# Patient Record
Sex: FEMALE | Race: WHITE | NOT HISPANIC OR LATINO | Employment: OTHER | URBAN - METROPOLITAN AREA
[De-identification: names, ages, dates, MRNs, and addresses within clinical notes are randomized per-mention and may not be internally consistent; named-entity substitution may affect disease eponyms.]

---

## 2017-04-11 ENCOUNTER — HISTORICAL (OUTPATIENT)
Dept: ADMINISTRATIVE | Facility: HOSPITAL | Age: 42
End: 2017-04-11

## 2017-04-11 LAB
BUN SERPL-MCNC: 9 MG/DL (ref 7–25)
CALCIUM SERPL-MCNC: 9.4 MG/DL (ref 8.4–10.3)
CHLORIDE SERPL-SCNC: 102 MMOL/L (ref 96–110)
CHOLEST SERPL-MCNC: 270 MG/DL
CHOLEST/HDLC SERPL: 7.3 {RATIO} (ref 0–4.4)
CO2 SERPL-SCNC: 28 MMOL/L (ref 24–32)
CREAT SERPL-MCNC: 0.65 MG/DL (ref 0.7–1.1)
CREAT UR-MCNC: 134.8 MG/DL
CREAT UR-MCNC: 134.8 MG/DL
EST. AVERAGE GLUCOSE BLD GHB EST-MCNC: 272 MG/DL
GLUCOSE SERPL-MCNC: 217 MG/DL (ref 65–99)
HBA1C MFR BLD: 11.1 % (ref 4.7–5.6)
HDLC SERPL-MCNC: 37 MG/DL
LDLC SERPL CALC-MCNC: 176 MG/DL (ref 0–130)
MICROALBUMIN UR-MCNC: <2 MG/L (ref 0–19)
MICROALBUMIN/CREAT RATIO PNL UR: <1.5 MCG/MG CR (ref 0–29)
POTASSIUM SERPL-SCNC: 4.2 MMOL/L (ref 3.6–5.2)
PROT UR STRIP-MCNC: 9 MG/DL
PROT/CREAT UR-RTO: 66.8 MG/GM
SODIUM SERPL-SCNC: 138 MMOL/L (ref 135–146)
TRIGL SERPL-MCNC: 287 MG/DL
VLDLC SERPL CALC-MCNC: 57 MG/DL

## 2018-04-16 ENCOUNTER — HISTORICAL (OUTPATIENT)
Dept: ADMINISTRATIVE | Facility: HOSPITAL | Age: 43
End: 2018-04-16

## 2018-04-16 LAB
ABS NEUT (OLG): 6.23 X10(3)/MCL (ref 2.1–9.2)
ALBUMIN SERPL-MCNC: 3.7 GM/DL (ref 3.4–5)
ALBUMIN/GLOB SERPL: 1 RATIO (ref 1–2)
ALP SERPL-CCNC: 106 UNIT/L (ref 45–117)
ALT SERPL-CCNC: 15 UNIT/L (ref 12–78)
AST SERPL-CCNC: 6 UNIT/L (ref 15–37)
BASOPHILS # BLD AUTO: 0.06 X10(3)/MCL
BASOPHILS NFR BLD AUTO: 0 %
BILIRUB SERPL-MCNC: 0.1 MG/DL (ref 0.2–1)
BILIRUBIN DIRECT+TOT PNL SERPL-MCNC: <0.1 MG/DL
BILIRUBIN DIRECT+TOT PNL SERPL-MCNC: ABNORMAL MG/DL
BUN SERPL-MCNC: 5 MG/DL (ref 7–18)
CALCIUM SERPL-MCNC: 9.4 MG/DL (ref 8.5–10.1)
CHLORIDE SERPL-SCNC: 102 MMOL/L (ref 98–107)
CHOLEST SERPL-MCNC: 226 MG/DL
CHOLEST/HDLC SERPL: 6.8 {RATIO} (ref 0–4.4)
CO2 SERPL-SCNC: 31 MMOL/L (ref 21–32)
CREAT SERPL-MCNC: 0.7 MG/DL (ref 0.6–1.3)
EOSINOPHIL # BLD AUTO: 0.16 X10(3)/MCL
EOSINOPHIL NFR BLD AUTO: 1 %
ERYTHROCYTE [DISTWIDTH] IN BLOOD BY AUTOMATED COUNT: 12.3 % (ref 11.5–14.5)
EST. AVERAGE GLUCOSE BLD GHB EST-MCNC: 280 MG/DL
GLOBULIN SER-MCNC: 3.9 GM/ML (ref 2.3–3.5)
GLUCOSE SERPL-MCNC: 342 MG/DL (ref 74–106)
HBA1C MFR BLD: 11.4 % (ref 4.2–6.3)
HCT VFR BLD AUTO: 43.6 % (ref 35–46)
HDLC SERPL-MCNC: 33 MG/DL
HGB BLD-MCNC: 14.7 GM/DL (ref 12–16)
IMM GRANULOCYTES # BLD AUTO: 0.07 10*3/UL
IMM GRANULOCYTES NFR BLD AUTO: 1 %
LDLC SERPL CALC-MCNC: 146 MG/DL (ref 0–130)
LYMPHOCYTES # BLD AUTO: 4.6 X10(3)/MCL
LYMPHOCYTES NFR BLD AUTO: 39 % (ref 13–40)
MCH RBC QN AUTO: 29.6 PG (ref 26–34)
MCHC RBC AUTO-ENTMCNC: 33.7 GM/DL (ref 31–37)
MCV RBC AUTO: 87.9 FL (ref 80–100)
MONOCYTES # BLD AUTO: 0.55 X10(3)/MCL
MONOCYTES NFR BLD AUTO: 5 % (ref 4–12)
NEUTROPHILS # BLD AUTO: 6.23 X10(3)/MCL
NEUTROPHILS NFR BLD AUTO: 53 X10(3)/MCL
PLATELET # BLD AUTO: 305 X10(3)/MCL (ref 130–400)
PMV BLD AUTO: 12 FL (ref 7.4–10.4)
POTASSIUM SERPL-SCNC: 3.4 MMOL/L (ref 3.5–5.1)
PROT SERPL-MCNC: 7.6 GM/DL (ref 6.4–8.2)
RBC # BLD AUTO: 4.96 X10(6)/MCL (ref 4–5.2)
SODIUM SERPL-SCNC: 140 MMOL/L (ref 136–145)
T4 SERPL-MCNC: 10.2 MCG/DL (ref 4.8–13.9)
TRIGL SERPL-MCNC: 237 MG/DL
TSH SERPL-ACNC: 2.28 MIU/L (ref 0.36–3.74)
VLDLC SERPL CALC-MCNC: 47 MG/DL
WBC # SPEC AUTO: 11.7 X10(3)/MCL (ref 4.5–11)

## 2018-07-30 ENCOUNTER — HISTORICAL (OUTPATIENT)
Dept: RADIOLOGY | Facility: HOSPITAL | Age: 43
End: 2018-07-30

## 2018-08-14 ENCOUNTER — HISTORICAL (OUTPATIENT)
Dept: INTERNAL MEDICINE | Facility: CLINIC | Age: 43
End: 2018-08-14

## 2018-08-14 LAB
BUN SERPL-MCNC: 8 MG/DL (ref 7–18)
CALCIUM SERPL-MCNC: 9.2 MG/DL (ref 8.5–10.1)
CHLORIDE SERPL-SCNC: 98 MMOL/L (ref 98–107)
CHOLEST SERPL-MCNC: 256 MG/DL
CHOLEST/HDLC SERPL: 7.8 {RATIO} (ref 0–4.4)
CO2 SERPL-SCNC: 28 MMOL/L (ref 21–32)
CREAT SERPL-MCNC: 0.8 MG/DL (ref 0.6–1.3)
CREAT UR-MCNC: 92 MG/DL
CREAT/UREA NIT SERPL: 10
EST. AVERAGE GLUCOSE BLD GHB EST-MCNC: 252 MG/DL
GLUCOSE SERPL-MCNC: 323 MG/DL (ref 74–106)
HBA1C MFR BLD: 10.4 % (ref 4.2–6.3)
HDLC SERPL-MCNC: 33 MG/DL
LDLC SERPL CALC-MCNC: 153 MG/DL (ref 0–130)
MICROALBUMIN UR-MCNC: 9 MG/L (ref 0–19)
MICROALBUMIN/CREAT RATIO PNL UR: 9.8 MCG/MG CR (ref 0–29)
POTASSIUM SERPL-SCNC: 3.9 MMOL/L (ref 3.5–5.1)
SODIUM SERPL-SCNC: 135 MMOL/L (ref 136–145)
TRIGL SERPL-MCNC: 349 MG/DL
VLDLC SERPL CALC-MCNC: 70 MG/DL

## 2018-10-17 LAB — POC BETA-HCG (QUAL): NEGATIVE

## 2019-02-07 ENCOUNTER — HISTORICAL (OUTPATIENT)
Dept: ADMINISTRATIVE | Facility: HOSPITAL | Age: 44
End: 2019-02-07

## 2019-02-07 ENCOUNTER — HISTORICAL (OUTPATIENT)
Dept: INTERNAL MEDICINE | Facility: CLINIC | Age: 44
End: 2019-02-07

## 2019-02-07 LAB
ALBUMIN SERPL-MCNC: 3.6 GM/DL (ref 3.4–5)
ALBUMIN/GLOB SERPL: 0.8 RATIO (ref 1.1–2)
ALP SERPL-CCNC: 109 UNIT/L (ref 45–117)
ALT SERPL-CCNC: 11 UNIT/L (ref 12–78)
AST SERPL-CCNC: 14 UNIT/L (ref 15–37)
BILIRUB SERPL-MCNC: 0.3 MG/DL (ref 0.2–1)
BILIRUBIN DIRECT+TOT PNL SERPL-MCNC: <0.1 MG/DL
BILIRUBIN DIRECT+TOT PNL SERPL-MCNC: ABNORMAL MG/DL
BUN SERPL-MCNC: 5 MG/DL (ref 7–18)
CALCIUM SERPL-MCNC: 9.4 MG/DL (ref 8.5–10.1)
CHLORIDE SERPL-SCNC: 95 MMOL/L (ref 98–107)
CHOLEST SERPL-MCNC: 225 MG/DL
CHOLEST/HDLC SERPL: 6.2 {RATIO} (ref 0–4.4)
CK MB SERPL-MCNC: 1.4 NG/ML (ref 1–3.6)
CK SERPL-CCNC: 65 UNIT/L (ref 26–192)
CO2 SERPL-SCNC: 26 MMOL/L (ref 21–32)
CREAT SERPL-MCNC: 0.8 MG/DL (ref 0.6–1.3)
CREAT UR-MCNC: 36 MG/DL
EST. AVERAGE GLUCOSE BLD GHB EST-MCNC: 260 MG/DL
GLOBULIN SER-MCNC: 4.7 GM/ML (ref 2.3–3.5)
GLUCOSE SERPL-MCNC: 372 MG/DL (ref 74–106)
HBA1C MFR BLD: 10.7 % (ref 4.2–6.3)
HDLC SERPL-MCNC: 36 MG/DL
LDLC SERPL CALC-MCNC: 153 MG/DL (ref 0–130)
MAGNESIUM SERPL-MCNC: 2 MG/DL (ref 1.6–2.6)
MICROALBUMIN UR-MCNC: <5 MG/L (ref 0–19)
MICROALBUMIN/CREAT RATIO PNL UR: <13.9 MCG/MG CR (ref 0–29)
POTASSIUM SERPL-SCNC: 3.1 MMOL/L (ref 3.5–5.1)
PROT SERPL-MCNC: 8.3 GM/DL (ref 6.4–8.2)
SODIUM SERPL-SCNC: 133 MMOL/L (ref 136–145)
TRIGL SERPL-MCNC: 178 MG/DL
TROPONIN I SERPL-MCNC: 2.81 NG/ML (ref 0–0.05)
VLDLC SERPL CALC-MCNC: 36 MG/DL

## 2019-02-08 ENCOUNTER — HOSPITAL ENCOUNTER (OUTPATIENT)
Dept: MEDSURG UNIT | Facility: HOSPITAL | Age: 44
End: 2019-02-09
Attending: INTERNAL MEDICINE | Admitting: INTERNAL MEDICINE

## 2019-02-09 LAB
ABS NEUT (OLG): 7.47 X10(3)/MCL (ref 2.1–9.2)
ALBUMIN SERPL-MCNC: 2.6 GM/DL (ref 3.4–5)
ALBUMIN/GLOB SERPL: 0.9 RATIO (ref 1.1–2)
ALP SERPL-CCNC: 91 UNIT/L (ref 38–126)
ALT SERPL-CCNC: 8 UNIT/L (ref 12–78)
AST SERPL-CCNC: 6 UNIT/L (ref 15–37)
BASOPHILS # BLD AUTO: 0 X10(3)/MCL (ref 0–0.2)
BASOPHILS NFR BLD AUTO: 0 %
BILIRUB SERPL-MCNC: 0.1 MG/DL (ref 0.2–1)
BILIRUBIN DIRECT+TOT PNL SERPL-MCNC: 0 MG/DL (ref 0–0.5)
BILIRUBIN DIRECT+TOT PNL SERPL-MCNC: 0.1 MG/DL (ref 0–0.8)
BUN SERPL-MCNC: 4 MG/DL (ref 7–18)
CALCIUM SERPL-MCNC: 8.3 MG/DL (ref 8.5–10.1)
CHLORIDE SERPL-SCNC: 106 MMOL/L (ref 98–107)
CO2 SERPL-SCNC: 28 MMOL/L (ref 21–32)
CREAT SERPL-MCNC: 0.63 MG/DL (ref 0.55–1.02)
EOSINOPHIL # BLD AUTO: 0.1 X10(3)/MCL (ref 0–0.9)
EOSINOPHIL NFR BLD AUTO: 1 %
ERYTHROCYTE [DISTWIDTH] IN BLOOD BY AUTOMATED COUNT: 12.8 % (ref 11.5–17)
GLOBULIN SER-MCNC: 3 GM/DL (ref 2.4–3.5)
GLUCOSE SERPL-MCNC: 292 MG/DL (ref 74–106)
HCT VFR BLD AUTO: 33 % (ref 37–47)
HGB BLD-MCNC: 10.6 GM/DL (ref 12–16)
LYMPHOCYTES # BLD AUTO: 2.9 X10(3)/MCL (ref 0.6–4.6)
LYMPHOCYTES NFR BLD AUTO: 26 %
MCH RBC QN AUTO: 29 PG (ref 27–31)
MCHC RBC AUTO-ENTMCNC: 32.1 GM/DL (ref 33–36)
MCV RBC AUTO: 90.2 FL (ref 80–94)
MONOCYTES # BLD AUTO: 0.7 X10(3)/MCL (ref 0.1–1.3)
MONOCYTES NFR BLD AUTO: 6 %
NEUTROPHILS # BLD AUTO: 7.47 X10(3)/MCL (ref 2.1–9.2)
NEUTROPHILS NFR BLD AUTO: 66 %
PLATELET # BLD AUTO: 285 X10(3)/MCL (ref 130–400)
PMV BLD AUTO: 11.1 FL (ref 9.4–12.4)
POTASSIUM SERPL-SCNC: 3.9 MMOL/L (ref 3.5–5.1)
PROT SERPL-MCNC: 5.6 GM/DL (ref 6.4–8.2)
RBC # BLD AUTO: 3.66 X10(6)/MCL (ref 4.2–5.4)
SODIUM SERPL-SCNC: 139 MMOL/L (ref 136–145)
WBC # SPEC AUTO: 11.4 X10(3)/MCL (ref 4.5–11.5)

## 2019-06-27 ENCOUNTER — HISTORICAL (OUTPATIENT)
Dept: INTERNAL MEDICINE | Facility: CLINIC | Age: 44
End: 2019-06-27

## 2019-06-27 LAB
CHOLEST SERPL-MCNC: 157 MG/DL
CHOLEST/HDLC SERPL: 5.6 {RATIO} (ref 0–4.4)
EST. AVERAGE GLUCOSE BLD GHB EST-MCNC: 232 MG/DL
HBA1C MFR BLD: 9.7 % (ref 4.2–6.3)
HDLC SERPL-MCNC: 28 MG/DL
LDLC SERPL CALC-MCNC: 92 MG/DL (ref 0–130)
TRIGL SERPL-MCNC: 183 MG/DL
VLDLC SERPL CALC-MCNC: 37 MG/DL

## 2019-09-10 ENCOUNTER — HISTORICAL (OUTPATIENT)
Dept: ADMINISTRATIVE | Facility: HOSPITAL | Age: 44
End: 2019-09-10

## 2019-09-10 LAB
ABS NEUT (OLG): 7.95 X10(3)/MCL (ref 2.1–9.2)
ALBUMIN SERPL-MCNC: 4.1 GM/DL (ref 3.4–5)
ALBUMIN/GLOB SERPL: 1.1 RATIO (ref 1.1–2)
ALP SERPL-CCNC: 133 UNIT/L (ref 45–117)
ALT SERPL-CCNC: 12 UNIT/L (ref 12–78)
AST SERPL-CCNC: 5 UNIT/L (ref 15–37)
BASOPHILS # BLD AUTO: 0.1 X10(3)/MCL (ref 0–0.2)
BASOPHILS NFR BLD AUTO: 0 %
BILIRUB SERPL-MCNC: 0.2 MG/DL (ref 0.2–1)
BILIRUBIN DIRECT+TOT PNL SERPL-MCNC: <0.1 MG/DL (ref 0–0.2)
BILIRUBIN DIRECT+TOT PNL SERPL-MCNC: ABNORMAL MG/DL
BUN SERPL-MCNC: 7 MG/DL (ref 7–18)
CALCIUM SERPL-MCNC: 9.5 MG/DL (ref 8.5–10.1)
CHLORIDE SERPL-SCNC: 100 MMOL/L (ref 98–107)
CO2 SERPL-SCNC: 26 MMOL/L (ref 21–32)
CREAT SERPL-MCNC: 0.8 MG/DL (ref 0.6–1.3)
EOSINOPHIL # BLD AUTO: 0.2 X10(3)/MCL (ref 0–0.9)
EOSINOPHIL NFR BLD AUTO: 1 %
ERYTHROCYTE [DISTWIDTH] IN BLOOD BY AUTOMATED COUNT: 14.3 % (ref 11.5–14.5)
EST. AVERAGE GLUCOSE BLD GHB EST-MCNC: 292 MG/DL
GLOBULIN SER-MCNC: 3.9 GM/ML (ref 2.3–3.5)
GLUCOSE SERPL-MCNC: 357 MG/DL (ref 74–106)
HBA1C MFR BLD: 11.8 % (ref 4.2–6.3)
HCT VFR BLD AUTO: 39.5 % (ref 35–46)
HGB BLD-MCNC: 11.9 GM/DL (ref 12–16)
IMM GRANULOCYTES # BLD AUTO: 0.1 10*3/UL
IMM GRANULOCYTES NFR BLD AUTO: 1 %
LYMPHOCYTES # BLD AUTO: 3.8 X10(3)/MCL (ref 0.6–4.6)
LYMPHOCYTES NFR BLD AUTO: 30 %
MCH RBC QN AUTO: 22.7 PG (ref 26–34)
MCHC RBC AUTO-ENTMCNC: 30.1 GM/DL (ref 31–37)
MCV RBC AUTO: 75.2 FL (ref 80–100)
MONOCYTES # BLD AUTO: 0.8 X10(3)/MCL (ref 0.1–1.3)
MONOCYTES NFR BLD AUTO: 6 %
NEUTROPHILS # BLD AUTO: 7.95 X10(3)/MCL (ref 2.1–9.2)
NEUTROPHILS NFR BLD AUTO: 62 %
PLATELET # BLD AUTO: 487 X10(3)/MCL (ref 130–400)
PMV BLD AUTO: 11.5 FL (ref 7.4–10.4)
POTASSIUM SERPL-SCNC: 3.6 MMOL/L (ref 3.5–5.1)
PROT SERPL-MCNC: 8 GM/DL (ref 6.4–8.2)
RBC # BLD AUTO: 5.25 X10(6)/MCL (ref 4–5.2)
SODIUM SERPL-SCNC: 136 MMOL/L (ref 136–145)
TSH SERPL-ACNC: 2.55 MIU/L (ref 0.36–3.74)
WBC # SPEC AUTO: 12.9 X10(3)/MCL (ref 4.5–11)

## 2019-12-09 ENCOUNTER — HISTORICAL (OUTPATIENT)
Dept: INTERNAL MEDICINE | Facility: CLINIC | Age: 44
End: 2019-12-09

## 2019-12-09 LAB
ABS NEUT (OLG): 9.09 X10(3)/MCL (ref 2.1–9.2)
ALBUMIN SERPL-MCNC: 3.8 GM/DL (ref 3.4–5)
ALBUMIN/GLOB SERPL: 1 RATIO (ref 1.1–2)
ALP SERPL-CCNC: 111 UNIT/L (ref 45–117)
ALT SERPL-CCNC: 17 UNIT/L (ref 12–78)
APPEARANCE, UA: ABNORMAL
AST SERPL-CCNC: 6 UNIT/L (ref 15–37)
BACTERIA #/AREA URNS AUTO: ABNORMAL /HPF
BASOPHILS # BLD AUTO: 0.1 X10(3)/MCL (ref 0–0.2)
BASOPHILS NFR BLD AUTO: 0 %
BILIRUB SERPL-MCNC: 0.1 MG/DL (ref 0.2–1)
BILIRUB UR QL STRIP: NEGATIVE
BILIRUBIN DIRECT+TOT PNL SERPL-MCNC: <0.1 MG/DL (ref 0–0.2)
BILIRUBIN DIRECT+TOT PNL SERPL-MCNC: >0 MG/DL
BUN SERPL-MCNC: 4 MG/DL (ref 7–18)
CALCIUM SERPL-MCNC: 9.2 MG/DL (ref 8.5–10.1)
CHLORIDE SERPL-SCNC: 104 MMOL/L (ref 98–107)
CHOLEST SERPL-MCNC: 145 MG/DL
CHOLEST/HDLC SERPL: 4.3 {RATIO} (ref 0–4.4)
CO2 SERPL-SCNC: 27 MMOL/L (ref 21–32)
COLOR UR: YELLOW
CREAT SERPL-MCNC: 0.7 MG/DL (ref 0.6–1.3)
CREAT UR-MCNC: 180 MG/DL
DEPRECATED CALCIDIOL+CALCIFEROL SERPL-MC: 27.28 NG/ML (ref 30–80)
EOSINOPHIL # BLD AUTO: 0.2 X10(3)/MCL (ref 0–0.9)
EOSINOPHIL NFR BLD AUTO: 1 %
ERYTHROCYTE [DISTWIDTH] IN BLOOD BY AUTOMATED COUNT: 16 % (ref 11.5–14.5)
EST. AVERAGE GLUCOSE BLD GHB EST-MCNC: 220 MG/DL
GLOBULIN SER-MCNC: 3.7 GM/ML (ref 2.3–3.5)
GLUCOSE (UA): 300 MG/DL
GLUCOSE SERPL-MCNC: 228 MG/DL (ref 74–106)
HBA1C MFR BLD: 9.3 % (ref 4.2–6.3)
HCT VFR BLD AUTO: 44.6 % (ref 35–46)
HDLC SERPL-MCNC: 34 MG/DL (ref 40–59)
HGB BLD-MCNC: 14.5 GM/DL (ref 12–16)
HGB UR QL STRIP: NEGATIVE
HYALINE CASTS #/AREA URNS LPF: ABNORMAL /LPF
IMM GRANULOCYTES # BLD AUTO: 0.09 10*3/UL
IMM GRANULOCYTES NFR BLD AUTO: 1 %
KETONES UR QL STRIP: ABNORMAL
LDLC SERPL CALC-MCNC: 73 MG/DL
LEUKOCYTE ESTERASE UR QL STRIP: 250 LEU/UL
LYMPHOCYTES # BLD AUTO: 3.2 X10(3)/MCL (ref 0.6–4.6)
LYMPHOCYTES NFR BLD AUTO: 24 %
MCH RBC QN AUTO: 27.2 PG (ref 26–34)
MCHC RBC AUTO-ENTMCNC: 32.5 GM/DL (ref 31–37)
MCV RBC AUTO: 83.7 FL (ref 80–100)
MICROALBUMIN UR-MCNC: 18.6 MG/L (ref 0–19)
MICROALBUMIN/CREAT RATIO PNL UR: 10.3 MCG/MG CR (ref 0–29)
MONOCYTES # BLD AUTO: 0.6 X10(3)/MCL (ref 0.1–1.3)
MONOCYTES NFR BLD AUTO: 5 %
MUCOUS THREADS URNS QL MICRO: ABNORMAL
NEUTROPHILS # BLD AUTO: 9.09 X10(3)/MCL (ref 2.1–9.2)
NEUTROPHILS NFR BLD AUTO: 69 %
NITRITE UR QL STRIP: NEGATIVE
PH UR STRIP: 6 [PH] (ref 4.5–8)
PLATELET # BLD AUTO: 361 X10(3)/MCL (ref 130–400)
PMV BLD AUTO: 11.1 FL (ref 7.4–10.4)
POTASSIUM SERPL-SCNC: 3.7 MMOL/L (ref 3.5–5.1)
PROT SERPL-MCNC: 7.5 GM/DL (ref 6.4–8.2)
PROT UR QL STRIP: 30 MG/DL
RBC # BLD AUTO: 5.33 X10(6)/MCL (ref 4–5.2)
RBC #/AREA URNS AUTO: ABNORMAL /HPF
SODIUM SERPL-SCNC: 138 MMOL/L (ref 136–145)
SP GR UR STRIP: 1.02 (ref 1–1.03)
SQUAMOUS #/AREA URNS LPF: >100 /LPF
TRIGL SERPL-MCNC: 188 MG/DL
TSH SERPL-ACNC: 3.56 MIU/L (ref 0.36–3.74)
UROBILINOGEN UR STRIP-ACNC: 4 MG/DL
VLDLC SERPL CALC-MCNC: 38 MG/DL
WBC # SPEC AUTO: 13.2 X10(3)/MCL (ref 4.5–11)
WBC #/AREA URNS AUTO: ABNORMAL /HPF

## 2020-03-09 ENCOUNTER — HISTORICAL (OUTPATIENT)
Dept: INTERNAL MEDICINE | Facility: CLINIC | Age: 45
End: 2020-03-09

## 2020-03-09 ENCOUNTER — HISTORICAL (OUTPATIENT)
Dept: ADMINISTRATIVE | Facility: HOSPITAL | Age: 45
End: 2020-03-09

## 2020-03-09 LAB
ALBUMIN SERPL-MCNC: 3.5 GM/DL (ref 3.4–5)
ALBUMIN/GLOB SERPL: 0.9 RATIO (ref 1.1–2)
ALP SERPL-CCNC: 80 UNIT/L (ref 45–117)
ALT SERPL-CCNC: 17 UNIT/L (ref 12–78)
AST SERPL-CCNC: 7 UNIT/L (ref 15–37)
BILIRUB SERPL-MCNC: 0.1 MG/DL (ref 0.2–1)
BILIRUBIN DIRECT+TOT PNL SERPL-MCNC: <0.1 MG/DL (ref 0–0.2)
BILIRUBIN DIRECT+TOT PNL SERPL-MCNC: ABNORMAL MG/DL
BUN SERPL-MCNC: 3 MG/DL (ref 7–18)
CALCIUM SERPL-MCNC: 8.9 MG/DL (ref 8.5–10.1)
CHLORIDE SERPL-SCNC: 106 MMOL/L (ref 98–107)
CHOLEST SERPL-MCNC: 131 MG/DL
CHOLEST/HDLC SERPL: 3.7 {RATIO} (ref 0–4.4)
CO2 SERPL-SCNC: 26 MMOL/L (ref 21–32)
CREAT SERPL-MCNC: 0.6 MG/DL (ref 0.6–1.3)
CREAT UR-MCNC: 49 MG/DL
DEPRECATED CALCIDIOL+CALCIFEROL SERPL-MC: 29.8 NG/ML (ref 30–80)
EST. AVERAGE GLUCOSE BLD GHB EST-MCNC: 160 MG/DL
GLOBULIN SER-MCNC: 3.7 GM/ML (ref 2.3–3.5)
GLUCOSE SERPL-MCNC: 139 MG/DL (ref 74–106)
HBA1C MFR BLD: 7.2 % (ref 4.2–6.3)
HDLC SERPL-MCNC: 35 MG/DL (ref 40–59)
LDLC SERPL CALC-MCNC: 71 MG/DL
MICROALBUMIN UR-MCNC: 5.1 MG/L (ref 0–19)
MICROALBUMIN/CREAT RATIO PNL UR: 10.4 MCG/MG CR (ref 0–29)
POTASSIUM SERPL-SCNC: 4.4 MMOL/L (ref 3.5–5.1)
PROT SERPL-MCNC: 7.2 GM/DL (ref 6.4–8.2)
SODIUM SERPL-SCNC: 139 MMOL/L (ref 136–145)
TRIGL SERPL-MCNC: 123 MG/DL
VLDLC SERPL CALC-MCNC: 25 MG/DL

## 2020-06-11 ENCOUNTER — HISTORICAL (OUTPATIENT)
Dept: RADIOLOGY | Facility: HOSPITAL | Age: 45
End: 2020-06-11

## 2020-06-23 ENCOUNTER — HISTORICAL (OUTPATIENT)
Dept: ADMINISTRATIVE | Facility: HOSPITAL | Age: 45
End: 2020-06-23

## 2020-06-23 LAB
ABS NEUT (OLG): 10.83 X10(3)/MCL (ref 2.1–9.2)
ALBUMIN SERPL-MCNC: 3.8 GM/DL (ref 3.4–5)
ALBUMIN/GLOB SERPL: 0.9 RATIO (ref 1.1–2)
ALP SERPL-CCNC: 83 UNIT/L (ref 45–117)
ALT SERPL-CCNC: 16 UNIT/L (ref 12–78)
APPEARANCE, UA: CLEAR
AST SERPL-CCNC: 11 UNIT/L (ref 15–37)
BACTERIA #/AREA URNS AUTO: ABNORMAL /HPF
BASOPHILS # BLD AUTO: 0 X10(3)/MCL (ref 0–0.2)
BASOPHILS NFR BLD AUTO: 0 %
BILIRUB SERPL-MCNC: 0.2 MG/DL (ref 0.2–1)
BILIRUB UR QL STRIP: NEGATIVE
BILIRUBIN DIRECT+TOT PNL SERPL-MCNC: <0.1 MG/DL (ref 0–0.2)
BILIRUBIN DIRECT+TOT PNL SERPL-MCNC: ABNORMAL MG/DL
BUN SERPL-MCNC: 4 MG/DL (ref 7–18)
CALCIUM SERPL-MCNC: 9.2 MG/DL (ref 8.5–10.1)
CHLORIDE SERPL-SCNC: 105 MMOL/L (ref 98–107)
CO2 SERPL-SCNC: 27 MMOL/L (ref 21–32)
COLOR UR: NORMAL
CREAT SERPL-MCNC: 0.7 MG/DL (ref 0.6–1.3)
DEPRECATED CALCIDIOL+CALCIFEROL SERPL-MC: 38.4 NG/ML (ref 30–80)
EOSINOPHIL # BLD AUTO: 0.1 X10(3)/MCL (ref 0–0.9)
EOSINOPHIL NFR BLD AUTO: 1 %
ERYTHROCYTE [DISTWIDTH] IN BLOOD BY AUTOMATED COUNT: 13.2 % (ref 11.5–14.5)
EST. AVERAGE GLUCOSE BLD GHB EST-MCNC: 171 MG/DL
GLOBULIN SER-MCNC: 4.4 GM/ML (ref 2.3–3.5)
GLUCOSE (UA): NEGATIVE
GLUCOSE SERPL-MCNC: 153 MG/DL (ref 74–106)
HBA1C MFR BLD: 7.6 % (ref 4.2–6.3)
HCT VFR BLD AUTO: 43.5 % (ref 35–46)
HGB BLD-MCNC: 14.2 GM/DL (ref 12–16)
HGB UR QL STRIP: 0.2
HYALINE CASTS #/AREA URNS LPF: ABNORMAL /LPF
IMM GRANULOCYTES # BLD AUTO: 0.12 10*3/UL
IMM GRANULOCYTES NFR BLD AUTO: 1 %
KETONES UR QL STRIP: NEGATIVE
LEUKOCYTE ESTERASE UR QL STRIP: NEGATIVE
LYMPHOCYTES # BLD AUTO: 2.9 X10(3)/MCL (ref 0.6–4.6)
LYMPHOCYTES NFR BLD AUTO: 20 %
MCH RBC QN AUTO: 27.9 PG (ref 26–34)
MCHC RBC AUTO-ENTMCNC: 32.6 GM/DL (ref 31–37)
MCV RBC AUTO: 85.5 FL (ref 80–100)
MONOCYTES # BLD AUTO: 0.6 X10(3)/MCL (ref 0.1–1.3)
MONOCYTES NFR BLD AUTO: 4 %
NEUTROPHILS # BLD AUTO: 10.83 X10(3)/MCL (ref 2.1–9.2)
NEUTROPHILS NFR BLD AUTO: 74 %
NITRITE UR QL STRIP: NEGATIVE
PH UR STRIP: 7 [PH] (ref 4.5–8)
PLATELET # BLD AUTO: 315 X10(3)/MCL (ref 130–400)
PMV BLD AUTO: 11.6 FL (ref 7.4–10.4)
POTASSIUM SERPL-SCNC: 3.8 MMOL/L (ref 3.5–5.1)
PROT SERPL-MCNC: 8.2 GM/DL (ref 6.4–8.2)
PROT UR QL STRIP: NEGATIVE
RBC # BLD AUTO: 5.09 X10(6)/MCL (ref 4–5.2)
RBC #/AREA URNS AUTO: ABNORMAL /HPF
SODIUM SERPL-SCNC: 137 MMOL/L (ref 136–145)
SP GR UR STRIP: 1.01 (ref 1–1.03)
SQUAMOUS #/AREA URNS LPF: ABNORMAL /LPF
UROBILINOGEN UR STRIP-ACNC: NORMAL
WBC # SPEC AUTO: 14.6 X10(3)/MCL (ref 4.5–11)
WBC #/AREA URNS AUTO: ABNORMAL /HPF

## 2020-06-25 LAB — FINAL CULTURE: NORMAL

## 2020-07-23 ENCOUNTER — HISTORICAL (OUTPATIENT)
Dept: RADIOLOGY | Facility: HOSPITAL | Age: 45
End: 2020-07-23

## 2020-08-12 LAB — POC BETA-HCG (QUAL): NEGATIVE

## 2020-08-26 ENCOUNTER — HISTORICAL (OUTPATIENT)
Dept: ADMINISTRATIVE | Facility: HOSPITAL | Age: 45
End: 2020-08-26

## 2020-08-26 LAB
ABS NEUT (OLG): 7.56 X10(3)/MCL (ref 2.1–9.2)
BASOPHILS # BLD AUTO: 0.1 X10(3)/MCL (ref 0–0.2)
BASOPHILS NFR BLD AUTO: 1 %
EOSINOPHIL # BLD AUTO: 0.2 X10(3)/MCL (ref 0–0.9)
EOSINOPHIL NFR BLD AUTO: 1 %
ERYTHROCYTE [DISTWIDTH] IN BLOOD BY AUTOMATED COUNT: 13.5 % (ref 11.5–14.5)
HCT VFR BLD AUTO: 42 % (ref 35–46)
HGB BLD-MCNC: 13.7 GM/DL (ref 12–16)
IMM GRANULOCYTES # BLD AUTO: 0.13 10*3/UL
IMM GRANULOCYTES NFR BLD AUTO: 1 %
LYMPHOCYTES # BLD AUTO: 3.9 X10(3)/MCL (ref 0.6–4.6)
LYMPHOCYTES NFR BLD AUTO: 31 %
MCH RBC QN AUTO: 28.6 PG (ref 26–34)
MCHC RBC AUTO-ENTMCNC: 32.6 GM/DL (ref 31–37)
MCV RBC AUTO: 87.7 FL (ref 80–100)
MONOCYTES # BLD AUTO: 0.6 X10(3)/MCL (ref 0.1–1.3)
MONOCYTES NFR BLD AUTO: 5 %
NEUTROPHILS # BLD AUTO: 7.56 X10(3)/MCL (ref 2.1–9.2)
NEUTROPHILS NFR BLD AUTO: 61 %
PLATELET # BLD AUTO: 371 X10(3)/MCL (ref 130–400)
PMV BLD AUTO: 10.6 FL (ref 7.4–10.4)
RBC # BLD AUTO: 4.79 X10(6)/MCL (ref 4–5.2)
WBC # SPEC AUTO: 12.4 X10(3)/MCL (ref 4.5–11)

## 2020-08-31 ENCOUNTER — HISTORICAL (OUTPATIENT)
Dept: ADMINISTRATIVE | Facility: HOSPITAL | Age: 45
End: 2020-08-31

## 2020-09-03 ENCOUNTER — HISTORICAL (OUTPATIENT)
Dept: SURGERY | Facility: HOSPITAL | Age: 45
End: 2020-09-03

## 2020-09-18 ENCOUNTER — HISTORICAL (OUTPATIENT)
Dept: ADMINISTRATIVE | Facility: HOSPITAL | Age: 45
End: 2020-09-18

## 2020-09-18 LAB
ABS NEUT (OLG): 9.69 X10(3)/MCL (ref 2.1–9.2)
BASOPHILS # BLD AUTO: 0.1 X10(3)/MCL (ref 0–0.2)
BASOPHILS NFR BLD AUTO: 1 %
BUN SERPL-MCNC: 6 MG/DL (ref 7–18)
CALCIUM SERPL-MCNC: 9.2 MG/DL (ref 8.5–10.1)
CHLORIDE SERPL-SCNC: 102 MMOL/L (ref 98–107)
CO2 SERPL-SCNC: 24 MMOL/L (ref 21–32)
CREAT SERPL-MCNC: 0.7 MG/DL (ref 0.6–1.3)
CREAT/UREA NIT SERPL: 9 MG/DL (ref 12–14)
EOSINOPHIL # BLD AUTO: 0.2 X10(3)/MCL (ref 0–0.9)
EOSINOPHIL NFR BLD AUTO: 2 %
ERYTHROCYTE [DISTWIDTH] IN BLOOD BY AUTOMATED COUNT: 13.5 % (ref 11.5–14.5)
GLUCOSE SERPL-MCNC: 162 MG/DL (ref 74–106)
HCT VFR BLD AUTO: 41.6 % (ref 35–46)
HGB BLD-MCNC: 13.5 GM/DL (ref 12–16)
IMM GRANULOCYTES # BLD AUTO: 0.25 10*3/UL
IMM GRANULOCYTES NFR BLD AUTO: 2 %
LYMPHOCYTES # BLD AUTO: 3.8 X10(3)/MCL (ref 0.6–4.6)
LYMPHOCYTES NFR BLD AUTO: 26 %
MCH RBC QN AUTO: 29.5 PG (ref 26–34)
MCHC RBC AUTO-ENTMCNC: 32.5 GM/DL (ref 31–37)
MCV RBC AUTO: 90.8 FL (ref 80–100)
MONOCYTES # BLD AUTO: 0.7 X10(3)/MCL (ref 0.1–1.3)
MONOCYTES NFR BLD AUTO: 5 %
NEUTROPHILS # BLD AUTO: 9.69 X10(3)/MCL (ref 2.1–9.2)
NEUTROPHILS NFR BLD AUTO: 65 %
PLATELET # BLD AUTO: 438 X10(3)/MCL (ref 130–400)
PMV BLD AUTO: 10.6 FL (ref 7.4–10.4)
POTASSIUM SERPL-SCNC: 4.2 MMOL/L (ref 3.5–5.1)
RBC # BLD AUTO: 4.58 X10(6)/MCL (ref 4–5.2)
SODIUM SERPL-SCNC: 134 MMOL/L (ref 136–145)
WBC # SPEC AUTO: 14.8 X10(3)/MCL (ref 4.5–11)

## 2020-09-22 ENCOUNTER — HISTORICAL (OUTPATIENT)
Dept: INTERNAL MEDICINE | Facility: CLINIC | Age: 45
End: 2020-09-22

## 2020-09-22 LAB
ABS NEUT (OLG): 9.2 X10(3)/MCL (ref 2.1–9.2)
ALBUMIN SERPL-MCNC: 3.6 GM/DL (ref 3.4–5)
ALBUMIN/GLOB SERPL: 0.9 RATIO (ref 1.1–2)
ALP SERPL-CCNC: 93 UNIT/L (ref 45–117)
ALT SERPL-CCNC: 34 UNIT/L (ref 12–78)
AST SERPL-CCNC: 34 UNIT/L (ref 15–37)
BASOPHILS # BLD AUTO: 0.1 X10(3)/MCL (ref 0–0.2)
BASOPHILS NFR BLD AUTO: 1 %
BILIRUB SERPL-MCNC: 0.2 MG/DL (ref 0.2–1)
BILIRUBIN DIRECT+TOT PNL SERPL-MCNC: <0.1 MG/DL (ref 0–0.2)
BILIRUBIN DIRECT+TOT PNL SERPL-MCNC: ABNORMAL MG/DL
BUN SERPL-MCNC: 8 MG/DL (ref 7–18)
CALCIUM SERPL-MCNC: 8.8 MG/DL (ref 8.5–10.1)
CHLORIDE SERPL-SCNC: 103 MMOL/L (ref 98–107)
CHOLEST SERPL-MCNC: 205 MG/DL
CHOLEST/HDLC SERPL: 5.4 {RATIO} (ref 0–4.4)
CO2 SERPL-SCNC: 23 MMOL/L (ref 21–32)
CREAT SERPL-MCNC: 0.9 MG/DL (ref 0.6–1.3)
DEPRECATED CALCIDIOL+CALCIFEROL SERPL-MC: 31.6 NG/ML (ref 30–80)
EOSINOPHIL # BLD AUTO: 0.3 X10(3)/MCL (ref 0–0.9)
EOSINOPHIL NFR BLD AUTO: 2 %
ERYTHROCYTE [DISTWIDTH] IN BLOOD BY AUTOMATED COUNT: 13.3 % (ref 11.5–14.5)
EST. AVERAGE GLUCOSE BLD GHB EST-MCNC: 180 MG/DL
GLOBULIN SER-MCNC: 3.8 GM/ML (ref 2.3–3.5)
GLUCOSE SERPL-MCNC: 251 MG/DL (ref 74–100)
HBA1C MFR BLD: 7.9 % (ref 4.2–6.3)
HCT VFR BLD AUTO: 41 % (ref 35–46)
HDLC SERPL-MCNC: 38 MG/DL (ref 40–59)
HGB BLD-MCNC: 13.3 GM/DL (ref 12–16)
IMM GRANULOCYTES # BLD AUTO: 0.29 10*3/UL
IMM GRANULOCYTES NFR BLD AUTO: 2 %
LDLC SERPL CALC-MCNC: 88 MG/DL
LYMPHOCYTES # BLD AUTO: 3.5 X10(3)/MCL (ref 0.6–4.6)
LYMPHOCYTES NFR BLD AUTO: 25 %
MCH RBC QN AUTO: 28.8 PG (ref 26–34)
MCHC RBC AUTO-ENTMCNC: 32.4 GM/DL (ref 31–37)
MCV RBC AUTO: 88.7 FL (ref 80–100)
MONOCYTES # BLD AUTO: 0.8 X10(3)/MCL (ref 0.1–1.3)
MONOCYTES NFR BLD AUTO: 6 %
NEUTROPHILS # BLD AUTO: 9.2 X10(3)/MCL (ref 2.1–9.2)
NEUTROPHILS NFR BLD AUTO: 65 %
PLATELET # BLD AUTO: 438 X10(3)/MCL (ref 130–400)
PMV BLD AUTO: 10.6 FL (ref 7.4–10.4)
POTASSIUM SERPL-SCNC: 4.3 MMOL/L (ref 3.5–5.1)
PROT SERPL-MCNC: 7.4 GM/DL (ref 6.4–8.2)
RBC # BLD AUTO: 4.62 X10(6)/MCL (ref 4–5.2)
SODIUM SERPL-SCNC: 135 MMOL/L (ref 136–145)
TRIGL SERPL-MCNC: 394 MG/DL
VLDLC SERPL CALC-MCNC: 79 MG/DL
WBC # SPEC AUTO: 14.1 X10(3)/MCL (ref 4.5–11)

## 2021-01-22 ENCOUNTER — HISTORICAL (OUTPATIENT)
Dept: INTERNAL MEDICINE | Facility: CLINIC | Age: 46
End: 2021-01-22

## 2021-01-22 LAB
ALBUMIN SERPL-MCNC: 4.1 GM/DL (ref 3.5–5)
ALBUMIN/GLOB SERPL: 1.1 RATIO (ref 1.1–2)
ALP SERPL-CCNC: 78 UNIT/L (ref 40–150)
ALT SERPL-CCNC: 15 UNIT/L (ref 0–55)
AST SERPL-CCNC: 19 UNIT/L (ref 5–34)
BILIRUB SERPL-MCNC: 0.2 MG/DL
BILIRUBIN DIRECT+TOT PNL SERPL-MCNC: 0.1 MG/DL (ref 0–0.5)
BILIRUBIN DIRECT+TOT PNL SERPL-MCNC: 0.1 MG/DL (ref 0–0.8)
BUN SERPL-MCNC: 4 MG/DL (ref 7–18.7)
CALCIUM SERPL-MCNC: 9.5 MG/DL (ref 8.4–10.2)
CHLORIDE SERPL-SCNC: 101 MMOL/L (ref 98–107)
CHOLEST SERPL-MCNC: 242 MG/DL
CHOLEST/HDLC SERPL: 7 {RATIO} (ref 0–5)
CO2 SERPL-SCNC: 28 MMOL/L (ref 22–29)
CREAT SERPL-MCNC: 0.72 MG/DL (ref 0.55–1.02)
CREAT UR-MCNC: 48.7 MG/DL (ref 45–106)
DEPRECATED CALCIDIOL+CALCIFEROL SERPL-MC: 30.1 NG/ML (ref 30–80)
EST. AVERAGE GLUCOSE BLD GHB EST-MCNC: 171.4 MG/DL
GLOBULIN SER-MCNC: 3.8 GM/DL (ref 2.4–3.5)
GLUCOSE SERPL-MCNC: 145 MG/DL (ref 74–100)
HBA1C MFR BLD: 7.6 %
HDLC SERPL-MCNC: 34 MG/DL (ref 35–60)
LDLC SERPL CALC-MCNC: ABNORMAL MG/DL (ref 50–140)
MICROALBUMIN UR-MCNC: <5 UG/ML
MICROALBUMIN/CREAT RATIO PNL UR: <10.3 MG/GM CR (ref 0–30)
POTASSIUM SERPL-SCNC: 4.4 MMOL/L (ref 3.5–5.1)
PROT SERPL-MCNC: 7.9 GM/DL (ref 6.4–8.3)
SODIUM SERPL-SCNC: 138 MMOL/L (ref 136–145)
TRIGL SERPL-MCNC: 409 MG/DL (ref 37–140)
VLDLC SERPL CALC-MCNC: ABNORMAL MG/DL

## 2021-03-22 ENCOUNTER — HISTORICAL (OUTPATIENT)
Dept: RADIOLOGY | Facility: HOSPITAL | Age: 46
End: 2021-03-22

## 2021-03-31 ENCOUNTER — HISTORICAL (OUTPATIENT)
Dept: RADIOLOGY | Facility: HOSPITAL | Age: 46
End: 2021-03-31

## 2021-04-14 ENCOUNTER — HISTORICAL (OUTPATIENT)
Dept: INTERNAL MEDICINE | Facility: CLINIC | Age: 46
End: 2021-04-14

## 2021-04-14 LAB
ABS NEUT (OLG): 7.37 X10(3)/MCL (ref 2.1–9.2)
ALBUMIN SERPL-MCNC: 3.8 GM/DL (ref 3.5–5)
ALBUMIN/GLOB SERPL: 1 RATIO (ref 1.1–2)
ALP SERPL-CCNC: 99 UNIT/L (ref 40–150)
ALT SERPL-CCNC: 18 UNIT/L (ref 0–55)
APPEARANCE, UA: CLEAR
AST SERPL-CCNC: 21 UNIT/L (ref 5–34)
BACTERIA #/AREA URNS AUTO: ABNORMAL /HPF
BASOPHILS # BLD AUTO: 0 X10(3)/MCL (ref 0–0.2)
BASOPHILS NFR BLD AUTO: 0 %
BILIRUB SERPL-MCNC: 0.4 MG/DL
BILIRUB UR QL STRIP: NEGATIVE
BILIRUBIN DIRECT+TOT PNL SERPL-MCNC: 0.1 MG/DL (ref 0–0.5)
BILIRUBIN DIRECT+TOT PNL SERPL-MCNC: 0.3 MG/DL (ref 0–0.8)
BUN SERPL-MCNC: 3.5 MG/DL (ref 7–18.7)
CALCIUM SERPL-MCNC: 9.9 MG/DL (ref 8.4–10.2)
CHLORIDE SERPL-SCNC: 100 MMOL/L (ref 98–107)
CHOLEST SERPL-MCNC: 266 MG/DL
CHOLEST/HDLC SERPL: 10 {RATIO} (ref 0–5)
CO2 SERPL-SCNC: 22 MMOL/L (ref 22–29)
COLOR UR: YELLOW
CREAT SERPL-MCNC: 0.8 MG/DL (ref 0.55–1.02)
EOSINOPHIL # BLD AUTO: 0.2 X10(3)/MCL (ref 0–0.9)
EOSINOPHIL NFR BLD AUTO: 1 %
ERYTHROCYTE [DISTWIDTH] IN BLOOD BY AUTOMATED COUNT: 13.1 % (ref 11.5–14.5)
GLOBULIN SER-MCNC: 3.8 GM/DL (ref 2.4–3.5)
GLUCOSE (UA): >1000 MG/DL
GLUCOSE SERPL-MCNC: 314 MG/DL (ref 74–100)
HCT VFR BLD AUTO: 43.2 % (ref 35–46)
HDLC SERPL-MCNC: 28 MG/DL (ref 35–60)
HGB BLD-MCNC: 14.5 GM/DL (ref 12–16)
HGB UR QL STRIP: NEGATIVE
HYALINE CASTS #/AREA URNS LPF: ABNORMAL /LPF
IMM GRANULOCYTES # BLD AUTO: 0.1 10*3/UL
IMM GRANULOCYTES NFR BLD AUTO: 1 %
KETONES UR QL STRIP: NEGATIVE
LDLC SERPL CALC-MCNC: 172 MG/DL (ref 50–140)
LEUKOCYTE ESTERASE UR QL STRIP: NEGATIVE
LYMPHOCYTES # BLD AUTO: 3.1 X10(3)/MCL (ref 0.6–4.6)
LYMPHOCYTES NFR BLD AUTO: 27 %
MCH RBC QN AUTO: 29.1 PG (ref 26–34)
MCHC RBC AUTO-ENTMCNC: 33.6 GM/DL (ref 31–37)
MCV RBC AUTO: 86.6 FL (ref 80–100)
MONOCYTES # BLD AUTO: 0.7 X10(3)/MCL (ref 0.1–1.3)
MONOCYTES NFR BLD AUTO: 6 %
NEUTROPHILS # BLD AUTO: 7.37 X10(3)/MCL (ref 2.1–9.2)
NEUTROPHILS NFR BLD AUTO: 64 %
NITRITE UR QL STRIP: NEGATIVE
PH UR STRIP: 6 [PH] (ref 4.5–8)
PLATELET # BLD AUTO: 270 X10(3)/MCL (ref 130–400)
PMV BLD AUTO: 11.7 FL (ref 7.4–10.4)
POTASSIUM SERPL-SCNC: 3.8 MMOL/L (ref 3.5–5.1)
PROT SERPL-MCNC: 7.6 GM/DL (ref 6.4–8.3)
PROT UR QL STRIP: NEGATIVE
RBC # BLD AUTO: 4.99 X10(6)/MCL (ref 4–5.2)
RBC #/AREA URNS AUTO: ABNORMAL /HPF
SODIUM SERPL-SCNC: 133 MMOL/L (ref 136–145)
SP GR UR STRIP: 1.01 (ref 1–1.03)
SQUAMOUS #/AREA URNS LPF: ABNORMAL /LPF
TRIGL SERPL-MCNC: 331 MG/DL (ref 37–140)
UROBILINOGEN UR STRIP-ACNC: NORMAL
VLDLC SERPL CALC-MCNC: 66 MG/DL
WBC # SPEC AUTO: 11.5 X10(3)/MCL (ref 4.5–11)
WBC #/AREA URNS AUTO: ABNORMAL /HPF

## 2021-06-23 ENCOUNTER — HOSPITAL ENCOUNTER (OUTPATIENT)
Dept: MEDSURG UNIT | Facility: HOSPITAL | Age: 46
End: 2021-06-25
Attending: INTERNAL MEDICINE | Admitting: INTERNAL MEDICINE

## 2021-06-23 LAB
ABS NEUT (OLG): 10 X10(3)/MCL (ref 2.1–9.2)
ALBUMIN SERPL-MCNC: 4 GM/DL (ref 3.5–5)
ALBUMIN/GLOB SERPL: 1.1 RATIO (ref 1.1–2)
ALP SERPL-CCNC: 85 UNIT/L (ref 40–150)
ALT SERPL-CCNC: 69 UNIT/L (ref 0–55)
APPEARANCE, UA: ABNORMAL
APTT PPP: 24.3 SECOND(S) (ref 23.3–37)
AST SERPL-CCNC: 92 UNIT/L (ref 5–34)
BACTERIA #/AREA URNS AUTO: ABNORMAL /HPF
BASOPHILS # BLD AUTO: 0.1 X10(3)/MCL (ref 0–0.2)
BASOPHILS NFR BLD AUTO: 1 %
BILIRUB SERPL-MCNC: 0.3 MG/DL
BILIRUB UR QL STRIP: NEGATIVE
BILIRUBIN DIRECT+TOT PNL SERPL-MCNC: 0.1 MG/DL (ref 0–0.5)
BILIRUBIN DIRECT+TOT PNL SERPL-MCNC: 0.2 MG/DL (ref 0–0.8)
BUN SERPL-MCNC: 8 MG/DL (ref 7–18.7)
CALCIUM SERPL-MCNC: 10.2 MG/DL (ref 8.4–10.2)
CHLORIDE SERPL-SCNC: 101 MMOL/L (ref 98–107)
CK MB SERPL-MCNC: 0.7 NG/ML
CK SERPL-CCNC: 77 U/L (ref 29–168)
CO2 SERPL-SCNC: 24 MMOL/L (ref 22–29)
COLOR UR: ABNORMAL
CREAT SERPL-MCNC: 0.74 MG/DL (ref 0.55–1.02)
EOSINOPHIL # BLD AUTO: 0.2 X10(3)/MCL (ref 0–0.9)
EOSINOPHIL NFR BLD AUTO: 1 %
ERYTHROCYTE [DISTWIDTH] IN BLOOD BY AUTOMATED COUNT: 12.8 % (ref 11.5–14.5)
GLOBULIN SER-MCNC: 3.7 GM/DL (ref 2.4–3.5)
GLUCOSE (UA): NEGATIVE
GLUCOSE SERPL-MCNC: 148 MG/DL (ref 74–100)
HCT VFR BLD AUTO: 40.7 % (ref 35–46)
HGB BLD-MCNC: 13.7 GM/DL (ref 12–16)
HGB UR QL STRIP: NEGATIVE
HYALINE CASTS #/AREA URNS LPF: ABNORMAL /LPF
IMM GRANULOCYTES # BLD AUTO: 0.44 10*3/UL
IMM GRANULOCYTES NFR BLD AUTO: 3 %
INR PPP: 0.98 (ref 0.9–1.2)
KETONES UR QL STRIP: NEGATIVE
LEUKOCYTE ESTERASE UR QL STRIP: NEGATIVE
LYMPHOCYTES # BLD AUTO: 4.1 X10(3)/MCL (ref 0.6–4.6)
LYMPHOCYTES NFR BLD AUTO: 26 %
MAGNESIUM SERPL-MCNC: 1.8 MG/DL (ref 1.6–2.6)
MCH RBC QN AUTO: 29.1 PG (ref 26–34)
MCHC RBC AUTO-ENTMCNC: 33.7 GM/DL (ref 31–37)
MCV RBC AUTO: 86.4 FL (ref 80–100)
MONOCYTES # BLD AUTO: 0.7 X10(3)/MCL (ref 0.1–1.3)
MONOCYTES NFR BLD AUTO: 5 %
NEUTROPHILS # BLD AUTO: 10 X10(3)/MCL (ref 2.1–9.2)
NEUTROPHILS NFR BLD AUTO: 64 %
NITRITE UR QL STRIP: NEGATIVE
NRBC BLD AUTO-RTO: 0 % (ref 0–0.2)
PH UR STRIP: 6.5 [PH] (ref 4.5–8)
PLATELET # BLD AUTO: 333 X10(3)/MCL (ref 130–400)
PMV BLD AUTO: 10.4 FL (ref 7.4–10.4)
POTASSIUM SERPL-SCNC: 4 MMOL/L (ref 3.5–5.1)
PROT SERPL-MCNC: 7.7 GM/DL (ref 6.4–8.3)
PROT UR QL STRIP: NEGATIVE
PROTHROMBIN TIME: 12.8 SECOND(S) (ref 11.9–14.4)
RBC # BLD AUTO: 4.71 X10(6)/MCL (ref 4–5.2)
RBC #/AREA URNS AUTO: ABNORMAL /HPF
SARS-COV-2 RNA RESP QL NAA+PROBE: NOT DETECTED
SODIUM SERPL-SCNC: 138 MMOL/L (ref 136–145)
SP GR UR STRIP: 1 (ref 1–1.03)
SQUAMOUS #/AREA URNS LPF: >100 /LPF
TROPONIN I SERPL-MCNC: 0.01 NG/ML (ref 0–0.04)
UROBILINOGEN UR STRIP-ACNC: NORMAL
WBC # SPEC AUTO: 15.5 X10(3)/MCL (ref 4.5–11)
WBC #/AREA URNS AUTO: ABNORMAL /HPF

## 2021-06-24 LAB
ABS NEUT (OLG): 8.06 X10(3)/MCL (ref 2.1–9.2)
ALBUMIN SERPL-MCNC: 3.4 GM/DL (ref 3.5–5)
ALBUMIN/GLOB SERPL: 1.1 RATIO (ref 1.1–2)
ALP SERPL-CCNC: 75 UNIT/L (ref 40–150)
ALT SERPL-CCNC: 74 UNIT/L (ref 0–55)
APTT PPP: 27.5 SECOND(S) (ref 23.3–37)
APTT PPP: 37.5 SECOND(S) (ref 23.3–37)
APTT PPP: 47.4 SECOND(S) (ref 23.3–37)
AST SERPL-CCNC: 105 UNIT/L (ref 5–34)
BASOPHILS # BLD AUTO: 0.1 X10(3)/MCL (ref 0–0.2)
BASOPHILS NFR BLD AUTO: 1 %
BILIRUB SERPL-MCNC: 0.3 MG/DL
BILIRUBIN DIRECT+TOT PNL SERPL-MCNC: 0.1 MG/DL (ref 0–0.8)
BILIRUBIN DIRECT+TOT PNL SERPL-MCNC: 0.2 MG/DL (ref 0–0.5)
BUN SERPL-MCNC: 9.2 MG/DL (ref 7–18.7)
CALCIUM SERPL-MCNC: 9.4 MG/DL (ref 8.4–10.2)
CHLORIDE SERPL-SCNC: 99 MMOL/L (ref 98–107)
CO2 SERPL-SCNC: 26 MMOL/L (ref 22–29)
CREAT SERPL-MCNC: 0.77 MG/DL (ref 0.55–1.02)
EOSINOPHIL # BLD AUTO: 0.2 X10(3)/MCL (ref 0–0.9)
EOSINOPHIL NFR BLD AUTO: 2 %
ERYTHROCYTE [DISTWIDTH] IN BLOOD BY AUTOMATED COUNT: 12.9 % (ref 11.5–14.5)
GLOBULIN SER-MCNC: 3.2 GM/DL (ref 2.4–3.5)
GLUCOSE SERPL-MCNC: 195 MG/DL (ref 74–100)
HCT VFR BLD AUTO: 35.8 % (ref 35–46)
HGB BLD-MCNC: 11.9 GM/DL (ref 12–16)
IMM GRANULOCYTES # BLD AUTO: 0.32 10*3/UL
IMM GRANULOCYTES NFR BLD AUTO: 2 %
INR PPP: 0.98 (ref 0.9–1.2)
INR PPP: 1.01 (ref 0.9–1.2)
LYMPHOCYTES # BLD AUTO: 3.8 X10(3)/MCL (ref 0.6–4.6)
LYMPHOCYTES NFR BLD AUTO: 29 %
MAGNESIUM SERPL-MCNC: 1.8 MG/DL (ref 1.6–2.6)
MCH RBC QN AUTO: 29.3 PG (ref 26–34)
MCHC RBC AUTO-ENTMCNC: 33.2 GM/DL (ref 31–37)
MCV RBC AUTO: 88.2 FL (ref 80–100)
MONOCYTES # BLD AUTO: 0.6 X10(3)/MCL (ref 0.1–1.3)
MONOCYTES NFR BLD AUTO: 5 %
NEUTROPHILS # BLD AUTO: 8.06 X10(3)/MCL (ref 2.1–9.2)
NEUTROPHILS NFR BLD AUTO: 61 %
NRBC BLD AUTO-RTO: 0 % (ref 0–0.2)
PHOSPHATE SERPL-MCNC: 5.4 MG/DL (ref 2.3–4.7)
PLATELET # BLD AUTO: 290 X10(3)/MCL (ref 130–400)
PMV BLD AUTO: 10.9 FL (ref 7.4–10.4)
POTASSIUM SERPL-SCNC: 4.6 MMOL/L (ref 3.5–5.1)
PROT SERPL-MCNC: 6.6 GM/DL (ref 6.4–8.3)
PROTHROMBIN TIME: 12.8 SECOND(S) (ref 11.9–14.4)
PROTHROMBIN TIME: 13.1 SECOND(S) (ref 11.9–14.4)
RBC # BLD AUTO: 4.06 X10(6)/MCL (ref 4–5.2)
SODIUM SERPL-SCNC: 135 MMOL/L (ref 136–145)
TROPONIN I SERPL-MCNC: <0.01 NG/ML (ref 0–0.04)
WBC # SPEC AUTO: 13.1 X10(3)/MCL (ref 4.5–11)

## 2021-06-25 LAB
ABS NEUT (OLG): 7.04 X10(3)/MCL (ref 2.1–9.2)
ALBUMIN SERPL-MCNC: 3.3 GM/DL (ref 3.5–5)
ALBUMIN/GLOB SERPL: 1 RATIO (ref 1.1–2)
ALP SERPL-CCNC: 96 UNIT/L (ref 40–150)
ALT SERPL-CCNC: 117 UNIT/L (ref 0–55)
APTT PPP: 65.8 SECOND(S) (ref 23.3–37)
APTT PPP: 67.9 SECOND(S) (ref 23.3–37)
APTT PPP: 69.5 SECOND(S) (ref 23.3–37)
AST SERPL-CCNC: 134 UNIT/L (ref 5–34)
BASOPHILS # BLD AUTO: 0.1 X10(3)/MCL (ref 0–0.2)
BASOPHILS NFR BLD AUTO: 1 %
BILIRUB SERPL-MCNC: 0.3 MG/DL
BILIRUBIN DIRECT+TOT PNL SERPL-MCNC: 0.1 MG/DL (ref 0–0.5)
BILIRUBIN DIRECT+TOT PNL SERPL-MCNC: 0.2 MG/DL (ref 0–0.8)
BUN SERPL-MCNC: 10.7 MG/DL (ref 7–18.7)
CALCIUM SERPL-MCNC: 9.2 MG/DL (ref 8.4–10.2)
CHLORIDE SERPL-SCNC: 100 MMOL/L (ref 98–107)
CO2 SERPL-SCNC: 26 MMOL/L (ref 22–29)
CREAT SERPL-MCNC: 0.76 MG/DL (ref 0.55–1.02)
EOSINOPHIL # BLD AUTO: 0.2 X10(3)/MCL (ref 0–0.9)
EOSINOPHIL NFR BLD AUTO: 2 %
ERYTHROCYTE [DISTWIDTH] IN BLOOD BY AUTOMATED COUNT: 12.5 % (ref 11.5–14.5)
GLOBULIN SER-MCNC: 3.3 GM/DL (ref 2.4–3.5)
GLUCOSE SERPL-MCNC: 238 MG/DL (ref 74–100)
HCT VFR BLD AUTO: 35.8 % (ref 35–46)
HGB BLD-MCNC: 12 GM/DL (ref 12–16)
IMM GRANULOCYTES # BLD AUTO: 0.18 10*3/UL
IMM GRANULOCYTES NFR BLD AUTO: 2 %
LYMPHOCYTES # BLD AUTO: 2.3 X10(3)/MCL (ref 0.6–4.6)
LYMPHOCYTES NFR BLD AUTO: 22 %
MAGNESIUM SERPL-MCNC: 1.9 MG/DL (ref 1.6–2.6)
MCH RBC QN AUTO: 28.8 PG (ref 26–34)
MCHC RBC AUTO-ENTMCNC: 33.5 GM/DL (ref 31–37)
MCV RBC AUTO: 85.9 FL (ref 80–100)
MONOCYTES # BLD AUTO: 0.4 X10(3)/MCL (ref 0.1–1.3)
MONOCYTES NFR BLD AUTO: 4 %
NEUTROPHILS # BLD AUTO: 7.04 X10(3)/MCL (ref 2.1–9.2)
NEUTROPHILS NFR BLD AUTO: 69 %
NRBC BLD AUTO-RTO: 0 % (ref 0–0.2)
PHOSPHATE SERPL-MCNC: 4 MG/DL (ref 2.3–4.7)
PLATELET # BLD AUTO: 285 X10(3)/MCL (ref 130–400)
PMV BLD AUTO: 11.4 FL (ref 7.4–10.4)
POTASSIUM SERPL-SCNC: 4.3 MMOL/L (ref 3.5–5.1)
PROT SERPL-MCNC: 6.6 GM/DL (ref 6.4–8.3)
RBC # BLD AUTO: 4.17 X10(6)/MCL (ref 4–5.2)
SODIUM SERPL-SCNC: 134 MMOL/L (ref 136–145)
TROPONIN I SERPL-MCNC: <0.01 NG/ML (ref 0–0.04)
WBC # SPEC AUTO: 10.2 X10(3)/MCL (ref 4.5–11)

## 2021-06-26 LAB — FINAL CULTURE: NO GROWTH

## 2021-07-19 ENCOUNTER — HOSPITAL ENCOUNTER (OUTPATIENT)
Dept: EMERGENCY MEDICINE | Facility: HOSPITAL | Age: 46
End: 2021-07-20
Attending: INTERNAL MEDICINE | Admitting: INTERNAL MEDICINE

## 2021-07-19 LAB
ABS NEUT (OLG): 17.45 X10(3)/MCL (ref 2.1–9.2)
ALBUMIN SERPL-MCNC: 3.6 GM/DL (ref 3.5–5)
ALBUMIN/GLOB SERPL: 0.9 RATIO (ref 1.1–2)
ALP SERPL-CCNC: 121 UNIT/L (ref 40–150)
ALT SERPL-CCNC: 8 UNIT/L (ref 0–55)
APTT PPP: 38.6 SECOND(S) (ref 23.3–37)
AST SERPL-CCNC: 8 UNIT/L (ref 5–34)
BASOPHILS # BLD AUTO: 0.1 X10(3)/MCL (ref 0–0.2)
BASOPHILS NFR BLD AUTO: 0 %
BILIRUB SERPL-MCNC: 0.6 MG/DL
BILIRUBIN DIRECT+TOT PNL SERPL-MCNC: 0.3 MG/DL (ref 0–0.5)
BILIRUBIN DIRECT+TOT PNL SERPL-MCNC: 0.3 MG/DL (ref 0–0.8)
BUN SERPL-MCNC: 7.7 MG/DL (ref 7–18.7)
CALCIUM SERPL-MCNC: 10.2 MG/DL (ref 8.4–10.2)
CHLORIDE SERPL-SCNC: 96 MMOL/L (ref 98–107)
CO2 SERPL-SCNC: 27 MMOL/L (ref 22–29)
CREAT SERPL-MCNC: 0.92 MG/DL (ref 0.55–1.02)
EOSINOPHIL # BLD AUTO: 0.1 X10(3)/MCL (ref 0–0.9)
EOSINOPHIL NFR BLD AUTO: 0 %
ERYTHROCYTE [DISTWIDTH] IN BLOOD BY AUTOMATED COUNT: 13.5 % (ref 11.5–14.5)
ERYTHROCYTE [SEDIMENTATION RATE] IN BLOOD: 91 MM/HR (ref 0–20)
GLOBULIN SER-MCNC: 4.2 GM/DL (ref 2.4–3.5)
GLUCOSE SERPL-MCNC: 234 MG/DL (ref 74–100)
HCT VFR BLD AUTO: 37.1 % (ref 35–46)
HGB BLD-MCNC: 12.2 GM/DL (ref 12–16)
IMM GRANULOCYTES # BLD AUTO: 0.18 10*3/UL
IMM GRANULOCYTES NFR BLD AUTO: 1 %
INR PPP: 1.28 (ref 0.9–1.2)
LACTATE SERPL-SCNC: 2 MMOL/L (ref 0.5–2.2)
LYMPHOCYTES # BLD AUTO: 2.1 X10(3)/MCL (ref 0.6–4.6)
LYMPHOCYTES NFR BLD AUTO: 10 %
MCH RBC QN AUTO: 28.8 PG (ref 26–34)
MCHC RBC AUTO-ENTMCNC: 32.9 GM/DL (ref 31–37)
MCV RBC AUTO: 87.7 FL (ref 80–100)
MONOCYTES # BLD AUTO: 1.3 X10(3)/MCL (ref 0.1–1.3)
MONOCYTES NFR BLD AUTO: 6 %
NEUTROPHILS # BLD AUTO: 17.45 X10(3)/MCL (ref 2.1–9.2)
NEUTROPHILS NFR BLD AUTO: 82 %
NRBC BLD AUTO-RTO: 0 % (ref 0–0.2)
PLATELET # BLD AUTO: 430 X10(3)/MCL (ref 130–400)
PMV BLD AUTO: 11.1 FL (ref 7.4–10.4)
POTASSIUM SERPL-SCNC: 3.9 MMOL/L (ref 3.5–5.1)
PROT SERPL-MCNC: 7.8 GM/DL (ref 6.4–8.3)
PROTHROMBIN TIME: 15.8 SECOND(S) (ref 11.9–14.4)
RBC # BLD AUTO: 4.23 X10(6)/MCL (ref 4–5.2)
SARS-COV-2 AG RESP QL IA.RAPID: NEGATIVE
SODIUM SERPL-SCNC: 135 MMOL/L (ref 136–145)
TROPONIN I SERPL-MCNC: <0.01 NG/ML (ref 0–0.04)
WBC # SPEC AUTO: 21.2 X10(3)/MCL (ref 4.5–11)

## 2021-07-20 LAB
ABS NEUT (OLG): 11.42 X10(3)/MCL (ref 2.1–9.2)
ALBUMIN SERPL-MCNC: 2.8 GM/DL (ref 3.5–5)
ALBUMIN/GLOB SERPL: 0.8 RATIO (ref 1.1–2)
ALP SERPL-CCNC: 99 UNIT/L (ref 40–150)
ALT SERPL-CCNC: 10 UNIT/L (ref 0–55)
AST SERPL-CCNC: 9 UNIT/L (ref 5–34)
BASOPHILS # BLD AUTO: 0 X10(3)/MCL (ref 0–0.2)
BASOPHILS NFR BLD AUTO: 0 %
BILIRUB SERPL-MCNC: 0.3 MG/DL
BILIRUBIN DIRECT+TOT PNL SERPL-MCNC: 0.1 MG/DL (ref 0–0.8)
BILIRUBIN DIRECT+TOT PNL SERPL-MCNC: 0.2 MG/DL (ref 0–0.5)
BUN SERPL-MCNC: 6.7 MG/DL (ref 7–18.7)
CALCIUM SERPL-MCNC: 9 MG/DL (ref 8.4–10.2)
CHLORIDE SERPL-SCNC: 101 MMOL/L (ref 98–107)
CO2 SERPL-SCNC: 27 MMOL/L (ref 22–29)
CREAT SERPL-MCNC: 0.7 MG/DL (ref 0.55–1.02)
EOSINOPHIL # BLD AUTO: 0.4 X10(3)/MCL (ref 0–0.9)
EOSINOPHIL NFR BLD AUTO: 2 %
ERYTHROCYTE [DISTWIDTH] IN BLOOD BY AUTOMATED COUNT: 13.4 % (ref 11.5–14.5)
GLOBULIN SER-MCNC: 3.6 GM/DL (ref 2.4–3.5)
GLUCOSE SERPL-MCNC: 178 MG/DL (ref 74–100)
HCT VFR BLD AUTO: 30.9 % (ref 35–46)
HGB BLD-MCNC: 9.9 GM/DL (ref 12–16)
IMM GRANULOCYTES # BLD AUTO: 0.16 10*3/UL
IMM GRANULOCYTES NFR BLD AUTO: 1 %
LYMPHOCYTES # BLD AUTO: 1.8 X10(3)/MCL (ref 0.6–4.6)
LYMPHOCYTES NFR BLD AUTO: 12 %
MAGNESIUM SERPL-MCNC: 2 MG/DL (ref 1.6–2.6)
MCH RBC QN AUTO: 28.4 PG (ref 26–34)
MCHC RBC AUTO-ENTMCNC: 32 GM/DL (ref 31–37)
MCV RBC AUTO: 88.8 FL (ref 80–100)
MONOCYTES # BLD AUTO: 1 X10(3)/MCL (ref 0.1–1.3)
MONOCYTES NFR BLD AUTO: 7 %
NEUTROPHILS # BLD AUTO: 11.42 X10(3)/MCL (ref 2.1–9.2)
NEUTROPHILS NFR BLD AUTO: 77 %
NRBC BLD AUTO-RTO: 0 % (ref 0–0.2)
PHOSPHATE SERPL-MCNC: 2.7 MG/DL (ref 2.3–4.7)
PLATELET # BLD AUTO: 339 X10(3)/MCL (ref 130–400)
PMV BLD AUTO: 10.9 FL (ref 7.4–10.4)
POTASSIUM SERPL-SCNC: 4 MMOL/L (ref 3.5–5.1)
PROT SERPL-MCNC: 6.4 GM/DL (ref 6.4–8.3)
RBC # BLD AUTO: 3.48 X10(6)/MCL (ref 4–5.2)
SODIUM SERPL-SCNC: 135 MMOL/L (ref 136–145)
WBC # SPEC AUTO: 14.8 X10(3)/MCL (ref 4.5–11)

## 2021-07-24 LAB — FINAL CULTURE: NORMAL

## 2021-08-06 LAB
ABS NEUT (OLG): 4.11 X10(3)/MCL (ref 2.1–9.2)
BASOPHILS # BLD AUTO: 0.07 X10(3)/MCL (ref 0–0.2)
BASOPHILS NFR BLD AUTO: 0.9 % (ref 0–1)
BUN SERPL-MCNC: 9.8 MG/DL (ref 7–18.7)
CALCIUM SERPL-MCNC: 9.9 MG/DL (ref 8.4–10.2)
CHLORIDE SERPL-SCNC: 101 MMOL/L (ref 98–107)
CO2 SERPL-SCNC: 29 MMOL/L (ref 22–29)
CREAT SERPL-MCNC: 0.78 MG/DL (ref 0.57–1.11)
CREAT/UREA NIT SERPL: 13
EOSINOPHIL # BLD AUTO: 0.39 X10(3)/MCL (ref 0–0.9)
EOSINOPHIL NFR BLD AUTO: 4.8 % (ref 0–6.4)
ERYTHROCYTE [DISTWIDTH] IN BLOOD BY AUTOMATED COUNT: 14.6 % (ref 11.5–17)
GLUCOSE SERPL-MCNC: 230 MG/DL (ref 74–100)
HCT VFR BLD AUTO: 30.9 % (ref 37–47)
HGB BLD-MCNC: 9.7 GM/DL (ref 12–16)
IMM GRANULOCYTES # BLD AUTO: 0.07 10*3/UL (ref 0–0.02)
IMM GRANULOCYTES NFR BLD AUTO: 0.9 % (ref 0–0.43)
LYMPHOCYTES # BLD AUTO: 2.78 X10(3)/MCL (ref 0.6–4.6)
LYMPHOCYTES NFR BLD AUTO: 33.9 % (ref 16–44)
MCH RBC QN AUTO: 27.4 PG (ref 27–31)
MCHC RBC AUTO-ENTMCNC: 31.4 GM/DL (ref 33–36)
MCV RBC AUTO: 87.3 FL (ref 80–94)
MONOCYTES # BLD AUTO: 0.77 X10(3)/MCL (ref 0.1–1.3)
MONOCYTES NFR BLD AUTO: 9.4 % (ref 4–12.1)
NEUTROPHILS # BLD AUTO: 4.11 X10(3)/MCL (ref 2.1–9.2)
NEUTROPHILS NFR BLD AUTO: 50.1 % (ref 43–73)
NRBC BLD AUTO-RTO: 0 % (ref 0–0.2)
PLATELET # BLD AUTO: 332 X10(3)/MCL (ref 130–400)
PMV BLD AUTO: 11.3 FL (ref 7.4–10.4)
POTASSIUM SERPL-SCNC: 4.2 MMOL/L (ref 3.5–5.1)
PREALB SERPL-MCNC: 19.6 MG/DL (ref 16–38)
RBC # BLD AUTO: 3.54 X10(6)/MCL (ref 4.2–5.4)
SODIUM SERPL-SCNC: 140 MMOL/L (ref 136–145)
WBC # SPEC AUTO: 8.2 X10(3)/MCL (ref 4.5–11.5)

## 2021-08-09 LAB
CRP SERPL HS-MCNC: 13 MG/L (ref 0–5)
ERYTHROCYTE [SEDIMENTATION RATE] IN BLOOD: 52 MM/HR (ref 0–20)

## 2021-08-11 ENCOUNTER — HISTORICAL (OUTPATIENT)
Dept: ADMINISTRATIVE | Facility: HOSPITAL | Age: 46
End: 2021-08-11

## 2021-08-11 LAB
ABS NEUT (OLG): 3.14 X10(3)/MCL (ref 2.1–9.2)
ALBUMIN SERPL-MCNC: 2.6 GM/DL (ref 3.5–5)
BUN SERPL-MCNC: 11 MG/DL (ref 7–18.7)
CALCIUM SERPL-MCNC: 8.7 MG/DL (ref 8.4–10.2)
CHLORIDE SERPL-SCNC: 107 MMOL/L (ref 98–107)
CO2 SERPL-SCNC: 23 MMOL/L (ref 22–29)
CREAT SERPL-MCNC: 0.67 MG/DL (ref 0.57–1.11)
ERYTHROCYTE [DISTWIDTH] IN BLOOD BY AUTOMATED COUNT: 14.5 % (ref 11.5–17)
EST. AVERAGE GLUCOSE BLD GHB EST-MCNC: 148.5 MG/DL
GLUCOSE SERPL-MCNC: 118 MG/DL (ref 74–100)
HBA1C MFR BLD: 6.8 %
HCT VFR BLD AUTO: 31.7 % (ref 37–47)
HGB BLD-MCNC: 9.5 GM/DL (ref 12–16)
MAGNESIUM SERPL-MCNC: 1.4 MG/DL (ref 1.6–2.6)
MCH RBC QN AUTO: 27.2 PG (ref 27–31)
MCHC RBC AUTO-ENTMCNC: 30 GM/DL (ref 33–36)
MCV RBC AUTO: 90.8 FL (ref 80–94)
NRBC BLD AUTO-RTO: 0 % (ref 0–0.2)
PHOSPHATE SERPL-MCNC: 4.9 MG/DL (ref 2.3–4.7)
PLATELET # BLD AUTO: 240 X10(3)/MCL (ref 130–400)
PMV BLD AUTO: 11.7 FL (ref 7.4–10.4)
POTASSIUM SERPL-SCNC: 3.7 MMOL/L (ref 3.5–5.1)
PREALB SERPL-MCNC: 13.3 MG/DL (ref 16–38)
RBC # BLD AUTO: 3.49 X10(6)/MCL (ref 4.2–5.4)
SODIUM SERPL-SCNC: 141 MMOL/L (ref 136–145)
WBC # SPEC AUTO: 7 X10(3)/MCL (ref 4.5–11.5)

## 2021-08-16 LAB
ABS NEUT (OLG): 3.48 X10(3)/MCL (ref 2.1–9.2)
ALBUMIN SERPL-MCNC: 3.1 GM/DL (ref 3.5–5)
BUN SERPL-MCNC: 17.5 MG/DL (ref 7–18.7)
CALCIUM SERPL-MCNC: 9.7 MG/DL (ref 8.4–10.2)
CHLORIDE SERPL-SCNC: 104 MMOL/L (ref 98–107)
CO2 SERPL-SCNC: 26 MMOL/L (ref 22–29)
CREAT SERPL-MCNC: 0.88 MG/DL (ref 0.57–1.11)
ERYTHROCYTE [DISTWIDTH] IN BLOOD BY AUTOMATED COUNT: 14.4 % (ref 11.5–17)
GLUCOSE SERPL-MCNC: 200 MG/DL (ref 74–100)
HCT VFR BLD AUTO: 34.7 % (ref 37–47)
HGB BLD-MCNC: 10.5 GM/DL (ref 12–16)
MAGNESIUM SERPL-MCNC: 1.9 MG/DL (ref 1.6–2.6)
MCH RBC QN AUTO: 27 PG (ref 27–31)
MCHC RBC AUTO-ENTMCNC: 30.3 GM/DL (ref 33–36)
MCV RBC AUTO: 89.2 FL (ref 80–94)
NRBC BLD AUTO-RTO: 0 % (ref 0–0.2)
PHOSPHATE SERPL-MCNC: 5.1 MG/DL (ref 2.3–4.7)
PLATELET # BLD AUTO: 289 X10(3)/MCL (ref 130–400)
PMV BLD AUTO: 11.9 FL (ref 7.4–10.4)
POTASSIUM SERPL-SCNC: 4.6 MMOL/L (ref 3.5–5.1)
RBC # BLD AUTO: 3.89 X10(6)/MCL (ref 4.2–5.4)
SODIUM SERPL-SCNC: 139 MMOL/L (ref 136–145)
WBC # SPEC AUTO: 8.1 X10(3)/MCL (ref 4.5–11.5)

## 2021-08-25 LAB
ABS NEUT (OLG): 3.44 X10(3)/MCL (ref 2.1–9.2)
BUN SERPL-MCNC: 15.2 MG/DL (ref 7–18.7)
CALCIUM SERPL-MCNC: 9.6 MG/DL (ref 8.4–10.2)
CHLORIDE SERPL-SCNC: 103 MMOL/L (ref 98–107)
CO2 SERPL-SCNC: 25 MMOL/L (ref 22–29)
CREAT SERPL-MCNC: 0.71 MG/DL (ref 0.57–1.11)
CREAT/UREA NIT SERPL: 21
EOSINOPHIL NFR BLD MANUAL: 6 % (ref 0–8)
ERYTHROCYTE [DISTWIDTH] IN BLOOD BY AUTOMATED COUNT: 14.6 % (ref 11.5–17)
GLUCOSE SERPL-MCNC: 119 MG/DL (ref 74–100)
GRANULOCYTES NFR BLD MANUAL: 58 % (ref 47–80)
HCT VFR BLD AUTO: 34.5 % (ref 37–47)
HGB BLD-MCNC: 10.9 GM/DL (ref 12–16)
LYMPHOCYTES NFR BLD MANUAL: 28 % (ref 13–40)
MCH RBC QN AUTO: 27.7 PG (ref 27–31)
MCHC RBC AUTO-ENTMCNC: 31.6 GM/DL (ref 33–36)
MCV RBC AUTO: 87.8 FL (ref 80–94)
MONOCYTES NFR BLD MANUAL: 8 % (ref 2–11)
PHOSPHATE SERPL-MCNC: 4.8 MG/DL (ref 2.3–4.7)
PLATELET # BLD AUTO: 234 X10(3)/MCL (ref 130–400)
PLATELET # BLD EST: ADEQUATE 10*3/UL
PMV BLD AUTO: 11.5 FL (ref 7.4–10.4)
POTASSIUM SERPL-SCNC: 3.8 MMOL/L (ref 3.5–5.1)
RBC # BLD AUTO: 3.93 X10(6)/MCL (ref 4.2–5.4)
SODIUM SERPL-SCNC: 137 MMOL/L (ref 136–145)
WBC # SPEC AUTO: 6.9 X10(3)/MCL (ref 4.5–11.5)

## 2021-08-31 ENCOUNTER — HISTORICAL (OUTPATIENT)
Dept: ADMINISTRATIVE | Facility: HOSPITAL | Age: 46
End: 2021-08-31

## 2021-08-31 LAB
ALBUMIN SERPL-MCNC: 4 GM/DL (ref 3.5–5)
ALBUMIN/GLOB SERPL: 1.2 RATIO (ref 1.1–2)
ALP SERPL-CCNC: 107 UNIT/L (ref 40–150)
ALT SERPL-CCNC: 30 UNIT/L (ref 0–55)
AST SERPL-CCNC: 39 UNIT/L (ref 5–34)
BILIRUB SERPL-MCNC: 0.3 MG/DL
BILIRUBIN DIRECT+TOT PNL SERPL-MCNC: 0.1 MG/DL (ref 0–0.8)
BILIRUBIN DIRECT+TOT PNL SERPL-MCNC: 0.2 MG/DL (ref 0–0.5)
BUN SERPL-MCNC: 13.1 MG/DL (ref 7–18.7)
CALCIUM SERPL-MCNC: 9.7 MG/DL (ref 8.4–10.2)
CHLORIDE SERPL-SCNC: 107 MMOL/L (ref 98–107)
CO2 SERPL-SCNC: 18 MMOL/L (ref 22–29)
CREAT SERPL-MCNC: 0.71 MG/DL (ref 0.55–1.02)
CRP SERPL-MCNC: 0.28 MG/DL
ERYTHROCYTE [DISTWIDTH] IN BLOOD BY AUTOMATED COUNT: 14.8 % (ref 11.5–17)
GLOBULIN SER-MCNC: 3.3 GM/DL (ref 2.4–3.5)
GLUCOSE SERPL-MCNC: 120 MG/DL (ref 74–100)
HCT VFR BLD AUTO: 37.1 % (ref 37–47)
HGB BLD-MCNC: 11.9 GM/DL (ref 12–16)
MCH RBC QN AUTO: 27.7 PG (ref 27–31)
MCHC RBC AUTO-ENTMCNC: 32.1 GM/DL (ref 33–36)
MCV RBC AUTO: 86.3 FL (ref 80–94)
PLATELET # BLD AUTO: 250 X10(3)/MCL (ref 130–400)
PMV BLD AUTO: 12.1 FL (ref 9.4–12.4)
POTASSIUM SERPL-SCNC: 3.8 MMOL/L (ref 3.5–5.1)
PROT SERPL-MCNC: 7.3 GM/DL (ref 6.4–8.3)
RBC # BLD AUTO: 4.3 X10(6)/MCL (ref 4.2–5.4)
SODIUM SERPL-SCNC: 140 MMOL/L (ref 136–145)
WBC # SPEC AUTO: 7 X10(3)/MCL (ref 4.5–11.5)

## 2021-09-03 ENCOUNTER — HISTORICAL (OUTPATIENT)
Dept: ADMINISTRATIVE | Facility: HOSPITAL | Age: 46
End: 2021-09-03

## 2021-09-03 LAB — ERYTHROCYTE [SEDIMENTATION RATE] IN BLOOD: 18 MM/HR (ref 0–20)

## 2021-09-07 ENCOUNTER — HISTORICAL (OUTPATIENT)
Dept: ADMINISTRATIVE | Facility: HOSPITAL | Age: 46
End: 2021-09-07

## 2021-09-07 LAB
ALBUMIN SERPL-MCNC: 3.6 GM/DL (ref 3.5–5)
ALBUMIN/GLOB SERPL: 1.2 RATIO (ref 1.1–2)
ALP SERPL-CCNC: 98 UNIT/L (ref 40–150)
ALT SERPL-CCNC: 18 UNIT/L (ref 0–55)
AST SERPL-CCNC: 11 UNIT/L (ref 5–34)
BILIRUB SERPL-MCNC: 0.3 MG/DL
BILIRUBIN DIRECT+TOT PNL SERPL-MCNC: 0.1 MG/DL (ref 0–0.8)
BILIRUBIN DIRECT+TOT PNL SERPL-MCNC: 0.2 MG/DL (ref 0–0.5)
BUN SERPL-MCNC: 6.4 MG/DL (ref 7–18.7)
CALCIUM SERPL-MCNC: 9.1 MG/DL (ref 8.4–10.2)
CHLORIDE SERPL-SCNC: 108 MMOL/L (ref 98–107)
CO2 SERPL-SCNC: 20 MMOL/L (ref 22–29)
CREAT SERPL-MCNC: 0.67 MG/DL (ref 0.55–1.02)
CRP SERPL-MCNC: 0.25 MG/DL
ERYTHROCYTE [DISTWIDTH] IN BLOOD BY AUTOMATED COUNT: 14.3 % (ref 11.5–17)
ERYTHROCYTE [SEDIMENTATION RATE] IN BLOOD: 8 MM/HR (ref 0–20)
GLOBULIN SER-MCNC: 3.1 GM/DL (ref 2.4–3.5)
GLUCOSE SERPL-MCNC: 192 MG/DL (ref 74–100)
HCT VFR BLD AUTO: 37.4 % (ref 37–47)
HGB BLD-MCNC: 11.8 GM/DL (ref 12–16)
MCH RBC QN AUTO: 27.2 PG (ref 27–31)
MCHC RBC AUTO-ENTMCNC: 31.6 GM/DL (ref 33–36)
MCV RBC AUTO: 86.2 FL (ref 80–94)
PLATELET # BLD AUTO: 224 X10(3)/MCL (ref 130–400)
PMV BLD AUTO: 11.8 FL (ref 9.4–12.4)
POTASSIUM SERPL-SCNC: 3.8 MMOL/L (ref 3.5–5.1)
PROT SERPL-MCNC: 6.7 GM/DL (ref 6.4–8.3)
RBC # BLD AUTO: 4.34 X10(6)/MCL (ref 4.2–5.4)
SODIUM SERPL-SCNC: 140 MMOL/L (ref 136–145)
WBC # SPEC AUTO: 6.6 X10(3)/MCL (ref 4.5–11.5)

## 2021-09-13 ENCOUNTER — HISTORICAL (OUTPATIENT)
Dept: INTERNAL MEDICINE | Facility: CLINIC | Age: 46
End: 2021-09-13

## 2021-09-13 LAB
ABS NEUT (OLG): 4.69 X10(3)/MCL (ref 2.1–9.2)
ALBUMIN SERPL-MCNC: 3.9 GM/DL (ref 3.5–5)
ALBUMIN/GLOB SERPL: 1.1 RATIO (ref 1.1–2)
ALP SERPL-CCNC: 110 UNIT/L (ref 40–150)
ALT SERPL-CCNC: 27 UNIT/L (ref 0–55)
APPEARANCE, UA: ABNORMAL
AST SERPL-CCNC: 17 UNIT/L (ref 5–34)
BACTERIA #/AREA URNS AUTO: ABNORMAL /HPF
BASOPHILS # BLD AUTO: 0 X10(3)/MCL (ref 0–0.2)
BASOPHILS NFR BLD AUTO: 0 %
BILIRUB SERPL-MCNC: 0.4 MG/DL
BILIRUB UR QL STRIP: NEGATIVE
BILIRUBIN DIRECT+TOT PNL SERPL-MCNC: 0.2 MG/DL (ref 0–0.5)
BILIRUBIN DIRECT+TOT PNL SERPL-MCNC: 0.2 MG/DL (ref 0–0.8)
BUN SERPL-MCNC: 7.2 MG/DL (ref 7–18.7)
CALCIUM SERPL-MCNC: 10.1 MG/DL (ref 8.4–10.2)
CHLORIDE SERPL-SCNC: 106 MMOL/L (ref 98–107)
CHOLEST SERPL-MCNC: 108 MG/DL
CHOLEST/HDLC SERPL: 3 {RATIO} (ref 0–5)
CO2 SERPL-SCNC: 24 MMOL/L (ref 22–29)
COLOR UR: ABNORMAL
CREAT SERPL-MCNC: 0.76 MG/DL (ref 0.55–1.02)
CREAT UR-MCNC: 87.9 MG/DL (ref 45–106)
EOSINOPHIL # BLD AUTO: 0.2 X10(3)/MCL (ref 0–0.9)
EOSINOPHIL NFR BLD AUTO: 2 %
ERYTHROCYTE [DISTWIDTH] IN BLOOD BY AUTOMATED COUNT: 14.1 % (ref 11.5–14.5)
EST. AVERAGE GLUCOSE BLD GHB EST-MCNC: 151.3 MG/DL
GLOBULIN SER-MCNC: 3.7 GM/DL (ref 2.4–3.5)
GLUCOSE (UA): NEGATIVE
GLUCOSE SERPL-MCNC: 158 MG/DL (ref 74–100)
HBA1C MFR BLD: 6.9 %
HCT VFR BLD AUTO: 41.3 % (ref 35–46)
HDLC SERPL-MCNC: 35 MG/DL (ref 35–60)
HGB BLD-MCNC: 13.3 GM/DL (ref 12–16)
HGB UR QL STRIP: NEGATIVE
HYALINE CASTS #/AREA URNS LPF: ABNORMAL /LPF
IMM GRANULOCYTES # BLD AUTO: 0.05 10*3/UL
IMM GRANULOCYTES NFR BLD AUTO: 1 %
KETONES UR QL STRIP: NEGATIVE
LDLC SERPL CALC-MCNC: 55 MG/DL (ref 50–140)
LEUKOCYTE ESTERASE UR QL STRIP: 25 LEU/UL
LYMPHOCYTES # BLD AUTO: 2.8 X10(3)/MCL (ref 0.6–4.6)
LYMPHOCYTES NFR BLD AUTO: 34 %
MCH RBC QN AUTO: 27.6 PG (ref 26–34)
MCHC RBC AUTO-ENTMCNC: 32.2 GM/DL (ref 31–37)
MCV RBC AUTO: 85.7 FL (ref 80–100)
MICROALBUMIN UR-MCNC: 5.5 MG/L
MICROALBUMIN/CREAT RATIO PNL UR: 6.3 MG/GM CR (ref 0–30)
MONOCYTES # BLD AUTO: 0.6 X10(3)/MCL (ref 0.1–1.3)
MONOCYTES NFR BLD AUTO: 7 %
NEUTROPHILS # BLD AUTO: 4.69 X10(3)/MCL (ref 2.1–9.2)
NEUTROPHILS NFR BLD AUTO: 56 %
NITRITE UR QL STRIP: NEGATIVE
NRBC BLD AUTO-RTO: 0 % (ref 0–0.2)
PH UR STRIP: 5.5 [PH] (ref 4.5–8)
PLATELET # BLD AUTO: 282 X10(3)/MCL (ref 130–400)
PMV BLD AUTO: 11.5 FL (ref 7.4–10.4)
POTASSIUM SERPL-SCNC: 4 MMOL/L (ref 3.5–5.1)
PROT SERPL-MCNC: 7.6 GM/DL (ref 6.4–8.3)
PROT UR QL STRIP: NEGATIVE
RBC # BLD AUTO: 4.82 X10(6)/MCL (ref 4–5.2)
RBC #/AREA URNS AUTO: ABNORMAL /HPF
SODIUM SERPL-SCNC: 142 MMOL/L (ref 136–145)
SP GR UR STRIP: 1.01 (ref 1–1.03)
SQUAMOUS #/AREA URNS LPF: >100 /LPF
T4 FREE SERPL-MCNC: 0.86 NG/DL (ref 0.7–1.48)
TRIGL SERPL-MCNC: 90 MG/DL (ref 37–140)
TSH SERPL-ACNC: 2.99 UIU/ML (ref 0.35–4.94)
UROBILINOGEN UR STRIP-ACNC: NORMAL
VLDLC SERPL CALC-MCNC: 18 MG/DL
WBC # SPEC AUTO: 8.4 X10(3)/MCL (ref 4.5–11)
WBC #/AREA URNS AUTO: ABNORMAL /HPF

## 2022-02-23 ENCOUNTER — HISTORICAL (OUTPATIENT)
Dept: ADMINISTRATIVE | Facility: HOSPITAL | Age: 47
End: 2022-02-23

## 2022-03-13 ENCOUNTER — HISTORICAL (OUTPATIENT)
Dept: LAB | Facility: HOSPITAL | Age: 47
End: 2022-03-13

## 2022-03-13 LAB
ABS NEUT (OLG): 6.55 (ref 2.1–9.2)
ALBUMIN SERPL-MCNC: 4.3 G/DL (ref 3.5–5)
ALBUMIN/GLOB SERPL: 1.1 {RATIO} (ref 1.1–2)
ALP SERPL-CCNC: 112 U/L (ref 40–150)
ALT SERPL-CCNC: 24 U/L (ref 0–55)
AST SERPL-CCNC: 17 U/L (ref 5–34)
BASOPHILS # BLD AUTO: 0 10*3/UL (ref 0–0.2)
BASOPHILS NFR BLD AUTO: 0 %
BILIRUB SERPL-MCNC: 0.3 MG/DL
BILIRUBIN DIRECT+TOT PNL SERPL-MCNC: 0.1 (ref 0–0.5)
BILIRUBIN DIRECT+TOT PNL SERPL-MCNC: 0.2 (ref 0–0.8)
BUN SERPL-MCNC: 6.4 MG/DL (ref 7–18.7)
CALCIUM SERPL-MCNC: 10.4 MG/DL (ref 8.7–10.5)
CHLORIDE SERPL-SCNC: 103 MMOL/L (ref 98–107)
CHOLEST SERPL-MCNC: 168 MG/DL
CHOLEST/HDLC SERPL: 4 {RATIO} (ref 0–5)
CO2 SERPL-SCNC: 27 MMOL/L (ref 22–29)
CREAT SERPL-MCNC: 0.74 MG/DL (ref 0.55–1.02)
DEPRECATED CALCIDIOL+CALCIFEROL SERPL-MC: 25.8 NG/ML (ref 30–80)
EOSINOPHIL # BLD AUTO: 0.2 10*3/UL (ref 0–0.9)
EOSINOPHIL NFR BLD AUTO: 2 %
ERYTHROCYTE [DISTWIDTH] IN BLOOD BY AUTOMATED COUNT: 13 % (ref 11.5–14.5)
EST. AVERAGE GLUCOSE BLD GHB EST-MCNC: 226 MG/DL
FLAG2 (OHS): 60
FLAG3 (OHS): 80
FLAGS (OHS): 80
GLOBULIN SER-MCNC: 3.8 G/DL (ref 2.4–3.5)
GLUCOSE SERPL-MCNC: 199 MG/DL (ref 74–100)
HBA1C MFR BLD: 9.5 %
HCT VFR BLD AUTO: 43.7 % (ref 35–46)
HDLC SERPL-MCNC: 45 MG/DL (ref 35–60)
HEMOLYSIS INTERF INDEX SERPL-ACNC: 6
HGB BLD-MCNC: 14.6 G/DL (ref 12–16)
ICTERIC INTERF INDEX SERPL-ACNC: 0
IMM GRANULOCYTES # BLD AUTO: 0.07 10*3/UL
IMM GRANULOCYTES NFR BLD AUTO: 1 %
LDLC SERPL CALC-MCNC: 88 MG/DL (ref 50–140)
LIPEMIC INTERF INDEX SERPL-ACNC: 6
LOW EVENT # SUSPECT FLAG (OHS): 80
LYMPHOCYTES # BLD AUTO: 3.4 10*3/UL (ref 0.6–4.6)
LYMPHOCYTES NFR BLD AUTO: 31 %
MANUAL DIFF? (OHS): NO
MCH RBC QN AUTO: 29.1 PG (ref 26–34)
MCHC RBC AUTO-ENTMCNC: 33.4 G/DL (ref 31–37)
MCV RBC AUTO: 87.1 FL (ref 80–100)
MO BLASTS SUSPECT FLAG (OHS): 30
MONOCYTES # BLD AUTO: 0.5 10*3/UL (ref 0.1–1.3)
MONOCYTES NFR BLD AUTO: 5 %
NEUTROPHILS # BLD AUTO: 6.55 10*3/UL (ref 2.1–9.2)
NEUTROPHILS NFR BLD AUTO: 61 %
NRBC BLD AUTO-RTO: 0 % (ref 0–0.2)
PLATELET # BLD AUTO: 244 10*3/UL (ref 130–400)
PMV BLD AUTO: 11.3 FL (ref 7.4–10.4)
POTASSIUM SERPL-SCNC: 3.9 MMOL/L (ref 3.5–5.1)
PROT SERPL-MCNC: 8.1 G/DL (ref 6.4–8.3)
RBC # BLD AUTO: 5.02 10*6/UL (ref 4–5.2)
SODIUM SERPL-SCNC: 141 MMOL/L (ref 136–145)
TRIGL SERPL-MCNC: 175 MG/DL (ref 37–140)
VLDLC SERPL CALC-MCNC: 35 MG/DL
WBC # SPEC AUTO: 10.8 10*3/UL (ref 4.5–11)

## 2022-04-10 ENCOUNTER — HISTORICAL (OUTPATIENT)
Dept: ADMINISTRATIVE | Facility: HOSPITAL | Age: 47
End: 2022-04-10
Payer: MEDICAID

## 2022-04-29 VITALS
BODY MASS INDEX: 29.17 KG/M2 | WEIGHT: 170.88 LBS | HEIGHT: 63 IN | SYSTOLIC BLOOD PRESSURE: 139 MMHG | BODY MASS INDEX: 30.34 KG/M2 | DIASTOLIC BLOOD PRESSURE: 79 MMHG | HEIGHT: 64 IN | SYSTOLIC BLOOD PRESSURE: 122 MMHG | WEIGHT: 177.69 LBS | HEIGHT: 64 IN | OXYGEN SATURATION: 97 % | SYSTOLIC BLOOD PRESSURE: 141 MMHG | BODY MASS INDEX: 33.2 KG/M2 | WEIGHT: 187.38 LBS | DIASTOLIC BLOOD PRESSURE: 82 MMHG | DIASTOLIC BLOOD PRESSURE: 80 MMHG

## 2022-05-02 NOTE — HISTORICAL [facility] CERNER
This is a historical note converted from Nehemiah. Formatting and pictures may have been removed.  Please reference Nehemiah for original formatting and attached multimedia. Chief Complaint  follow up  History of Present Illness  44 yo White female being seen in clinic for follow-up, medication reconciliation, and lab review (3/9/2020).? Hx includes poorly-controlled type 2 diabetes, HLD, CAD with MI in 2019, with stenting, HTN), obesity (BMI 28.45), ventral hernia, heavy menses, depression, anxiety, and chronic rhinitis..??Mom , leukemia.? Former smoker.? 15 pack/year hx smoking.? Quit in 2019, following MI.? Followed by Nikki Ackerman mental health NP for psychiatric care and med management.? Followed by [facility].?? Scheduled to see [facility] 2019 for evaluation.? Single.? Two children 22/15.? Disabled.? Lives in [place].  ?   Imaging:  Needs MMG (ordered today 3/9/20)  ?   TTE done on 2019 showed left ventricular ejection fraction approximately 65%; normal right ventricular size with preserved RV function; and trace mitral regurgitation.  ?   CT of the lumbar spine done 2018 for trauma showed mild degenerative changes at L5-S1; no acute lumbar spine abnormalities; and ligament, spinal cord, and/or vascular abnormalities could not be excluded on the basis of the examination.  ?   Ultrasound of the pelvis for heavy bleeding showed heterogeneity of the endometrium and myometrium with borderline endometrial thickening (1.7 cm).? Tiny echogenic areas near the junctional zone and within the left ovary may reflect punctate calcifications; and possible 12 mm posterior fibroid.  ?   Today 3/9/2020:  Had flu 3 weeks ago.? C/O dizziness since Friday of last week.? Dizziness comes on, depending upon changes in position.? Happens when she is walking and turning her head.? If she is laid back in her recliner, with head still -- she does not have dizziness.? When she gets  up and turns head, dizziness occurs.? Feels like she, rather than room, is spinning.? Had some nausea Friday night; no vomiting.? Vision becomes slightly blurred; no associated ear s/s.? No associated numbness/tingling.? (Hx 2 MIs in 2019).? Dizziness lasts several hours at a time.? Plans for hysterectomy, once diabetes better-controlled for endometriosis and AUB.?? Sleeping well; eating well.? Denies? shortness of breath,?cough, fever, headache,? weakness, abdominal pain, nausea,?vomiting, diarrhea, constipation, black/bloody stools, unplanned weight loss, changes in urinary patterns, depression, anxiety, and SI/HI.?? Called EMS over Mardi Gras for chest pain, which she believes was due to being out in crowds.? Evaluated and not transported; no more episodes since Mardi Gras.? Current level of back pain 5/10.? Worst pain gets is 10/10.? Most days pain is 7/10.?  ?  ?  9/10/2019:? Primary c/o today is a dry cough X 1 week.? Cough comes and goes over the past several months.? Feels like I am having dripping coming from my nose sometimes.? No yellow or green mucous.? No fevers or chills.? No sore throat or ear pain.? Cough is worse at night.?Prescribed Fluticasone nasal spray, which she is using twice a day.? Not prescribed any anti-histamines presently.? ?Using Robitussin DM with no relief of cough.? Started Metformin at last visit and she is tolerating that well; taking 1000 mg twice daily now.? Stopped Basaglar last visit.? States highest CBG since med changes last clinic visit has been approximately 200.? Sleeping poorly with recent cough; eating well.? Denies chest pain, fever, headache,?dizziness, weakness, abdominal pain, nausea,?vomiting, diarrhea, constipation, dysuria, depression, anxiety, SI/HI.? Mood well-controlled on current medications.? Does have some transient SOB with recent coughing, with occasional gagging.? Has been out of Gabapentin for about 2 months; believes script got lost in recent move.?  Current level of pain in lower back 7/10.? Back surgery was recommended to her by local [facility] neurosurgeon, Dr. Simon,?several months ago; however, she is putting that off for now.? Fell, with resultant legal case, which has since settled.? Says she did not get enough money from the case for surgery.?  told her that she would lose her medical benefits, if she tried to have Medicaid pay for surgery, with legal case settled.? Finished PT during legal case, along with chiropractor.?  ?  ?  ?  Review of Systems  Except as stated in HPI, all other 10 body systems normal  ?  Physical Exam  Vitals & Measurements  T:?36.9? ?C (Oral)? HR:?86(Peripheral)? RR:?18? BP:?122/79?  HT:?163?cm? WT:?77.5?kg? BMI:?29.17? LMP:?02/28/2020 00:00 CST?  General:??VSS; afebrile.??Overweight; no acute distress.?  Eyes/Ears: EOMI, clear conjunctiva, eyelids normal.?  Mouth:??tongue midline, pink, and moist; no lesions or erythema in oral cavity, dentition poor  Neck: full range of motion, no thyromegaly or lymphadenopathy  Respiratory:?clear to auscultation anteriorly and posteriorly; diminished over anterior bases bilaterally; no mucous production  Cardiovascular:?regular rate and rhythm without murmurs, gallops or rubs.??No carotid bruits.? No peripheral edema.??No hemosiderin staining or varicosities.?  Gastrointestinal:?obese abdomen, soft, non-tender, with normal bowel sounds, without masses to palpation  Genitourinary: deferred  Musculoskeletal:?negative straight leg raises bilaterally at 80 degree angle;full range of motion of all extremities; no joint deformities or edema  Integumentary: no rashes or skin lesions present  Neurologic: A&O X 3; cranial nerves intact, no signs of peripheral neurological deficit, motor/sensory function intact  Psychiatric:?Calm, cooperative.??Mood and affect normal.???Responses appropriate  ?  Assessment/Plan  1.?Dizziness?R42  Differential dx:? BPV versus carotid artery disease (given hx of  MI, smoking).  RX:? outpatient PT for Epley maneuver.? Handout on technique given.  Carotid US ordered, with teaching on s/s of CVA and when to seek care at nearest ER.? Handouts given today.  Ordered:  US NIVA Carotid Bilateral, *Est. 03/23/20 3:00:00 CDT, *Est. Stop date 03/23/20 3:00:00 CDT, Ambulatory, Patient has IV, Standard Precautions, [facility], Order for future visit, Dizziness  Vision changes  ?  2.?Degenerative joint disease (DJD) of lumbar spine?M47.816  Pain moderate to severe at all times.? ?  Negative straight leg raises @ 60 degrees.?  RX:? outpatient PT with RTC in 6 weeks for follow-up.? Ms. Spring indicates she would be interested in a surgical evaluation, given the duration, frequency, and intensity of her lower back pain.?  MRI will be ordered, pending response to outpatient PT.  Ordered:  1160F- Medication reconciliation completed during visit, BPV (benign positional vertigo)  Diabetes mellitus  Diabetic peripheral neuropathy  CAD (coronary artery disease), native coronary artery  Hypertension  Mixed hyperlipidemia  Degenerative joint disease (DJD) of lumbar spine  Depression with anx...  Clinic Follow up, *Est. 04/22/20 7:40:00 CDT, Order for future visit, Diabetes mellitus, [facility]  Office/Outpatient Visit Level 4 Established [id number] PC, BPV (benign positional vertigo)  Diabetes mellitus  Diabetic peripheral neuropathy  CAD (coronary artery disease), native coronary artery  Hypertension  Mixed hyperlipidemia  Degenerative joint disease (DJD) of lumbar spine  Depression with anx...  XR Spine Lumbar 2 or 3 Views, Routine, 03/09/20 10:36:00 CDT, Back Pain, None, Stretcher, Patient Has IV?, Rad Type, Degenerative joint disease (DJD) of lumbar spine  Chronic back pain, Not Scheduled, 03/09/20 10:36:00 CDT  ?  3.?Diabetes mellitus?E11.9  Much improved with Bydureon.? HgbA1C 7.2 on 3/9/2020.?  She is only able to tolerate Metformin 500 mg every other day.?  CPM  Teaching provided on etiology of disease process, complications of diabetes, goal HgbA1C, normal, low, and high CBGs.? Teaching provided on diabetic foot care principles, and when to notify medical provider of toenail, foot, or nail changes.? Teaching provided on foods high in carbs and how that impairs diabetes.? Teaching provided to avoid sugary drinks, and substitute with water.???  Repeat labs with next visit.? May need to add Actos; no hx of CHF.?  Ordered:  exenatide, 2 mg = 1 EA, Subcutaneous, qWeek, Diabetes, # 4 EA, 5 Refill(s), Pharmacy: [facility], 163, cm, Height/Length Dosing, 03/09/20 8:59:00 CDT, 77.5, kg, Weight Dosing, 03/09/20 8:59:00 CDT  insulin glargine, 80 units, Subcutaneous, Once a day (at bedtime), Take 80 units at bedtime every night, # 15 mL, 5 Refill(s), Pharmacy: [facility], 163, cm, Height/Length Dosing, 03/09/20 8:59:00 CDT, 77.5, kg, Weight Dosing, 03/09/20 8:59:00 CDT  metFORMIN, See Instructions, 1 tab every other day, as tolerated for diabetes, # 15 tab(s), 5 Refill(s), Pharmacy: [facility], 163, cm, Height/Length Dosing, 03/09/20 8:59:00 CDT, 77.5, kg, Weight Dosing, 03/09/20 8:59:00 CDT  [facility] [place] ACCU CHECK SMARTVIEW LANCETS AND STRIPS, See Instructions, CHECK BLOOD SUGAR FOUR TIMES DAILY. DX E11.9, # 200 EA, 11 Refill(s), Pharmacy: [facility], 163, cm, Height/Length Dosing, 03/09/20 8:59:00 CDT, 77.5, kg, Weight Dosing, 03/09/20...  Misc Prescription, Insulin needles for Basaglar, See Instructions, Needles for basaglar injection once a day E11.9, # 30 EA, 11 Refill(s), Pharmacy: [facility], 163, cm, Height/Length Dosing, 03/09/20 8:59:00 CDT, 77.5, kg, Weight Dosing, 03/09/20 8:59:00...  1160F- Medication reconciliation completed during visit, BPV (benign positional vertigo)  Diabetes mellitus  Diabetic peripheral neuropathy  CAD (coronary artery disease), native coronary artery  Hypertension  Mixed  hyperlipidemia  Degenerative joint disease (DJD) of lumbar spine  Depression with anx...  Clinic Follow up, *Est. 04/22/20 7:40:00 CDT, Order for future visit, Diabetes mellitus, [facility]  Comprehensive Metabolic Panel, Routine collect, *Est. 09/09/20 3:00:00 CDT, Blood, Order for future visit, *Est. Stop date 09/09/20 3:00:00 CDT, Lab Collect, Hypertension  Diabetes mellitus, 03/09/20 10:11:00 CDT  Hemoglobin A1C [facility], Routine collect, *Est. 09/09/20 3:00:00 CDT, Blood, Order for future visit, *Est. Stop date 09/09/20 3:00:00 CDT, Lab Collect, Diabetes mellitus, 03/09/20 10:11:00 CDT  Office/Outpatient Visit Level 4 Established [id number] PC, BPV (benign positional vertigo)  Diabetes mellitus  Diabetic peripheral neuropathy  CAD (coronary artery disease), native coronary artery  Hypertension  Mixed hyperlipidemia  Degenerative joint disease (DJD) of lumbar spine  Depression with anx...  Urinalysis with Microscopic if Indicated, Routine collect, Urine, Order for future visit, *Est. 09/09/20 3:00:00 CDT, *Est. Stop date 09/09/20 3:00:00 CDT, Nurse collect, Diabetes mellitus  Hypertension  ?  4.?Diabetic peripheral neuropathy?E11.42  Stable; CPM  Ordered:  1160F- Medication reconciliation completed during visit, BPV (benign positional vertigo)  Diabetes mellitus  Diabetic peripheral neuropathy  CAD (coronary artery disease), native coronary artery  Hypertension  Mixed hyperlipidemia  Degenerative joint disease (DJD) of lumbar spine  Depression with anx...  Clinic Follow up, *Est. 04/22/20 7:40:00 CDT, Order for future visit, Diabetes mellitus, [facility]  Office/Outpatient Visit Level 4 Established 49778 PC, BPV (benign positional vertigo)  Diabetes mellitus  Diabetic peripheral neuropathy  CAD (coronary artery disease), native coronary artery  Hypertension  Mixed hyperlipidemia  Degenerative joint disease (DJD) of lumbar spine  Depression with anx...  ?  5.?CAD (coronary artery disease),  native coronary artery?I25.10  Stable; she is going to 7th floor to schedule GYN visit; instructed her to stop by cardiology clinic to reschedule her appointment.? CPM, with daily asa.? RX for NTG tabs, with teaching on how to use, store, and dispose of once bottle has been opened.  Ordered:  clopidogrel, 75 mg = 1 tab(s), Oral, Daily, # 30 tab(s), 5 Refill(s), Pharmacy: [facility] [address] [facility], 163, cm, Height/Length Dosing, 03/09/20 8:59:00 CDT, 77.5, kg, Weight Dosing, 03/09/20 8:59:00 CDT  nitroglycerin, 0.4 mg = 1 tab(s), SL, q5min, PRN PRN for chest pain, Bottle is good X 6 months, after opened. Dark place., # 1 bottle(s), 11 Refill(s), Pharmacy: [facility] [address] [facility], 163, cm, Height/Length Dosing, 03/09/20 8:59:00 CDT, 77.5, kg, Weight Dosing, 03...  omega-3 polyunsaturated fatty acids, 1,000 mg = 1 cap(s), Oral, BID, cholesterol, # 60 cap(s), 5 Refill(s), Pharmacy: [facility] [address] [facility], 163, cm, Height/Length Dosing, 03/09/20 8:59:00 CDT, 77.5, kg, Weight Dosing, 03/09/20 8:59:00 CDT  1160F- Medication reconciliation completed during visit, BPV (benign positional vertigo)  Diabetes mellitus  Diabetic peripheral neuropathy  CAD (coronary artery disease), native coronary artery  Hypertension  Mixed hyperlipidemia  Degenerative joint disease (DJD) of lumbar spine  Depression with anx...  Clinic Follow up, *Est. 04/22/20 7:40:00 CDT, Order for future visit, Diabetes mellitus, [facility]  Office/Outpatient Visit Level 4 Established [id number] PC, BPV (benign positional vertigo)  Diabetes mellitus  Diabetic peripheral neuropathy  CAD (coronary artery disease), native coronary artery  Hypertension  Mixed hyperlipidemia  Degenerative joint disease (DJD) of lumbar spine  Depression with anx...  ?  6.?Mixed hyperlipidemia?E78.2  At goal.? CPM  Lipid panel 3/9/2020:? Total 131, HDL 35, Trig 123, LDL 71.?  Teaching provided on normal cholesterol, HDL, LDL, and triglyceride levels, along with complications  associated with abnormalities.? Teaching provided on weight loss, dietary changes, exercise, and statins in controlling lipids.??Handout given.  Ordered:  atorvastatin, 80 mg = 1 tab(s), Oral, At Bedtime, # 30 tab(s), 5 Refill(s), Pharmacy: [facility], 163, cm, Height/Length Dosing, 03/09/20 8:59:00 CDT, 77.5, kg, Weight Dosing, 03/09/20 8:59:00 CDT  1160F- Medication reconciliation completed during visit, BPV (benign positional vertigo)  Diabetes mellitus  Diabetic peripheral neuropathy  CAD (coronary artery disease), native coronary artery  Hypertension  Mixed hyperlipidemia  Degenerative joint disease (DJD) of lumbar spine  Depression with anx...  Clinic Follow up, *Est. 04/22/20 7:40:00 CDT, Order for future visit, Diabetes mellitus, [facility]  Lipid Panel, Routine collect, *Est. 09/09/20 3:00:00 CDT, Blood, Order for future visit, *Est. Stop date 09/09/20 3:00:00 CDT, Lab Collect, Mixed hyperlipidemia, 03/09/20 10:11:00 CDT  Office/Outpatient Visit Level 4 Established 52332 PC, BPV (benign positional vertigo)  Diabetes mellitus  Diabetic peripheral neuropathy  CAD (coronary artery disease), native coronary artery  Hypertension  Mixed hyperlipidemia  Degenerative joint disease (DJD) of lumbar spine  Depression with anx...  ?  7.?Abnormal uterine bleeding (AUB)?N93.9  Now that diabetes is better controlled, she wants to move forward with hysterectomy for endometriosis and AUB.? She is going to stop by GYN clinic and get an appt scheduled to discuss that.?  RX:? Provera 10 mg twice daily; CPM  Ordered:  medroxyPROGESTERone, 10 mg = 1 tab(s), Oral, BID, # 60 tab(s), 5 Refill(s), Pharmacy: [facility], 163, cm, Height/Length Dosing, 03/09/20 8:59:00 CDT, 77.5, kg, Weight Dosing, 03/09/20 8:59:00 CDT  ?  8.?Depression with anxiety?F41.8  Stable; CPM.?  Continued care with RONNIE Rob MHNP  Ordered:  1160F- Medication reconciliation completed during visit, BPV (benign  positional vertigo)  Diabetes mellitus  Diabetic peripheral neuropathy  CAD (coronary artery disease), native coronary artery  Hypertension  Mixed hyperlipidemia  Degenerative joint disease (DJD) of lumbar spine  Depression with anx...  Clinic Follow up, *Est. 04/22/20 7:40:00 CDT, Order for future visit, Diabetes mellitus, [facility]  Office/Outpatient Visit Level 4 Established 46770 PC, BPV (benign positional vertigo)  Diabetes mellitus  Diabetic peripheral neuropathy  CAD (coronary artery disease), native coronary artery  Hypertension  Mixed hyperlipidemia  Degenerative joint disease (DJD) of lumbar spine  Depression with anx...  ?  Orders:  albuterol, 2 puff(s), INH, QID, # 1 EA, 11 Refill(s), Pharmacy: [facility] [facility], 163, cm, Height/Length Dosing, 03/09/20 8:59:00 CDT, 77.5, kg, Weight Dosing, 03/09/20 8:59:00 CDT  ergocalciferol, 2,000 IntUnit = 1 cap(s), Oral, Daily, with food, # 60 cap(s), 0 Refill(s), other reason (Rx)  ferrous sulfate, 325 mg = 1 tab(s), Oral, TID, Take 1 tab three times daily, with VITAMIN C, for anemia, # 180 tab(s), 0 Refill(s), Pharmacy: [facility] [facility], 163, cm, Height/Length Dosing, 03/09/20 8:59:00 CDT, 77.5, kg, Weight Dosing, 03/09/20 8:59:00 CDT  fluticasone nasal, 1 spray(s), Nasal, At Bedtime, # 16 gm, 6 Refill(s), Pharmacy: [facility] [facility], 163, cm, Height/Length Dosing, 03/09/20 8:59:00 CDT, 77.5, kg, Weight Dosing, 03/09/20 8:59:00 CDT  gabapentin, 600 mg = 1 tab(s), Oral, TID, # 90 tab(s), 5 Refill(s), Pharmacy: [facility] [facility], 163, cm, Height/Length Dosing, 03/09/20 8:59:00 CDT, 77.5, kg, Weight Dosing, 03/09/20 8:59:00 CDT  lisinopril, 5 mg = 1 tab(s), Oral, Daily, Blood pressure and kidney protection, # 30 tab(s), 5 Refill(s), Pharmacy: [facility] [facility], 163, cm, Height/Length Dosing, 03/09/20 8:59:00 CDT, 77.5, kg, Weight Dosing, 03/09/20 8:59:00 CDT  metoprolol, 25 mg = 1 tab(s), Oral, BID, Blood pressure, # 60 tab(s), 5  Refill(s), Pharmacy: [facility], 163, cm, Height/Length Dosing, 03/09/20 8:59:00 CDT, 77.5, kg, Weight Dosing, 03/09/20 8:59:00 CDT  [facility] Prescription, Outpatient PT, See Instructions, PT to evaluate and treat vertigo with Epley Maneuver or other procedure. DX: H81.10 PT to evaluate and treat lower back pain. DX: M47.816, # 1 EA, 0 Refill(s)  tiZANidine, 4 mg = 1 tab(s), Oral, TID, # 90 tab(s), 5 Refill(s), Pharmacy: [facility], 163, cm, Height/Length Dosing, 03/09/20 8:59:00 CDT, 77.5, kg, Weight Dosing, 03/09/20 8:59:00 CDT  CBC w/ Auto Diff, Routine collect, *Est. 09/09/20 3:00:00 CDT, Blood, Order for future visit, *Est. Stop date 09/09/20 3:00:00 CDT, Lab Collect, Iron deficiency anemia, 03/09/20 10:11:00 CDT  MG Screening Bilateral, Routine, *Est. 04/09/20 3:00:00 CDT, Screening, None, Stretcher, Patient Has IV?, Rad Type, Order for future visit, Breast cancer screening by mammogram, Schedule this test, [facility], Follow Breast Imaging Order Set Protocol, *E...  Vitamin D, 25-Hydroxy Level, Routine collect, *Est. 09/09/20 3:00:00 CDT, Blood, Order for future visit, *Est. Stop date 09/09/20 3:00:00 CDT, Lab Collect, Vitamin D deficiency, 03/09/20 10:11:00 CDT  Referrals  Clinic Follow up, *Est. 04/22/20 7:40:00 CDT, Order for future visit, Diabetes mellitus, [facility]  Reviewed most recent labs and imaging studies with patient; questions answered; voiced understanding of all teaching today.   Problem List/Past Medical History  Ongoing  Abnormal uterine bleeding (AUB)  BMI 29.0-29.9,adult  CAD (coronary artery disease), native coronary artery  Chronic rhinitis  Cyst of ovary  Degenerative joint disease (DJD) of lumbar spine  Depression  Depression with anxiety  Diabetes mellitus  Diabetic peripheral neuropathy  Extensor tenosynovitis of wrist  Eye pain  Generalized anxiety disorder  Hypertension  Microcytic hypochromic anemia  Mixed  hyperlipidemia  Obesity  Peripheral neuropathy  Rectus diastasis  Shoulder pain  Tobacco user  Umbilical hernia  Ventral hernia  Wellness examination  Historical  Anxiety  Anxiety  Arm pain  Closed fracture of radius, distal end  Depression  DIABETES MELLITUS  Hyperlipidemia  HYPERTENSION  Procedure/Surgical History  Catheter placement in coronary artery(s) for coronary angiography, including intraprocedural injection(s) for coronary angiography, imaging supervision and interpretation; with right heart catheterization (02/08/2019)  Dilation of Coronary Artery, One Artery with Drug-eluting Intraluminal Device, Percutaneous Approach (02/08/2019)  Fluoroscopy of Left Heart using Low Osmolar Contrast (02/08/2019)  Fluoroscopy of Multiple Coronary Arteries using Low Osmolar Contrast (02/08/2019)  Fluoroscopy of Multiple Coronary Arteries using Low Osmolar Contrast (02/08/2019)  Measurement of Cardiac Sampling and Pressure, Left Heart, Percutaneous Approach (02/08/2019)  Measurement of Cardiac Sampling and Pressure, Right Heart, Percutaneous Approach (02/08/2019)  Percutaneous transcatheter placement of drug eluting intracoronary stent(s), with coronary angioplasty when performed; single major coronary artery or branch (02/08/2019)  Application of short arm splint (02/09/2016)  Application of short arm splint (forearm to hand); static (02/09/2016)  Immobilization of Right Lower Arm using Splint (02/09/2016)  Central Venous Catheter Placement with Guidance (04/05/2012)  Insertion of peripherally inserted central venous catheter (PICC), without subcutaneous port or pump; age 5 years or older. (04/05/2012)  Insertion of PICC (peripherally inserted central catheter) (04/05/2012)  Ultrasound guidance for vascular access requiring ultrasound evaluation of potential access sites, documentation of selected vessel patency, concurrent realtime ultrasound visualization of vascular needle entry, with permanent carlos.  (04/05/2012)  Cholecystectomy  HAND  Ligation of fallopian tubes with division by endoscopy   Medications  ACCU CHECK SMARTVIEW LANCETS AND STRIPS, See Instructions, 11 refills  albuterol CFC free 90 mcg/inh inhalation aerosol with adapter, 2 puff(s), INH, QID, 11 refills  alPRAzolam 1 mg oral tablet, 1 mg= 1 tab(s), Oral, TID, PRN  atorvastatin 80 mg oral tablet, 80 mg= 1 tab(s), Oral, At Bedtime, 5 refills  Basaglar KwikPen 100 units/mL subcutaneous solution, 80 units, Subcutaneous, Once a day (at bedtime), 5 refills  Bydureon Pen 2 mg subcutaneous injection, extended release, 2 mg= 1 EA, Subcutaneous, qWeek, 5 refills  desvenlafaxine 100 mg oral tablet, extended release, 100 mg= 1 tab(s), Oral, Daily, 4 refills  ferrous sulfate 325 mg (65 mg elemental iron) oral delayed release tablet, 325 mg= 1 tab(s), Oral, TID  fluticasone 50 mcg/inh nasal spray, 1 spray(s), Nasal, At Bedtime, 6 refills  gabapentin 600 mg oral tablet, 600 mg= 1 tab(s), Oral, TID, 5 refills  Insulin needles for Basaglar, See Instructions, 11 refills  lisinopril 5 mg oral tablet, 5 mg= 1 tab(s), Oral, Daily, 5 refills  metFORMIN 500 mg oral tablet, extended release, See Instructions, 5 refills  Metoprolol Tartrate 25 mg oral tablet, 25 mg= 1 tab(s), Oral, BID, 5 refills  nitroglycerin 0.4 mg sublingual TAB, 0.4 mg= 1 tab(s), SL, q5min, PRN, 11 refills  Omega-3 1000 mg oral capsule, 1000 mg= 1 cap(s), Oral, BID, 5 refills  Outpatient PT, See Instructions  oxcarbazepine 300 mg oral tablet, 300 mg= 1 tab(s), Oral, BID  Plavix 75 mg oral tablet, 75 mg= 1 tab(s), Oral, Daily, 5 refills  Provera 10 mg oral tablet, 10 mg= 1 tab(s), Oral, BID, 5 refills  sumatriptan 25mg tab, 25 mg= 1 tab(s), Oral, Once  tiZANidine 4 mg oral tablet, 4 mg= 1 tab(s), Oral, TID, 5 refills  traZODONE 150 mg oral tab ( Desyrel ), 150 mg= 1 tab(s), Oral, Once a day (at bedtime)  Vitamin D2 2000 intl units oral capsule, 2000 IntUnit= 1 cap(s), Oral, Daily  Allergies  No Known  Allergies  Social History  Abuse/Neglect  No, No, Yes, 12/09/2019  No, No, Yes, 09/10/2019  Alcohol - No Risk, 11/28/2015  Past, 12/09/2019  Employment/School  Stay at home w/ 9th grade education, 06/30/2016  Home/Environment  Lives with Children. Living situation: Home/Independent., 04/16/2018  Lives with Children, Spouse. Living situation: Home/Independent. Alcohol abuse in household: No. Substance abuse in household: No. Smoker in household: No. Injuries/Abuse/Neglect in household: No. Feels unsafe at home: No. Safe place to go: Yes. Agency(s)/Others notified: No. Family/Friends available for support: Yes. Concern for family members at home: No. Major illness in household: No. Financial concerns: No. TV/Computer concerns: No., 06/30/2016  Nutrition/Health  Regular, 06/30/2016  Other  Substance Use - Denies Substance Abuse, 11/28/2015  Never, 02/07/2019  Tobacco  Former smoker, quit more than 30 days ago, No, 12/09/2019  Family History  Acute myocardial infarction.: Mother.  Cardiac arrhythmia.: Mother.  Congestive heart disease.: Mother.  Diabetes mellitus type 2: Brother.  Leukemia: Mother.  Immunizations  Vaccine Date Status Comments   influenza virus vaccine, inactivated 09/10/2018 Recorded    tetanus/diphtheria/pertussis, acel(Tdap) 03/29/2018 Recorded    influenza virus vaccine, inactivated 10/25/2017 Recorded [10/30/2017] Immunization record scanned into chart   influenza virus vaccine, inactivated 11/30/2015 Given    Health Maintenance  Health Maintenance  ???Pending?(in the next year)  ??? ??OverDue  ??? ? ? ?Coronary Artery Disease Maintenance-Antiplatelet Agent Prescribed due??and every?  ??? ? ? ?Coronary Artery Disease Maintenance-Lipid Lowering Therapy due??and every?  ??? ? ? ?Alcohol Misuse Screening due??01/01/20??and every 1??year(s)  ??? ??Due?  ??? ? ? ?Hypertension Management-Education due??03/09/20??and every 1??year(s)  ??? ??Due In Future?  ??? ? ? ?Diabetes Maintenance-Foot Exam not due  until??09/09/20??and every 1??year(s)  ??? ? ? ?ADL Screening not due until??09/10/20??and every 1??year(s)  ??? ? ? ?Diabetes Maintenance-Eye Exam not due until??09/11/20??and every 1??year(s)  ??? ? ? ?Diabetes Maintenance-Urine Dipstick not due until??12/09/20??and every 1??year(s)  ??? ? ? ?Obesity Screening not due until??01/01/21??and every 1??year(s)  ??? ? ? ?Smoking Cessation not due until??01/01/21??and every 1??year(s)  ???Satisfied?(in the past 1 year)  ??? ??Satisfied?  ??? ? ? ?ADL Screening on??09/10/19.??Satisfied by Shira Huerta LPN  ??? ? ? ?Alcohol Misuse Screening on??09/10/19.??Satisfied by Yolanda De Jesus  ??? ? ? ?Blood Pressure Screening on??03/09/20.??Satisfied by Shira Huerta LPN  ??? ? ? ?Body Mass Index Check on??03/09/20.??Satisfied by Shira Huerta LPN  ??? ? ? ?Coronary Artery Disease Maintenance-Antiplatelet Agent Prescribed on??03/09/20.??Satisfied by Yolanda De Jesus  ??? ? ? ?Depression Screening on??03/09/20.??Satisfied by Shira Huerta LPN  ??? ? ? ?Diabetes Maintenance-Fasting Lipid Profile on??03/09/20.??Satisfied by Lauren Garcia  ??? ? ? ?Diabetes Maintenance-HgbA1c on??03/09/20.??Satisfied by Lauren Garcia  ??? ? ? ?Diabetes Maintenance-Serum Creatinine on??03/09/20.??Satisfied by Lauren Garcia  ??? ? ? ?Diabetes Maintenance-Urine Dipstick on??12/09/19.??Satisfied by Cindy Stapleton  ??? ? ? ?Diabetes Maintenance-Eye Exam on??09/11/19.??Satisfied by Trey Felix MD  ??? ? ? ?Diabetes Maintenance-Foot Exam on??09/10/19.??Satisfied by Yolanda De Jesus  ??? ? ? ?Diabetes Screening on??03/09/20.??Satisfied by Lauren Garcia  ??? ? ? ?Hypertension Management-Blood Pressure on??03/09/20.??Satisfied by Shira Huerta LPN  ??? ? ? ?Lipid Screening on??03/09/20.??Satisfied by Lauren Garcia  ??? ? ? ?Obesity Screening on??03/09/20.??Satisfied by Shira Huerta LPN  ??? ? ? ?Smoking Cessation on??03/09/20.??Satisfied by Yolanda De Jesus  W??Reason: Expectation Satisfied Elsewhere  ?  Lab Results  Test Name Test Result Date/Time Comments   Sodium Lvl 139 mmol/L 03/09/2020 07:05 CDT    Potassium Lvl 4.4 mmol/L 03/09/2020 07:05 CDT    Calcium Lvl 8.9 mg/dL 03/09/2020 07:05 CDT     mg/dL (High) 03/09/2020 07:05 CDT    BUN 3 mg/dL (Low) 03/09/2020 07:05 CDT    Creatinine 0.60 mg/dL 03/09/2020 07:05 CDT    eGFR-KECIA >105 mL/min 03/09/2020 07:05 CDT    Bili Direct <0.1 mg/dL 03/09/2020 07:05 CDT    AST 7 unit/L (Low) 03/09/2020 07:05 CDT    ALT 17 unit/L 03/09/2020 07:05 CDT    Alk Phos 80 unit/L 03/09/2020 07:05 CDT    Total Protein 7.2 gm/dL 03/09/2020 07:05 CDT    Hgb A1c 7.2 % (High) 03/09/2020 07:05 CDT    Vit D 25 OH 27.28 ng/mL (Low) 12/09/2019 07:16 CST Vit D 25 Hydroxy Reference Range:  0 - 17 years - ? ? Deficiency: ? ? ? ? less than 20 ng/ml  ? ? ? ? Optimum level: ? > or = 20 ng/ml  18 yrs or older: ?Deficiency: ? ? ? ? less than 20 ng/ml  ? ? ? ? ? ? ? ? ? ? ? ? ?Insufficiency: ? ? 20 - -29 ng/ml  ? ? ? ? ? ? ? ? ? ? ? ? ?Optimum level: ?30 - 80 ng/ml  ? ? ? ? ? ? ? ? ? ? ? ? ?Possible toxicity: > or = 150 ng/ml   Chol 131 mg/dL 03/09/2020 07:05 CDT    HDL 35 mg/dL (Low) 03/09/2020 07:05 CDT    Trig 123 mg/dL 03/09/2020 07:05 CDT    LDL 71 mg/dL 03/09/2020 07:05 CDT    Chol/HDL 3.7 03/09/2020 07:05 CDT    TSH 3.560 mIU/L 12/09/2019 07:16 CST    Microalb/Creat 10.4 mcg/mg Cr 03/09/2020 07:05 CDT    WBC 13.2 x10(3)/mcL (High) 12/09/2019 07:16 CST    RBC 5.33 x10(6)/mcL (High) 12/09/2019 07:16 CST    Hgb 14.5 gm/dL 12/09/2019 07:16 CST    Hct 44.6 % 12/09/2019 07:16 CST    Platelet 361 x10(3)/mcL 12/09/2019 07:16 CST    MCV 83.7 fL 12/09/2019 07:16 CST    MCH 27.2 pg 12/09/2019 07:16 CST    UA RBC Interp 3-5 (Abnormal) 12/09/2019 07:09 CST

## 2022-05-02 NOTE — HISTORICAL [facility] CERNER
This is a historical note converted from Nehemiah. Formatting and pictures may have been removed.  Please reference Nehemiah for original formatting and attached multimedia. Chief Complaint  follow up  History of Present Illness  43 yo White female being seen in clinic for follow-up, medication reconciliation, and lab review (3/10/2020).? Hx includes GERD, BPV (self-treats with Epley), chronic rhinitis, type 2 diabetes, HLD, CAD with MI in 2019, with stenting, HTN, chronic lower back pain, obesity (BMI 3.71), ventral hernia, AUB, endometriosis, depression, and anxiety.??Mom , leukemia.? Former smoker.? 15 pack/year hx smoking.? Quit in 2019, following MI.? Followed by Nikki Ackerman, mental health NP for psychiatric care and med management.? Followed by [facility].??? Single.? Two children 22/15.? Disabled.? Lives in [place].? Waiting for US of pelvis to move forward with hysterectomy, now that her diabetes is better-controlled.? Cardiology has given surgical clearance for dental surgery; however, that is on hold now for financial reasons and COVID reasons.? Takes Metformin QOD due to diarrhea and fecal incontinence.?? +straight leg raise 2020.?  ?   Imaging:  Needs MMG (ordered 3/9/20)  ?   TTE done on 2019 showed left ventricular ejection fraction approximately 65%; normal right ventricular size with preserved RV function; and trace mitral regurgitation.  ?   CT of the lumbar spine done 2018 for trauma showed mild degenerative changes at L5-S1; no acute lumbar spine abnormalities; and ligament, spinal cord, and/or vascular abnormalities could not be excluded on the basis of the examination.  ?   2018:? Ultrasound of the pelvis for heavy bleeding showed heterogeneity of the endometrium and myometrium with borderline endometrial thickening (1.7 cm).? Tiny echogenic areas near the junctional zone and within the left ovary may reflect punctate calcifications; and possible  12 mm posterior fibroid.  ?   Today 6/4/2020:  Scheduled for telephone visit with GYN tomorrow to discuss hysterectomy.? Continues taking Bydureon weekly and is tolerating that well.? CBGs continue to be very good at home.? Has occasional Dr. Pepper.? Scheduled for MMG 7/22/2020.? Has a mole on left groin that she would like removed; getting caught in her pants.? Has had it for a long time; no increase in size or changes in appearance.?? Sleeping well; eating well.? Denies chest pain, shortness of breath,?cough, fever, headache,?dizziness, weakness, abdominal pain, nausea,?vomiting, diarrhea, constipation, black/bloody stools, unplanned weight loss, night sweats, changes in urinary patterns, burning/odor with urination, depression, anxiety, and SI/HI.?? Current level of back pain 5-6/10, which is worsened by housework.? Worst pain gets is 8/10.? Best pain gets is 5-6/10, with her medications.? Takes her muscle relaxers TID, scheduled.? Wants to get hysterectomy done before focusing on chronic back pain.?  ?   5/21/2020:  See above for todays complaints.? Sleeping well; eating depends upon heartburn symptoms.? Denies shortness of breath,?cough, fever, headache,?dizziness, weakness, nausea,?vomiting, diarrhea, constipation, black/bloody stools, unplanned weight loss, night sweats, changes in urinary patterns, burning/odor with urination.? Mood well-controlled with current medications; continues care with Ms. Chavez; denies SI/HI.??CBG was 140 this morning.? Checking BP readings at home (verified with 2nd machine at home).? Readings have been ranging 108/92 to 167/113, with three of the four readings >156/90 range.? Does have chronic pelvic pain from endometriosis; no worse from baseline.?  ?   3/9/2020:  Had flu 3 weeks ago.? C/O dizziness since Friday of last week.? Dizziness comes on, depending upon changes in position.? Happens when she is walking and turning her head.? If she is laid back in her recliner, with  head still -- she does not have dizziness.? When she gets up and turns head, dizziness occurs.? Feels like she, rather than room, is spinning.? Had some nausea Friday night; no vomiting.? Vision becomes slightly blurred; no associated ear s/s.? No associated numbness/tingling.? (Hx 2 MIs in 2019).? Dizziness lasts several hours at a time.? Plans for hysterectomy, once diabetes better-controlled for endometriosis and AUB.?? Sleeping well; eating well.? Denies? shortness of breath,?cough, fever, headache,? weakness, abdominal pain, nausea,?vomiting, diarrhea, constipation, black/bloody stools, unplanned weight loss, changes in urinary patterns, depression, anxiety, and SI/HI.?? Called EMS over Mardi Gras for chest pain, which she believes was due to being out in crowds.? Evaluated and not transported; no more episodes since Mardi Gras.? Current level of back pain 5/10.? Worst pain gets is 10/10.? Most days pain is 7/10.?  ?  9/10/2019:? Primary c/o today is a dry cough X 1 week.? Cough comes and goes over the past several months.? Feels like I am having dripping coming from my nose sometimes.? No yellow or green mucous.? No fevers or chills.? No sore throat or ear pain.? Cough is worse at night.?Prescribed Fluticasone nasal spray, which she is using twice a day.? Not prescribed any anti-histamines presently.? ?Using Robitussin DM with no relief of cough.? Started Metformin at last visit and she is tolerating that well; taking 1000 mg twice daily now.? Stopped Basaglar last visit.? States highest CBG since med changes last clinic visit has been approximately 200.? Sleeping poorly with recent cough; eating well.? Denies chest pain, fever, headache,?dizziness, weakness, abdominal pain, nausea,?vomiting, diarrhea, constipation, dysuria, depression, anxiety, SI/HI.? Mood well-controlled on current medications.? Does have some transient SOB with recent coughing, with occasional gagging.? Has been out of Gabapentin for about  2 months; believes script got lost in recent move.? Current level of pain in lower back 7/10.? Back surgery was recommended to her by local [facility] neurosurgeon, Dr. Simon,?several months ago; however, she is putting that off for now.? Fell, with resultant legal case, which has since settled.? Says she did not get enough money from the case for surgery.?  told her that she would lose her medical benefits, if she tried to have Medicaid pay for surgery, with legal case settled.? Finished PT during legal case, along with chiropractor.?  ?  ?  Review of Systems  Except as stated in HPI, all other 10 body systems normal  ?  Physical Exam  Vitals & Measurements  T:?36.9? ?C (Oral)? HR:?80(Peripheral)? RR:?18? BP:?139/82?  HT:?162?cm? WT:?80.6?kg? BMI:?30.71? LMP:?06/01/2020 00:00 CDT?  General:??B/P above goal for diabetes today; afebrile.??Well-nourished; in no acute distress  Eyes: EOMI, clear conjunctiva, eyelids normal  Mouth:??tongue midline, pink, and moist; no lesions or erythema in oral cavity  Neck: full range of motion, no thyromegaly or lymphadenopathy  Respiratory:?breathing even, quiet, and unlabored.? Breath sounds clear throughout, but diminished over bases bilaterally.? No coughing.?  Cardiovascular:? S1/S2, regular rhythm and rate.? No murmurs or rubs.? No peripheral edema.??No hemosiderin staining or varicosities.?  Gastrointestinal:?obese abdomen, soft, mild pelvic and epigastric tenderness; no guarding, normal bowel sounds, without masses to palpation  Genitourinary: deferred  Musculoskeletal:?full range of motion of all extremities; no joint deformities or edema  Integumentary: Small flesh-colored nevi to left abdominal pannus skin fold, with measurements of approximately 0.5 cm x 0.5 cm; lesion non-suspicious in apearance; no other?rashes or skin lesions present  Neurologic: A&O X 3; cranial nerves intact, no signs of peripheral neurological deficit, motor/sensory function  intact  Psychiatric:?Calm, cooperative.??Mood and affect normal.??Responses appropriate  ?  Assessment/Plan  ?   GERD with esophagitis?K21.0,?GERD with esophagitis?K21.0  Stool for H. Pylori cancelled today.? Symptoms well-controlled with newly-prescribed Pantoprazole; CPM  ??  Hypertension?I10  BP labile during visits; ranges 111/74 to 145/97.?  Prescribed Lisinopril 5 mg daily.  RX:? Increasing Lisinopril to 10 mg twice daily.?  Teaching provided on role sodium salt plays in BP elevation.? Taught foods high in salt content to limit/avoid in diet:? processed meats, canned soup, fast foods, products from potato chip aisle in grocery stores, and lunch meats).? Reinforced teaching on vascular, and kidney,?long-term complications associated with poorly-controlled hypertension.? Taught BP goal for patient, based upon age and co-morbidities.?  ?  Chronic rhinitis?J31.0,?Chronic rhinitis?J31.0  Symptoms much improved with Astelin nasal spray; CPM  Not using Flonase much, as that does not control her s/s.? Uses that PRN for post nasal drip.?? Continue self-administered Epley maneuver, as she has taught herself how to do that, with good results.  ?   Abnormal uterine bleeding (AUB)?N93.9  TVUS and pelvic US re-ordered today.?? Scheduled telephone visit tomorrow with GYN to discuss hysterectomy versus continued Provera twice a day.??Heavy menses unresponsive to?BID?Provera.? ?Has been on current menstrual cycle for about 3 weeks, with heavy bleeding in beginning of current cycle.?  ?   Leukocytosis:  WBCs have been?chronically elevated over past one year.? No s/s of?infection.?  Will continue to monitor, with periperhal smear added with next labs.  WBC 14.6, with?elevated Abs Neut?of 10.83 on 6/23/2020.  ?  CAD (coronary artery disease), native coronary artery?I25.10  Stable; continued care with [facility] cardiology clinic.  ?  Degenerative joint disease (DJD) of lumbar spine?M47.816  Stable at her usual baseline.? Outpatient PT  pending at this time, during COVID pandemic.? She wants to hold off on surgical evaluation at this time, pending hysterectomy.? HEP established today, with handouts.? Instructed to?perform HEP for low back pain three times/daily.? RTC 3 months for re-evaluation.?  ?  Assessment/Plan  1.?Diabetic peripheral neuropathy?E11.42  Ordered:  1160F- Medication reconciliation completed during visit, Diabetes mellitus  Diabetic peripheral neuropathy  GERD with esophagitis  Hypertension  Mixed hyperlipidemia  Chronic rhinitis  Abnormal uterine bleeding (AUB)  Endometriosis  Microcytic hypochromic anemia  CAD (coronary artery disease), judy...  Clinic Follow up, *Est. 09/23/20 8:00:00 CDT, Order for future visit, Diabetic peripheral neuropathy, [facility]  Office/Outpatient Visit Level 4 Established 11693 , Diabetes mellitus  Diabetic peripheral neuropathy  GERD with esophagitis  Hypertension  Mixed hyperlipidemia  Chronic rhinitis  Abnormal uterine bleeding (AUB)  Endometriosis  Microcytic hypochromic anemia  CAD (coronary artery disease), judy...  ?  2.?GERD with esophagitis?K21.0  Ordered:  1160F- Medication reconciliation completed during visit, Diabetes mellitus  Diabetic peripheral neuropathy  GERD with esophagitis  Hypertension  Mixed hyperlipidemia  Chronic rhinitis  Abnormal uterine bleeding (AUB)  Endometriosis  Microcytic hypochromic anemia  CAD (coronary artery disease), judy...  Clinic Follow up, *Est. 09/23/20 8:00:00 CDT, Order for future visit, Diabetic peripheral neuropathy, [facility]  Office/Outpatient Visit Level 4 Established 86132 PC, Diabetes mellitus  Diabetic peripheral neuropathy  GERD with esophagitis  Hypertension  Mixed hyperlipidemia  Chronic rhinitis  Abnormal uterine bleeding (AUB)  Endometriosis  Microcytic hypochromic anemia  CAD (coronary artery disease), judy...  ?  3.?Mixed hyperlipidemia?E78.2  Ordered:  atorvastatin, 80 mg = 1 tab(s), Oral, At  Bedtime, Take 1 nightly for cholesterol, # 30 tab(s), 5 Refill(s), Pharmacy: [facility] [id number], 162, cm, Height/Length Dosing, 06/23/20 7:59:00 CDT, 80.6, kg, Weight Dosing, 06/23/20 7:59:00 CDT  1160F- Medication reconciliation completed during visit, Diabetes mellitus  Diabetic peripheral neuropathy  GERD with esophagitis  Hypertension  Mixed hyperlipidemia  Chronic rhinitis  Abnormal uterine bleeding (AUB)  Endometriosis  Microcytic hypochromic anemia  CAD (coronary artery disease), judy...  Clinic Follow up, *Est. 09/23/20 8:00:00 CDT, Order for future visit, Diabetic peripheral neuropathy, [facility]  Lipid Panel, Routine collect, *Est. 09/23/20 3:00:00 CDT, Blood, Order for future visit, *Est. Stop date 09/23/20 3:00:00 CDT, Lab Collect, Mixed hyperlipidemia, 06/23/20 9:05:00 CDT  Office/Outpatient Visit Level 4 Established 77575 PC, Diabetes mellitus  Diabetic peripheral neuropathy  GERD with esophagitis  Hypertension  Mixed hyperlipidemia  Chronic rhinitis  Abnormal uterine bleeding (AUB)  Endometriosis  Microcytic hypochromic anemia  CAD (coronary artery disease), judy...  ?  4.?Abnormal uterine bleeding (AUB)?N93.9  Ordered:  1160F- Medication reconciliation completed during visit, Diabetes mellitus  Diabetic peripheral neuropathy  GERD with esophagitis  Hypertension  Mixed hyperlipidemia  Chronic rhinitis  Abnormal uterine bleeding (AUB)  Endometriosis  Microcytic hypochromic anemia  CAD (coronary artery disease), judy...  CBC w/ Auto Diff, Routine collect, *Est. 09/23/20 3:00:00 CDT, Blood, Order for future visit, *Est. Stop date 09/23/20 3:00:00 CDT, Lab Collect, Abnormal uterine bleeding (AUB)  Iron deficiency anemia, 06/23/20 9:05:00 CDT  Clinic Follow up, *Est. 09/23/20 8:00:00 CDT, Order for future visit, Diabetic peripheral neuropathy, [facility]  Office/Outpatient Visit Level 4 Established 68538 PC, Diabetes mellitus  Diabetic peripheral neuropathy  GERD  with esophagitis  Hypertension  Mixed hyperlipidemia  Chronic rhinitis  Abnormal uterine bleeding (AUB)  Endometriosis  Microcytic hypochromic anemia  CAD (coronary artery disease), jduy...  ?  5.?Endometriosis?N80.9  Ordered:  1160F- Medication reconciliation completed during visit, Diabetes mellitus  Diabetic peripheral neuropathy  GERD with esophagitis  Hypertension  Mixed hyperlipidemia  Chronic rhinitis  Abnormal uterine bleeding (AUB)  Endometriosis  Microcytic hypochromic anemia  CAD (coronary artery disease), judy...  Clinic Follow up, *Est. 09/23/20 8:00:00 CDT, Order for future visit, Diabetic peripheral neuropathy, [facility]  Office/Outpatient Visit Level 4 Established 99335 , Diabetes mellitus  Diabetic peripheral neuropathy  GERD with esophagitis  Hypertension  Mixed hyperlipidemia  Chronic rhinitis  Abnormal uterine bleeding (AUB)  Endometriosis  Microcytic hypochromic anemia  CAD (coronary artery disease), judy...  ?  6.?Leukocytosis?D72.829  ?  7.?Microcytic hypochromic anemia?D50.9  Ordered:  1160F- Medication reconciliation completed during visit, Diabetes mellitus  Diabetic peripheral neuropathy  GERD with esophagitis  Hypertension  Mixed hyperlipidemia  Chronic rhinitis  Abnormal uterine bleeding (AUB)  Endometriosis  Microcytic hypochromic anemia  CAD (coronary artery disease), judy...  CBC w/ Auto Diff, Routine collect, *Est. 09/23/20 3:00:00 CDT, Blood, Order for future visit, *Est. Stop date 09/23/20 3:00:00 CDT, Lab Collect, Abnormal uterine bleeding (AUB)  Iron deficiency anemia, 06/23/20 9:05:00 CDT  Clinic Follow up, *Est. 09/23/20 8:00:00 CDT, Order for future visit, Diabetic peripheral neuropathy, [facility]  Office/Outpatient Visit Level 4 Established 15522 , Diabetes mellitus  Diabetic peripheral neuropathy  GERD with esophagitis  Hypertension  Mixed hyperlipidemia  Chronic rhinitis  Abnormal uterine bleeding (AUB)  Endometriosis   Microcytic hypochromic anemia  CAD (coronary artery disease), judy...  ?  8.?CAD (coronary artery disease), native coronary artery?I25.10  Ordered:  metoprolol, 25 mg = 1 tab(s), Oral, BID, Blood pressure, # 60 tab(s), 5 Refill(s), Pharmacy: [facility] [address] [id number], 162, cm, Height/Length Dosing, 06/23/20 7:59:00 CDT, 80.6, kg, Weight Dosing, 06/23/20 7:59:00 CDT  1160F- Medication reconciliation completed during visit, Diabetes mellitus  Diabetic peripheral neuropathy  GERD with esophagitis  Hypertension  Mixed hyperlipidemia  Chronic rhinitis  Abnormal uterine bleeding (AUB)  Endometriosis  Microcytic hypochromic anemia  CAD (coronary artery disease), judy...  Clinic Follow up, *Est. 09/23/20 8:00:00 CDT, Order for future visit, Diabetic peripheral neuropathy, [facility]  Office/Outpatient Visit Level 4 Established 55217 PC, Diabetes mellitus  Diabetic peripheral neuropathy  GERD with esophagitis  Hypertension  Mixed hyperlipidemia  Chronic rhinitis  Abnormal uterine bleeding (AUB)  Endometriosis  Microcytic hypochromic anemia  CAD (coronary artery disease), judy...  ?  9.?Shoulder pain?M25.519  ?  10.?Degenerative joint disease (DJD) of lumbar spine?M47.816  ?  11.?Depression with anxiety?F41.8  ?  12.?Rectus diastasis?M62.08  ?  13.?Ventral hernia?K43.9  ?  Orders:  azelastine nasal, 1 spray(s), Nasal, BID, Twice a day for sinuses, # 30 mL, 5 Refill(s), Pharmacy: [facility] [address] [id number], 162, cm, Height/Length Dosing, 06/23/20 7:59:00 CDT, 80.6, kg, Weight Dosing, 06/23/20 7:59:00 CDT  exenatide, 2 mg = 1 EA, Subcutaneous, qWeek, Diabetes, # 4 EA, 5 Refill(s), Pharmacy: [facility] [address] [id number], 162, cm, Height/Length Dosing, 06/23/20 7:59:00 CDT, 80.6, kg, Weight Dosing, 06/23/20 7:59:00 CDT  gabapentin, 600 mg = 1 tab(s), Oral, TID, Take for nerve/back pain, # 90 tab(s), 5 Refill(s), Pharmacy: [facility] [address] [id number], 162, cm, Height/Length Dosing, 06/23/20 7:59:00 CDT, 80.6, kg, Weight Dosing,  06/23/20 7:59:00 CDT  lisinopril, 10 mg = 1 tab(s), Oral, BID, Take 1 twice/day for blood pressure and kidney protection, # 60 tab(s), 5 Refill(s), Pharmacy: [facility] [address] [id number], 162, cm, Height/Length Dosing, 06/23/20 7:59:00 CDT, 80.6, kg, Weight Dosing, 06/23/20 7:59:00 CDT  metFORMIN, See Instructions, 1 tab every other day, as tolerated for diabetes, # 15 tab(s), 5 Refill(s), Pharmacy: [facility] [address] [facility], 162, cm, Height/Length Dosing, 06/23/20 7:59:00 CDT, 80.6, kg, Weight Dosing, 06/23/20 7:59:00 CDT  tiZANidine, 4 mg = 1 tab(s), Oral, TID, Take for back pain, as needed, # 90 tab(s), 5 Refill(s), Pharmacy: [facility] [address] [id number], 162, cm, Height/Length Dosing, 06/23/20 7:59:00 CDT, 80.6, kg, Weight Dosing, 06/23/20 7:59:00 CDT  3008F Body Mass Index (BMI), documented, 06/23/20 9:00:00 CDT  3016F Patient screened for unhealthy alcohol use using a systematic screening method, 06/23/20 9:00:00 CDT  3075F Most recent systolic blood pressure, 130 to 139 mm Hg, 06/23/20 9:00:00 CDT  3079F Most recent diastolic blood pressure, 80 - 89 mm Hg, 06/23/20 9:00:00 CDT  3725F Screening for depression performed, 06/23/20 9:00:00 CDT  4008F Beta-blocker prescribed or currently being taken, 06/23/20 9:00:00 CDT  4010F Angiotensin converting enzyme (ACE) inhibitor or Angiotensin Receptor Blocker (ARB) therapy p, 06/23/20 9:00:00 CDT  4013F Statin therapy prescribed or currently being taken, 06/23/20 9:00:00 CDT  Comprehensive Metabolic Panel, Routine collect, *Est. 09/23/20 3:00:00 CDT, Blood, Order for future visit, *Est. Stop date 09/23/20 3:00:00 CDT, Lab Collect, Diabetes mellitus  Hypertension, 06/23/20 9:04:00 CDT  Hemoglobin A1C UHC, Routine collect, *Est. 09/23/20 3:00:00 CDT, Blood, Order for future visit, *Est. Stop date 09/23/20 3:00:00 CDT, Lab Collect, Diabetes mellitus, 06/23/20 9:05:00 CDT  Peripheral Smear Review, Routine collect, *Est. 09/24/20 3:00:00 CDT, Blood, Order for future visit, *Est. Stop date  09/24/20 3:00:00 CDT, Lab Collect, Neutrophilic leukocytosis, 06/24/20 6:19:00 CDT  Urine Culture [id number], Routine collect, 06/23/20 9:40:00 CDT, Urine, Collected, Nurse collect, Urine, [id number], Stop date 06/23/20 9:40:00 CDT, ~INHERIT, Wellness examination  US Pelvic Non-Obstetrics, Routine, *Est. 07/23/20 3:00:00 CDT, Other (please specify), Excessive and frequent menstruation with regular cycle, None, Stretcher, Patient Has IV?, Patient was told she could have hysterectomy once diabetes was better-controlled. Diabetes is now b...  US Transvaginal Non-OB, Routine, *Est. 07/23/20 3:00:00 CDT, Other (please specify), Excessive and frequent menstruation with regular cycle, None, Stretcher, Patient Has IV?, Patient was told she could have hysterectomy once diabetes was better-controlled. Diabetes is now b...  Vitamin D, 25-Hydroxy Level, Routine collect, *Est. 09/23/20 3:00:00 CDT, Blood, Order for future visit, *Est. Stop date 09/23/20 3:00:00 CDT, Lab Collect, Vitamin D deficiency, 06/23/20 9:05:00 CDT  Referrals  Clinic Follow up, *Est. 09/23/20 8:00:00 CDT, Order for future visit, Diabetic peripheral neuropathy, [facility]  Reviewed most recent labs and imaging studies with patient; questions answered; voiced understanding of all teaching today.   Problem List/Past Medical History  Ongoing  Abnormal uterine bleeding (AUB)  BMI 29.0-29.9,adult  CAD (coronary artery disease), native coronary artery  CAD in native artery  Chronic back pain  Chronic rhinitis  Cyst of ovary  Degenerative joint disease (DJD) of lumbar spine  Depression  Depression with anxiety  Diabetes mellitus  Diabetic peripheral neuropathy  Endometriosis  Extensor tenosynovitis of wrist  Eye pain  Generalized anxiety disorder  GERD with esophagitis  Hypertension  Leukocytosis  Microcytic hypochromic anemia  Mixed hyperlipidemia  Obesity  Peripheral neuropathy  Rectus diastasis  Shoulder pain  Umbilical hernia  Ventral hernia  Wellness  examination  Historical  Anxiety  Anxiety  Arm pain  Closed fracture of radius, distal end  Depression  DIABETES MELLITUS  Endometriosis  Hyperlipidemia  HYPERTENSION  Pregnant  Pregnant  Tobacco user  Procedure/Surgical History  Catheter placement in coronary artery(s) for coronary angiography, including intraprocedural injection(s) for coronary angiography, imaging supervision and interpretation; with right heart catheterization (02/08/2019)  Dilation of Coronary Artery, One Artery with Drug-eluting Intraluminal Device, Percutaneous Approach (02/08/2019)  Fluoroscopy of Left Heart using Low Osmolar Contrast (02/08/2019)  Fluoroscopy of Multiple Coronary Arteries using Low Osmolar Contrast (02/08/2019)  Fluoroscopy of Multiple Coronary Arteries using Low Osmolar Contrast (02/08/2019)  Measurement of Cardiac Sampling and Pressure, Left Heart, Percutaneous Approach (02/08/2019)  Measurement of Cardiac Sampling and Pressure, Right Heart, Percutaneous Approach (02/08/2019)  Percutaneous transcatheter placement of drug eluting intracoronary stent(s), with coronary angioplasty when performed; single major coronary artery or branch (02/08/2019)  Application of short arm splint (02/09/2016)  Application of short arm splint (forearm to hand); static (02/09/2016)  Immobilization of Right Lower Arm using Splint (02/09/2016)  Central Venous Catheter Placement with Guidance (04/05/2012)  Insertion of peripherally inserted central venous catheter (PICC), without subcutaneous port or pump; age 5 years or older. (04/05/2012)  Insertion of PICC (peripherally inserted central catheter) (04/05/2012)  Ultrasound guidance for vascular access requiring ultrasound evaluation of potential access sites, documentation of selected vessel patency, concurrent realtime ultrasound visualization of vascular needle entry, with permanent carlos. (04/05/2012)  Cholecystectomy  HAND  Ligation of fallopian tubes with division by endoscopy    Medications  ACCU CHECK SMARTVIEW LANCETS AND STRIPS, See Instructions, 11 refills  albuterol CFC free 90 mcg/inh inhalation aerosol with adapter, 2 puff(s), INH, QID, 11 refills  alPRAzolam 1 mg oral tablet, 1 mg= 1 tab(s), Oral, TID, PRN  Astelin 137 mcg/inh nasal spray, 1 spray(s), Nasal, BID, 5 refills  atorvastatin 80 mg oral tablet, 80 mg= 1 tab(s), Oral, At Bedtime, 5 refills  Basaglar KwikPen 100 units/mL subcutaneous solution, 80 units, Subcutaneous, Once a day (at bedtime), 5 refills  Bydureon Pen 2 mg subcutaneous injection, extended release, 2 mg= 1 EA, Subcutaneous, qWeek, 5 refills  desvenlafaxine 100 mg oral tablet, extended release, 100 mg= 1 tab(s), Oral, Daily, 4 refills  ferrous sulfate 325 mg (65 mg elemental iron) oral delayed release tablet, 325 mg= 1 tab(s), Oral, TID, 3 refills  Flonase 50 mcg/inh nasal spray, 1 spray(s), Nasal, BID, PRN  fluticasone 50 mcg/inh nasal spray, 1 spray(s), Nasal, At Bedtime, 6 refills,? ?Not taking: duplicate  gabapentin 600 mg oral tablet, 600 mg= 1 tab(s), Oral, TID, 5 refills  Insulin needles for Basaglar, See Instructions, 11 refills  lisinopril 10 mg oral tablet, 10 mg= 1 tab(s), Oral, BID, 5 refills  loratadine 10 mg oral tablet, 10 mg= 1 tab(s), Oral, Daily, 5 refills  medroxyPROGESTERone 10 mg oral tablet, See Instructions  metFORMIN 500 mg oral tablet, extended release, See Instructions, 5 refills  Metoprolol Tartrate 25 mg oral tablet, 25 mg= 1 tab(s), Oral, BID, 5 refills  nitroglycerin 0.4 mg sublingual TAB, 0.4 mg= 1 tab(s), SL, q5min, PRN, 11 refills  Outpatient PT, See Instructions,? ?Unable to obtain  oxcarbazepine 300 mg oral tablet, 300 mg= 1 tab(s), Oral, BID  Pantoprazole 40 mg ORAL EC-Tablet, 40 mg= 1 tab(s), Oral, Daily, 5 refills  Plavix 75 mg oral tablet, 75 mg= 1 tab(s), Oral, Daily, 5 refills  sumatriptan 25mg tab, 25 mg= 1 tab(s), Oral, Once  tiZANidine 4 mg oral tablet, 4 mg= 1 tab(s), Oral, TID, 5 refills  traZODONE 150 mg oral tab  ( Desyrel ), 150 mg= 1 tab(s), Oral, Once a day (at bedtime)  Vitamin D2 2000 intl units oral capsule, 2000 IntUnit= 1 cap(s), Oral, Daily,? ?Not Taking, Completed Rx  Allergies  No Known Allergies  Social History  Abuse/Neglect  No, No, Yes, 06/23/2020  Alcohol - No Risk, 11/28/2015  Past, 06/23/2020  Employment/School  Unemployed, Disabled, 06/22/2020  Exercise  Exercise duration: 0., 06/22/2020  Home/Environment  Lives with Children., 06/22/2020  Nutrition/Health  Diabetic, 06/22/2020  Other  Sexual  Sexually active: Yes. Number of current partners 1. Sexual orientation: Straight or heterosexual. Gender Identity Identifies as female. No, 06/22/2020  Substance Use - Denies Substance Abuse, 11/28/2015  Never, 02/07/2019  Tobacco  Former smoker, quit more than 30 days ago, No, 06/23/2020  Family History  Acute myocardial infarction.: Mother.  Breast cancer: Aunt.  Cancer: Negative: Mother.  Cardiac arrhythmia.: Mother.  Congestive heart disease.: Mother.  Diabetes mellitus type 2: Brother.  Leukemia: Mother.  Immunizations  Vaccine Date Status Comments   influenza virus vaccine, inactivated 09/10/2018 Recorded    tetanus/diphtheria/pertussis, acel(Tdap) 03/29/2018 Recorded    influenza virus vaccine, inactivated 10/25/2017 Recorded [10/30/2017] Immunization record scanned into chart   influenza virus vaccine, inactivated 11/30/2015 Given    Health Maintenance  Health Maintenance  ???Pending?(in the next year)  ??? ??OverDue  ??? ? ? ?Coronary Artery Disease Maintenance-Antiplatelet Agent Prescribed due??and every?  ??? ? ? ?Coronary Artery Disease Maintenance-Lipid Lowering Therapy due??and every?  ??? ??Due?  ??? ? ? ?Hypertension Management-Education due??06/24/20??and every 1??year(s)  ??? ??Due In Future?  ??? ? ? ?Diabetes Maintenance-Foot Exam not due until??09/09/20??and every 1??year(s)  ??? ? ? ?Diabetes Maintenance-Eye Exam not due until??09/11/20??and every 1??year(s)  ??? ? ? ?Alcohol Misuse Screening  not due until??01/01/21??and every 1??year(s)  ??? ? ? ?Obesity Screening not due until??01/01/21??and every 1??year(s)  ??? ? ? ?Diabetes Maintenance-Fasting Lipid Profile not due until??03/09/21??and every 1??year(s)  ??? ? ? ?Blood Pressure Screening not due until??06/23/21??and every 1??year(s)  ??? ? ? ?Body Mass Index Check not due until??06/23/21??and every 1??year(s)  ??? ? ? ?Diabetes Maintenance-HgbA1c not due until??06/23/21??and every 1??year(s)  ??? ? ? ?Hypertension Management-Blood Pressure not due until??06/23/21??and every 1??year(s)  ??? ? ? ?Hypertension Management-BMP not due until??06/23/21??and every 1??year(s)  ??? ? ? ?ADL Screening not due until??06/23/21??and every 1??year(s)  ??? ? ? ?Diabetes Maintenance-Urine Dipstick not due until??06/23/21??and every 1??year(s)  ??? ? ? ?Diabetes Maintenance-Serum Creatinine not due until??06/23/21??and every 1??year(s)  ???Satisfied?(in the past 1 year)  ??? ??Satisfied?  ??? ? ? ?ADL Screening on??06/23/20.??Satisfied by Shira Huerta LPN  ??? ? ? ?Alcohol Misuse Screening on??06/23/20.??Satisfied by Yolanda De Jesus  ??? ? ? ?Blood Pressure Screening on??06/23/20.??Satisfied by Shira Huerta LPN  ??? ? ? ?Body Mass Index Check on??06/23/20.??Satisfied by Shira Huerta LPN  ??? ? ? ?Coronary Artery Disease Maintenance-Lipid Lowering Therapy on??06/23/20.??Satisfied by Yolanda De Jesus  ??? ? ? ?Depression Screening on??06/23/20.??Satisfied by Shira Huerta LPN  ??? ? ? ?Diabetes Maintenance-HgbA1c on??06/23/20.??Satisfied by Biju Jenkins Jr.  ??? ? ? ?Diabetes Maintenance-Serum Creatinine on??06/23/20.??Satisfied by Biju Jenkins Jr.  ??? ? ? ?Diabetes Maintenance-Urine Dipstick on??06/23/20.??Satisfied by Lachelle Cho  ??? ? ? ?Diabetes Maintenance-Fasting Lipid Profile on??03/09/20.??Satisfied by Lauren Garcia  ??? ? ? ?Diabetes Maintenance-Eye Exam on??09/11/19.??Satisfied by Trey Felix MD  ???  ? ? ?Diabetes Maintenance-Foot Exam on??09/10/19.??Satisfied by Yolanda De Jesus  ??? ? ? ?Diabetes Screening on??20.??Satisfied by Biju Jenkins Jr.  ??? ? ? ?Hypertension Management-BMP on??20.??Satisfied by Biju Jenkins Jr.  ??? ? ? ?Lipid Screening on??20.??Satisfied by Lauren Garcia  ??? ? ? ?Obesity Screening on??20.??Satisfied by Shira Huerta LPN  ?  Lab Results  Last 6 Months?  ??Lipid Profile: ?Basic Metabolic Panel: ?Hematology:   ?: () ?Sodium Lvl: 137 (20) ?Hgb: 14.2 (20)   ?: () ?Potassium Lvl: 3.8 (20) ?: ()   ?: () ?: () ?WBC: 14.6 (20)   ?LDL: 71 (20) ?: () ?Platelet: 315 (20)   ? ?BUN: 4 (20) ?INR: ------   ? ?Creatinine: 0.70 (20) ?   ? ?: () ?   ?  ?  Additional - Last 6 Months?  ??A/G Ratio: 0.9 (20) ?Abs Baso: 0.0 (20) ?Abs Eos: 0.1 (20)   ?Abs Lymph: 2.9 (20) ?Abs Mono: 0.6 (20) ?Abs Neut: 10.83 (20)   ?Abs Neutro: 10.83 (20) ?Albumin Lvl: 3.8 (20) ?Alk Phos: 83 (20)   ?ALT: 16 (20) ?AST: 11 (20) ?Basophil Auto: 0 (20)   ?Bili Direct: <0.1 (20) ?Bili Indirect: Calc. not valid (20) ?Bili Total: 0.2 (20)   ?Calcium Lvl: 9.2 (20) ?Chloride: 105 (20) ?Chol: 131 (20)   ?Chol/HDL: 3.7 (20) ?CK MB: <1.0 (03/10/20) ?CO2: 27 (20)   ?EA (20) ?eGFR-AA: >105 (20) ?eGFR-KECIA: 96 (20)   ?Eos Auto: 1 (20) ?Globulin: 4.40 (20) ?Glucose Lvl: 153 (20)   ?Hct: 43.5 (20) ?HDL: 35 (20) ?Hgb A1c: 7.6 (20)   ?IG#: 0.1200 (20) ?IG%: 1 (20) ?Lymph Auto: 20 (20)   ?MCH: 27.9 (20) ?MCHC: 32.6 (20) ?MCV: 85.5 (20)   ?Microalb/Creat: 10.4 (20) ?Mono Auto: 4 (20) ?MPV: 11.6 (20)   ?Neutro Auto: 74 (20) ?RBC: 5.09 (20) ?RDW: 13.2 (20)   ?Total CK: 51 (03/10/20) ?Total Protein:  8.2 (20) ?Tri (20)   ?Troponin-I: Troponin-I (03/10/20) ?U beta hCG Ql POC: Negative (03/10/20) ?U Creatinine: 49.0 (20)   ?U Microalb: 5.1 (20) ?UA Appear: Clear (20) ?UA Bact Interp: Few (20)   ?UA Bili: Negative (20) ?UA Blood: 0.2 (20) ?UA Color: LIGHT YELLOW (20)   ?UA Glucose: Negative (20) ?UA Hyal Cast Interp: 0-2 (20) ?UA Ketones: Negative (20)   ?UA Leuk Est: Negative (20) ?UA Mucous: Slight (03/10/20) ?UA Nitrite: Negative (20)   ?UA pH: 7.0 (20) ?UA Protein: Negative (20) ?UA RBC Interp: 0-2 (20)   ?UA Spec Grav: 1.009 (20) ?UA Squam Epi Interp:  (20) ?UA Urobilinogen: Normal (20)   ?UA WBC Interp: 6-10 (20) ?Urine Culture: Review (03/10/20) ?Vit D 25 OH: 38.4 (20)   ?VLDL: 25 (20) ? ?   ?  ?  ?

## 2022-05-02 NOTE — HISTORICAL [facility] CERNER
This is a historical note converted from [place]. Formatting and pictures may have been removed.  Please reference Cerben for original formatting and attached multimedia. Chief Complaint  c/o pain right scapular area and incision mid chest. wound dehisence noted with puirulent drainage. cabg done 3 weeks ago. mask in place  History of Present Illness  46yo F w/ PMHx CAD with stents, s/p CABG,?HTN, HLD, type 2 diabetes, presented to ED 7/19/21?complaining of yellow pus drainage from sternotomy wound that started 7/17/2021. Patient underwent CABG 3 weeks ago with Dr. Jorgensen at [facility]. No complications in the hospital. Denies drainage of blood from incision. One day prior to the drainage of pus, she experienced pain that started in her back, which later?moved to her right chest and then to sternotomy incision. Pain felt like a pulled muscle. The pain gradually increased in intensity until sitting up or moving her right arm was difficult.?Percocet 7.5 mg provided some relief. ?Movement made the pain worse. Reports pain in her ribs with inspiration, chest tightness along sternotomy incision, occasional palpitations, and cough without sputum. ?She has been cleaning the incision with Dial soap. ?Denies swimming or bathing since CABG.?Denies fever, chills, shortness of breath.?  ?   Past Medical History-  CAD: s/p stent (2019), CABG x1 (6/28/21),  DM: prescribed Lantus 80 units?at bedtime but has not?taken since surgery, Trulicity, metformin 500 every other day, Actos, gabapentin for diabetic neuropathy  HTN: Metoprolol 25 twice daily  Anxiety/depression  Chronic back pain  ?   Surgical Hx: Cholecystectomy, hysterectomy, BTL, wrist surgery  Social Hx: Denies alcohol use, quit smoking cigarettes 3 weeks ago, previous 27pack-year smoking history, denies drug use, lives with daughter, son, paco, has been disabled for 3 years due to chronic back pain  Family Hx:Mom- CAD, CHF, leukemia, Dad- unknown  ?   In ED,?patient  afebrile,?tachycardic at 104, tachypneic at 28, 99% on room air, /80.?WBC 21.2. Met sepsis criteria with SIRS 3+ with source.?Patient started on Vanc and Zosyn. CT revealed?recent sternotomy and associated inflammatory changes through the superficial thoracic wall and the subjacent mediastinum. No drainable fluid collection is identified. Consolidative irregularity of the bilateral lower lungs, and?widespread mediastinal adenopathy. CT surgery on call was notified and they recommended vancomycin?with follow up upon discharge.?Internal Medicine team consulted for admission.  Review of Systems  Constitutional:?no fevers, chills or fatigue, no diaphoresis, decreased oral intake  Eye:?no headache  ENMT:?no sore throat or sinus problems  Respiratory:?no shortness?of breath,?+cough without?sputum  Cardiovascular:?Chest pain with deep inspiration,?chest tightness over sternotomy incision, no shortness breath on activity,?no ankle swelling  Gastrointestinal:no constipation,?no diarrhea,?no nausea,?no vomiting  Musculoskeletal:?No muscle pain,?no joint pain  Integumentary: puss from sternotomy incision  Physical Exam  Vitals & Measurements  T:?36.1? ?C (Oral)? HR:?89(Peripheral)? HR:?89(Monitored)? RR:?20? BP:?113/62? SpO2:?95%? WT:?80?kg? BMI:?30.48?  General:?laying in ED bed, mild discomfort, Alert and oriented  Respiratory:?decreased respiratory quality 2/2 pain, Lungs clear to auscultation bilaterally,?No increased work of breathing  Cardiovascular:?S1-S2, regular rate, regular rhythm,?2+ radial pulses,?No lower extremity edema, dorsal pedal pulses appreciated with Doppler  Gastrointestinal:?Soft, nontender, nondistended, bowel sounds present  Musculoskeletal: Appropriate movement of extremities bilaterally  Integumentary: approx. 10cm sternotomy incision, draining yellow pus along inferior half of incision, no blood, increased erythema around incision, tender to palpate on chest around incision, well healed non  erythematous?incisions below left breast and left proximal calf  Psych: Calm, cooperative  Assessment/Plan  Infected sternotomy incision?  - s/p CABG 6/28/21  - yellow drainage x2days, pain at incision  - CT revealed?recent sternotomy and associated inflammatory changes through the superficial thoracic wall and the subjacent mediastinum. No drainable fluid collection is identified.  - SIRS 3+ with source (WBC 21.2, RR?28, )  - wound culture and blood culture x2  - started vanc and zosyn  - LR at 100mL/hr given decreased oral intake  - CT surgery on call recommended vanc with follow up upon discharge  ?   Pneumonia  - CT: Consolidative irregularity of the bilateral lower lungs,  concerning for infectious process with associated small pleural  effusions.  - reports cough with no sputum, afebrile  - vanc and zosyn broad coverage due to sepsis criteria met  ?   CAD s/p stent and CABG  - continue plavix 75mg daily  - atorvastatin 80 mg daily  ?  HTN  - continue home?metoprolol tartrate 25 mg?BID  ?  DM  - low sliding scale insulin  - did not resume?Lantus given pt has not taken for 3 weeks  -?primary?team?to optimize insulin regimen as needed?  - gabapentin 300 TID for diabetic neuropathy  ?  Anxiety/Depression  - continue home oxcarbazepine 300 mg?BID, Pristiq, doxepine 25mg  ?  ?  PPx: Protonix  Abx: vanc and zosyn  IVF: LR at 100 mL/hr  Lines: PIV  Diet: Diabetic and Cardiac  ?  Dispo: SIRS3+ with source. Admit to telemetry for IV antibiotics. Vanc/zosyn.  ?  Fifi Johnson M.D.  [employer]  Resident, HO-1  ?  63-year-old  female PMH DM, HLD, status post CABG 6/28/2021, hypertension, anxiety, depression. ?Presented to [facility] ER 7/19 C/C purulent discharge from midsternal incision site s/p CABG 3 weeks ago. ?Onset few days ago. ?Associated pain with breathing however nontender to touch and nonerythematous with no warmth. ?Denies fevers, chills, nausea vomiting, swelling, coughing, palpitations, abdominal pain. ?No  transformative factors.  ?  In the ER, VS WNL with exception to heart rate 104 and respiratory rate 28 saturating at 99 on room air. ?CMP WNL other than glucose 234. ?Lactic acid 2.0. ?Troponin 0 0.01. ?CBC shows WBC 21.2 with platelets 430 and neutrophil predominance of 82. ?ESR 91 Covid negative blood cultures x2 as well as wound culture pending CXR shows slightly improved aeration of the left lung base with comparison to July 2, 2021 imaging, with small left effusion suspected today. ?CT angio chest PE shows no convincing evidence of PE, findings consistent with recent sternotomy and associated inflammatory changes through the superficial thoracic wall and subjacent mediastinum but no drainable fluid collection identified; consolidative irregularity of the bilateral lower lungs concerning for infectious process with associated small pleural effusions; widespread mediastinal adenopathy is favored to represent a reactive process however worsened sister because of lymph node a more sinister cause of lymph node enlargement is not excluded. ?Hazy irregularity of bilateral mid and lower posterior ribs. ?EKG shows normal sinus rhythm with a rate of 100 no ectopy, normal CO and QRS intervals with a T wave inversion in 1 aVL and V1 through V4. ?Internal medicine consulted and patient admitted for for postsurgical complication.  ?  PMH:DM, HLD, status post CABG 6/28/2021, hypertension, anxiety, depression  Surgical history: BTL, cholecystectomy, hysterectomy, salpingectomy, CABG 6/28/2021  -Family history mother-MI, cardiac arrhythmia, CHF, leukemia; father-DM 2  Social history: Quit smoking 3 weeks ago after smoking a pack per day for 27 years, denies alcohol or other illicit substances; disabled lives at home with her family  Allergies: Aspirin  Medications: Percocet 5/325 every 6 as needed for pain, alprazolam 1 mg 3 times daily, Plavix 75, desvenlafaxine 100, doxepin 25, dulaglutide q. weekly, famotidine 20 twice  daily, gabapentin 600 3 times daily, Lantus 80 p.m., loratadine 10, metformin 500 every other day, metoprolol tartrate 25 twice daily, NTG, oxcarbazepine 300 twice daily, pantoprazole 40, pioglitazone 30, rosuvastatin 40, tizanidine 4 3 times daily  ?  Physical exam  General: Patient lying comfortably in bed in no acute distress  Neck: No JVD, carotid bruits supple  Cardiovascular: Midsternal incision site with open area with a wound cover over purulent fluid draining, with dried drainage under bilateral breast creases; nontender to palpation, no warmth, nor erythema; regular rate and rhythm, no murmurs auscultated; capillary refill less than 3 seconds, radial pulses 2+ bilaterally, dorsalis pedis 2+ on left lower extremity but had to obtain Doppler to find pulses on the right foot; no peripheral edema  Respiratory: Fine crackles at bilateral lower bases posterior, no egophony; symmetrical chest expansion  Abdominal: Normoactive bowel sounds, nontender to palpation  Integumentary: No lesions or rashes on exposed skin  Neurological: No FND  Psychological: Cooperative and appropriate  ?  Assessment and plan  SIRS?3?out of 4 2/2 post surgical complication s/p CABG 6/28/2021  -Patient ?, tachypneic at 28 with WBC 21.2  -Lactic acid of 2  -Patient received Vanco and Zosyn in the ER; will continue Vanco at 1200 every 8 per cardiovascular surgeon recommendations  - Cx PND  -Continue Plavix 75 and metoprolol 25 twice daily  -Continue to monitor vitals and labs  ?  DM  -  -DM diet with cardiac modifications  -Low SSI in place  ?  Hyperlipidemia  -?continue atorvastatin 80  ?  Anxiety/depression  -Continue doxepin 25, oxcarbazepine 300 twice daily as well as desvenlafaxine 100,  ?  PPx: Protonix  Abx: vanc and zosyn  IVF: LR at 100 mL/hr  Lines: PIV  Diet: DM/Cardiac  ?  Disposition: 45-year-old female PMH DM, HLD, status post ?CABG 6/28/2021, hypertension, anxiety, depression. ?Admitted for SIRS 2/4 secondary to  postsurgical complication s/p CABG 6/28/2021. ?Received Vanco and Zosyn in the ER will continue with the vancomycin per cardiothoracic surgeon recommendations at the institution where the procedure was performed. ?Will continue to monitor vitals and labs.  ?   VIMAL Stone MD HO-II?  [employer] FM Resident  ?  ?  ?  ?  Addendum?by primary team  Brief history  Ms. Spring is a 45-year-old  female with a past medical history of CAD with stents, status post CABG 3 weeks ago, hypertension, HLD, type 2 diabetes, who presents to [facility] ED on 7/19/2021 complaining of yellow purulent discharge from sternotomy wound that began 3 days ago on 7/17/2021. ?She underwent CABG 3 weeks ago with Dr. Crandall at [facility] with no complications in the hospital or before discharge. ?Prior to noticing discharge from her sternotomy wound she states that she experienced pain that began around her right scapula that radiated towards her right breast. ?She states that this felt more like a skill skeletal pain and not true chest pain. ?She states that is painful to move around especially with sitting up or moving her right arm. ?After she noticed the purulent drainage she states that she began to clean it with water. ?She denied any recent trauma or incidents that would cause wound dehiscence since her CABG. ?She denied having fever, chills, shortness of breath, difficulty breathing, bloody discharge from wound, nausea, vomiting, and chest pain.  ?  This morning patient continues to experience pain and purulent drainage from her sternotomy wound.  ?   ROS  Constitutional: No recent changes in?weight or appetite. No fevers, no chills, no?recent illness or sick contacts  Head:?No headache, no neck pain  Eyes: No blurry vision, no double vision  Ears: No hearing loss, no tinnitus  Nose/mouth: No rhinorrhea, no congestion, no sore throat  Cardiac: No chest pain, no palpitations, no lower extremity edema  Resp: No shortness of breath, no dyspnea on exertion,?no  coughing, no hemoptysis  GI: No abdominal pain, no constipation, no diarrhea, no nausea, no emesis  Gu: No dysuria  Musculoskeletal: Right sided?musculoskeletal chest pain/back. Ambulatory without assistance at baseline  Neurologic: No loss of sensation, no dizziness,?no syncope  Skin: 4 cm?open wound along?sternotomy?incision  Endocrine: No polyuria, no polydipsia, no heat/cold intolerance  Psychiatric:?No depression,?no anxiety, no SI/HI  ?  PE  Gen: well-nourished, well-developed? female laying in bed in moderate distress  HEENT: Normocephalic, atraumatic.  Neck: No JVD or carotid bruits. No thyromegaly. No lymphadenopathy.  Heart: RRR, no murmurs, gallops, clicks or rubs.  Lungs: CTAB without rales, wheezes or rhonchi. Normal work of breathing. Chest rise symmetrical on inspiration.  Abd: Soft, non-tender, non-distended and without guarding. No organomegaly. No obvious masses. Bowel sounds present.  Extremities: Radial and pedal pulses 2+ bilaterally, no LE edema.  MSK: No obvious deformities. Moves all extremities purposefully.  Neuro: Responds well to commands.  Skin: 4 cm?open wound along sternotomy incision?draining?yellow?purulent pus.  ?   Assessment and plan  Postoperative wound infection/Concern for Mediastinitis?  - s/p CABG 6/28/21  - yellow purulent?drainage x3days, pain at incision.? Copious amount of purulence draining this am.  - CT revealed?recent sternotomy and associated inflammatory changes through the superficial thoracic wall and the subjacent mediastinum. No drainable fluid collection is identified.  - SIRS 3+ with source (WBC 21.2, RR?28, )?on 7/19/2021; 7/20/2021?WBC 14.8, RR?20, HR 87  - wound culture and blood culture x2  - started vanc and zosyn  - CT surgery on call recommended vanc with follow up upon discharge.  - Percocet PRN q6 for pain  ?   Pneumonia  - CT: Consolidative irregularity of the bilateral lower lungs,  concerning for infectious process with associated  small pleural  effusions.  - vanc and zosyn broad coverage due to sepsis criteria met  ?   CAD s/p stent and CABG  - continue plavix 75mg daily  - atorvastatin 80 mg daily  ?   HTN  - continue home?metoprolol tartrate 25 mg?BID  ?   DM  - low sliding scale insulin  - did not resume?Lantus given pt has not taken for 3 weeks  - gabapentin 300 TID for diabetic neuropathy  ?  Anxiety/Depression  - continue home oxcarbazepine 300 mg?BID, Pristiq, doxepine 25mg  ?   DVT Px:?Plavix  GI Px:?Pantoprazole  IVF:?LR  Diet:?Diabetic  ABx:?Vanco and Zosyn - day 2  Lines: R PIV  Dispo:?Patient will be admitted to telemetry?for IV antibiotics.? Wound and blood cultures?still pending  ?  ?  Demond Farris D.O.  [facility]  Faculty addendum: Patient seen and examined in the ED.? Copious amount of light greenish discharge noted from sternal incision.? CT chest with extensive mediastinal and hilar adenopathy.? Scattered locules of gas noted in the superficial and deeper contours of sternotomy site.? Inflammatory fat stranding of mediastinum noted.? Leukocytosis of 21 K on presentation with neutrophilia.? Patient met SIRS criteria.? Reconsulting CV surgery for urgent transfer.   Lab Results  Labs Last 24 Hours?  ?Chemistry? Hematology/Coagulation?   Sodium Lvl:?135 mmol/L?Low (07/20/21 03:36:00) PT:?15.8 second(s)?High (07/19/21 14:10:00)   Potassium Lvl: 4 mmol/L (07/20/21 03:36:00) INR:?1.28?High (07/19/21 14:10:00)   Chloride: 101 mmol/L (07/20/21 03:36:00) PTT:?38.6 second(s)?High (07/19/21 14:10:00)   CO2: 27 mmol/L (07/20/21 03:36:00) WBC:?14.8 x10(3)/mcL?High (07/20/21 05:30:00)   Calcium Lvl: 9 mg/dL (07/20/21 03:36:00) RBC:?3.48 x10(6)/mcL?Low (07/20/21 05:30:00)   Magnesium Lvl: 2 mg/dL (07/20/21 03:36:00) Hgb:?9.9 gm/dL?Low (07/20/21 05:30:00)   Glucose Lvl:?178 mg/dL?High (07/20/21 03:36:00) Hct:?30.9 %?Low (07/20/21 05:30:00)   BUN:?6.7 mg/dL?Low (07/20/21 03:36:00) Platelet: 339 x10(3)/mcL (07/20/21 05:30:00)    Creatinine: 0.7 mg/dL (07/20/21 03:36:00) MCV: 88.8 fL (07/20/21 05:30:00)   Est Creat Clearance Ser: 85.91 mL/min (07/20/21 04:26:16) MCH: 28.4 pg (07/20/21 05:30:00)   eGFR-AA: >105 (07/20/21 03:36:00) MCHC: 32 gm/dL (07/20/21 05:30:00)   eGFR-KECIA: 96 mL/min/1.73 m2 (07/20/21 03:36:00) RDW: 13.4 % (07/20/21 05:30:00)   Bili Total: 0.3 mg/dL (07/20/21 03:36:00) MPV:?10.9 fL?High (07/20/21 05:30:00)   Bili Direct: 0.2 mg/dL (07/20/21 03:36:00) Abs Neut:?11.42 x10(3)/mcL?High (07/20/21 05:30:00)   Bili Indirect: 0.1 mg/dL (07/20/21 03:36:00) Neutro Auto: 77 % (07/20/21 05:30:00)   AST: 9 unit/L (07/20/21 03:36:00) Lymph Auto: 12 % (07/20/21 05:30:00)   ALT: 10 unit/L (07/20/21 03:36:00) Mono Auto: 7 % (07/20/21 05:30:00)   Alk Phos: 99 unit/L (07/20/21 03:36:00) Eos Auto: 2 % (07/20/21 05:30:00)   Total Protein: 6.4 gm/dL (07/20/21 03:36:00) Abs Eos: 0.4 x10(3)/mcL (07/20/21 05:30:00)   Albumin Lvl:?2.8 gm/dL?Low (07/20/21 03:36:00) Basophil Auto: 0 % (07/20/21 05:30:00)   Globulin:?3.6 gm/dL?High (07/20/21 03:36:00) Abs Neutro:?11.42 x10(3)/mcL?High (07/20/21 05:30:00)   A/G Ratio:?0.8 ratio?Low (07/20/21 03:36:00) Abs Lymph: 1.8 x10(3)/mcL (07/20/21 05:30:00)   Phosphorus: 2.7 mg/dL (07/20/21 03:36:00) Abs Mono: 1 x10(3)/mcL (07/20/21 05:30:00)   Lactic Acid Lvl: 2 mmol/L (07/19/21 14:10:00) Abs Baso: 0 x10(3)/mcL (07/20/21 05:30:00)   Troponin-I: <0.010 (07/19/21 14:15:00) NRBC%: 0.0 (07/20/21 05:30:00)   ? IG%: 1 % (07/20/21 05:30:00)   ? IG#: 0.16 (07/20/21 05:30:00)   ? Sed Rate:?91 mm/hr?High (07/19/21 14:10:00)   ?  ?  Diagnostic Results  Accession:?[id number]  Order:?CT Angio Chest PE W Contrast  Report Dt/Tm:?07/19/2021 18:56  Report:?  EXAMINATION  CT Angio Chest PE W Contrast  ?  INDICATION  PE evaluation, sternotomy infection  ?  Comparison: 28 November, 2015  ?  TECHNIQUE  Helical-acquisition CT images were obtained following the intravenous  administration of iodine-based contrast media. Scanning  was timed to  pulmonary arterial system for purposes of pulmonary CTA.  Multiplanar reconstructions, including MIP images, were accomplished  by a CT technologist at a separate workstation and pushed to PACS for  physician review.  ?  Automated tube current modulation and/or weight-based exposure dosing  is utilized, when appropriate, to reach lowest reasonably achievable  exposure rate.  ?  FINDINGS  Images were reviewed in soft tissue, lung, and bone windows.  ?  Exam quality: Quality is grossly degraded secondary to respiratory  motion and resulting artifact, as well as contrast bolus washout  through the segmental and more peripheral pulmonary artery branches.  ?  Lines/tubes: None visualized.  ?  No definite central or segmental pulmonary artery filling defect is  identified. There are no findings of right heart strain.  ?  Cardiac chambers are of mildly dilated appearance, relatively similar  to the prior study. Small pericardial effusion is present. Mediastinal  postoperative changes are noted. There is mild soft tissue prominence  at the superficial and deeper contours of the median sternotomy site,  with scattered locules of gas. Associated appearance of inflammatory  mediastinal fat stranding is also evident. No definite  expansile/drainable fluid collection is identified.  The systemic mediastinal vessels are without convincing evidence of  acute or otherwise suspicious focal abnormality.  ?  Central airway patency is widely maintained. Irregular consolidation  of the bilateral lower lobes is noted. The upper lung zones remain  clear. No definite aggressive appearing mass lesion is identified.  There are small layering bilateral pleural effusions. No loculated  component is evident. There is no pneumothorax.  ?  Extensive mediastinal and bihilar adenopathy is present, increased  relative to the prior study. For example, left mediastinal lymph node  at the level of the aortic arch measures 12 mm short-axis  (series 2,  image 31); subcarinal node measures 12 mm in shortest dimension  (series 2, image 41).  No definite evidence of necrotic adenopathy is appreciated.  ?  Subcentimeter low density nodule of the left hemithyroid is unchanged  in comparison. No new focal lesion or interval mass-like expansion is  identified.  ?  There is no acute or suspicious focal abnormality of the included  upper abdomen.  ?  Widespread inflammatory changes overlie the sternotomy site, without  discrete enhancing lesion or drainable fluid collection. The remaining  superficial thoracic wall subcutaneous tissues and subjacent  musculature are without acute or suspicious focal abnormality.  There is no acute osseous displacement. No destructive skeletal lesion  is evident. There is somewhat hazy irregularity of the posterior  bilateral mid to lower ribs, without expansile medullary lesion or  other corresponding focal abnormality; overall appearance is  indeterminate and may represent artifact, although element of healed  nondisplaced fractures or inflammatory changes are not entirely  excluded.  ?  IMPRESSION  Limited evaluation secondary to motion artifact and technical  limitations discussed above. Within limitations:  ?  ??1. No convincing evidence of large central or segmental pulmonary  thromboembolism.  ??2. Findings consistent with recent sternotomy and associated  inflammatory changes through the superficial thoracic wall and the  subjacent mediastinum. No drainable fluid collection is identified.  ??3. Consolidative irregularity of the bilateral lower lungs,  concerning for infectious process with associated small pleural  effusions.  ??4. Widespread mediastinal adenopathy is favored represent reactive  process. However, more sinister cause of lymph node enlargement is not  excluded and close clinical surveillance recommended.  ??5. Hazy irregularity of the bilateral mid and lower posterior ribs  is nonspecific and may be  artifactual given overall appearance.  However, close clinical/imaging surveillance recommended to ensure  absence of inflammatory or destructive osseous process.  ??6. Additional chronic secondary details discussed above.  ?  Accession:?[id number]  Order:?XR Chest 1 View  Report Dt/Tm:?07/19/2021 16:02  Report:?  History:  Fever  ?  Comparison:  2 July 2021  ?  Findings:  Portable frontal view of the chest was obtained. Median sternotomy.  The heart is not significantly enlarged. Slightly improved aeration of  the left lung base compared to prior with a small left pleural  effusion suspected. No pneumothorax seen.  ?  Impression:?  Slightly improved aeration left lung base. Small left effusion  suspected.  ?

## 2022-05-02 NOTE — HISTORICAL [facility] CERNER
This is a historical note converted from [employer]. Formatting and pictures may have been removed.  Please reference [employer] for original formatting and attached multimedia. Indication for Surgery  43yo  with long-standing history of AUB-L, chronic pelvic pain desiring definitive surgical management. Also with two skin lesions, desiring removal and definitive diagnosis.  ?   Preoperative Diagnosis  Abnormal Uterine Bleeding  Uterine Fibroids  Chronic Pelvic Pain  Skin Lesion x2 (left perianal, left lower abdomen)  ?   Postoperative Diagnosis  Abnormal Uterine Bleeding  Uterine Fibroids  Chronic Pelvic Pain  Skin Lesion x2 (left perianal, left lower abdomen)  ?   Operation  Total Vaginal Hysterectomy, Bilateral Salpingectomy, Right Modified McCalls Culdoplasty, Cystoscopy, Skin Lesion Excision x2 (perianal, left lower quadrant)  ?  Surgeon(s)  Dr. JACLYN Jordan, PGY-4  ?  Staff:  Dr. Bull Evangelina  ?  Assistant  Dr. RADHA Lopez, PGY-3  Dr. INOCENCIA Bowen, PGY-2  ?  Anesthesia  General Endotracheal  ?  Estimated Blood Loss  125cc  ?  Urine Output  130cc clear yellow urine  ?  Findings  -EUA:?Anteverted, 10cm?mobile uterus, not broad with moderate descent, adequate vaginal?capacity, without adnexal masses or fullness; cervix anterior without mass or lesions  -Intraop:?Uterus approximately 10cm in size.?Normal appearing bilateral fallopian tubes and ovaries. Normal appearing uterosacral ligaments without nodularities or lesions.  -Cysto: no evidence of trauma, injury, or suture. Brisk efflux of urine from bilateral ureteral orifices  ?  Specimen(s)  Uterus (220g), bilateral fallopian tubes, left perianal skin lesion, left lower abdomen skin lesion  ?  Complications  None  ?  Technique  Patient was taken to the OR with IV fluids running. General anesthesia was obtained without difficulty.?Ancef 2g?was administered for infection prophylaxis. SCDs were placed on bilateral lower extremities and?Heparin 5000U administered?for  DVT prophylaxis. She was positioned in dorsal lithotomy position with feet in [place] stirrups. Exam under anesthesia was performed. She was prepared in draped in the normal sterile fashion. A time out was performed.?  ?  Attention was first paid to the left sided perianal lesion.?5 cc?of Marcaine was injected at the base and #11 blade was used to create an elliptical incision with 5mm margins . Lesion was grasped with toothed forceps and excised, and sent off for pathologic confirmation. Defect was reapproximated with horizontal mattress suture using 2-0 vicryl suture with noted hemostasis and good reapproximation. Area was then covered with a Tegaderm for the remainder of the case.  ?  Attention was then turned to the vagina for the hysterectomy portion of the case. Right angle retractor placed superiorly with short weighted speculum into the posterior vagina. The cervix was visualized and grasped with two Cynthia thyroid clamps along the anterior?and posterior lip of the cervix. The cervicovaginal junction was visualized. 10 units vasopressin (20 units diluted in 30 mL normal saline) was then circumferentially injected into the cervix and massaged.  ?  A circumferential?incision was made?along the?cervicovaginal junction?with use of the?#11?blade scalpel.?With use of a ray-jennifer sponge,?gentle blunt dissection was then performed to gently separate tissue planes anteriorly and posteriorly, attempting to identify the vesicouterine peritoneum anteriorly and the posterior peritoneum posteriorly. Decision was then made to enter posteriorly. Pickups with teeth were used to tent upwards the posterior peritoneum and cervix was deflected anteriorly.?Posterior colpotomy was then made with use of curved harrison scissors.?Following sharp entry posteriorly, the posterior cul de sac was digitally explored with no injury, masses or lesions?noted. Return of clear peritoneal fluid was noted.?One interrupted suture of #0 Vicryl was  placed to affix the posterior peritoneum to the posterior vaginal mucosa and suture tied to long weighted speculum that was replaced for the short weighted speculum.?The bilateral uterosacral ligaments were identified and clamped with curved Iram clamp,?cut, suture ligated with 0-Vicryl.?The bilateral?cardinal ligaments were clamped , ligated and cut with 0-Vicryl. The bilateral uterine vessels were also identified, clamped, ligated, and cut with use of 0 Vicryl suture. Hemostasis was noted along?with blanching of the uterus. The remainder of the broad ligament bilaterally?was then serially clamped,?cut, and ligated with 0-Vicryl. During the ligation of left uterine vessels, one of the pop-off needles of the 0-Vicryl suture was noted to have popped off prior to being handed back to the surgical tech. After hemostasis was confirmed intraoperatively, we performed a meticulous search for the needle: Xray was performed of abdomen and pelvis and no foreign radiographic body was seen in patients abdomen or pelvis. We confirmed that there was no needle left within the patients cavity?prior to completion of the procedure.  ?  Attention was then paid back to the anterior colpotomy. With use of Metzenbaum scissors, meticulous sharp dissection was performed anteriorly, carefully?dissecting to the level of the vesicouterine peritoneal reflection,?which was then grasped with toothed forceps and entered sharply with Metzenbaum scissors. The anterior cul de sac was explored with no evidence of injury or mass. Cornual structures were digitally palpated bilaterally. Fundus was then grasped via the posterior colpotomy and with the use of the Cynthia clamp, carefully walked out to the level of the?introitus. At this point, #10 blade was used to bivalve the uterus to the level of the lower uterine segment, allowing better access to the cornual structures. At this point, the uterine cornual structures?were visualized with good access to  bilateral pedicle sites.?The round ligament, utero ovarian vessels and cornual ends of fallopian tubes were clamped, cut and double suture ligated bilaterally. This freed the specimen, which was removed and handed off for submission to pathology, with confirmation of both uterus and intact cervix.  ?  Bilateral fallopian tubes were then identified with [place] clamps and walked out to the fimbriated ends. They were then clamped with a Elva clamp along the mesosalpinx with care taken to remove the entire portion of fimbriated ends, cut and suture ligated with 0-Vicryl suture. All vascular pedicles were then inspected and oozing was noted bilaterally along the broad and cardinal ligaments. Several additional figure of eight sutures were placed at bleeding sites with good hemostasis noted. Attention was then paid to?closure of the vaginal cuff. Left vaginal cuff suture was placed at the lateral edge with care taken to incorporate ipsilateral uterosacral ligament; there was good support noted at the left side.  ?  Attention was then paid to right vaginal cuff edge for the suspension procedure.?With use of a?Stoen needle the individual 0 Vicryl suture on?the right uterosacral ligament was used to incorporate the vaginal cuff including the peritoneum?posteriorly to pexy the lateral cuff edge to the ipsilateral uterosacral ligament.?The remainder of the vaginal cuff was closed with?multiple interrupted figure of eight sutures. Surgicel foam was placed for hemostasis prior to completion of cuff closure.?The tagged uterosacral suture was then tied?with?its ipsilateral suture in order to elevate the vaginal apical support?at the level of the uterosacral ligament on the right.Hemostasis with reapproximation confirmed. No visible or palpable defects were?noted.?  ?  Cystoscopy was then performed. The cystoscope was introduced into the bladder without difficulty. There was no noted trauma or injury on bladder survey. We were  able to identify brisk?efflux of urine from bilateral ureteral orifices. Bladder was partially emptied and all instruments removed. Vagina copiously irrigated with suctioning of remaining fluids. All instruments removed from the vagina.  ?  Attention was then paid to the left lower abdominal skin lesion. 5cc of Marcaine were injected at the base and #11 blade was used to remove lesion at its base with 5mm margins. Defect was reapproximated with horizontal mattress suture and wound was covered with Tegaderm dressing. Left perianal?wound was re-dressed with new Tegaderm dressing at completion of case. The patient tolerated the procedure well. ?The sponge and instrument counts were correct x2. Needle count was correct when accounting for the needle that had been misplaced during the procedure as above. Patient went to the recovery?room in stable condition.? Dr. Ml Hutchinson was scrubbed and present for the entire procedure.  ?  Erica Jordan MD  [facility], PGY-4   I was present with the resident during all critical and key portions of the procedure and agree with the findings documented in the residents note.

## 2022-05-02 NOTE — HISTORICAL [facility] CERNER
This is a historical note converted from [employer]. Formatting and pictures may have been removed.  Please reference [employer] for original formatting and attached multimedia. Chief Complaint  postop visit  vaginal discharge  History of Present Illness  45yo  approx 2 weeks s/p Total Vaginal Hysterectomy, Bilateral Salpingectomy, Right Modified McCalls Culdoplasty, Cystoscopy, Skin Lesion Excision x2 (perianal, left lower quadrant), presenting for postop visit.  She was seen in the ED on 9/10 with complaints of UTI sxs and back pain. She was discharged with Cipro x6d, which she completed, and Norco for back pain. She is endorsing some?foul smelling vaginal discharge that she has been wearing pads for, not?soaking. Having?daily normal BM and is denying any fevers/chills, weakness, dizziness. No complaints of dysuria on todays visit.  ?   She has been ambulating without issue. Not yet sexually active, has been upholding pelvic rest precautions.  Pathology  1. ?Left vulva skin tag with stitch, left abdomen skin tag, Excision:  - Left vulva: ? Condyloma acuminatum.  - Left abdomen: ?Fibroepithelial polyp.  ?  2. ?Uterus, cervix, and bilateral tubes, Excision:  - Cervix: ? ? ? ? ? ? ? Squamous metaplasia and chronic cervicitis.  - Endometrium: ? ?Basalis endometrium.  - Myometrium: ? ? ?Adenomyosis. Leiomyomata.  - Bilateral fallopian tubes: ? No significant histopathologic abnormality. [1]  Gynecological History  Last Menstrual Period: 20  Menstrual Status Intake: Hysterectomy  Review of Systems  Constitutional:?no fever, fatigue, weakness  Respiratory:?no cough, no wheezing, no shortness of breath  Cardiovascular:?no chest pain, no palpitations, no edema  Gastrointestinal:?no nausea, vomiting, or diarrhea. No abdominal pain  : Denying vaginal bleeding, + vaginal discharge  Neurologic: no headache, no dizziness, no weakness  Physical Exam  Vitals & Measurements  T:?37? ?C (Oral)? HR:?67(Peripheral)? RR:?20?  BP:?125/85? SpO2:?100%?  HT:?165.00?cm? WT:?78.000?kg? BMI:?28.65?  General: NAD, A/Ox3.  Head: Atraumatic, normocephalic  Eyes: EOMI, No scleral icterus, normal conjunctivae  Respiratory: CTAB  Cardiovascular: RRR  Abdomen: soft, NTND, normoactive bowel sounds  Incisions: well healing LLQ and perianal skin lesion excision sites, c/d/i, well approximated  :  - Vaginal cuff and sutures well visualized with moderate to copious amount of foul smelling, yellow/white discharge upon initial insertion of speculum. Fibrinous appearing central area of vaginal cuff with darkened tissue.  - Vaginal cuff palpated across in its entirety and probed across with Proctoswab without e/o dehiscence or defect  - On digital exam, on significant tenderness noted, minimal tenderness and pt able to tolerate exam well. Vaginal cuff feels somewhat tethered.  - No vaginal bleeding  Skin: No rashes, warm and?dry  Extremities: no edema, no calf tenderness.  ?   Bedside US:  No evidence of abscess near vaginal cuff.  Assessment/Plan  1.?Postop check?Z09  - Postop exam today with concern for vaginal cuff cellulitis, plan below  - Meeting all other postop goals at this time  Ordered:  Wound Culture, Now collect, 09/18/20 8:59:00 CDT, Drainage, Vagina, Nurse collect, Stop date 09/18/20 9:00:00 CDT, Postop check  Vaginal cuff cellulitis  ?  2.?Vaginal cuff cellulitis?N76.0  - Rocephin x1 today in clinic  - Start: Levaquin 750mg QD x14 days, Flagyl 500mg BID x14 days  - Wound cx collected, pending  - No e/o abscess at this time. Pt provided with strict return precautions.  ?  Dispo: RTC in 1 week for repeat exam, follow up. Strict ED return precautions provided.  ?  Examined, plan d/w Dr. Cardenas.  Erica Jordan MD  [facility], PGY-4  Ordered:  Wound Culture, Now collect, 09/18/20 8:59:00 CDT, Drainage, Vagina, Nurse collect, Stop date 09/18/20 9:00:00 CDT, Postop check  Vaginal cuff cellulitis  ?   Problem List/Past Medical  History  Ongoing  CAD (coronary artery disease), native coronary artery  Chronic back pain  Chronic rhinitis  Degenerative joint disease (DJD) of lumbar spine  Depression with anxiety  Diabetes mellitus  Diabetic peripheral neuropathy  Dysmenorrhea  Extensor tenosynovitis of wrist  Eye pain  GERD with esophagitis  Hypertension  Leukocytosis  Microcytic hypochromic anemia  Mixed hyperlipidemia  Obesity  Rectus diastasis  Shoulder pain  Skin tag of vulva  Umbilical hernia  Ventral hernia  Historical  Anxiety  Anxiety  Arm pain  Closed fracture of radius, distal end  Depression  DIABETES MELLITUS  Endometriosis  Hyperlipidemia  HYPERTENSION  Pregnant  Pregnant  Tobacco user  Procedure/Surgical History  Cystoscopy (None) (09/03/2020)  Excision Lesion (Left) (09/03/2020)  McCalls Culdoplasty (Right) (09/03/2020)  Vaginal hysterectomy, for uterus 250 g or less; with removal of tube(s), and/or ovary(s) (09/03/2020)  Vulvar Biopsy (None) (09/03/2020)  Dilation of Coronary Artery, One Artery with Drug-eluting Intraluminal Device, Percutaneous Approach (02/08/2019)  Fluoroscopy of Multiple Coronary Arteries using Low Osmolar Contrast (02/08/2019)  Measurement of Cardiac Sampling and Pressure, Left Heart, Percutaneous Approach (02/08/2019)  Measurement of Cardiac Sampling and Pressure, Right Heart, Percutaneous Approach (02/08/2019)  Application of short arm splint (forearm to hand); static (02/09/2016)  Insertion of PICC (peripherally inserted central catheter) (04/05/2012)  Ligation of fallopian tubes with division by endoscopy (2003)  Cholecystectomy  HAND   Medications  ACCU CHECK SMARTVIEW LANCETS AND STRIPS, See Instructions, 11 refills  albuterol CFC free 90 mcg/inh inhalation aerosol with adapter, 2 puff(s), INH, QID, 11 refills  alPRAzolam 1 mg oral tablet, 1 mg= 1 tab(s), Oral, TID, PRN  Astelin 137 mcg/inh nasal spray, 1 spray(s), Nasal, BID, 5 refills  atorvastatin 80 mg oral tablet, 80 mg= 1 tab(s), Oral, At  Bedtime, 5 refills  Basaglar KwikPen 100 units/mL subcutaneous solution, 80 units, Subcutaneous, Once a day (at bedtime), 5 refills  Buffered Lidocaine 1% 10mL, 100 mg= 10 mL, Subcutaneous, Once  Bydureon Pen 2 mg subcutaneous injection, extended release, 2 mg= 1 EA, Subcutaneous, qWeek, 5 refills  desvenlafaxine 100 mg oral tablet, extended release, 100 mg= 1 tab(s), Oral, Daily, 4 refills  doxepin 25 mg oral capsule, 25 mg= 1 cap(s), Oral, Once a day (at bedtime)  ferrous sulfate 325 mg (65 mg elemental iron) oral delayed release tablet, 325 mg= 1 tab(s), Oral, TID, 3 refills  Flagyl 500 mg oral tablet, 500 mg= 1 tab(s), Oral, q12hr  gabapentin 300 mg oral capsule, 900 mg= 3 cap(s), Oral, TID  Insulin needles for Basaglar, See Instructions, 11 refills  Levaquin 750 mg oral tablet, 750 mg= 1 tab(s), Oral, q24hr  lisinopril 10 mg oral tablet, 10 mg= 1 tab(s), Oral, Daily, 5 refills  loratadine 10 mg oral tablet, 10 mg= 1 tab(s), Oral, Daily, 5 refills  metFORMIN 500 mg oral tablet, extended release, See Instructions, 5 refills  Metoprolol Tartrate 25 mg oral tablet, 25 mg= 1 tab(s), Oral, BID, 5 refills  nitroglycerin 0.4 mg sublingual TAB, 0.4 mg= 1 tab(s), SL, q5min, PRN, 11 refills  Norco 5 mg-325 mg oral tablet, 1 tab(s), Oral, q6hr, PRN  Outpatient PT, See Instructions,? ?Unable to obtain  oxcarbazepine 300 mg oral tablet, 300 mg= 1 tab(s), Oral, BID  Pantoprazole 40 mg ORAL EC-Tablet, 40 mg= 1 tab(s), Oral, Daily, 5 refills  tiZANidine 4 mg oral tablet, 4 mg= 1 tab(s), Oral, TID, 5 refills  Allergies  aspirin?(Stomach upset)  Social History  Abuse/Neglect  No, 09/10/2020  No, 09/03/2020  No, 08/31/2020  No, 08/26/2020  Alcohol - No Risk, 11/28/2015  Never, 08/26/2020  Employment/School  Disabled, 08/26/2020  Exercise  Exercise type: Walking., 08/26/2020  Home/Environment  Lives with Children., 08/26/2020  Nutrition/Health  Diabetic, 08/26/2020  Other  Sexual  Sexually active: Yes. Number of current partners 1.  Sexual orientation: Straight or heterosexual. Gender Identity Identifies as female. No, 08/26/2020  Substance Use - Denies Substance Abuse, 11/28/2015  Never, 08/26/2020  Tobacco  Former smoker, quit more than 30 days ago, N/A, 09/18/2020  Former smoker, quit more than 30 days ago, No, 09/10/2020  Former smoker, quit more than 30 days ago, N/A, 09/03/2020  Former smoker, quit more than 30 days ago, No, 08/31/2020  Former smoker, quit more than 30 days ago, No, 08/26/2020  Marital Status  Single     [1]?SURGICAL PATH FINAL REPORT; 09/03/2020 07:43 CDT   Reviewed the patients medical history, residents findings on physical exam, and the diagnosis with treatment plan. Care provided was reasonable and necessary.?  I was personally present on this date of service and discussed the patients care on this actual date.  I was also present for examination and agree with above.?

## 2022-05-02 NOTE — HISTORICAL [facility] CERNER
This is a historical note converted from [facility]. Formatting and pictures may have been removed.  Please reference [employer] for original formatting and attached multimedia. Interval H&P  Pt reports no complaints. NPO since 1700 on 2020. She is here with her daughter Sarahi ([phone]). Desires for her fiance Matias ([phone]) or her?mother in law Lin ([phone]) to be notified after her surgery is completed.?Pt consents reviewed in chart. Pt able to describe procedure in her own words. Pt reports taking her home meds of?Gabapentin, Metoprolol, Oxcarbazepipne, and Pantoprazole this AM.?Did not take her albuterol, but states she only takes it PRN and generally 1-2 times a month.?No changes in her H&P since her last visit (see note below).  ?   Vitals  /82  HR 85  Temp?37.3 C  O2 99% on r/a  ?  General: NAD, A/Ox3. Well appearing.  Respiratory: normal work of breathing, clear to auscultation bilaterally.  Cardiovascular: RRR  Abdomen: soft, nondistended, nontender to palpation.  Extremities: no edema, no calf tenderness  ?  Plan: To the OR for scheduled TVH/BS/Cysto/Removal of Vulvar skin tag this AM.  ?   Obed Bowen MD PGY-2  [facility]  ?Chief Complaint  Pre Op, no video  History of Present Illness  44 y.o.  preoperative visit for total vaginal hysterectomy, bilateral salpingectomy, cystoscopy, simple vulvectomy for abnormal uterine bleeding, uterine fibroids, pelvic pain, left vulvar skin tag.  She reports chronic pelvic pain with dyspareunia?for the past 4 years, she has no specific inciting events to which she can attribute onset pain. She has been with the same partner, has dyspareunia with insertion and deep penetration. She also has chronic back pain which is non-debilitating but?causes her discomfort. Pain worse at the end of the day and often radiates to her groin. Denies bowel or bladder dysfunction.  ?   Past medical history notable for DM II (A1c 7.6), well  controlled?hypertension,?coronary artery disease, degenerative joint disease.  1. Coronary disease -?with MI in 2019 s/p 2?stent placement.?Patient stop taking Plavix a month ago and is only on baby?aspirin. Follows with [facility]ology at [facility].  2. Degenerative?joint disease and chronic back pain-?lumbar spine from L3-L5?and only taking tizanidine for it.  3. Anxiety and Depression - followed at [facility] on Xanax 1 mg PRN - usually takes once a day, desvenlafaxine and oxcarbazepine  4. Diabetes Mellitus - On Metformin and Insulin (A1c 7.6)  ?   Per HPI from previous visit  Abnormal uterine bleeding for 5-6 years, lasting 15 days to 1 month,?going through an overnight pad in an hour, passing huge blood clots.?Does feel lightheaded sometimes. Currently taking Provera 10 mg BID?and ferrous sulfate 65 mg elemental iron TID and reports compliance with medication.  ?   Obstetric History   x 2  BB 6lb7oz  ?   Gynecology History  Last PAP 10/2018: NIL with neg HRHPV  ?  Social History?  Occupation - disabled, does not work  Support system - lives with daughter, son, and fiancee. Mother-in-law will be with her on the day of surgery  Previous tobacco user, quit   Gynecological History  Last Menstrual Period: 20  Menstrual Status Intake: Menarcheal  Physical Exam  ???Vitals & Measurements  ??T:?36.9? ?C (Oral)? HR:?88(Peripheral)? RR:?20? BP:?116/77? SpO2:?100%?  ??HT:?163.00?cm? WT:?83.000?kg? BMI:?31.24? LMP:?2020 00:00 CDT?  General: NAD, AAOx3  Respiratory: CTAB  Cardiovascular: RRR no murmurs  Abdomen: soft, obese, nondistended, nontender to palpation  Incisions: well-healed laparoscopic incisions (cholecystectomy)  Extremities: no edema, no calf tenderness  ?  Pelvic Exam per Dr. Flanagan 2020  Approximately 2 cm skin tag on left inner thigh otherwise normal external female genitalia; Cervix normal size. Vagina with very adequate capacity; uterus high in vault, however it is mobile. Moderate  descent. ~10-12 weeks size, limited due to habitus, but this is consistent with US.?  Assessment/Plan  1.?Abnormal uterine bleeding (AUB)?N93.9  ???44 y.o. ?with abnormal uterine bleeding, uterine fibroids, and dysmenorrhea.  ?  ??Indication for this procedure,?its risks,?benefits and complications including injury to surrounding structures, bleeding, possibility of transfusion, infection were reviewed in detail. The patient has chronic lower pack pain occasionally radiating to her pelvis. We discussed that the procedure may not improve her pain. Ovarian conservation versus elective risk reducing ovarian resection were discussed with the patient.?Risk for reoperation for ovarian etiology?is 5-10%, the risk for ovarian cancer is 1 in 70. After discussion, the patient desires ovarian conservation with the understanding that if any disease is suspected or bleeding that necessitates it, they may be removed.?  ?  ??She understands we are affiliated with a teaching institution and residents and medical students will be involved in her care.?She has consented to an exam under anesthesia and understands that medical students participate in this portion of the procedure. We have reviewed the typical perioperative course, anticipation of same day discharge home. Instructions reviewed, including NPO after midnight.?Post-operative precautions and restrictions have been reviewed including no heavy lifting > 10 lbs and nothing in vagina x 6 weeks.?  ?  ?  2.?Pelvic pain?R10.2  ??Discussed that pelvic pain is likely multifactorial. She understands that a hysterectomy will not resolve her pelvic pain. Given musculoskeletal component of pain, she will likely?benefit from physical therapy postoperatively.  ?  ?  3.?Dysmenorrhea?N94.6  ?  4.?Skin tag of vulva?N90.89  ??Simple vulvectomy of left vulvar skin tag. Although low, we discussed risk of poor cosmesis or poor wound healing.  ?  ?  5.?Preoperative  examination?Z01.818  ??PACE appointment requested.  Consents reviewed with patient and signed.  Labs today: T&S CBC  Labs DOS: T&S, UPT  Meds DOS: Metoprolol, Albuterol, Gabapentin, Insulin 40u  Caprini score?5; SCDs and Heparin?for DVT prophylaxis  Abx: Ancef 2g  Discussed?outpatient surgery expectations and recovery time.  ?  ??To OR for?total vaginal hysterectomy, bilateral salpingectomy, cystoscopy, simple vulvectomy on 9/3/202  Post-op appt on 9/18/2020  ? Problem List/Past Medical History  Ongoing  ??Abnormal uterine bleeding (AUB)  ?CAD (coronary artery disease), native coronary artery  ?Chronic back pain  ?Chronic rhinitis  ?Degenerative joint disease (DJD) of lumbar spine  ?Depression with anxiety  ?Diabetes mellitus  ?Diabetic peripheral neuropathy  ?Dysmenorrhea  ?Extensor tenosynovitis of wrist  ?GERD with esophagitis  ?Hypertension  ?Microcytic hypochromic anemia  ?Mixed hyperlipidemia  ?Obesity  ?Rectus diastasis  ?Shoulder pain  ?Skin tag of vulva  ?Umbilical hernia  ?Ventral hernia  Procedure/Surgical History  ?Catheter placement in coronary artery(s) for coronary angiography, including intraprocedural injection(s) for coronary angiography, imaging supervision and interpretation; with right heart catheterization (02/08/2019)  Percutaneous transcatheter placement of drug eluting intracoronary stent(s), with coronary angioplasty when performed; single major coronary artery or branch (02/08/2019)  Ligation of fallopian tubes with division by endoscopy (2003)  Cholecystectomy   ?  Medications  ?ACCU CHECK SMARTVIEW LANCETS AND STRIPS, See Instructions, 11 refills  ?albuterol CFC free 90 mcg/inh inhalation aerosol with adapter, 2 puff(s), INH, QID, 11 refills  ?alPRAzolam 1 mg oral tablet, 1 mg= 1 tab(s), Oral, TID, PRN  ?Astelin 137 mcg/inh nasal spray, 1 spray(s), Nasal, BID, 5 refills  ?atorvastatin 80 mg oral tablet, 80 mg= 1 tab(s), Oral, At Bedtime, 5 refills  ?Basaglar KwikPen 100 units/mL  subcutaneous solution, 80 units, Subcutaneous, Once a day (at bedtime), 5 refills  ?Bydureon Pen 2 mg subcutaneous injection, extended release, 2 mg= 1 EA, Subcutaneous, qWeek, 5 refills  ?desvenlafaxine 100 mg oral tablet, extended release, 100 mg= 1 tab(s), Oral, Daily, 4 refills  ?ferrous sulfate 325 mg (65 mg elemental iron) oral delayed release tablet, 325 mg= 1 tab(s), Oral, TID, 3 refills  ?gabapentin 600 mg oral tablet, 600 mg= 1 tab(s), Oral, TID, 5 refills  ?Insulin needles for Basaglar, See Instructions, 11 refills  ?lisinopril 10 mg oral tablet, 10 mg= 1 tab(s), Oral, Daily, 5 refills  ?loratadine 10 mg oral tablet, 10 mg= 1 tab(s), Oral, Daily, 5 refills  ?medroxyPROGESTERone 10 mg oral tablet, See Instructions, 2 refills  ?metFORMIN 500 mg oral tablet, extended release, See Instructions, 5 refills  ?Metoprolol Tartrate 25 mg oral tablet, 25 mg= 1 tab(s), Oral, BID, 5 refills  ?nitroglycerin 0.4 mg sublingual TAB, 0.4 mg= 1 tab(s), SL, q5min, PRN, 11 refills  ?oxcarbazepine 300 mg oral tablet, 300 mg= 1 tab(s), Oral, BID  ?Pantoprazole 40 mg ORAL EC-Tablet, 40 mg= 1 tab(s), Oral, Daily, 5 refills  ?tiZANidine 4 mg oral tablet, 4 mg= 1 tab(s), Oral, TID, 5 refills  ?traZODONE 150 mg oral tab ( Desyrel ), 150 mg= 1 tab(s), Oral, Once a day (at bedtime)  Allergies  No Known Allergies  Social History  Abuse/Neglect  ?No, 08/12/2020  Alcohol - No Risk, 11/28/2015  ?Never, 08/12/2020  Employment/School  ?Disabled, 08/12/2020  Exercise  ?Exercise type: Walking., 08/12/2020  Home/Environment  ?Lives with Children., 08/12/2020  Nutrition/Health  ?Diabetic, 08/12/2020  Other  Sexual  ?Sexually active: Yes. Number of current partners 1. Sexual orientation: Straight or heterosexual. Gender Identity Identifies as female. No, 08/12/2020  Substance Use - Denies Substance Abuse, 11/28/2015  ?Never, 08/12/2020  Tobacco  ?Never (less than 100 in lifetime), No, 08/12/2020  ?Marital Status  ?Single  Family History  ?Acute  myocardial infarction.: Mother.  ?Breast cancer: Aunt.  ?Cancer: Negative: Mother.  ?Cardiac arrhythmia.: Mother.  ?Congestive heart disease.: Mother.  ?Diabetes mellitus type 2: Brother.  ?Leukemia: Mother.  Lab Results  ??Test Name ?Test Result ?Date/Time   ?Hgb A1c 7.6 % (High) 06/23/2020 09:45 CDT   ?TSH 3.560 mIU/L 12/09/2019 07:16 CST   ?Hgb 14.2 gm/dL 06/23/2020 09:45 CDT   ?Hct 43.5 % 06/23/2020 09:45 CDT   ?Platelet 315 x10(3)/mcL 06/23/2020 09:45 CDT   ?  ?  ?  Final Diagnosis?(Verified) 8/2020?  Endometrium, Biopsy:  - Benign endometrium with exogenous progestin effect.?  - No evidence of malignancy.  Diagnostic Results  (07/23/2020 12:10 CDT US Pelvic Non-OB w Transvag if needed)  Reason For Exam  Excessive and frequent menstruation  ?  Radiology Report  US Pelvic Non-OB w Transvag if needed  ?  REASON FOR STUDY: Excessive and frequent menstruation  ?  TECHNIQUE: Transabdominal and endovaginal ultrasound of the pelvis.?  ?  COMPARISON: Pelvic ultrasound dated 7/10/2018?  ?  FINDINGS:?  ?  The uterus measures 11.3 x 5.7 x 6.8 cm and contains multiple  fibroids. An intramural fibroid in the anterior uterine fundus  measures 4.1 cm. An intramural fibroid in the anterior wall measures  1.9 cm. The endometrium measures up to 1 cm in thickness, decreased  from the prior exam. Multiple echogenic foci are seen adjacent to the  endometrium which may represent calcifications.  ?  The ovaries are normal in appearance with bilateral follicles and  color Doppler flow. The right ovary measures 3.9 x 3.2 x 3.2 cm. The  left ovary measures 3.5 x 2.4 x 3.4 cm.  ?  There is no adnexal mass or free fluid.  ?  IMPRESSION:?  1. ?Multiple uterine fibroids.  2. ?Endometrial thickness of 1 cm, decreased from the prior exam. [1]   ?  [1]?US Pelvic Non-OB w Transvag if needed; Brissa Michaels MD 07/23/2020 12:10 CDT  Addendum by Panchito COUGHLIN, May S on August 31, 2020 10:08:06 CDT (Verified)  I have seen this patient and agree  with note above.  ?  Informed consent obtained, specifically I reiterated the risks of:?pain, infection, bleeding, need for transfusion, need for laparotomy, damage to nearby organs and need for repair or additional surgery, delayed complications, ?or other unpredicted risks.  Patient able to describe planned procedure in her own words.  Expected recovery time reviewed, as well as normal postoperative course.  Addendum by Panchito COUGHLIN, May S on August 31, 2020 10:14:26 CDT (Verified)  Risk of needing laparotomy or laparoscopy was discussed.   AccuChecks 119 this AM   Patient requested removal of left abdominal skin tag as well. Site examined, marked and consents updated. Discussed with Dr. Hutchinson.   Reviewed the patients medical history, residents findings on physical exam, and the diagnosis with treatment plan. Care provided was reasonable and necessary.

## 2022-05-02 NOTE — HISTORICAL [facility] CERNER
This is a historical note converted from Nehemiah. Formatting and pictures may have been removed.  Please reference [facility]hemiah for original formatting and attached multimedia. Chief Complaint  Pre Op, no video  History of Present Illness  44 y.o.  preoperative visit for total vaginal hysterectomy, bilateral salpingectomy, cystoscopy, simple vulvectomy for abnormal uterine bleeding, uterine fibroids, pelvic pain, left vulvar skin tag.  She reports chronic pelvic pain with dyspareunia?for the past 4 years, she has no specific inciting events to which she can attribute onset pain. She has been with the same partner, has dyspareunia with insertion and deep penetration. She also has chronic back pain which is non-debilitating but?causes her discomfort. Pain worse at the end of the day and often radiates to her groin. Denies bowel or bladder dysfunction.  ?  Past medical history notable for DM II (A1c 7.6), well controlled?hypertension,?coronary artery disease, degenerative joint disease.  1. Coronary disease -?with MI in 2019 s/p 2?stent placement.?Patient stop taking Plavix a month ago and is only on baby?aspirin. Follows with cardiology at [facility].  2. Degenerative?joint disease and chronic back pain-?lumbar spine from L3-L5?and only taking tizanidine for it.  3. Anxiety and Depression - followed at [facility] on Xanax 1 mg PRN - usually takes once a day, desvenlafaxine and oxcarbazepine  4. Diabetes Mellitus - On Metformin and Insulin (A1c 7.6)  ?   Per HPI from previous visit  Abnormal uterine bleeding for 5-6 years, lasting 15 days to 1 month,?going through an overnight pad in an hour, passing huge blood clots.?Does feel lightheaded sometimes. Currently taking Provera 10 mg BID?and ferrous sulfate 65 mg elemental iron TID and reports compliance with medication.  ?   Obstetric History   x 2  BB 6lb7oz  ?  Gynecology History  Last PAP 10/2018: NIL with neg HRHPV  ?  Social History?  Occupation - disabled, does not  work  Support system - lives with daughter, son, and fiancee. Mother-in-law will be with her on the day of surgery  Previous tobacco user, quit 2019  Gynecological History  Last Menstrual Period: 20  Menstrual Status Intake: Menarcheal  Physical Exam  Vitals & Measurements  T:?36.9? ?C (Oral)? HR:?88(Peripheral)? RR:?20? BP:?116/77? SpO2:?100%?  HT:?163.00?cm? WT:?83.000?kg? BMI:?31.24? LMP:?2020 00:00 CDT?  General: NAD, AAOx3  Respiratory: CTAB  Cardiovascular: RRR no murmurs  Abdomen: soft, obese, nondistended, nontender to palpation  Incisions: well-healed laparoscopic incisions (cholecystectomy)  Extremities: no edema, no calf tenderness  ?  Pelvic Exam per Dr. Flanagan 2020  Approximately 2 cm skin tag on left inner thigh otherwise normal external female genitalia; Cervix normal size. Vagina with very adequate capacity; uterus high in vault, however it is mobile. Moderate descent. ~10-12 weeks size, limited due to habitus, but this is consistent with US.?  Assessment/Plan  1.?Abnormal uterine bleeding (AUB)?N93.9  ?44 y.o. ?with abnormal uterine bleeding, uterine fibroids, and dysmenorrhea.  ?   Indication for this procedure,?its risks,?benefits and complications including injury to surrounding structures, bleeding, possibility of transfusion, infection were reviewed in detail. The patient has chronic lower pack pain occasionally radiating to her pelvis. We discussed that the procedure may not improve her pain. Ovarian conservation versus elective risk reducing ovarian resection were discussed with the patient.?Risk for reoperation for ovarian etiology?is 5-10%, the risk for ovarian cancer is 1 in 70. After discussion, the patient desires ovarian conservation with the understanding that if any disease is suspected or bleeding that necessitates it, they may be removed.?  ?   She understands we are affiliated with a teaching institution and residents and medical students will be involved in  her care.?She has consented to an exam under anesthesia and understands that medical students participate in this portion of the procedure. We have reviewed the typical perioperative course, anticipation of same day discharge home. Instructions reviewed, including NPO after midnight.?Post-operative precautions and restrictions have been reviewed including no heavy lifting > 10 lbs and nothing in vagina x 6 weeks.?  ?  2.?Pelvic pain?R10.2  Discussed that pelvic pain is likely multifactorial. She understands that a hysterectomy will not resolve her pelvic pain. Given musculoskeletal component of pain, she will likely?benefit from physical therapy postoperatively.  ?  3.?Dysmenorrhea?N94.6  4.?Skin tag of vulva?N90.89  Simple vulvectomy of left vulvar skin tag. Although low, we discussed risk of poor cosmesis or poor wound healing.  ?  5.?Preoperative examination?Z01.818  PACE appointment requested.  Consents reviewed with patient and signed.  Labs today: T&S CBC  Labs DOS: T&S, UPT  Meds DOS: Metoprolol, Albuterol, Gabapentin, Insulin 40u  Caprini score?5; SCDs and Heparin?for DVT prophylaxis  Abx: Ancef 2g  Discussed?outpatient surgery expectations and recovery time.  ?   To OR for?total vaginal hysterectomy, bilateral salpingectomy, cystoscopy, simple vulvectomyon 9/3/202  Post-op appt on 9/18/2020   Problem List/Past Medical History  Ongoing  Abnormal uterine bleeding (AUB)  CAD (coronary artery disease), native coronary artery  Chronic back pain  Chronic rhinitis  Degenerative joint disease (DJD) of lumbar spine  Depression with anxiety  Diabetes mellitus  Diabetic peripheral neuropathy  Dysmenorrhea  Extensor tenosynovitis of wrist  GERD with esophagitis  Hypertension  Microcytic hypochromic anemia  Mixed hyperlipidemia  Obesity  Rectus diastasis  Shoulder pain  Skin tag of vulva  Umbilical hernia  Ventral hernia  Procedure/Surgical History  Catheter placement in coronary artery(s) for coronary angiography,  including intraprocedural injection(s) for coronary angiography, imaging supervision and interpretation; with right heart catheterization (02/08/2019)  Percutaneous transcatheter placement of drug eluting intracoronary stent(s), with coronary angioplasty when performed; single major coronary artery or branch (02/08/2019)  Ligation of fallopian tubes with division by endoscopy (2003)  Cholecystectomy   Medications  ACCU CHECK SMARTVIEW LANCETS AND STRIPS, See Instructions, 11 refills  albuterol CFC free 90 mcg/inh inhalation aerosol with adapter, 2 puff(s), INH, QID, 11 refills  alPRAzolam 1 mg oral tablet, 1 mg= 1 tab(s), Oral, TID, PRN  Astelin 137 mcg/inh nasal spray, 1 spray(s), Nasal, BID, 5 refills  atorvastatin 80 mg oral tablet, 80 mg= 1 tab(s), Oral, At Bedtime, 5 refills  Basaglar KwikPen 100 units/mL subcutaneous solution, 80 units, Subcutaneous, Once a day (at bedtime), 5 refills  Bydureon Pen 2 mg subcutaneous injection, extended release, 2 mg= 1 EA, Subcutaneous, qWeek, 5 refills  desvenlafaxine 100 mg oral tablet, extended release, 100 mg= 1 tab(s), Oral, Daily, 4 refills  ferrous sulfate 325 mg (65 mg elemental iron) oral delayed release tablet, 325 mg= 1 tab(s), Oral, TID, 3 refills  gabapentin 600 mg oral tablet, 600 mg= 1 tab(s), Oral, TID, 5 refills  Insulin needles for Basaglar, See Instructions, 11 refills  lisinopril 10 mg oral tablet, 10 mg= 1 tab(s), Oral, Daily, 5 refills  loratadine 10 mg oral tablet, 10 mg= 1 tab(s), Oral, Daily, 5 refills  medroxyPROGESTERone 10 mg oral tablet, See Instructions, 2 refills  metFORMIN 500 mg oral tablet, extended release, See Instructions, 5 refills  Metoprolol Tartrate 25 mg oral tablet, 25 mg= 1 tab(s), Oral, BID, 5 refills  nitroglycerin 0.4 mg sublingual TAB, 0.4 mg= 1 tab(s), SL, q5min, PRN, 11 refills  oxcarbazepine 300 mg oral tablet, 300 mg= 1 tab(s), Oral, BID  Pantoprazole 40 mg ORAL EC-Tablet, 40 mg= 1 tab(s), Oral, Daily, 5 refills  tiZANidine 4  mg oral tablet, 4 mg= 1 tab(s), Oral, TID, 5 refills  traZODONE 150 mg oral tab ( Desyrel ), 150 mg= 1 tab(s), Oral, Once a day (at bedtime)  Allergies  No Known Allergies  Social History  Abuse/Neglect  No, 08/12/2020  Alcohol - No Risk, 11/28/2015  Never, 08/12/2020  Employment/School  Disabled, 08/12/2020  Exercise  Exercise type: Walking., 08/12/2020  Home/Environment  Lives with Children., 08/12/2020  Nutrition/Health  Diabetic, 08/12/2020  Other  Sexual  Sexually active: Yes. Number of current partners 1. Sexual orientation: Straight or heterosexual. Gender Identity Identifies as female. No, 08/12/2020  Substance Use - Denies Substance Abuse, 11/28/2015  Never, 08/12/2020  Tobacco  Never (less than 100 in lifetime), No, 08/12/2020  Marital Status  Single  Family History  Acute myocardial infarction.: Mother.  Breast cancer: Aunt.  Cancer: Negative: Mother.  Cardiac arrhythmia.: Mother.  Congestive heart disease.: Mother.  Diabetes mellitus type 2: Brother.  Leukemia: Mother.  Lab Results  Test Name Test Result Date/Time   Hgb A1c 7.6 % (High) 06/23/2020 09:45 CDT   TSH 3.560 mIU/L 12/09/2019 07:16 CST   Hgb 14.2 gm/dL 06/23/2020 09:45 CDT   Hct 43.5 % 06/23/2020 09:45 CDT   Platelet 315 x10(3)/mcL 06/23/2020 09:45 CDT   ?  ?  Final Diagnosis?(Verified) 8/2020?  Endometrium, Biopsy:  - Benign endometrium with exogenous progestin effect.?  - No evidence of malignancy.  Diagnostic Results  (07/23/2020 12:10 CDT US Pelvic Non-OB w Transvag if needed)  Reason For Exam  Excessive and frequent menstruation  ?  Radiology Report  US Pelvic Non-OB w Transvag if needed  ?  REASON FOR STUDY: Excessive and frequent menstruation  ?  TECHNIQUE: Transabdominal and endovaginal ultrasound of the pelvis.?  ?  COMPARISON: Pelvic ultrasound dated 7/10/2018?  ?  FINDINGS:?  ?  The uterus measures 11.3 x 5.7 x 6.8 cm and contains multiple  fibroids. An intramural fibroid in the anterior uterine fundus  measures 4.1 cm. An intramural  fibroid in the anterior wall measures  1.9 cm. The endometrium measures up to 1 cm in thickness, decreased  from the prior exam. Multiple echogenic foci are seen adjacent to the  endometrium which may represent calcifications.  ?  The ovaries are normal in appearance with bilateral follicles and  color Doppler flow. The right ovary measures 3.9 x 3.2 x 3.2 cm. The  left ovary measures 3.5 x 2.4 x 3.4 cm.  ?  There is no adnexal mass or free fluid.  ?  IMPRESSION:?  1. ?Multiple uterine fibroids.  2. ?Endometrial thickness of 1 cm, decreased from the prior exam. [1]     [1]?US Pelvic Non-OB w Transvag if needed; Brissa Michaels MD 07/23/2020 12:10 CDT   I have seen this patient and agree with note above.  ?   Informed consent obtained, specifically I reiterated the risks of:?pain, infection, bleeding, need for transfusion, need for laparotomy, damage to nearby organs and need for repair or additional surgery, delayed complications, ?or other unpredicted risks.  Patient able to describe planned procedure in her own words.  Expected recovery time reviewed, as well as normal postoperative course.   Risk of needing laparotomy or laparoscopy was discussed.

## 2022-05-02 NOTE — HISTORICAL [facility] CERNER
This is a historical note converted from Nehemiah. Formatting and pictures may have been removed.  Please reference Nehemiah for original formatting and attached multimedia. Subjective  45 y/o female with a past medical history of MI?with stent?(2018),?HTN,?DM 2, HLD, anxiety, depression,?chronic back?and left knee pain? who was recently discharged on 6/22/21 who presented to the ED?with complaints of?numbness on the right side?of her entire body x 2 months and ?chest tightness/chest pain?for the past two weeks.?On admit, CT head no acute findings, left corona radiata old lacunar infarct, chronic microvascular ischemia and atrophy.? CT angio head and neck both unremarkable.? Patient was started on Plavix.? MRI brain with left peritrigonal subacute nonhemorrhagic lacunar infarct.? Nonspecific periventricular and deep white matter T2 flair hyperintensity signals, minimal old infarcts. Last echo on 6/21/21 showed Echo showed EF of 50%.? A Lexiscan showed a medium area of moderate inferior apical ischemia.? Per record, Cardiology advised for her to optimize patients antianginals and she was discharged?and started ?on Imdur 30 daily.? She had a?follow-up appointment with cardiology on July 19, 2021 and she was to be evaluated for a ?left heart cath at that time.? She was told to return to the ED if symptoms returned or worsened.? Pt was sitting at 1800 ?down watching TV when she started having left sided stabbing chest pain on the left chest wall? along with SOB and palpitations.? She denies any diaphoreses but states that the pain moved under her left breast.? She denies any difficulty with slurred speech or unsteady gait, nausea, vomiting, dizziness, confusion,?but endorses right side facial drooping and a left side headache?according to her daughter in law.?Her BP at home was 159/95, .?Pt took three NTG without relief and?the Xanax and her blood pressure medications and came to the ED.? She is being admitted to?IM  for?Unstable angina?ACS rule out, Hx?recent CVA, DM2  ?   Allergies:?aspirin stomach pain  PmHx: MI?with stent?(2018),?HTN,?DM 2, HLD, anxiety, depression,?chronic back?and left knee pain?  FmHx: lymphoma, hypertension, diabetes - mom  Surgical Hx: heart?stent 2018, hysterectomy, right hand surgery 2002, cholecystectomy  Social Hx: four cigarettes?a day times 26 years,?EtOH negative, illicit drug use negative,?lives with fiancé and two kids  Medications:  desvenlafaxine 100 mg one tablet daily  oxcarbazepine 300 mg one tablet BID  Xanax 1 mg TID PRN  loratadine 10 mg one tablet daily  albuterol two puffs, INH, QID  dulaglutide 1.5 mg weekly sq  Pepcid 20 mg one tablet BID  tizanidine 4 mg one tablet TID  insulin glargine? 80 units at bedtime  metformin 500 mg one tablet every other day  pioglitazone 30 mg one tablet daily  amlodipine 10 mg one tablet daily  doxepin 25 mg once a day  Protonix 40 mg once a day  gabapentin 600 mg TID  Imdur ER ?30 mg daily  Plavix 75 mg daily  rosuvastatin 40 mg qhs  NTG 0.4 mg sl prn  ?  Review of Systems  Negative except as stated above  Objective  Vitals & Measurements  T:?37.1? ?C (Oral)? HR:?102(Peripheral)? HR:?100(Monitored)? RR:?21? BP:?121/81? SpO2:?96%? WT:?84.6?kg?  Physical Exam  General: cooperative, no acute respiratory distress, Laying supine in the stretcher and conversational  HEENT:? PERRL, EOMI, conjunctiva clear without paleness, EAC clear, TM translucent, no external lesions of nares,??nasal mucosa non-inflamed, no lesions, oral mucous membranes moist and pink??without exudates or hypertrophy, neck supple non tender?without lesionsor thyromegaly  CV: RRR, no murmurs, No JVD lynette, No carotid bruits lynette  Lungs:?CTA b/l, no wheezing?No rales  Abdomen: soft, NT, ND, + BS x 4, no organomegaly, no CVA tenderness lynette  Extremities: no cyanosis, clubbing, or edema lynette LE  SKIN:? warm, dry and intact without any lesions or erythema noted  PSYCH: alert and oriented x 3  NEURO:  sensation intact by light touch; DTRs +2 bilateral and symmetrical, Negative Romberg, asymmetrical hand . Weakness to right hand  > than left side?  LYMPH: no LAD noted  MSK:? full range of motion of upper and lower extremities, 4/5 on right upper extremity and 5/5 on left upper extremity,??5/5 lynette to LE?sensation in tact throughout  Lab Results  Labs Last 24 Hours?  ?Chemistry? Hematology/Coagulation?   Sodium Lvl: 138 mmol/L (06/23/21 22:40:00) PT: 12.8 second(s) (06/23/21 22:40:00)   Potassium Lvl: 4 mmol/L (06/23/21 22:40:00) INR: 0.98 (06/23/21 22:40:00)   Chloride: 101 mmol/L (06/23/21 22:40:00) PTT: 24.3 second(s) (06/23/21 22:40:00)   CO2: 24 mmol/L (06/23/21 22:40:00) WBC:?15.5 x10(3)/mcL?High (06/23/21 22:40:00)   Calcium Lvl: 10.2 mg/dL (06/23/21 22:40:00) RBC: 4.71 x10(6)/mcL (06/23/21 22:40:00)   Magnesium Lvl: 1.8 mg/dL (06/23/21 22:40:00) Hgb: 13.7 gm/dL (06/23/21 22:40:00)   Glucose Lvl:?148 mg/dL?High (06/23/21 22:40:00) Hct: 40.7 % (06/23/21 22:40:00)   BUN: 8 mg/dL (06/23/21 22:40:00) Platelet: 333 x10(3)/mcL (06/23/21 22:40:00)   Creatinine: 0.74 mg/dL (06/23/21 22:40:00) MCV: 86.4 fL (06/23/21 22:40:00)   Est Creat Clearance Ser: 81.27 mL/min (06/23/21 23:13:18) MCH: 29.1 pg (06/23/21 22:40:00)   eGFR-AA: >105 (06/23/21 22:40:00) MCHC: 33.7 gm/dL (06/23/21 22:40:00)   eGFR-KECIA: 90 mL/min/1.73 m2 (06/23/21 22:40:00) RDW: 12.8 % (06/23/21 22:40:00)   Bili Total: 0.3 mg/dL (06/23/21 22:40:00) MPV: 10.4 fL (06/23/21 22:40:00)   Bili Direct: 0.1 mg/dL (06/23/21 22:40:00) Abs Neut:?10 x10(3)/mcL?High (06/23/21 22:40:00)   Bili Indirect: 0.2 mg/dL (06/23/21 22:40:00) Neutro Auto: 64 % (06/23/21 22:40:00)   AST:?92 unit/L?High (06/23/21 22:40:00) Lymph Auto: 26 % (06/23/21 22:40:00)   ALT:?69 unit/L?High (06/23/21 22:40:00) Mono Auto: 5 % (06/23/21 22:40:00)   Alk Phos: 85 unit/L (06/23/21 22:40:00) Eos Auto: 1 % (06/23/21 22:40:00)   Total Protein: 7.7 gm/dL (06/23/21 22:40:00) Abs Eos: 0.2  x10(3)/mcL (06/23/21 22:40:00)   Albumin Lvl: 4 gm/dL (06/23/21 22:40:00) Basophil Auto: 1 % (06/23/21 22:40:00)   Globulin:?3.7 gm/dL?High (06/23/21 22:40:00) Abs Neutro:?10 x10(3)/mcL?High (06/23/21 22:40:00)   A/G Ratio: 1.1 ratio (06/23/21 22:40:00) Abs Lymph: 4.1 x10(3)/mcL (06/23/21 22:40:00)   Total CK: 77 U/L (06/23/21 22:40:00) Abs Mono: 0.7 x10(3)/mcL (06/23/21 22:40:00)   CK MB: 0.7 ng/mL (06/23/21 22:40:00) Abs Baso: 0.1 x10(3)/mcL (06/23/21 22:40:00)   Troponin-I: 0.01 ng/mL (06/23/21 22:40:00) NRBC%: 0.0 (06/23/21 22:40:00)    IG%: 3 % (06/23/21 22:40:00)    IG#: 0.44 (06/23/21 22:40:00)   Diagnostic Results  chest x-ray per my interpretation: no abnormal bony dislocations or fractures. Coastal margins clear bilaterally, no consolidation or infiltrates.? Waiting official read  Assessment/Plan  Unstable Angina ACS rule out  Hx of recent Ischemic CVA  DM2  HTN  HLD  Anxiety  Abnormal UA  Pt presented to ED with left sided stabbing chest pain on the left chest wall? along with SOB and palpitations that was unrelieved with three NTGs.?  Last echo on 6/21/21 showed Echo showed EF of 50%.? A Lexiscan showed a medium area of moderate inferior apical ischemia.  Admit troponin negative but will continue to trend troponin and ECG every six hours  Ordered Nitro-bid transdermal ointment?every 6 hrs  Heparin 5000 units BID was given the ED.? Consider?starting the heparin drip?  Consult CIS in am for possible left heart cath  Last A1C 6/20/21 7.7  NPO  ACHS  EDWARD  Ordered lantus 10 units qhs  Resumed home medications of albuterol 90 mcg INH, amlodipine 10 mg, atorvastatin 80 mg qhs, Plavix 75 mg daily, gabapentin 600 mg TID, metoprolol 25 mg BID, xanax 1 mg TID  Holding home medications: insulin glargine? 80 units at bedtime, metformin 500 mg one tablet every other day, pioglitazone 30 mg one tablet daily  CBC and CMP Mg,Phos in am and replete accordingly  UA with RBC and leukocytes, urine cultures pending, pt is  asymptomatic  ?  PPX:Protonix 40 mg once a day  IV:? Heparin drip  Code: Full  Disposition: Admit to telemetry for unstable angina r/o ACS.? Continue to trend EKG and troponin. Consult CIS in am for possible left heart cath.? Consider?starting the heparin drip  ?  ?  ?  HO2 addendum  45-year-old white female past medical history GERD, BPV, chronic rhinitis, type 2 diabetes, hyperlipidemia, CAD s/p stent 2018, hypertension, chronic low back pain, depression, anxiety, tobacco use, h/o CVA presents to ED with chest pain and shortness of breath. ?Patient states took 2 nitro sublingual with no relief. ?She was discharged 6/22 after Lexiscan showed medium area of moderate inferior apical ischemia. ?Patient has been started on Imdur 30 daily and scheduled with cardiology appointment on 6/19/2021.  ?  PE: Appears well, no acute distress, regular rate and rhythm, nonlabored breath, no edema  ?  A/P:  Unstable angina  Recent discharge with Lexican showing moderate inferior apical ischemia. Consult cardiology in am. Troponin 0.010, no ECG ST elevations or depression.  ?  Nicole Vazquez, DO  [employer] [facility] Resident HO-2  ?   Primary Team Addendum:  ?   Brief HPI: This is a 45 year old female w/ hx ofMI?with stent?(2018),?HTN,?DM 2, HLD, anxiety, depression,?chronic back?and left knee pain?who came to the ED last night with a complaint of chest pain and weakness. She was recently d/rosie on 6/22 after presenting for a CVA and then was found to also have a positive stress test after being evaluated for concomitant chest pain. Patient was evaluated by [facility] and a decision was made to medically manage the patient and have her follow up in clinic for a possible outpatient Cath. Patient states that last night she began to have chest pain that was only partially relieved by nitro and was also noted to have high blood pressure. At the time of my interview, she was having no chest pain and denies associated shortness of breath, nausea,  vomiting, fever and chills.  ?   Physical Exam:  Gen: Alert and oriented x3. No acute distress  Head: Normocephalic and atraumatic  Eyes: PERRL and EOMI  Lungs: Clear and equal BS bilaterally with no wheezing noted  CV: Regular rate and rhythm noted. No murmurs noted  Abdomen: Non-tender and non-distended abdomen. BS present  Ext: 2+ radial and pedal pulses. No lower extremity edema noted  Neuro: Patient is moving all 4 extremities comfortably with no slurred speech  Skin:?No rashes noted  ?   Assessment and Plan:  ?   Unstable Angina ACS rule out  Hx of recent Ischemic CVA  DM2  HTN  HLD  Anxiety  Abnormal UA  ?   -Patient started on heparin drip and continued home aspirin and plavix.  -Troponins were negative x3. one EKG did show diffuse ST elevation but repeat EKG showed resolution of changes. Patient remain without chest pain at this time.  -Will speak to Cardiology and discuss possibility of a coronary angiogram inpatient. She does have a follow up with Cards on July 19th.  -Patient noted to have low blood pressure. Suspect secondary to nitro paste. Will hold antihypertensives at this time.  ?  Klever Duckworth MD  [facility]  [id number]  Faculty addendum:? 45 year old female known to me from being admitted over the weekend with diploplia and numbness.?History of Diabetes, coronary artery disease/stent placement several years ago.?Plavix was stopped over 1 year ago secondary to anemia from menorrhagia.? Not resumed after hysterectomy until recent admission. ?Workup revealed subacute infarction (see MRI).? Developed chest pain on Monday, underwent Lexiscan which revealed reversible ischemia.??Team discussed with Cardiology who recommended medical management with plans for heart catheterization out patient.? Patient represented?last night with chest pain at rest intermittent which was not relieved by nitroglycerine.? Troponins negative.? Most recent EKG without acute changes.? Will discuss with Cardiology again  today.? Currently on full anticoagulation and antiplatelet agents, beta blocker, statin, imdur.  ?

## 2022-05-02 NOTE — HISTORICAL [facility] CERNER
This is a historical note converted from Nehemiah. Formatting and pictures may have been removed.  Please reference Nehemiah for original formatting and attached multimedia. Chief Complaint  follow up  History of Present Illness  43 year old female here today for F/U. ?Diabetes?is uncontrolled?and has been uncontrolled for?1 year.? Patient reports she is having side effects of the Victoza?with extreme nausea.? Patient stopped Victoza due to the side effects. ?Will add Novolin 70/30 10 units twice daily. ?Patient return to clinic in 6 weeks?with log and diet.? Hyperlipidemia LDL is 153 HDL 36?not at goal?patient states compliance with her atorvastatin.? Will increase atorvastatin to 40 mg and recheck cholesterol?in 6 weeks. ?Patient reports that she is having?chest pain on the left side that is pinching nonradiating?and currently has tachycardia today.? Patient was seen?at [place]?general?ED?on February 2?when chest pain started and had a normal EKG?and negative troponin levels.? Since patient reports that the chest pain has been off and on and has continued?and she does have some dizziness?with the pain in her chest. ?She reports that the pain has been nonradiating.  Review of Systems  All Systems Negative Except: See HPI  Physical Exam  Vitals & Measurements  T:?36.8? ?C (Oral)? HR:?112(Peripheral)? RR:?18? BP:?135/84?  HT:?162.0?cm? WT:?71.7?kg? BMI:?27.32?  General:? Alert and oriented, No acute distress, General health good  Eye:? Pupils are equal, round and reactive to light, Normal conjunctiva.?  HENT:? Normocephalic, Normal hearing, Oral mucosa is moist, No pharyngeal erythema.?  Neck:? Supple, Non-tender, No carotid bruit, No jugular venous distention, No lymphadenopathy, No thyromegaly.?  Respiratory:? Lungs are clear to auscultation, Respirations are non-labored, Breath sounds are equal, Symmetrical chest wall expansion.?  Cardiovascular:?Tachycardia, Regular rhythm, No murmur, Good pulses equal in all  extremities, Normal peripheral perfusion, No edema.?  Gastrointestinal:? Soft, Non-tender, Non-distended, Normal bowel sounds, No organomegaly.?  Musculoskeletal: Normal range of motion, Normal strength, No tenderness, No deformity, Normal gait.?  Integumentary:? Warm, Dry.?  Neurologic:? Alert, Oriented.?  Psychiatric:? Cooperative, Appropriate mood & affect.?  ?  ?  ?  Assessment/Plan  Abnormal EKG?R94.31,?Abnormal EKG?R94.31,?Abnormal EKG?R94.31,?Abnormal EKG?R94.31,?Abnormal EKG?R94.31  ?Stat enzymes today  Critical troponin level call from lab?of 2.810  Report called to emergency room and patient transferred to emergency room  Ordered:  Creatine Kinase, Stat collect, 02/07/19 14:32:00 CST, Blood, Stop date 02/07/19 14:32:00 CST, Lab Collect, Venous Draw, Abnormal EKG, Print Label By Order Location, 02/07/19 14:31:00 CST  Creatine Kinase MB, Stat collect, 02/07/19 14:32:00 CST, Blood, Stop date 02/07/19 14:32:00 CST, Lab Collect, Venous Draw, Abnormal EKG, Print Label By Order Location, 02/07/19 14:31:00 CST  Magnesium Level, Stat collect, 02/07/19 14:32:00 CST, Blood, Stop date 02/07/19 14:32:00 CST, Lab Collect, Venous Draw, Abnormal EKG  Tachycardia, Print Label By Order Location, 02/07/19 14:31:00 CST  Troponin-I, Stat collect, 02/07/19 14:32:00 CST, Blood, Stop date 02/07/19 14:32:00 CST, Lab Collect, Venous Draw, Abnormal EKG, Print Label By Order Location, 02/07/19 14:30:00 CST  ?  HLD (hyperlipidemia)?E78.5  LDL?153?HDL?36  Increased?Statin?to 40mg repeat lipid panel in 6 weeks?  Continue diet modifications  Follow a?low cholesterol, low saturated fat diet with less that 200mg of cholesterol a day  Avoid Fried foods and high saturated fats (high saturated fats less than 7% of calories)  Add flax seed or Fish oil supplements?to diet, if not DM eat?a bowl of oatmeal or high fiber cereal for?breakfast  Regular exercise can reduce LDL and raise HDL, reduce weight to help lower cholesterol?  Ordered:  Lipid  Panel, Routine collect, *Est. 03/21/19 3:00:00 CDT, Blood, Order for future visit, *Est. Stop date 03/21/19 3:00:00 CDT, Lab Collect, HLD (hyperlipidemia), 02/07/19 13:49:00 CST  ?  HTN (hypertension)?I10  At goal  Low sodium diet (Dash Diet)  Continue Medications as prescribed  Monitor Blood daily, report any consistent numbers greater than 140/90  Maintain healthy weight  ?  ?  Hypokalemia?E87.6  Potassium level 3.1?patient is transferred to emergency room will be addressed?in the emergency room  ?  Tachycardia?R00.0,?Tachycardia?R00.0  Stat enzymes ?  EKG (abnormal)  Critical troponin level call from lab?of 2.810  Report called to emergency room and patient transferred to emergency room  Ordered:  Magnesium Level, Stat collect, 02/07/19 14:32:00 CST, Blood, Stop date 02/07/19 14:32:00 CST, Lab Collect, Venous Draw, Abnormal EKG  Tachycardia, Print Label By Order Location, 02/07/19 14:31:00 CST  ?  Tobacco user?Z72.0  Consider Cessation(not ready) Smokes 1?pack? a day  Consider decreasing to half a pack  Discussed benefits : Improved health, decreased cardiac risks, saving money  Re discuss cessation on F/U visit?  ?  Uncontrolled diabetes mellitus?E11.65  A1C? 10.7 not at goal  Add Novolog 70/30 10 units BID  RTC 6 weeks with A1C  On ACE, Statin according to guidelines  Follow ADA diet, avoid soda, simple sweets, and limit rice, breads and starches  Maintain healthy weight, exercise 4-5 times a week for 30?minutes  Ordered:  Hemoglobin A1C [facility], Routine collect, *Est. 03/21/19 3:00:00 CDT, Blood, Order for future visit, *Est. Stop date 03/21/19 3:00:00 CDT, Lab Collect, Uncontrolled diabetes mellitus, 02/07/19 13:49:00 CST  ?  Orders:  atorvastatin, 40 mg = 1 tab(s), Oral, At Bedtime, # 30 tab(s), 0 Refill(s), Pharmacy: [facility] [id number] [id number]  Misc Prescription, Insulin needles for Novolog, See Instructions, Use BID for Novolog injection E11.9, # 60 EA, 6 Refill(s), Pharmacy: [id number]   Problem  List/Past Medical History  Ongoing  Anxiety  Arm pain  Closed fracture of radius, distal end  Cyst of ovary  Depression  Diabetes mellitus  Extensor tenosynovitis of wrist  Eye pain  Hypertension  Obesity  Rectus diastasis  Shoulder pain  Tobacco user  Umbilical hernia  Ventral hernia  Historical  Anxiety  Depression  DIABETES MELLITUS  Hyperlipidemia  HYPERTENSION  Procedure/Surgical History  Application of short arm splint (02/09/2016)  Insertion of PICC (peripherally inserted central catheter) (04/05/2012)  Cholecystectomy  HAND  Ligation of fallopian tubes with division by endoscopy   Medications  ACCU CHECK kiah LANCETS AND STRIPS, See Instructions, 11 refills  albuterol CFC free 90 mcg/inh inhalation aerosol with adapter, 2 puff(s), INH, QID, 11 refills  Aspir 81 oral delayed release tablet, 81 mg= 1 tab(s), Oral, Daily, 11 refills  atorvastatin 20 mg oral tablet, 20 mg= 1 tab(s), Oral, Once a day (at bedtime), 5 refills  Basaglar KwikPen 100 units/mL subcutaneous solution, 80 units, Subcutaneous, Once a day (at bedtime), 5 refills  desvenlafaxine 100 mg oral tablet, extended release, 100 mg= 1 tab(s), Oral, Daily, 4 refills  fluticasone 50 mcg/inh nasal spray, 1 spray(s), Nasal, Daily, 6 refills  gabapentin 600 mg oral tablet, 600 mg= 1 tab(s), Oral, TID, 5 refills  hydrochlorothiazide-losartan 12.5 mg-50 mg oral tablet, 1 tab(s), Oral, Daily, 6 refills  Provera 10 mg oral tablet, 10 mg= 1 tab(s), Oral, BID, 3 refills  sumatriptan 25mg tab, 25 mg= 1 tab(s), Oral, Once  SURE COMFORT 1 ML SYRINGE, See Instructions, 11 refills  tiZANidine 4 mg oral tablet, 4 mg= 1 tab(s), Oral, TID, 5 refills  traZODONE 150 mg oral tab ( Desyrel ), 150 mg= 1 tab(s), Oral, Once a day (at bedtime)  Allergies  No Known Allergies  Social History  Alcohol - No Risk, 11/28/2015  Current, 1-2 times per year, 04/16/2018  Past, 06/30/2016  Employment/School  Stay at home w/ 9th grade education, 06/30/2016  Home/Environment  Lives with  Children. Living situation: Home/Independent., 04/16/2018  Lives with Children, Spouse. Living situation: Home/Independent. Alcohol abuse in household: No. Substance abuse in household: No. Smoker in household: No. Injuries/Abuse/Neglect in household: No. Feels unsafe at home: No. Safe place to go: Yes. Agency(s)/Others notified: No. Family/Friends available for support: Yes. Concern for family members at home: No. Major illness in household: No. Financial concerns: No. TV/Computer concerns: No., 06/30/2016  Nutrition/Health  Regular, 06/30/2016  Other  Substance Abuse - Denies Substance Abuse, 11/28/2015  Tobacco  Current every day smoker, N/A, 3 per day. Started age 17 Years., 10/17/2018  Current every day smoker Use:. Cigarettes Type:. 5 per day., 08/14/2018  Family History  Acute myocardial infarction.: Mother.  Cardiac arrhythmia.: Mother.  Congestive heart disease.: Mother.  Diabetes mellitus type 2: Brother.  Leukemia: Mother.  Immunizations  Vaccine Date Status Comments   influenza virus vaccine, inactivated 09/10/2018 Recorded    tetanus/diphtheria/pertussis, acel(Tdap) 03/29/2018 Recorded    influenza virus vaccine, inactivated 10/25/2017 Recorded [10/30/2017] Immunization record scanned into chart   influenza virus vaccine, inactivated 11/30/2015 Given    Health Maintenance  Health Maintenance  ???Pending?(in the next year)  ??? ??OverDue  ??? ? ? ?Diabetes Maintenance-Urine Dipstick due??02/02/19??and every 1??year(s)  ??? ??Due?  ??? ? ? ?Diabetes Maintenance-Eye Exam due??02/07/19??and every?  ??? ? ? ?Hypertension Maintenance-Medication Prescribed due??02/07/19??and every 1??year(s)  ??? ? ? ?Hypertension Management-Education due??02/07/19??and every 1??year(s)  ??? ??Due In Future?  ??? ? ? ?Diabetes Maintenance-Foot Exam not due until??04/16/19??and every 1??year(s)  ??? ? ? ?Alcohol Misuse Screening not due until??04/16/19??and every 1??year(s)  ??? ? ? ?Smoking Cessation not due  until??09/23/19??and every 1??year(s)  ???Satisfied?(in the past 1 year)  ??? ??Satisfied?  ??? ? ? ?ADL Screening on??02/07/19.??Satisfied by Mary Miranda LPN F.  ??? ? ? ?Alcohol Misuse Screening on??04/16/18.??Satisfied by Peace Foster NP  ??? ? ? ?Blood Pressure Screening on??02/07/19.??Satisfied by Mary Miranda LPN F.  ??? ? ? ?Body Mass Index Check on??02/07/19.??Satisfied by Mary Miranda LPN F.  ??? ? ? ?Cervical Cancer Screening on??10/17/18.??Satisfied by Kimmy Segal  ??? ? ? ?Depression Screening on??02/07/19.??Satisfied by Mary Miranda LPN F.  ??? ? ? ?Diabetes Maintenance-Microalbumin on??02/07/19.??Satisfied by Biju Jenkins Jr.  ??? ? ? ?Diabetes Maintenance-Fasting Lipid Profile on??02/07/19.??Satisfied by Biju Jenkins Jr.  ??? ? ? ?Diabetes Maintenance-HgbA1c on??02/07/19.??Satisfied by Biju Jenkins Jr.  ??? ? ? ?Diabetes Maintenance-Serum Creatinine on??02/07/19.??Satisfied by Biju Jenkins Jr.  ??? ? ? ?Diabetes Maintenance-Foot Exam on??04/16/18.??Satisfied by Peace Foster NP  ??? ? ? ?Diabetes Screening on??02/07/19.??Satisfied by Biju Jenkins Jr.  ??? ? ? ?Hypertension Management-Blood Pressure on??02/07/19.??Satisfied by Mary Miranda LPN F.  ??? ? ? ?Influenza Vaccine on??09/10/18.??Satisfied by Peace Foster NP  ??? ? ? ?Obesity Screening on??02/07/19.??Satisfied by Mary Miranda LPN  ??? ? ? ?Smoking Cessation on??09/23/18.??Satisfied by Kulwant Dowling  ??? ? ? ?Smoking Cessation (Coronary Artery Disease) on??09/23/18.??Satisfied by Kulwant Dowling  ??? ? ? ?Smoking Cessation (Diabetes) on??09/23/18.??Satisfied by Kulwant Dowling  ??? ? ? ?Tetanus Vaccine on??03/29/18.??Satisfied by Peace Foster NP  ?  ?

## 2022-05-02 NOTE — HISTORICAL [facility] CERNER
This is a historical note converted from [place]. Formatting and pictures may have been removed.  Please reference Cerben for original formatting and attached multimedia. Admit and Discharge Dates  Admit Date: 07/19/2021  Discharge Date: 07/20/2021  Physicians  Attending Physician - Jorge COUGHLIN, Shiloh FINLEY  Admitting Physician - Jorge COUGHLIN, Shiloh FINLEY  Primary Care Physician - Jono MANTILLA-Flory DESHPANDE  Discharge Diagnosis  CAD in native artery?I25.10  Chest pain?[id number]  GERD with esophagitis?K21.0  Hypertension?I10  Infected surgical wound?T81.49XA  Post surgical problem?[id number]  Surgical Procedures  No procedures recorded for this visit.  Immunizations  No immunizations recorded for this visit.  Admission Information  Ms. Spring is a 45-year-old  female with a past medical history of CAD with stents, status post CABG 3 weeks ago, hypertension, HLD, type 2 diabetes, who presents to [facility] ED on 7/19/2021 complaining of yellow purulent discharge from sternotomy wound that began 3 days ago on 7/17/2021. ?She underwent CABG 3 weeks ago with Dr. Crandall at [facility] with no complications in the hospital or before discharge. ?Prior to noticing discharge from her sternotomy wound she states that she experienced pain that began around her right scapula that radiated towards her right breast. ?She states that this felt more like a skill skeletal pain and not true chest pain. ?She states that is painful to move around especially with sitting up or moving her right arm. ?After she noticed the purulent drainage she states that she began to clean it with water. ?She denied any recent trauma or incidents that would cause wound dehiscence since her CABG. ?She denied having fever, chills, shortness of breath, difficulty breathing, bloody discharge from wound, nausea, vomiting, and chest pain.  ?  This morning patient continues to experience pain and purulent drainage from her sternotomy  wound.  Hospital Course  Upon admission to [facility] ED?on 7/19/2021, patient afebrile, tachycardic at 104, tachypneic at 28, 99% on room air, /80. ?WBC 21.2. ?Met sepsis criteria with SIRS 3+ with source. ?Patient was started empirically on Vanco and Zosyn. ?Was also given Percocet as needed every 6 for pain. ?CT was ordered. ?CT revealed recent sternotomy and associated inflammatory changes throughout the superficial thoracic wall and subjacent mediastinum. ?No drainable fluid collection was identified. ?Consolidative irregularity of the bilateral lower lungs, and widespread mediastinal adenopathy. ?CT surgery on-call was notified and they recommended him vancomycin and follow-up upon discharge. ?Internal medicine team was consulted and patient was planned to be admitted to telemetry?on 7/20/2021. ? was then notified?of his patient?and accepted?transfer to [facility].  ?  Time Spent on discharge  >30 min  Objective  Vitals & Measurements  T:?37.1? ?C (Oral)? TMIN:?36.1? ?C (Oral)? TMAX:?37.1? ?C (Oral)? HR:?69(Monitored)? RR:?20? BP:?122/75? SpO2:?97%? WT:?80?kg? BMI:?30.48?  Physical Exam  General:?laying in ED bed, mild discomfort, Alert and oriented  Respiratory:?decreased respiratory quality 2/2 pain, Lungs clear to auscultation bilaterally,?No increased work of breathing  Cardiovascular:?S1-S2, regular rate, regular rhythm,?2+ radial pulses,?No lower extremity edema, dorsal pedal pulses appreciated with Doppler  Gastrointestinal:?Soft, nontender, nondistended, bowel sounds present  Musculoskeletal: Appropriate movement of extremities bilaterally  Integumentary: approx. 10cm sternotomy incision, draining yellow pus along inferior half of incision, no blood, increased erythema around incision, tender to palpate on chest around incision, well healed non erythematous?incisions below left breast and left proximal calf  Psych: Calm, cooperative  Patient Discharge Condition  Medically?and?hemodynamically stable  for transfer  Discharge Disposition  Transfer to [facility]  Faculty addendum:? Patient accepted to [facility] in transfer for postoperative wound infection/possible mediastinitis.   Discharge Medication Reconciliation  Discharge Med Rec is not complete  Education and Orders Provided  Coronary Artery Bypass Grafting, Care After, Easy-to-Read  Car Seat Challenge  No Qualifying Data

## 2022-05-02 NOTE — HISTORICAL [facility] CERNER
This is a historical note converted from [place]hem[facility]. Formatting and pictures may have been removed.  Please reference Cerben for original formatting and attached multimedia. Chief Complaint  coughing for 1 week  History of Present Illness  44 yo White female being seen in clinic for follow-up, medication reconciliation, and lab review (2019).? Hx includes poorly-controlled type 2 diabetes (HgbA1C 9.7% on 2019), HLD, CAD with MI in 2019, with stenting, HTN (eGFR >60 on 2019), obesity, ventral hernia, heavy menses, depression, anxiety, and chronic rhinitis..??Mom , leukemia.? Former smoker.? 15 pack/year hx smoking.? Quit in 2019, following MI.? Folowed by Nikki Ackerman, mental health NP for psychiatric care and med management.? Followed by [facility].?? Scheduled to see [facility] 2019 for evaluation.? Single.? Two children 22/15.? Disabled.? Lives in [place].  ?  Today 9/10/2019:? Primary c/o today is a dry cough X 1 week.? Cough comes and goes over the past several months.? Feels like I am having dripping coming from my nose sometimes.? No yellow or green mucous.? No fevers or chills.? No sore throat or ear pain.? Cough is worse at night.?Prescribed Fluticasone nasal spray, which she is using twice a day.? Not prescribed any anti-histamines presently.? ?Using Robitussin DM with no relief of cough.? Started Metformin at last visit and she is tolerating that well; taking 1000 mg twice daily now.? Stopped Basaglar last visit.? States highest CBG since med changes last clinic visit has been approximately 200.? Sleeping poorly with recent cough; eating well.? Denies chest pain, fever, headache,?dizziness, weakness, abdominal pain, nausea,?vomiting, diarrhea, constipation, dysuria, depression, anxiety, SI/HI.? Mood well-controlled on current medications.? Does have some transient SOB with recent coughing, with occasional gagging.? Has been out of Gabapentin for  about 2 months; believes script got lost in recent move.? Current level of pain in lower back 7/10.? Back surgery was recommended to her by local [facility] neurosurgeon, Dr. Simon,?several months ago; however, she is putting that off for now.? Fell, with resultant legal case, which has since settled.? Says she did not get enough money from the case for surgery.?  told her that she would lose her medical benefits, if she tried to have Medicaid pay for surgery, with legal case settled.? Finished PT during legal case, along with chiropractor.?  ?  ?   Imaging:  Needs MMG  ?  TTE done on 2/8/2019 showed left ventricular ejection fraction approximately 65%; normal right ventricular size with preserved RV function; and trace mitral regurgitation.  ?  CT of the lumbar spine done 9/23/2018 for trauma showed mild degenerative changes at L5-S1; no acute lumbar spine abnormalities; and ligament, spinal cord, and/or vascular abnormalities could not be excluded on the basis of the examination.  ?  Ultrasound of the pelvis for heavy bleeding showed heterogeneity of the endometrium and myometrium with borderline endometrial thickening (1.7 cm).? Tiny echogenic areas near the junctional zone and within the left ovary may reflect punctate calcifications; and possible 12 mm posterior fibroid.  ?  ?  Labs:  BMI:? 29.24  Lipid Panel on 6/27/2019: Chol 157, HDL 28, Trig 183, LDL 92  PTT elevated at 70.7 seconds on 2/8/2019  Troponins elevated on 2/8/2019 at 3.070  CBC with elevated WBCs of 15.1 on 4/12/2019; otherwise, unremarkable  Lipase normal 3/12/2018  TSH and T4 normal 4/16/2018  Micro ACR normal? 2/7/2019  ?  Peripheral blood smear done 2/15/2019 showed normochromic normocytic red blood cells; the white blood cell and platelet morphology are within normal limits.  ?  No infectious labs on chart past 3 years.  ?  Endometrium biopsy done 10/19/2018 showed secretory endometrium; and no evidence of malignancy.  ?  Review of  Systems  Except as stated in HPI, all other 10 body systems normal  ?  Physical Exam  Vitals & Measurements  T:?36.9? ?C (Oral)? HR:?84(Peripheral)? RR:?16? BP:?94/60?  HT:?163?cm? WT:?77.7?kg? BMI:?29.24?  General:??Blood pressure low today, asymptomatic with; afebrile.??Morbidly obese; mildly ill-appearing; occasional dry cough throughout visit  Eyes/Ears: EOMI, clear conjunctiva, eyelids normal.? TMs clear bilaterally; no drainage, erythema; auditory canal without erythema  Mouth:??tongue midline, pink, and moist; no lesions or erythema in oral cavity, dentition poor  Neck: full range of motion, no thyromegaly or lymphadenopathy  Respiratory:?clear to auscultation anteriorly and posteriorly; diminished over anterior bases bilaterally; no mucous production  Cardiovascular:?regular rate and rhythm without murmurs, gallops or rubs.??No peripheral edema.??No hemosiderin staining or varicosities.?  Gastrointestinal:?obese abdomen, soft, non-tender, with normal bowel sounds, without masses to palpation  Genitourinary: deferred  Musculoskeletal:?full range of motion of all extremities; no joint deformities or edema  Integumentary: no rashes or skin lesions present  Neurologic: A&O X 3; cranial nerves intact, no signs of peripheral neurological deficit, motor/sensory function intact  Psychiatric:?Calm, cooperative.??Mood blunted and withdrawn; affect flat.???Responses appropriate  ?  Assessment/Plan  1.?Viral URI?J06.9  Rx Tessalon Perles TID as needed.? Instructed on use, rationale, how to use, and expected outcomes of medication.??Instructed her to call clinic if s/s worsen, or she can go to [facility] for evaluation.? Instructed on difference between viral and bacterial infections. Instructed her if s/s worsen, I will call in antibiotics for her.? Instructed to hydrate well, and to use OTC symptom relievers, if cough unrelieved with tessalon perles.  ?  2.?Diabetes mellitus?E11.9  Handouts given today.  Repeat HgbA1C today.?  She stopped the Basaglar last visit, which I believe may have been a misunderstanding with the resumption of Metformin.? Expect HgbA1C to be elevated in light of that.? Instructed on GLP2 medications.? She was trialed on Victoza in past and was unable to tolerate due to GI s/e.? Discussed Bydureon, which is a weekly, injectible as another option, which may not have the GI s/e, given it is a once weekly medicatioin.? She was agreable to trialing that, pending todays HgbA1C.? Teaching provided on etiology of disease process, complications of diabetes, goal HgbA1C, normal, low, and high CBGs.? Teaching provided on diabetic foot care principles, and when to notify medical provider of toenail, foot, or nail changes.? Teaching provided on foods high in carbs and how that impairs diabetes.? Teaching provided to avoid sugary drinks, and substitute with water.??RTC in 3 months, with repeat HgbA1C.? Foot and eye exams today.  Ordered:  1160F- Medication reconciliation completed during visit, Diabetes mellitus  Hypertension  Mixed hyperlipidemia  CAD (coronary artery disease), native coronary artery  Chronic rhinitis  Asthma  CAD (coronary artery disease)  Neuropathy in diabetes, [facility], 09/10/19 11:30:00 CDT  CBC w/ Auto Diff, Routine collect, *Est. 12/10/19 3:00:00 CST, Blood, Order for future visit, *Est. Stop date 12/10/19 3:00:00 CST, Lab Collect, Diabetes mellitus  CAD (coronary artery disease), native coronary artery  Hypertension, 09/10/19 11:43:00 CDT  Clinic Follow up, *Est. 12/10/19 3:00:00 CST, Order for future visit, Diabetes mellitus  Hypertension  Mixed hyperlipidemia  CAD (coronary artery disease), native coronary artery  Chronic rhinitis  Asthma  CAD (coronary artery disease)  Neuropathy in diabetes, U...  Comprehensive Metabolic Panel, Routine collect, 09/10/19 10:58:00 CDT, Blood, Stop date 09/10/19 10:58:00 CDT, Lab Collect, Diabetes mellitus  Hypertension, 09/10/19 10:58:00  CDT  Comprehensive Metabolic Panel, Routine collect, *Est. 12/10/19 3:00:00 CST, Blood, Order for future visit, *Est. Stop date 12/10/19 3:00:00 CST, Lab Collect, Diabetes mellitus  Hypertension, 09/10/19 11:42:00 CDT  Hemoglobin A1C [facility], Routine collect, 09/10/19 10:58:00 CDT, Blood, Stop date 09/10/19 10:58:00 CDT, Lab Collect, Diabetes mellitus, 09/10/19 10:58:00 CDT  Hemoglobin A1C [facility], Routine collect, *Est. 12/10/19 3:00:00 CST, Blood, Order for future visit, *Est. Stop date 12/10/19 3:00:00 CST, Lab Collect, Diabetes mellitus, 09/10/19 11:43:00 CDT  Microalbum/Creatinine Ratio Urine (Microalb/Creat), Routine collect, Urine, Order for future visit, *Est. 12/10/19 3:00:00 CST, *Est. Stop date 12/10/19 3:00:00 CST, Nurse collect, Diabetes mellitus  Office/Outpatient Visit Level 4 Established 08421 PC, Diabetes mellitus  Hypertension  Mixed hyperlipidemia  CAD (coronary artery disease), native coronary artery  Chronic rhinitis  Asthma  CAD (coronary artery disease)  Neuropathy in diabetes, [facility] C, 09/10/19 11:30:00 CDT  Thyroid Stimulating Hormone, Routine collect, 09/10/19 10:58:00 CDT, Blood, Stop date 09/10/19 10:58:00 CDT, Lab Collect, Diabetes mellitus, 09/10/19 10:58:00 CDT  ?  3.?Mixed hyperlipidemia?E78.2  Handouts given today.  Still not at goal with her diabetes.? Last lipid panel done in June.? Will repeat in 3 months and evaluate need for additional medications then.? Maxed out on Atorvastatin.  Ordered:  atorvastatin, 80 mg = 1 tab(s), Oral, At Bedtime, # 30 tab(s), 5 Refill(s), Pharmacy: [place] [address] [facility][id number]- Medication reconciliation completed during visit, Diabetes mellitus  Hypertension  Mixed hyperlipidemia  CAD (coronary artery disease), native coronary artery  Chronic rhinitis  Asthma  CAD (coronary artery disease)  Neuropathy in diabetes, [facility] Int [facility] C, 09/10/19 11:30:00 CDT  Clinic Follow up, *Est. 12/10/19 3:00:00 CST, Order for future visit, Diabetes mellitus   Hypertension  Mixed hyperlipidemia  CAD (coronary artery disease), native coronary artery  Chronic rhinitis  Asthma  CAD (coronary artery disease)  Neuropathy in diabetes, U...  Lipid Panel, Routine collect, *Est. 12/10/19 3:00:00 CST, Blood, Order for future visit, *Est. Stop date 12/10/19 3:00:00 CST, Lab Collect, Mixed hyperlipidemia, 09/10/19 11:42:00 CDT  Office/Outpatient Visit Level 4 Established 77814 PC, Diabetes mellitus  Hypertension  Mixed hyperlipidemia  CAD (coronary artery disease), native coronary artery  Chronic rhinitis  Asthma  CAD (coronary artery disease)  Neuropathy in diabetes, [facility] Int Med C, 09/10/19 11:30:00 CDT  ?  4.?CAD (coronary artery disease), native coronary artery?I25.10  Handouts given today.  Stable; CPM.? Keep appt with [facility] for November.?  No longer smoking.  Ordered:  clopidogrel, 75 mg = 1 tab(s), Oral, Daily, # 30 tab(s), 5 Refill(s), Pharmacy: [id number]  omega-3 polyunsaturated fatty acids, 1,000 mg = 1 cap(s), Oral, BID, # 60 cap(s), 5 Refill(s), Pharmacy: [id number]  1160F- Medication reconciliation completed during visit, Diabetes mellitus  Hypertension  Mixed hyperlipidemia  CAD (coronary artery disease), native coronary artery  Chronic rhinitis  Asthma  CAD (coronary artery disease)  Neuropathy in diabetes, [facility], 09/10/19 11:30:00 CDT  CBC w/ Auto Diff, Routine collect, *Est. 12/10/19 3:00:00 CST, Blood, Order for future visit, *Est. Stop date 12/10/19 3:00:00 CST, Lab Collect, Diabetes mellitus  CAD (coronary artery disease), native coronary artery  Hypertension, 09/10/19 11:43:00 CDT  Clinic Follow up, *Est. 12/10/19 3:00:00 CST, Order for future visit, Diabetes mellitus  Hypertension  Mixed hyperlipidemia  CAD (coronary artery disease), native coronary artery  Chronic rhinitis  Asthma  CAD (coronary artery disease)  Neuropathy in diabetes, U...  Office/Outpatient Visit Level 4 Established [id number]  PC, Diabetes mellitus  Hypertension  Mixed hyperlipidemia  CAD (coronary artery disease), native coronary artery  Chronic rhinitis  Asthma  CAD (coronary artery disease)  Neuropathy in diabetes, [facility] Int Med C, 09/10/19 11:30:00 CDT  ?  5.?Tobacco user?Z72.0  Handouts given today.  Stopped smoking in Feb 2019, following MI.  ?  6.?Depression?F32.9  Stable; CPM, with psychiatric provider.  Ordered:  Thyroid Stimulating Hormone, Routine collect, *Est. 12/10/19 3:00:00 CST, Blood, Order for future visit, *Est. Stop date 12/10/19 3:00:00 CST, Lab Collect, BMI 29.0-29.9,adult  Depression  Generalized anxiety disorder  Wellness examination, 09/10/19 11:42:00 CDT  ?  7.?Generalized anxiety disorder?F41.1  ?Stable; CPM with psychiatric provider.  Ordered:  Thyroid Stimulating Hormone, Routine collect, *Est. 12/10/19 3:00:00 CST, Blood, Order for future visit, *Est. Stop date 12/10/19 3:00:00 CST, Lab Collect, BMI 29.0-29.9,adult  Depression  Generalized anxiety disorder  Wellness examination, 09/10/19 11:42:00 CDT  ?  8.?Chronic rhinitis?J31.0  Chronic, stable.? Believe cough is due to viral URI.? Ears and nose without erythema today.? CPM  Ordered:  fluticasone nasal, 1 spray(s), Nasal, At Bedtime, # 16 gm, 6 Refill(s), Pharmacy: [place] [address] [facility][id number]- Medication reconciliation completed during visit, Diabetes mellitus  Hypertension  Mixed hyperlipidemia  CAD (coronary artery disease), native coronary artery  Chronic rhinitis  Asthma  CAD (coronary artery disease)  Neuropathy in diabetes, [facility] Int [facility] C, 09/10/19 11:30:00 CDT  Clinic Follow up, *Est. 12/10/19 3:00:00 CST, Order for future visit, Diabetes mellitus  Hypertension  Mixed hyperlipidemia  CAD (coronary artery disease), native coronary artery  Chronic rhinitis  Asthma  CAD (coronary artery disease)  Neuropathy in diabetes, U...  Office/Outpatient Visit Level 4 Established 40521 PC, Diabetes mellitus  Hypertension  Mixed  hyperlipidemia  CAD (coronary artery disease), native coronary artery  Chronic rhinitis  Asthma  CAD (coronary artery disease)  Neuropathy in diabetes, [facility], 09/10/19 11:30:00 CDT  ?  9.?BMI 29.0-29.9,adult?Z68.29  Handouts given today.  Reviewed goal BMI with her BMI today.? Teaching provided on long-term complications associated with obesity, weight loss measures, increasing physical activity, improving diet, avoiding sugary foods and liquids, avoiding processed and fast foods, and increasing vegetables and fruits.?  Ordered:  Thyroid Stimulating Hormone, Routine collect, *Est. 12/10/19 3:00:00 CST, Blood, Order for future visit, *Est. Stop date 12/10/19 3:00:00 CST, Lab Collect, BMI 29.0-29.9,adult  Depression  Generalized anxiety disorder  Wellness examination, 09/10/19 11:42:00 CDT  ?  Orders:  albuterol, 2 puff(s), INH, QID, # 1 EA, 11 Refill(s), Pharmacy: [place] [id number] [id number]  benzonatate, 100 mg = 1 cap(s), Oral, TID, PRN PRN as needed for cough, Take as needed for cough, X 10 day(s), # 30 cap(s), 0 Refill(s), Pharmacy: [place] [address] [facility][id number]  gabapentin, 600 mg = 1 tab(s), Oral, TID, # 90 tab(s), 5 Refill(s), Pharmacy: [place] [address] [facility][id number]  lisinopril, 5 mg = 1 tab(s), Oral, Daily, # 30 tab(s), 5 Refill(s), Pharmacy: [place] [address] [facility][id number]  metFORMIN, 1,000 mg = 1 tab(s), Oral, BID, # 60 tab(s), 5 Refill(s), Pharmacy: [place] [address] [facility][id number]  metoprolol, 25 mg = 1 tab(s), Oral, BID, # 60 tab(s), 5 Refill(s), Pharmacy: [place] [address] [facility][id number]  tiZANidine, 4 mg = 1 tab(s), Oral, TID, # 90 tab(s), 5 Refill(s), Pharmacy: [place] [address] [facility][id number]  MG Screening, Routine, *Est. 10/10/19 3:00:00 CDT, Screening, None, Stretcher, Patient Has IV?, Rad Type, Order for future visit, Breast cancer screening, Schedule this test, [facility], Follow Breast Imaging Order Set Protocol, *Est. 10/10/19...  Urinalysis with Microscopic if Indicated, Routine collect, Urine, Order for future visit,  *Est. 12/10/19 3:00:00 CST, *Est. Stop date 12/10/19 3:00:00 CST, Nurse collect, Wellness examination  Vitamin D, 25-Hydroxy Level, Routine collect, *Est. 12/10/19 3:00:00 CST, Blood, Order for future visit, *Est. Stop date 12/10/19 3:00:00 CST, Lab Collect, Wellness examination, 09/10/19 11:43:00 CDT  Referrals  Internal Referral to [facility], diabetes, 09/10/19 10:49:23 CDT, Diabetes mellitus  Clinic Follow up, *Est. 12/10/19 3:00:00 CST, Order for future visit, Diabetes mellitus  Hypertension  Mixed hyperlipidemia  CAD (coronary artery disease), native coronary artery  Chronic rhinitis  Asthma  CAD (coronary artery disease)  Neuropathy in diabetes, U...  Reviewed most recent labs and imaging studies with patient; questions answered; voiced understanding of all teaching today.   Problem List/Past Medical History  Ongoing  Anxiety  BMI 29.0-29.9,adult  CAD (coronary artery disease), native coronary artery  Chronic rhinitis  Cyst of ovary  Depression  Depression  Diabetes mellitus  Extensor tenosynovitis of wrist  Eye pain  Generalized anxiety disorder  Hypertension  Mixed hyperlipidemia  Obesity  Rectus diastasis  Shoulder pain  Tobacco user  Umbilical hernia  Ventral hernia  Historical  Anxiety  Arm pain  Closed fracture of radius, distal end  DIABETES MELLITUS  Hyperlipidemia  HYPERTENSION  Procedure/Surgical History  Catheter placement in coronary artery(s) for coronary angiography, including intraprocedural injection(s) for coronary angiography, imaging supervision and interpretation; with right heart catheterization (02/08/2019)  Dilation of Coronary Artery, One Artery with Drug-eluting Intraluminal Device, Percutaneous Approach (02/08/2019)  Fluoroscopy of Left Heart using Low Osmolar Contrast (02/08/2019)  Fluoroscopy of Multiple Coronary Arteries using Low Osmolar Contrast (02/08/2019)  Fluoroscopy of Multiple Coronary Arteries using Low Osmolar Contrast (02/08/2019)  Measurement of  Cardiac Sampling and Pressure, Left Heart, Percutaneous Approach (02/08/2019)  Measurement of Cardiac Sampling and Pressure, Right Heart, Percutaneous Approach (02/08/2019)  Percutaneous transcatheter placement of drug eluting intracoronary stent(s), with coronary angioplasty when performed; single major coronary artery or branch (02/08/2019)  Application of short arm splint (02/09/2016)  Application of short arm splint (forearm to hand); static (02/09/2016)  Immobilization of Right Lower Arm using Splint (02/09/2016)  Central Venous Catheter Placement with Guidance (04/05/2012)  Insertion of peripherally inserted central venous catheter (PICC), without subcutaneous port or pump; age 5 years or older. (04/05/2012)  Insertion of PICC (peripherally inserted central catheter) (04/05/2012)  Ultrasound guidance for vascular access requiring ultrasound evaluation of potential access sites, documentation of selected vessel patency, concurrent realtime ultrasound visualization of vascular needle entry, with permanent carlos. (04/05/2012)  Cholecystectomy  HAND  Ligation of fallopian tubes with division by endoscopy   Medications  ACCU CHECK kiah LANCETS AND STRIPS, See Instructions, 11 refills  albuterol CFC free 90 mcg/inh inhalation aerosol with adapter, 2 puff(s), INH, QID, 11 refills  alPRAzolam 1 mg oral tablet, 1 mg= 1 tab(s), Oral, TID, PRN  atorvastatin 80 mg oral tablet, 80 mg= 1 tab(s), Oral, At Bedtime, 5 refills  desvenlafaxine 100 mg oral tablet, extended release, 100 mg= 1 tab(s), Oral, Daily, 4 refills  fluticasone 50 mcg/inh nasal spray, 1 spray(s), Nasal, At Bedtime, 6 refills  gabapentin 600 mg oral tablet, 600 mg= 1 tab(s), Oral, TID, 5 refills  Insulin needles for Basaglar, See Instructions, 6 refills  lisinopril 5 mg oral tablet, 5 mg= 1 tab(s), Oral, Daily, 5 refills  metFORMIN 1000 mg oral tablet, 1000 mg= 1 tab(s), Oral, BID, 5 refills  Metoprolol Tartrate 25 mg oral tablet, 25 mg= 1 tab(s), Oral, BID,  5 refills  nitroglycerin 0.4 mg sublingual TAB, 0.4 mg= 1 tab(s), SL, q5min, PRN  Omega-3 1000 mg oral capsule, 1000 mg= 1 cap(s), Oral, BID, 5 refills  oxcarbazepine 300 mg oral tablet, 300 mg= 1 tab(s), Oral, BID  Plavix 75 mg oral tablet, 75 mg= 1 tab(s), Oral, Daily, 5 refills  Provera 10 mg oral tablet, 10 mg= 1 tab(s), Oral, BID, 3 refills  sumatriptan 25mg tab, 25 mg= 1 tab(s), Oral, Once  SURE COMFORT 1 ML SYRINGE, See Instructions, 11 refills,? ?Not taking  Tessalon Perles 100 mg oral capsule, 100 mg= 1 cap(s), Oral, TID, PRN  tiZANidine 4 mg oral tablet, 4 mg= 1 tab(s), Oral, TID, 5 refills  traZODONE 150 mg oral tab ( Desyrel ), 150 mg= 1 tab(s), Oral, Once a day (at bedtime)  Allergies  No Known Allergies  Social History  Abuse/Neglect  No, No, Yes, 09/10/2019  Alcohol - No Risk, 11/28/2015  Past, 09/10/2019  Employment/School  Stay at home w/ 9th grade education, 06/30/2016  Home/Environment  Lives with Children. Living situation: Home/Independent., 04/16/2018  Lives with Children, Spouse. Living situation: Home/Independent. Alcohol abuse in household: No. Substance abuse in household: No. Smoker in household: No. Injuries/Abuse/Neglect in household: No. Feels unsafe at home: No. Safe place to go: Yes. Agency(s)/Others notified: No. Family/Friends available for support: Yes. Concern for family members at home: No. Major illness in household: No. Financial concerns: No. TV/Computer concerns: No., 06/30/2016  Nutrition/Health  Regular, 06/30/2016  Other  Substance Use - Denies Substance Abuse, 11/28/2015  Never, 02/07/2019  Tobacco  Former smoker, quit more than 30 days ago, N/A, 09/10/2019  Family History  Acute myocardial infarction.: Mother.  Cardiac arrhythmia.: Mother.  Congestive heart disease.: Mother.  Diabetes mellitus type 2: Brother.  Leukemia: Mother.  Immunizations  Vaccine Date Status Comments   influenza virus vaccine, inactivated 09/10/2018 Recorded    tetanus/diphtheria/pertussis,  acel(Tdap) 03/29/2018 Recorded    influenza virus vaccine, inactivated 10/25/2017 Recorded [10/30/2017] Immunization record scanned into chart   influenza virus vaccine, inactivated 11/30/2015 Given    Health Maintenance  Health Maintenance  ???Pending?(in the next year)  ??? ??OverDue  ??? ? ? ?Coronary Artery Disease Maintenance-Antiplatelet Agent Prescribed due??and every?  ??? ? ? ?Coronary Artery Disease Maintenance-Lipid Lowering Therapy due??and every?  ??? ? ? ?Diabetes Maintenance-Urine Dipstick due??02/02/19??and every 1??year(s)  ??? ??Due?  ??? ? ? ?Diabetes Maintenance-Eye Exam due??09/10/19??and every?  ??? ? ? ?Hypertension Management-Education due??09/10/19??and every 1??year(s)  ??? ? ? ?Influenza Vaccine due??09/10/19??and every?  ??? ??Due In Future?  ??? ? ? ?Alcohol Misuse Screening not due until??01/01/20??and every 1??year(s)  ??? ? ? ?Obesity Screening not due until??01/01/20??and every 1??year(s)  ??? ? ? ?Smoking Cessation not due until??01/01/20??and every 1??year(s)  ??? ? ? ?Hypertension Management-BMP not due until??04/12/20??and every 1??year(s)  ??? ? ? ?Diabetes Maintenance-Serum Creatinine not due until??04/12/20??and every 1??year(s)  ??? ? ? ?Diabetes Maintenance-Fasting Lipid Profile not due until??06/26/20??and every 1??year(s)  ??? ? ? ?Diabetes Maintenance-HgbA1c not due until??06/26/20??and every 1??year(s)  ??? ? ? ?Blood Pressure Screening not due until??09/09/20??and every 1??year(s)  ??? ? ? ?Body Mass Index Check not due until??09/09/20??and every 1??year(s)  ??? ? ? ?Diabetes Maintenance-Foot Exam not due until??09/09/20??and every 1??year(s)  ??? ? ? ?Hypertension Management-Blood Pressure not due until??09/09/20??and every 1??year(s)  ???Satisfied?(in the past 1 year)  ??? ??Satisfied?  ??? ? ? ?ADL Screening on??09/10/19.??Satisfied by Shira Huerta LPN  ??? ? ? ?Alcohol Misuse Screening on??09/10/19.??Satisfied by Yolanda De Jesus  ??? ? ? ?Blood Pressure  Screening on??09/10/19.??Satisfied by Shira Huerta LPN  ??? ? ? ?Body Mass Index Check on??09/10/19.??Satisfied by Shira Huerta LPN  ??? ? ? ?Cervical Cancer Screening on??10/17/18.??Satisfied by Kimmy Segal  ??? ? ? ?Coronary Artery Disease Maintenance-Antiplatelet Agent Prescribed on??09/10/19.??Satisfied by Yolanda De Jesus  ??? ? ? ?Depression Screening on??09/10/19.??Satisfied by Shira Huerta LPN  ??? ? ? ?Diabetes Maintenance-Foot Exam on??09/10/19.??Satisfied by Yolanda De Jesus  ??? ? ? ?Diabetes Maintenance-Fasting Lipid Profile on??06/27/19.??Satisfied by Sophie Vaughn  ??? ? ? ?Diabetes Maintenance-HgbA1c on??06/27/19.??Satisfied by Sophie Vaughn  ??? ? ? ?Diabetes Maintenance-Serum Creatinine on??04/12/19.??Satisfied by Arpan Garcia.  ??? ? ? ?Diabetes Maintenance-Eye Exam on??02/07/19.??Satisfied by Peace Foster NP  ??? ? ? ?Diabetes Screening on??06/27/19.??Satisfied by Sophie Vaughn  ??? ? ? ?Hypertension Management-Blood Pressure on??09/10/19.??Satisfied by Shira Huerta LPN  ??? ? ? ?Influenza Vaccine on??09/10/18.??Satisfied by Peace Foster NP  ??? ? ? ?Obesity Screening on??09/10/19.??Satisfied by Shira Huerta LPN  ??? ? ? ?Smoking Cessation on??02/09/19.??Satisfied by Elma Kohler RN  ??? ? ? ?Smoking Cessation (Coronary Artery Disease) on??02/09/19.??Satisfied by Elma Kohler RN  ??? ? ? ?Smoking Cessation (Diabetes) on??02/09/19.??Satisfied by Jose F RN, Elma Zuniga  ?

## 2022-05-20 RX ORDER — ALBUTEROL SULFATE 90 UG/1
2 AEROSOL, METERED RESPIRATORY (INHALATION) 4 TIMES DAILY PRN
COMMUNITY
Start: 2022-05-03 | End: 2023-03-09 | Stop reason: SDUPTHER

## 2022-05-26 DIAGNOSIS — M79.2 NERVE PAIN: Primary | ICD-10-CM

## 2022-05-26 RX ORDER — GABAPENTIN 600 MG/1
600 TABLET ORAL 3 TIMES DAILY
Qty: 90 TABLET | Refills: 1 | Status: SHIPPED | OUTPATIENT
Start: 2022-05-26 | End: 2022-07-26 | Stop reason: SDUPTHER

## 2022-05-26 RX ORDER — GABAPENTIN 600 MG/1
600 TABLET ORAL 3 TIMES DAILY
COMMUNITY
Start: 2022-04-25 | End: 2022-05-26 | Stop reason: SDUPTHER

## 2022-05-26 NOTE — TELEPHONE ENCOUNTER
Pt called requesting a refill on her gabapentin    LOV: 03/14/2022  NOV: 06/14/2022 (changed to telemed)

## 2022-06-02 ENCOUNTER — OFFICE VISIT (OUTPATIENT)
Dept: CARDIOLOGY | Facility: CLINIC | Age: 47
End: 2022-06-02
Payer: MEDICAID

## 2022-06-02 VITALS
DIASTOLIC BLOOD PRESSURE: 76 MMHG | WEIGHT: 185.19 LBS | SYSTOLIC BLOOD PRESSURE: 150 MMHG | HEIGHT: 64 IN | RESPIRATION RATE: 20 BRPM | HEART RATE: 77 BPM | OXYGEN SATURATION: 100 % | BODY MASS INDEX: 31.62 KG/M2

## 2022-06-02 DIAGNOSIS — R07.89 NON-CARDIAC CHEST PAIN: Primary | ICD-10-CM

## 2022-06-02 DIAGNOSIS — Z71.6 ENCOUNTER FOR SMOKING CESSATION COUNSELING: ICD-10-CM

## 2022-06-02 DIAGNOSIS — I10 HYPERTENSION, UNSPECIFIED TYPE: ICD-10-CM

## 2022-06-02 DIAGNOSIS — E78.5 HYPERLIPIDEMIA, UNSPECIFIED HYPERLIPIDEMIA TYPE: ICD-10-CM

## 2022-06-02 PROCEDURE — 99215 OFFICE O/P EST HI 40 MIN: CPT | Mod: PBBFAC | Performed by: INTERNAL MEDICINE

## 2022-06-02 RX ORDER — ROSUVASTATIN CALCIUM 40 MG/1
40 TABLET, COATED ORAL DAILY
COMMUNITY
Start: 2022-03-14 | End: 2022-07-29 | Stop reason: SDUPTHER

## 2022-06-02 RX ORDER — LANCETS
EACH MISCELLANEOUS
COMMUNITY
Start: 2022-02-01 | End: 2023-05-09

## 2022-06-02 RX ORDER — LOSARTAN POTASSIUM 25 MG/1
50 TABLET ORAL DAILY
Qty: 90 TABLET | Refills: 3 | Status: SHIPPED | OUTPATIENT
Start: 2022-06-02 | End: 2022-07-29 | Stop reason: SDUPTHER

## 2022-06-02 RX ORDER — LOSARTAN POTASSIUM 25 MG/1
25 TABLET ORAL
COMMUNITY
Start: 2021-11-01 | End: 2022-06-02 | Stop reason: SDUPTHER

## 2022-06-02 RX ORDER — TIZANIDINE 4 MG/1
4 TABLET ORAL
COMMUNITY
Start: 2021-09-14 | End: 2022-07-27 | Stop reason: SDUPTHER

## 2022-06-02 RX ORDER — BLOOD SUGAR DIAGNOSTIC
STRIP MISCELLANEOUS
COMMUNITY
Start: 2022-04-25 | End: 2023-05-09

## 2022-06-02 RX ORDER — CLOPIDOGREL BISULFATE 75 MG/1
TABLET ORAL
COMMUNITY
Start: 2022-02-07 | End: 2022-07-29 | Stop reason: SDUPTHER

## 2022-06-02 RX ORDER — ALPRAZOLAM 1 MG/1
1 TABLET ORAL 3 TIMES DAILY PRN
COMMUNITY
Start: 2022-05-31

## 2022-06-02 RX ORDER — EZETIMIBE 10 MG/1
10 TABLET ORAL DAILY
Qty: 90 TABLET | Refills: 3 | Status: SHIPPED | OUTPATIENT
Start: 2022-06-02 | End: 2022-07-29 | Stop reason: SDUPTHER

## 2022-06-02 RX ORDER — IBUPROFEN 200 MG
TABLET ORAL
COMMUNITY
Start: 2021-09-14 | End: 2022-06-29

## 2022-06-02 RX ORDER — METFORMIN HYDROCHLORIDE 500 MG/1
TABLET, EXTENDED RELEASE ORAL
COMMUNITY
Start: 2022-03-14 | End: 2022-06-29 | Stop reason: SDUPTHER

## 2022-06-02 RX ORDER — NITROGLYCERIN 0.4 MG/1
0.4 TABLET SUBLINGUAL
COMMUNITY
Start: 2021-08-27 | End: 2022-07-29 | Stop reason: SDUPTHER

## 2022-06-02 RX ORDER — DESVENLAFAXINE 100 MG/1
TABLET, EXTENDED RELEASE ORAL
COMMUNITY
Start: 2022-05-08

## 2022-06-02 RX ORDER — PANTOPRAZOLE SODIUM 40 MG/1
TABLET, DELAYED RELEASE ORAL
COMMUNITY
Start: 2022-05-08 | End: 2022-09-01 | Stop reason: SDUPTHER

## 2022-06-02 RX ORDER — FLUTICASONE PROPIONATE 50 MCG
SPRAY, SUSPENSION (ML) NASAL
COMMUNITY
Start: 2022-05-08 | End: 2023-05-09

## 2022-06-02 RX ORDER — ISOSORBIDE MONONITRATE 30 MG/1
TABLET, EXTENDED RELEASE ORAL
COMMUNITY
Start: 2021-12-10 | End: 2022-07-29 | Stop reason: SDUPTHER

## 2022-06-02 RX ORDER — OXCARBAZEPINE 300 MG/1
300 TABLET, FILM COATED ORAL 2 TIMES DAILY
COMMUNITY
Start: 2022-05-18

## 2022-06-02 RX ORDER — METOPROLOL SUCCINATE 25 MG/1
25 TABLET, EXTENDED RELEASE ORAL 2 TIMES DAILY
COMMUNITY
Start: 2021-12-10 | End: 2022-07-29 | Stop reason: SDUPTHER

## 2022-06-02 RX ORDER — LORATADINE 10 MG/1
10 TABLET ORAL DAILY
COMMUNITY
Start: 2022-05-09 | End: 2023-05-09 | Stop reason: ALTCHOICE

## 2022-06-02 NOTE — PROGRESS NOTES
Cardiology attending addendum  Patient's case discussed with cardiology fellow Dr. Kirill José. Agree with plan as outlined above.     Deloris Mcgrath MD  [facility]iology[facility]

## 2022-06-02 NOTE — PROGRESS NOTES
"  CHIEF COMPLAINT:   Chief Complaint   Patient presents with    Chest Pain    Dizziness    Shortness of Breath    Follow-up     C/O chest pain, SOB, lightheadedness, dizziness, left leg and feet swelling, and elevated heart rate.                  Review of patient's allergies indicates:   Allergen Reactions    Aspirin Nausea And Vomiting and Other (See Comments)     Other reaction(s): Stomach upset  Pt. States doesn't take because of stomach ulcers                           HPI:    47 yo WF here for f/u via telmed. PMHx includes  CAD with MI in February 2019, with stenting now s/p CABG LIMA->LAD in 6/2021 complicated by wound dehiscence and infection treated, HTN, T2DM, HLD, chronic lower back pain, ventral hernia, AUB, endometriosis, depression with anxiety, and tobacco use, 1-2 ciggs / day.   She is following up from ED presentation for chest pain. A "popping feeling" in her chest. Patient reports to having CABG with sternal flap with infection. Patient states feels like having really bad heartburn. Popping in her chest whenever she takes deep breaths.                                                                                                                                                                                                                                                                                                                                                                                                                                           Past Surgical History:   Procedure Laterality Date    CARDIAC CATHETERIZATION      CHOLECYSTECTOMY      CORONARY ANGIOPLASTY      CORONARY ARTERY BYPASS GRAFT      HYSTERECTOMY      right wrist surgery      TUBAL LIGATION         Social History     Socioeconomic History    Marital status: Single   Tobacco Use    Smoking status: Current Every Day Smoker     Packs/day: 0.25     Types: Cigarettes    Smokeless tobacco: Never Used "   Substance and Sexual Activity    Alcohol use: Not Currently    Drug use: Never          Family History   Problem Relation Age of Onset    Arrhythmia Mother     Clotting disorder Mother     Heart disease Mother     Heart attack Mother     Hyperlipidemia Mother     Heart failure Mother     Stroke Mother     Hypertension Mother     Diabetes Mother     Hyperlipidemia Brother     Hypertension Brother          Current Outpatient Medications:     ACCU-CHEK GUIDE TEST STRIPS Strp, , Disp: , Rfl:     ACCU-CHEK SOFTCLIX LANCETS Misc, , Disp: , Rfl:     albuterol (PROVENTIL/VENTOLIN HFA) 90 mcg/actuation inhaler, Inhale 2 puffs into the lungs 4 (four) times daily as needed., Disp: , Rfl:     ALLERGY RELIEF, LORATADINE, 10 mg tablet, Take 10 mg by mouth once daily., Disp: , Rfl:     ALPRAZolam (XANAX) 1 MG tablet, Take 1 mg by mouth 3 (three) times daily as needed., Disp: , Rfl:     clopidogreL (PLAVIX) 75 mg tablet,  See Instructions, TAKE ONE TABLET BY MOUTH EVERY DAY, # 90 tab(s), 3 Refill(s), Pharmacy: [id number], 160, cm, Height/Length Dosing, 09/01/21 13:52:00 CDT, 85, kg, Weight Dosing, 09/01/21 13:52:00 CDT, Disp: , Rfl:     desvenlafaxine succinate (PRISTIQ) 100 MG Tb24, , Disp: , Rfl:     fluticasone propionate (FLONASE) 50 mcg/actuation nasal spray, , Disp: , Rfl:     gabapentin (NEURONTIN) 600 MG tablet, Take 1 tablet (600 mg total) by mouth 3 (three) times daily., Disp: 90 tablet, Rfl: 1    isosorbide mononitrate (IMDUR) 30 MG 24 hr tablet, , Disp: , Rfl:     losartan (COZAAR) 25 MG tablet, Take 25 mg by mouth., Disp: , Rfl:     metFORMIN (GLUCOPHAGE-XR) 500 MG ER 24hr tablet, Take by mouth., Disp: , Rfl:     metoprolol succinate (TOPROL-XL) 25 MG 24 hr tablet, Take 25 mg by mouth 2 (two) times daily., Disp: , Rfl:     nicotine (NICODERM CQ) 14 mg/24 hr, Apply topically., Disp: , Rfl:     nitroGLYCERIN (NITROSTAT) 0.4 MG SL tablet, Place 0.4 mg under the tongue., Disp: ,  Rfl:     OXcarbazepine (TRILEPTAL) 300 MG Tab, Take 300 mg by mouth 2 (two) times daily., Disp: , Rfl:     pantoprazole (PROTONIX) 40 MG tablet, , Disp: , Rfl:     rosuvastatin (CRESTOR) 40 MG Tab, Take 40 mg by mouth once daily., Disp: , Rfl:     tiZANidine (ZANAFLEX) 4 MG tablet, Take 4 mg by mouth., Disp: , Rfl:     TRULICITY 1.5 mg/0.5 mL pen injector, Inject 1.5 mg into the skin every 7 days., Disp: 4 pen, Rfl: 0     Laboratory:  Most Recent Data:  CBC:   Lab Results   Component Value Date    WBC 10.8 03/13/2022    HGB 14.6 03/13/2022    HCT 43.7 03/13/2022     03/13/2022    MCV 87.1 03/13/2022    RDW 13.0 03/13/2022     BMP:   Lab Results   Component Value Date     03/13/2022    K 3.9 03/13/2022    CO2 27 03/13/2022    BUN 6.4 03/13/2022    CALCIUM 10.4 03/13/2022    MG 1.80 02/23/2022    PHOS 4.8 (H) 08/25/2021     LFTs:   Lab Results   Component Value Date    ALBUMIN 4.3 03/13/2022    BILITOT 0.3 03/13/2022    AST 17 03/13/2022    ALKPHOS 112 03/13/2022    ALT 24 03/13/2022     Coags:   Lab Results   Component Value Date    INR 1.28 (H) 07/19/2021    PROTIME 15.8 (H) 07/19/2021    PTT 33.1 07/27/2021     FLP:   Lab Results   Component Value Date    CHOL 168 03/13/2022    HDL 45 03/13/2022    TRIG 175 03/13/2022     DM:   Lab Results   Component Value Date    HGBA1C 9.5 03/13/2022    HGBA1C 6.9 09/13/2021    HGBA1C 6.8 08/11/2021    CREATININE 0.74 03/13/2022     Thyroid:   Lab Results   Component Value Date    TSH 2.9933 09/13/2021     Anemia: No results found for: IRON, TIBC, FERRITIN, [id number], FOLATE  Cardiac:   Lab Results   Component Value Date    TROPONINI <0.010 02/23/2022    BNP 13.3 02/23/2022     Urinalysis:   Lab Results   Component Value Date    COLORU LIGHT YELLOW 09/13/2021    NITRITE Negative 09/13/2021    KETONESU Negative 09/13/2021    UROBILINOGEN Normal 09/13/2021             Review of Systems   Constitutional: Negative for chills and fever.   Respiratory: Negative for  "cough and shortness of breath.    Cardiovascular: As above   Gastrointestinal: Negative for nausea and vomiting.   Musculoskeletal: Negative for back pain.   Skin: Negative for itching.   Neurological: Negative for dizziness, loss of consciousness and headaches.                                                                                                                                                                              Blood pressure (!) 150/76, pulse 77, resp. rate 20, height 5' 3.78" (1.62 m), weight 84 kg (185 lb 3 oz), SpO2 100 %.   PE:  Physical Exam:  Neck: normal carotids, no bruits; normal JVP  Lungs :clear  Heart: RR, normal S1,S2, no murmurs, no gallops, sternotomy scar noted  Abd: no masses; no bruits;   Exts: normal DP and PT pulses bilaterally, no edema noted , left leg graft harvest site CDI      ASSESSMENT/PLAN:  45 yo WF here for f/u via [employer]. PMHx includes  CAD with MI in February 2019, with stenting now s/p CABG LIMA->LAD and and SVG graft to ??? As well. in 6/2021 complicated by wound dehiscence and infection treated, HTN, T2DM, HLD, chronic lower back pain, ventral hernia, AUB, endometriosis, depression with anxiety, and tobacco use, 1-2 ciggs / day.   She is following up from ED presentation for chest pain. A "popping feeling" in her chest. Patient reports to having CABG with sternal flap with infection. Patient states feels like having really bad heartburn. Popping in her chest whenever she takes deep breaths.      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

## 2022-06-28 ENCOUNTER — LAB VISIT (OUTPATIENT)
Dept: LAB | Facility: HOSPITAL | Age: 47
End: 2022-06-28
Attending: NURSE PRACTITIONER
Payer: MEDICAID

## 2022-06-28 ENCOUNTER — TELEPHONE (OUTPATIENT)
Dept: INTERNAL MEDICINE | Facility: CLINIC | Age: 47
End: 2022-06-28
Payer: MEDICAID

## 2022-06-28 DIAGNOSIS — E11.9 TYPE 2 DIABETES MELLITUS WITHOUT COMPLICATION, WITHOUT LONG-TERM CURRENT USE OF INSULIN: ICD-10-CM

## 2022-06-28 DIAGNOSIS — E11.9 TYPE 2 DIABETES MELLITUS WITHOUT COMPLICATION, WITHOUT LONG-TERM CURRENT USE OF INSULIN: Primary | ICD-10-CM

## 2022-06-28 LAB
ANION GAP SERPL CALC-SCNC: 10 MEQ/L
APPEARANCE UR: ABNORMAL
BACTERIA #/AREA URNS AUTO: ABNORMAL /HPF
BILIRUB UR QL STRIP.AUTO: NEGATIVE MG/DL
BUN SERPL-MCNC: 6.2 MG/DL (ref 7–18.7)
CALCIUM SERPL-MCNC: 9.7 MG/DL (ref 8.4–10.2)
CHLORIDE SERPL-SCNC: 101 MMOL/L (ref 98–107)
CO2 SERPL-SCNC: 28 MMOL/L (ref 22–29)
COLOR UR AUTO: YELLOW
CREAT SERPL-MCNC: 0.77 MG/DL (ref 0.55–1.02)
CREAT UR-MCNC: 106.9 MG/DL (ref 47–110)
CREAT/UREA NIT SERPL: 8
EST. AVERAGE GLUCOSE BLD GHB EST-MCNC: 223.1 MG/DL
GLUCOSE SERPL-MCNC: 230 MG/DL (ref 74–100)
GLUCOSE UR QL STRIP.AUTO: ABNORMAL MG/DL
HBA1C MFR BLD: 9.4 %
HYALINE CASTS #/AREA URNS LPF: ABNORMAL /LPF
KETONES UR QL STRIP.AUTO: NEGATIVE MG/DL
LEUKOCYTE ESTERASE UR QL STRIP.AUTO: NEGATIVE UNIT/L
MICROALBUMIN UR-MCNC: 7.6 UG/ML
MICROALBUMIN/CREAT RATIO PNL UR: 7.1 MG/GM CR (ref 0–30)
MUCOUS THREADS URNS QL MICRO: ABNORMAL /LPF
NITRITE UR QL STRIP.AUTO: NEGATIVE
PH UR STRIP.AUTO: 5.5 [PH]
POTASSIUM SERPL-SCNC: 3.9 MMOL/L (ref 3.5–5.1)
PROT UR QL STRIP.AUTO: ABNORMAL MG/DL
RBC #/AREA URNS AUTO: ABNORMAL /HPF
RBC UR QL AUTO: NEGATIVE UNIT/L
SODIUM SERPL-SCNC: 139 MMOL/L (ref 136–145)
SP GR UR STRIP.AUTO: 1.02
SQUAMOUS #/AREA URNS LPF: ABNORMAL /HPF
UROBILINOGEN UR STRIP-ACNC: NORMAL MG/DL
WBC #/AREA URNS AUTO: ABNORMAL /HPF

## 2022-06-28 PROCEDURE — 82043 UR ALBUMIN QUANTITATIVE: CPT

## 2022-06-28 PROCEDURE — 36415 COLL VENOUS BLD VENIPUNCTURE: CPT

## 2022-06-28 PROCEDURE — 83036 HEMOGLOBIN GLYCOSYLATED A1C: CPT

## 2022-06-28 PROCEDURE — 81001 URINALYSIS AUTO W/SCOPE: CPT

## 2022-06-28 PROCEDURE — 80048 BASIC METABOLIC PNL TOTAL CA: CPT

## 2022-06-29 ENCOUNTER — OFFICE VISIT (OUTPATIENT)
Dept: INTERNAL MEDICINE | Facility: CLINIC | Age: 47
End: 2022-06-29
Payer: MEDICAID

## 2022-06-29 DIAGNOSIS — F17.210 CIGARETTE NICOTINE DEPENDENCE WITHOUT COMPLICATION: ICD-10-CM

## 2022-06-29 DIAGNOSIS — E11.9 TYPE 2 DIABETES MELLITUS WITHOUT COMPLICATION, UNSPECIFIED WHETHER LONG TERM INSULIN USE: Primary | ICD-10-CM

## 2022-06-29 PROBLEM — K21.00 GASTROESOPHAGEAL REFLUX DISEASE WITH ESOPHAGITIS: Status: ACTIVE | Noted: 2022-06-29

## 2022-06-29 PROBLEM — E66.9 OBESITY: Status: ACTIVE | Noted: 2022-06-29

## 2022-06-29 PROBLEM — D50.9 MICROCYTIC HYPOCHROMIC ANEMIA: Status: ACTIVE | Noted: 2022-06-29

## 2022-06-29 PROBLEM — D72.829 LEUKOCYTOSIS: Status: ACTIVE | Noted: 2022-06-29

## 2022-06-29 PROBLEM — M62.08 DIASTASIS OF RECTUS ABDOMINIS: Status: ACTIVE | Noted: 2022-06-29

## 2022-06-29 PROBLEM — M65.849 OTHER SYNOVITIS AND TENOSYNOVITIS, UNSPECIFIED HAND: Status: ACTIVE | Noted: 2022-06-29

## 2022-06-29 PROBLEM — I25.10 CORONARY ARTERIOSCLEROSIS IN NATIVE ARTERY: Status: ACTIVE | Noted: 2022-06-29

## 2022-06-29 PROBLEM — M47.816 SPONDYLOSIS OF LUMBAR SPINE: Status: ACTIVE | Noted: 2022-06-29

## 2022-06-29 PROBLEM — E78.2 MIXED HYPERLIPIDEMIA: Status: ACTIVE | Noted: 2022-06-29

## 2022-06-29 PROBLEM — G89.29 CHRONIC BACK PAIN: Status: ACTIVE | Noted: 2022-06-29

## 2022-06-29 PROBLEM — H81.10 BENIGN PAROXYSMAL POSITIONAL VERTIGO: Status: ACTIVE | Noted: 2022-06-29

## 2022-06-29 PROBLEM — D75.839 THROMBOCYTHEMIA: Status: ACTIVE | Noted: 2022-06-29

## 2022-06-29 PROBLEM — J31.0 CHRONIC RHINITIS: Status: ACTIVE | Noted: 2022-06-29

## 2022-06-29 PROBLEM — M54.9 CHRONIC BACK PAIN: Status: ACTIVE | Noted: 2022-06-29

## 2022-06-29 PROBLEM — F41.8 MIXED ANXIETY DEPRESSIVE DISORDER: Status: ACTIVE | Noted: 2022-06-29

## 2022-06-29 PROCEDURE — 1159F MED LIST DOCD IN RCRD: CPT | Mod: CPTII,95,, | Performed by: NURSE PRACTITIONER

## 2022-06-29 PROCEDURE — 4010F ACE/ARB THERAPY RXD/TAKEN: CPT | Mod: CPTII,95,, | Performed by: NURSE PRACTITIONER

## 2022-06-29 PROCEDURE — 3061F PR NEG MICROALBUMINURIA RESULT DOCUMENTED/REVIEW: ICD-10-PCS | Mod: CPTII,95,, | Performed by: NURSE PRACTITIONER

## 2022-06-29 PROCEDURE — 1160F RVW MEDS BY RX/DR IN RCRD: CPT | Mod: CPTII,95,, | Performed by: NURSE PRACTITIONER

## 2022-06-29 PROCEDURE — 99214 OFFICE O/P EST MOD 30 MIN: CPT | Mod: 95,,, | Performed by: NURSE PRACTITIONER

## 2022-06-29 PROCEDURE — 1160F PR REVIEW ALL MEDS BY PRESCRIBER/CLIN PHARMACIST DOCUMENTED: ICD-10-PCS | Mod: CPTII,95,, | Performed by: NURSE PRACTITIONER

## 2022-06-29 PROCEDURE — 3061F NEG MICROALBUMINURIA REV: CPT | Mod: CPTII,95,, | Performed by: NURSE PRACTITIONER

## 2022-06-29 PROCEDURE — 3066F NEPHROPATHY DOC TX: CPT | Mod: CPTII,95,, | Performed by: NURSE PRACTITIONER

## 2022-06-29 PROCEDURE — 4010F PR ACE/ARB THEARPY RXD/TAKEN: ICD-10-PCS | Mod: CPTII,95,, | Performed by: NURSE PRACTITIONER

## 2022-06-29 PROCEDURE — 99214 PR OFFICE/OUTPT VISIT, EST, LEVL IV, 30-39 MIN: ICD-10-PCS | Mod: 95,,, | Performed by: NURSE PRACTITIONER

## 2022-06-29 PROCEDURE — 3066F PR DOCUMENTATION OF TREATMENT FOR NEPHROPATHY: ICD-10-PCS | Mod: CPTII,95,, | Performed by: NURSE PRACTITIONER

## 2022-06-29 PROCEDURE — 1159F PR MEDICATION LIST DOCUMENTED IN MEDICAL RECORD: ICD-10-PCS | Mod: CPTII,95,, | Performed by: NURSE PRACTITIONER

## 2022-06-29 RX ORDER — DOXEPIN HYDROCHLORIDE 25 MG/1
25 CAPSULE ORAL NIGHTLY
COMMUNITY
Start: 2022-06-05

## 2022-06-29 RX ORDER — METFORMIN HYDROCHLORIDE 500 MG/1
500 TABLET, EXTENDED RELEASE ORAL DAILY
Qty: 30 TABLET | Refills: 4 | Status: SHIPPED | OUTPATIENT
Start: 2022-06-29 | End: 2022-09-30

## 2022-06-29 NOTE — PROGRESS NOTES
JOSE RAFAEL Vu   [facility]  [address] [facility] [address]      PATIENT NAME: Kaye Spring  : 1975  DATE: 22  MRN: [id number]      Billing Provider: JOSE RAFAEL Vu  Level of Service:   Patient PCP Information     Provider PCP Type    JOSE RAFAEL Vu General          Reason for Visit / Chief Complaint: Follow-up (Lab review / nicotine patches or medication for smoking cessation / anxiety )       History of Present Illness / Problem Focused Workflow     Kaye Spring presents to the clinic with Follow-up (Lab review / nicotine patches or medication for smoking cessation / anxiety )     47 yo female here for audio follow up for lab review, unable to review [employer] to eval at this time. Pt report her icebox went out so she does not have her Truliciyt due to it going bad - she has been out for about 3 weeks. Pt will restart all of her meds as directed and will increase her Metformin to 500 mg daily as she has only been taking it every other day.       Review of Systems     Review of Systems   Constitutional: Negative.    HENT: Negative.    Eyes: Negative.    Respiratory: Negative.    Cardiovascular: Negative.    Gastrointestinal: Negative.    Endocrine: Negative.    Genitourinary: Negative.    Neurological: Negative.    Psychiatric/Behavioral: Negative.        Medical / Social / Family History     Past Medical History:   Diagnosis Date    Anxiety     Coronary artery disease     Depression     Diabetes mellitus     Hyperlipidemia     Hypertension     Stroke        Past Surgical History:   Procedure Laterality Date    CARDIAC CATHETERIZATION      CHOLECYSTECTOMY      CORONARY ANGIOPLASTY      CORONARY ARTERY BYPASS GRAFT      HYSTERECTOMY      right wrist surgery      TUBAL LIGATION         Social History  Ms.  reports that she has been smoking cigarettes. She has been smoking about 0.25 packs per day. She has never  used smokeless tobacco. She reports previous alcohol use. She reports that she does not use drugs.    Family History  MsShruthi's family history includes Arrhythmia in her mother; Clotting disorder in her mother; Diabetes in her mother; Heart attack in her mother; Heart disease in her mother; Heart failure in her mother; Hyperlipidemia in her brother and mother; Hypertension in her brother and mother; Stroke in her mother.    Medications and Allergies     Medications  Medication List with Changes/Refills   Current Medications    ACCU-CHEK GUIDE TEST STRIPS STRP        ACCU-CHEK SOFTCLIX LANCETS MISC        ALBUTEROL (PROVENTIL/VENTOLIN HFA) 90 MCG/ACTUATION INHALER    Inhale 2 puffs into the lungs 4 (four) times daily as needed.    ALLERGY RELIEF, LORATADINE, 10 MG TABLET    Take 10 mg by mouth once daily.    ALPRAZOLAM (XANAX) 1 MG TABLET    Take 1 mg by mouth 3 (three) times daily as needed.    CLOPIDOGREL (PLAVIX) 75 MG TABLET      See Instructions, TAKE ONE TABLET BY MOUTH EVERY DAY, # 90 tab(s), 3 Refill(s), Pharmacy: [id number] [id number], 160, cm, Height/Length Dosing, 09/01/21 13:52:00 CDT, 85, kg, Weight Dosing, 09/01/21 13:52:00 CDT    DESVENLAFAXINE SUCCINATE (PRISTIQ) 100 MG TB24        DOXEPIN (SINEQUAN) 25 MG CAPSULE    Take 25 mg by mouth nightly.    EZETIMIBE (ZETIA) 10 MG TABLET    Take 1 tablet (10 mg total) by mouth once daily.    FLUTICASONE PROPIONATE (FLONASE) 50 MCG/ACTUATION NASAL SPRAY        GABAPENTIN (NEURONTIN) 600 MG TABLET    Take 1 tablet (600 mg total) by mouth 3 (three) times daily.    ISOSORBIDE MONONITRATE (IMDUR) 30 MG 24 HR TABLET        LOSARTAN (COZAAR) 25 MG TABLET    Take 2 tablets (50 mg total) by mouth once daily.    METFORMIN (GLUCOPHAGE-XR) 500 MG ER 24HR TABLET    Take by mouth.    METOPROLOL SUCCINATE (TOPROL-XL) 25 MG 24 HR TABLET    Take 25 mg by mouth 2 (two) times daily.    NITROGLYCERIN (NITROSTAT) 0.4 MG SL TABLET    Place 0.4 mg under the tongue.    OXCARBAZEPINE  (TRILEPTAL) 300 MG TAB    Take 300 mg by mouth 2 (two) times daily.    PANTOPRAZOLE (PROTONIX) 40 MG TABLET        ROSUVASTATIN (CRESTOR) 40 MG TAB    Take 40 mg by mouth once daily.    TIZANIDINE (ZANAFLEX) 4 MG TABLET    Take 4 mg by mouth.   Discontinued Medications    NICOTINE (NICODERM CQ) 14 MG/24 HR    Apply topically.         Allergies  Review of patient's allergies indicates:   Allergen Reactions    Aspirin Nausea And Vomiting and Other (See Comments)     Other reaction(s): Stomach upset  Pt. States doesn't take because of stomach ulcers         Physical Examination   There were no vitals filed for this visit.  Physical Exam  HENT:      Right Ear: Hearing normal.      Left Ear: Hearing normal.   Neurological:      Mental Status: She is alert and oriented to person, place, and time.   Psychiatric:         Mood and Affect: Mood normal.           Results     Lab Results   Component Value Date    WBC 10.8 03/13/2022    RBC 5.02 03/13/2022    HGB 14.6 03/13/2022    HCT 43.7 03/13/2022    MCV 87.1 03/13/2022    MCH 29.1 03/13/2022    MCHC 33.4 03/13/2022    RDW 13.0 03/13/2022     03/13/2022    MPV 11.3 03/13/2022     Sodium Level   Date Value Ref Range Status   06/28/2022 139 136 - 145 mmol/L Final     Potassium Level   Date Value Ref Range Status   06/28/2022 3.9 3.5 - 5.1 mmol/L Final     Carbon Dioxide   Date Value Ref Range Status   06/28/2022 28 22 - 29 mmol/L Final     Blood Urea Nitrogen   Date Value Ref Range Status   06/28/2022 6.2 (L) 7.0 - 18.7 mg/dL Final     Creatinine   Date Value Ref Range Status   06/28/2022 0.77 0.55 - 1.02 mg/dL Final     Calcium Level Total   Date Value Ref Range Status   06/28/2022 9.7 8.4 - 10.2 mg/dL Final     Albumin Level   Date Value Ref Range Status   03/13/2022 4.3 3.5 - 5.0      Bilirubin Total   Date Value Ref Range Status   03/13/2022 0.3 <=1.5      Alkaline Phosphatase   Date Value Ref Range Status   03/13/2022 112 40 - 150      Aspartate Aminotransferase    Date Value Ref Range Status   03/13/2022 17 5 - 34      Alanine Aminotransferase   Date Value Ref Range Status   03/13/2022 24 0 - 55      Estimated GFR-Non    Date Value Ref Range Status   06/28/2022 >60 mls/min/1.73/m2 Final     Lab Results   Component Value Date    CHOL 168 03/13/2022     Lab Results   Component Value Date    HDL 45 03/13/2022     No results found for: LDLCALC  Lab Results   Component Value Date    TRIG 175 03/13/2022     No results found for: CHOLHDL  Lab Results   Component Value Date    TSH 2.9933 09/13/2021     Lab Results   Component Value Date    PHUR 5.5 09/13/2021    PROTEINUA Trace (A) 06/28/2022    GLUCUA Negative 09/13/2021    KETONESU Negative 09/13/2021    OCCULTUA Negative 09/13/2021    NITRITE Negative 09/13/2021    LEUKOCYTESUR Negative 06/28/2022     Lab Results   Component Value Date    HGBA1C 9.4 (H) 06/28/2022    HGBA1C 9.5 03/13/2022    HGBA1C 6.9 09/13/2021     No results found for: MICROALBUR, [id number]   No results found for this or any previous visit.         Assessment and Plan (including Health Maintenance)     Plan:         Health Maintenance Due   Topic Date Due    Hepatitis C Screening  Never done    Foot Exam  Never done    HIV Screening  Never done    TETANUS VACCINE  Never done    Mammogram  Never done    Pneumococcal Vaccines (Age 0-64) (2 - PPSV23 or PCV20) 12/19/2013    Colorectal Cancer Screening  Never done    Eye Exam  09/11/2020    COVID-19 Vaccine (3 - Booster) 01/27/2022       Problem List Items Addressed This Visit    None         Health Maintenance Topics with due status: Not Due       Topic Last Completion Date    Lipid Panel 03/13/2022    Low Dose Statin 06/02/2022    Diabetes Urine Screening 06/28/2022    Hemoglobin A1c 06/28/2022       Future Appointments   Date Time Provider Department Center   10/3/2022 10:30 AM Deloris Mcgrath MD [facility]            Signature:     [facility]  [address] [address]    Date of encounter: 6/29/22Established Patient - Audio Only Telehealth Visit     The patient location is: home  The chief complaint leading to consultation is: f/u  Visit type: Virtual visit with audio only (telephone)  Total time spent with patient:  10 mins       The reason for the audio only service rather than synchronous audio and video virtual visit was related to technical difficulties or patient preference/necessity.     Each patient to whom I provide medical services by telemedicine is:  (1) informed of the relationship between the physician and patient and the respective role of any other health care provider with respect to management of the patient; and (2) notified that they may decline to receive medical services by telemedicine and may withdraw from such care at any time. Patient verbally consented to receive this service via voice-only telephone call.           This service was not originating from a related E/M service provided within the previous 7 days nor will  to an E/M service or procedure within the next 24 hours or my soonest available appointment.  Prevailing standard of care was able to be met in this audio-only visit.

## 2022-07-13 ENCOUNTER — TELEPHONE (OUTPATIENT)
Dept: INTERNAL MEDICINE | Facility: CLINIC | Age: 47
End: 2022-07-13
Payer: MEDICAID

## 2022-07-13 DIAGNOSIS — E11.9 TYPE 2 DIABETES MELLITUS WITHOUT COMPLICATION, WITHOUT LONG-TERM CURRENT USE OF INSULIN: Primary | ICD-10-CM

## 2022-07-29 DIAGNOSIS — I25.10 CORONARY ARTERY DISEASE, UNSPECIFIED VESSEL OR LESION TYPE, UNSPECIFIED WHETHER ANGINA PRESENT, UNSPECIFIED WHETHER NATIVE OR TRANSPLANTED HEART: Primary | ICD-10-CM

## 2022-07-29 RX ORDER — CLOPIDOGREL BISULFATE 75 MG/1
TABLET ORAL
Qty: 30 TABLET | Refills: 6 | Status: SHIPPED | OUTPATIENT
Start: 2022-07-29 | End: 2022-08-09 | Stop reason: SDUPTHER

## 2022-07-29 RX ORDER — ISOSORBIDE MONONITRATE 30 MG/1
30 TABLET, EXTENDED RELEASE ORAL DAILY
Qty: 90 TABLET | Refills: 1 | Status: SHIPPED | OUTPATIENT
Start: 2022-07-29 | End: 2022-08-09 | Stop reason: SDUPTHER

## 2022-07-29 RX ORDER — METOPROLOL SUCCINATE 25 MG/1
25 TABLET, EXTENDED RELEASE ORAL 2 TIMES DAILY
Qty: 90 TABLET | Refills: 1 | Status: SHIPPED | OUTPATIENT
Start: 2022-07-29 | End: 2022-08-09 | Stop reason: SDUPTHER

## 2022-07-29 RX ORDER — EZETIMIBE 10 MG/1
10 TABLET ORAL DAILY
Qty: 90 TABLET | Refills: 3 | Status: CANCELLED | OUTPATIENT
Start: 2022-07-29 | End: 2023-07-29

## 2022-07-29 RX ORDER — LOSARTAN POTASSIUM 25 MG/1
50 TABLET ORAL DAILY
Qty: 90 TABLET | Refills: 3 | Status: CANCELLED | OUTPATIENT
Start: 2022-07-29

## 2022-07-29 RX ORDER — EZETIMIBE 10 MG/1
10 TABLET ORAL DAILY
Qty: 90 TABLET | Refills: 3 | Status: SHIPPED | OUTPATIENT
Start: 2022-07-29 | End: 2022-08-09 | Stop reason: SDUPTHER

## 2022-07-29 RX ORDER — ROSUVASTATIN CALCIUM 40 MG/1
40 TABLET, COATED ORAL DAILY
Qty: 90 TABLET | Refills: 1 | Status: SHIPPED | OUTPATIENT
Start: 2022-07-29 | End: 2022-08-09 | Stop reason: SDUPTHER

## 2022-07-29 RX ORDER — LOSARTAN POTASSIUM 25 MG/1
50 TABLET ORAL DAILY
Qty: 90 TABLET | Refills: 3 | Status: SHIPPED | OUTPATIENT
Start: 2022-07-29 | End: 2022-09-16 | Stop reason: SDUPTHER

## 2022-07-29 RX ORDER — ISOSORBIDE MONONITRATE 30 MG/1
30 TABLET, EXTENDED RELEASE ORAL DAILY
Qty: 90 TABLET | Refills: 1 | Status: CANCELLED | OUTPATIENT
Start: 2022-07-29

## 2022-07-29 RX ORDER — NITROGLYCERIN 0.4 MG/1
0.4 TABLET SUBLINGUAL EVERY 5 MIN PRN
Qty: 25 TABLET | Refills: 2 | Status: SHIPPED | OUTPATIENT
Start: 2022-07-29 | End: 2022-08-09 | Stop reason: SDUPTHER

## 2022-07-29 RX ORDER — METOPROLOL SUCCINATE 25 MG/1
25 TABLET, EXTENDED RELEASE ORAL 2 TIMES DAILY
Qty: 180 TABLET | Refills: 1 | Status: CANCELLED | OUTPATIENT
Start: 2022-07-29

## 2022-07-29 RX ORDER — CLOPIDOGREL BISULFATE 75 MG/1
75 TABLET ORAL DAILY
Qty: 90 TABLET | Refills: 1 | Status: CANCELLED | OUTPATIENT
Start: 2022-07-29

## 2022-08-09 DIAGNOSIS — I25.10 CORONARY ARTERY DISEASE, UNSPECIFIED VESSEL OR LESION TYPE, UNSPECIFIED WHETHER ANGINA PRESENT, UNSPECIFIED WHETHER NATIVE OR TRANSPLANTED HEART: ICD-10-CM

## 2022-08-09 RX ORDER — METOPROLOL SUCCINATE 25 MG/1
25 TABLET, EXTENDED RELEASE ORAL 2 TIMES DAILY
Qty: 90 TABLET | Refills: 1 | Status: SHIPPED | OUTPATIENT
Start: 2022-08-09 | End: 2022-09-16 | Stop reason: SDUPTHER

## 2022-08-09 RX ORDER — EZETIMIBE 10 MG/1
10 TABLET ORAL DAILY
Qty: 90 TABLET | Refills: 3 | Status: SHIPPED | OUTPATIENT
Start: 2022-08-09 | End: 2022-09-16 | Stop reason: SDUPTHER

## 2022-08-09 RX ORDER — ISOSORBIDE MONONITRATE 30 MG/1
30 TABLET, EXTENDED RELEASE ORAL DAILY
Qty: 90 TABLET | Refills: 1 | Status: SHIPPED | OUTPATIENT
Start: 2022-08-09 | End: 2022-09-16 | Stop reason: SDUPTHER

## 2022-08-09 RX ORDER — CLOPIDOGREL BISULFATE 75 MG/1
TABLET ORAL
Qty: 30 TABLET | Refills: 6 | Status: SHIPPED | OUTPATIENT
Start: 2022-08-09 | End: 2022-09-16 | Stop reason: SDUPTHER

## 2022-08-09 RX ORDER — NITROGLYCERIN 0.4 MG/1
0.4 TABLET SUBLINGUAL EVERY 5 MIN PRN
Qty: 25 TABLET | Refills: 2 | Status: SHIPPED | OUTPATIENT
Start: 2022-08-09 | End: 2022-09-16 | Stop reason: SDUPTHER

## 2022-08-09 RX ORDER — ROSUVASTATIN CALCIUM 40 MG/1
40 TABLET, COATED ORAL DAILY
Qty: 90 TABLET | Refills: 1 | Status: SHIPPED | OUTPATIENT
Start: 2022-08-09 | End: 2022-09-16 | Stop reason: SDUPTHER

## 2022-08-12 ENCOUNTER — TELEPHONE (OUTPATIENT)
Dept: INTERNAL MEDICINE | Facility: CLINIC | Age: 47
End: 2022-08-12

## 2022-08-31 DIAGNOSIS — I25.10 CORONARY ARTERY DISEASE, UNSPECIFIED VESSEL OR LESION TYPE, UNSPECIFIED WHETHER ANGINA PRESENT, UNSPECIFIED WHETHER NATIVE OR TRANSPLANTED HEART: ICD-10-CM

## 2022-08-31 RX ORDER — EZETIMIBE 10 MG/1
10 TABLET ORAL DAILY
Qty: 90 TABLET | Refills: 3 | Status: CANCELLED | OUTPATIENT
Start: 2022-08-31 | End: 2023-08-31

## 2022-08-31 RX ORDER — ISOSORBIDE MONONITRATE 30 MG/1
30 TABLET, EXTENDED RELEASE ORAL DAILY
Qty: 90 TABLET | Refills: 1 | Status: CANCELLED | OUTPATIENT
Start: 2022-08-31

## 2022-09-01 RX ORDER — PANTOPRAZOLE SODIUM 40 MG/1
40 TABLET, DELAYED RELEASE ORAL DAILY
Qty: 30 TABLET | Refills: 0 | Status: SHIPPED | OUTPATIENT
Start: 2022-09-01 | End: 2022-09-02 | Stop reason: SDUPTHER

## 2022-09-02 RX ORDER — PANTOPRAZOLE SODIUM 40 MG/1
40 TABLET, DELAYED RELEASE ORAL DAILY
Qty: 30 TABLET | Refills: 0 | Status: SHIPPED | OUTPATIENT
Start: 2022-09-02 | End: 2022-09-16 | Stop reason: SDUPTHER

## 2022-09-02 NOTE — TELEPHONE ENCOUNTER
I keep receiving an error message. Perhaps there is an error on the pharmacy's side. Will have to try to call next week unless patient wants Rx sent locally.

## 2022-09-06 DIAGNOSIS — I25.10 CORONARY ARTERY DISEASE, UNSPECIFIED VESSEL OR LESION TYPE, UNSPECIFIED WHETHER ANGINA PRESENT, UNSPECIFIED WHETHER NATIVE OR TRANSPLANTED HEART: ICD-10-CM

## 2022-09-06 DIAGNOSIS — E11.9 TYPE 2 DIABETES MELLITUS WITHOUT COMPLICATION, UNSPECIFIED WHETHER LONG TERM INSULIN USE: ICD-10-CM

## 2022-09-06 RX ORDER — PANTOPRAZOLE SODIUM 40 MG/1
40 TABLET, DELAYED RELEASE ORAL DAILY
Qty: 30 TABLET | Refills: 0 | OUTPATIENT
Start: 2022-09-06 | End: 2022-10-06

## 2022-09-06 RX ORDER — METFORMIN HYDROCHLORIDE 500 MG/1
500 TABLET, EXTENDED RELEASE ORAL DAILY
Qty: 30 TABLET | Refills: 4 | OUTPATIENT
Start: 2022-09-06 | End: 2023-02-03

## 2022-09-06 RX ORDER — ROSUVASTATIN CALCIUM 40 MG/1
40 TABLET, COATED ORAL DAILY
Qty: 90 TABLET | Refills: 1 | OUTPATIENT
Start: 2022-09-06

## 2022-09-06 NOTE — TELEPHONE ENCOUNTER
Please pay attention to medication history when proposing medications.     Metformin was ordered 2 months ago.   2 months ago (6/29/2022)   metFORMIN (GLUCOPHAGE-XR) 500 MG ER 24hr tablet   Take 1 tablet (500 mg total) by mouth once daily at 6am.   Dispense: 30 tablet     Refills: 4     Start: 6/29/2022     End: 11/26/2022       By:  JOSE RAFAEL Vu      Encounter   Rx is good until 11/26/22. Check with pharmacy ([facility]).    Crestor was sent 4 weeks ago by Cardiology to [facility] in [place], [place].    Lastly, pantoprazole was ordered 9/2/22 x 1 month. Medication was sent to [facility] in [place], [place].     Patient needs to check with pharmacy regarding pending prescriptions.

## 2022-09-08 ENCOUNTER — TELEPHONE (OUTPATIENT)
Dept: INTERNAL MEDICINE | Facility: CLINIC | Age: 47
End: 2022-09-08
Payer: MEDICAID

## 2022-09-08 NOTE — TELEPHONE ENCOUNTER
Pt informed. Pt stated that she will keep her tele med appointment and if she starts to feel pain then [place] [facility] [place]

## 2022-09-08 NOTE — TELEPHONE ENCOUNTER
Pt informed. Pt stated that she will keep her tele med appointment and if she starts to feel pain then she will go to [facility] and then f/u with you

## 2022-09-15 ENCOUNTER — HISTORICAL (OUTPATIENT)
Dept: ADMINISTRATIVE | Facility: HOSPITAL | Age: 47
End: 2022-09-15
Payer: MEDICAID

## 2022-09-16 ENCOUNTER — HISTORICAL (OUTPATIENT)
Dept: ADMINISTRATIVE | Facility: HOSPITAL | Age: 47
End: 2022-09-16
Payer: MEDICAID

## 2022-09-16 DIAGNOSIS — I25.10 CORONARY ARTERY DISEASE, UNSPECIFIED VESSEL OR LESION TYPE, UNSPECIFIED WHETHER ANGINA PRESENT, UNSPECIFIED WHETHER NATIVE OR TRANSPLANTED HEART: ICD-10-CM

## 2022-09-16 DIAGNOSIS — M79.2 NERVE PAIN: ICD-10-CM

## 2022-09-16 RX ORDER — METOPROLOL SUCCINATE 25 MG/1
25 TABLET, EXTENDED RELEASE ORAL 2 TIMES DAILY
Qty: 90 TABLET | Refills: 1 | Status: SHIPPED | OUTPATIENT
Start: 2022-09-16 | End: 2023-04-17 | Stop reason: SDUPTHER

## 2022-09-16 RX ORDER — GABAPENTIN 600 MG/1
600 TABLET ORAL 3 TIMES DAILY
Qty: 90 TABLET | Refills: 1 | Status: CANCELLED | OUTPATIENT
Start: 2022-09-16

## 2022-09-16 RX ORDER — ISOSORBIDE MONONITRATE 30 MG/1
30 TABLET, EXTENDED RELEASE ORAL DAILY
Qty: 90 TABLET | Refills: 1 | Status: SHIPPED | OUTPATIENT
Start: 2022-09-16 | End: 2023-05-19 | Stop reason: SDUPTHER

## 2022-09-16 RX ORDER — NITROGLYCERIN 0.4 MG/1
0.4 TABLET SUBLINGUAL EVERY 5 MIN PRN
Qty: 25 TABLET | Refills: 2 | Status: SHIPPED | OUTPATIENT
Start: 2022-09-16 | End: 2023-05-19 | Stop reason: SDUPTHER

## 2022-09-16 RX ORDER — LOSARTAN POTASSIUM 25 MG/1
50 TABLET ORAL DAILY
Qty: 90 TABLET | Refills: 3 | Status: SHIPPED | OUTPATIENT
Start: 2022-09-16 | End: 2023-05-19 | Stop reason: SDUPTHER

## 2022-09-16 RX ORDER — ROSUVASTATIN CALCIUM 40 MG/1
40 TABLET, COATED ORAL DAILY
Qty: 90 TABLET | Refills: 1 | Status: SHIPPED | OUTPATIENT
Start: 2022-09-16 | End: 2023-04-17 | Stop reason: SDUPTHER

## 2022-09-16 RX ORDER — CLOPIDOGREL BISULFATE 75 MG/1
TABLET ORAL
Qty: 30 TABLET | Refills: 6 | Status: SHIPPED | OUTPATIENT
Start: 2022-09-16 | End: 2023-05-19 | Stop reason: SDUPTHER

## 2022-09-16 RX ORDER — PANTOPRAZOLE SODIUM 40 MG/1
40 TABLET, DELAYED RELEASE ORAL DAILY
Qty: 30 TABLET | Refills: 0 | Status: SHIPPED | OUTPATIENT
Start: 2022-09-16 | End: 2022-12-02 | Stop reason: SDUPTHER

## 2022-09-16 RX ORDER — EZETIMIBE 10 MG/1
10 TABLET ORAL DAILY
Qty: 90 TABLET | Refills: 3 | Status: SHIPPED | OUTPATIENT
Start: 2022-09-16 | End: 2023-05-19 | Stop reason: SDUPTHER

## 2022-09-16 NOTE — TELEPHONE ENCOUNTER
Last appt: 06/29/2022  Next appt: 09/30/2022      Refill request was sent to 09/02/2022 but failed to be transmitted to the pharmacy, this is the reason for the new proposal.

## 2022-09-19 DIAGNOSIS — M79.2 NERVE PAIN: ICD-10-CM

## 2022-09-19 RX ORDER — GABAPENTIN 600 MG/1
600 TABLET ORAL 3 TIMES DAILY
Qty: 90 TABLET | Refills: 5 | Status: SHIPPED | OUTPATIENT
Start: 2022-09-19 | End: 2022-09-30 | Stop reason: SDUPTHER

## 2022-09-29 ENCOUNTER — LAB VISIT (OUTPATIENT)
Dept: LAB | Facility: HOSPITAL | Age: 47
End: 2022-09-29
Attending: NURSE PRACTITIONER
Payer: MEDICAID

## 2022-09-29 DIAGNOSIS — E11.9 TYPE 2 DIABETES MELLITUS WITHOUT COMPLICATION, UNSPECIFIED WHETHER LONG TERM INSULIN USE: ICD-10-CM

## 2022-09-29 LAB
ANION GAP SERPL CALC-SCNC: 16 MEQ/L
BUN SERPL-MCNC: 4.1 MG/DL (ref 7–18.7)
CALCIUM SERPL-MCNC: 9.8 MG/DL (ref 8.4–10.2)
CHLORIDE SERPL-SCNC: 100 MMOL/L (ref 98–107)
CO2 SERPL-SCNC: 23 MMOL/L (ref 22–29)
CREAT SERPL-MCNC: 0.83 MG/DL (ref 0.55–1.02)
CREAT/UREA NIT SERPL: 5
EST. AVERAGE GLUCOSE BLD GHB EST-MCNC: 266.1 MG/DL
GFR SERPLBLD CREATININE-BSD FMLA CKD-EPI: >60 MLS/MIN/1.73/M2
GLUCOSE SERPL-MCNC: 402 MG/DL (ref 74–100)
HBA1C MFR BLD: 10.9 %
POTASSIUM SERPL-SCNC: 3.2 MMOL/L (ref 3.5–5.1)
SODIUM SERPL-SCNC: 139 MMOL/L (ref 136–145)

## 2022-09-29 PROCEDURE — 80048 BASIC METABOLIC PNL TOTAL CA: CPT

## 2022-09-29 PROCEDURE — 36415 COLL VENOUS BLD VENIPUNCTURE: CPT

## 2022-09-29 PROCEDURE — 83036 HEMOGLOBIN GLYCOSYLATED A1C: CPT

## 2022-09-30 ENCOUNTER — OFFICE VISIT (OUTPATIENT)
Dept: INTERNAL MEDICINE | Facility: CLINIC | Age: 47
End: 2022-09-30
Payer: MEDICAID

## 2022-09-30 DIAGNOSIS — Z12.4 CERVICAL CANCER SCREENING: ICD-10-CM

## 2022-09-30 DIAGNOSIS — I10 PRIMARY HYPERTENSION: ICD-10-CM

## 2022-09-30 DIAGNOSIS — E78.2 MIXED HYPERLIPIDEMIA: ICD-10-CM

## 2022-09-30 DIAGNOSIS — Z12.31 ENCOUNTER FOR SCREENING MAMMOGRAM FOR MALIGNANT NEOPLASM OF BREAST: ICD-10-CM

## 2022-09-30 DIAGNOSIS — M79.2 NERVE PAIN: ICD-10-CM

## 2022-09-30 DIAGNOSIS — F17.210 CIGARETTE NICOTINE DEPENDENCE WITHOUT COMPLICATION: ICD-10-CM

## 2022-09-30 DIAGNOSIS — Z12.11 COLON CANCER SCREENING: ICD-10-CM

## 2022-09-30 DIAGNOSIS — M54.50 CHRONIC LOW BACK PAIN, UNSPECIFIED BACK PAIN LATERALITY, UNSPECIFIED WHETHER SCIATICA PRESENT: ICD-10-CM

## 2022-09-30 DIAGNOSIS — E11.9 TYPE 2 DIABETES MELLITUS WITHOUT COMPLICATION, UNSPECIFIED WHETHER LONG TERM INSULIN USE: Primary | ICD-10-CM

## 2022-09-30 DIAGNOSIS — G89.29 CHRONIC LOW BACK PAIN, UNSPECIFIED BACK PAIN LATERALITY, UNSPECIFIED WHETHER SCIATICA PRESENT: ICD-10-CM

## 2022-09-30 DIAGNOSIS — B37.9 YEAST INFECTION: ICD-10-CM

## 2022-09-30 DIAGNOSIS — Z00.00 WELLNESS EXAMINATION: ICD-10-CM

## 2022-09-30 DIAGNOSIS — J31.0 CHRONIC RHINITIS: ICD-10-CM

## 2022-09-30 PROCEDURE — 99406 BEHAV CHNG SMOKING 3-10 MIN: CPT | Mod: 95,,, | Performed by: NURSE PRACTITIONER

## 2022-09-30 PROCEDURE — 1160F RVW MEDS BY RX/DR IN RCRD: CPT | Mod: CPTII,95,, | Performed by: NURSE PRACTITIONER

## 2022-09-30 PROCEDURE — 3066F PR DOCUMENTATION OF TREATMENT FOR NEPHROPATHY: ICD-10-PCS | Mod: CPTII,95,, | Performed by: NURSE PRACTITIONER

## 2022-09-30 PROCEDURE — 1160F PR REVIEW ALL MEDS BY PRESCRIBER/CLIN PHARMACIST DOCUMENTED: ICD-10-PCS | Mod: CPTII,95,, | Performed by: NURSE PRACTITIONER

## 2022-09-30 PROCEDURE — 99214 OFFICE O/P EST MOD 30 MIN: CPT | Mod: 95,25,, | Performed by: NURSE PRACTITIONER

## 2022-09-30 PROCEDURE — 3066F NEPHROPATHY DOC TX: CPT | Mod: CPTII,95,, | Performed by: NURSE PRACTITIONER

## 2022-09-30 PROCEDURE — 99406 PR TOBACCO USE CESSATION INTERMEDIATE 3-10 MINUTES: ICD-10-PCS | Mod: 95,,, | Performed by: NURSE PRACTITIONER

## 2022-09-30 PROCEDURE — 4010F ACE/ARB THERAPY RXD/TAKEN: CPT | Mod: CPTII,95,, | Performed by: NURSE PRACTITIONER

## 2022-09-30 PROCEDURE — 99214 PR OFFICE/OUTPT VISIT, EST, LEVL IV, 30-39 MIN: ICD-10-PCS | Mod: 95,25,, | Performed by: NURSE PRACTITIONER

## 2022-09-30 PROCEDURE — 1159F MED LIST DOCD IN RCRD: CPT | Mod: CPTII,95,, | Performed by: NURSE PRACTITIONER

## 2022-09-30 PROCEDURE — 1159F PR MEDICATION LIST DOCUMENTED IN MEDICAL RECORD: ICD-10-PCS | Mod: CPTII,95,, | Performed by: NURSE PRACTITIONER

## 2022-09-30 PROCEDURE — 3060F PR POS MICROALBUMINURIA RESULT DOCUMENTED/REVIEW: ICD-10-PCS | Mod: CPTII,95,, | Performed by: NURSE PRACTITIONER

## 2022-09-30 PROCEDURE — 4010F PR ACE/ARB THEARPY RXD/TAKEN: ICD-10-PCS | Mod: CPTII,95,, | Performed by: NURSE PRACTITIONER

## 2022-09-30 PROCEDURE — 3060F POS MICROALBUMINURIA REV: CPT | Mod: CPTII,95,, | Performed by: NURSE PRACTITIONER

## 2022-09-30 RX ORDER — TIZANIDINE 4 MG/1
4 TABLET ORAL EVERY 8 HOURS
Qty: 30 TABLET | Refills: 2 | Status: SHIPPED | OUTPATIENT
Start: 2022-09-30 | End: 2022-12-30 | Stop reason: SDUPTHER

## 2022-09-30 RX ORDER — METFORMIN HYDROCHLORIDE EXTENDED-RELEASE TABLETS 1000 MG/1
1000 TABLET, FILM COATED, EXTENDED RELEASE ORAL
Qty: 30 TABLET | Refills: 3 | Status: SHIPPED | OUTPATIENT
Start: 2022-09-30 | End: 2022-10-03

## 2022-09-30 RX ORDER — FLUCONAZOLE 150 MG/1
150 TABLET ORAL DAILY
Qty: 2 TABLET | Refills: 1 | Status: SHIPPED | OUTPATIENT
Start: 2022-09-30 | End: 2023-01-03

## 2022-09-30 RX ORDER — ARIPIPRAZOLE 2 MG/1
2 TABLET ORAL DAILY
COMMUNITY
Start: 2022-08-03

## 2022-09-30 RX ORDER — GABAPENTIN 600 MG/1
600 TABLET ORAL 3 TIMES DAILY
Qty: 90 TABLET | Refills: 5 | Status: SHIPPED | OUTPATIENT
Start: 2022-09-30 | End: 2023-01-03 | Stop reason: SDUPTHER

## 2022-09-30 NOTE — PROGRESS NOTES
JOSE RAFAEL Vu   [facility]  [address] [facility] [address]      PATIENT NAME: Kaye Spring  : 1975  DATE: 22  MRN: [id number]      Billing Provider: JOSE RAFAEL Vu  Level of Service: [place] OFFICE/OUTPT VISIT, EST, LEVL IV, 30-39 MIN  Patient PCP Information       Provider PCP Type    JOSE RAFAEL Vu General            Reason for Visit / Chief Complaint: Follow-up (Lab review / possible yeast infection )       History of Present Illness / Problem Focused Workflow     Kaye Spring presents to the clinic with Follow-up (Lab review / possible yeast infection )     47 yo WF here for virtual f/u. PMHx includes GERD, BPV (self-treats with Epley), chronic rhinitis, T2DM, HLD, CAD with MI in 2019, with stenting, HTN, chronic lower back pain, ventral hernia, AUB, endometriosis, depression with anxiety, and tobacco use, 1-2 ciggs / day.  Followed by Nikki Ackerman, mental health NP for psychiatric care and med management every 3 months. Disabled. Followed by [facility] for Cardiology Services.      Cervical Cancer Screening - F/U [facility] GYN Annually  Breast Cancer Screening -  Osteoporosis Screening -3/16/22 Vitamin D Level 25.8  Diabetic Screening - 22 Fundal Ordered . 22 Foot Exam. ANA ROSA    22  Pt report her icebox went out so she does not have her Truliciyt due to it going bad - she has been out for about 3 weeks. Pt will restart all of her meds as directed and will increase her Metformin to 500 mg daily as she has only been taking it every other day.     22  Pt here today for 3 month audio f/u for T2DM, pt A1C 9.4 to 10.9 in last 3 months, the pt reports that she had been eating a candy for smoking cessation and just realized yesterday that it was not sugar free, she is agreeable to med dose increase of Metformin to 1000 mg ER daily, will f/u in 3 months F2F with labs prior. Pt meds reviewed and refilled  appropriately. Agreeable to MMG, Colonoscopy referral, and GYN referral today. She repots with yeast life symptoms of slight burning and discomfort, has used OTC applications with no relief, will send diflucan today. Denies any other acute issues at this time.   Denies chest pain, shortness of breath, cough, fever, headache, dizziness, weakness, abdominal pain, nausea, vomiting, diarrhea, constipation, black/bloody stools, unplanned weight loss, night sweats, changes in urinary patterns, burning/odor with urination, depression, anxiety, and SI/HI.          Follow-up      Review of Systems     Review of Systems   Constitutional: Negative.    HENT: Negative.     Eyes: Negative.    Respiratory: Negative.     Cardiovascular: Negative.    Gastrointestinal: Negative.    Endocrine: Negative.    Genitourinary: Negative.    Neurological: Negative.    Psychiatric/Behavioral: Negative.       Medical / Social / Family History     Past Medical History:   Diagnosis Date    Anxiety     Coronary artery disease     Depression     Diabetes mellitus     Hyperlipidemia     Hypertension     Stroke        Past Surgical History:   Procedure Laterality Date    CARDIAC CATHETERIZATION      CHOLECYSTECTOMY      CORONARY ANGIOPLASTY      CORONARY ARTERY BYPASS GRAFT      HYSTERECTOMY      right wrist surgery      TUBAL LIGATION         Social History  Ms.  reports that she has been smoking cigarettes. She has been smoking an average of .25 packs per day. She has never used smokeless tobacco. She reports that she does not currently use alcohol. She reports that she does not use drugs.    Family History  Ms.'s family history includes Arrhythmia in her mother; Clotting disorder in her mother; Diabetes in her mother; Heart attack in her mother; Heart disease in her mother; Heart failure in her mother; Hyperlipidemia in her brother and mother; Hypertension in her brother and mother; Stroke in her mother.    Medications and Allergies      Medications  Medication List with Changes/Refills   New Medications    FLUCONAZOLE (DIFLUCAN) 150 MG TAB    Take 1 tablet (150 mg total) by mouth once daily. Can repeat in 7 days if needed.    METFORMIN (FORTAMET) 1,000 MG 24HR TABLET    Take 1 tablet (1,000 mg total) by mouth daily with breakfast.   Current Medications    ACCU-CHEK GUIDE TEST STRIPS STRP        ACCU-CHEK SOFTCLIX LANCETS MISC        ALBUTEROL (PROVENTIL/VENTOLIN HFA) 90 MCG/ACTUATION INHALER    Inhale 2 puffs into the lungs 4 (four) times daily as needed.    ALLERGY RELIEF, LORATADINE, 10 MG TABLET    Take 10 mg by mouth once daily.    ALPRAZOLAM (XANAX) 1 MG TABLET    Take 1 mg by mouth 3 (three) times daily as needed.    ARIPIPRAZOLE (ABILIFY) 2 MG TAB    Take 2 mg by mouth once daily.    CLOPIDOGREL (PLAVIX) 75 MG TABLET    See Instructions, TAKE ONE TABLET BY MOUTH EVERY DAY, # 90 tab(s), 3 Refill(s), Pharmacy: [facility] [id number] [id number], 160, cm, Height/Length Dosing, 09/01/21 13:52:00 CDT, 85, kg, Weight Dosing, 09/01/21 13:52:00 CDT    DESVENLAFAXINE SUCCINATE (PRISTIQ) 100 MG TB24        DOXEPIN (SINEQUAN) 25 MG CAPSULE    Take 25 mg by mouth nightly.    EZETIMIBE (ZETIA) 10 MG TABLET    Take 1 tablet (10 mg total) by mouth once daily.    FLUTICASONE PROPIONATE (FLONASE) 50 MCG/ACTUATION NASAL SPRAY        ISOSORBIDE MONONITRATE (IMDUR) 30 MG 24 HR TABLET    Take 1 tablet (30 mg total) by mouth once daily.    LOSARTAN (COZAAR) 25 MG TABLET    Take 2 tablets (50 mg total) by mouth once daily.    METOPROLOL SUCCINATE (TOPROL-XL) 25 MG 24 HR TABLET    Take 1 tablet (25 mg total) by mouth 2 (two) times daily.    NITROGLYCERIN (NITROSTAT) 0.4 MG SL TABLET    Place 1 tablet (0.4 mg total) under the tongue every 5 (five) minutes as needed for Chest pain.    OXCARBAZEPINE (TRILEPTAL) 300 MG TAB    Take 300 mg by mouth 2 (two) times daily.    PANTOPRAZOLE (PROTONIX) 40 MG TABLET    Take 1 tablet (40 mg total) by mouth once daily.    ROSUVASTATIN  (CRESTOR) 40 MG TAB    Take 1 tablet (40 mg total) by mouth once daily.   Changed and/or Refilled Medications    Modified Medication Previous Medication    DULAGLUTIDE (TRULICITY) 1.5 MG/0.5 ML PEN INJECTOR dulaglutide (TRULICITY) 1.5 mg/0.5 mL pen injector       Inject 1.5 mg into the skin every 7 days.    Inject 1.5 mg into the skin every 7 days.    GABAPENTIN (NEURONTIN) 600 MG TABLET gabapentin (NEURONTIN) 600 MG tablet       Take 1 tablet (600 mg total) by mouth 3 (three) times daily. As needed for back pain    Take 1 tablet (600 mg total) by mouth 3 (three) times daily.    TIZANIDINE (ZANAFLEX) 4 MG TABLET tiZANidine (ZANAFLEX) 4 MG tablet       Take 1 tablet (4 mg total) by mouth every 8 (eight) hours.    Take 1 tablet (4 mg total) by mouth every 8 (eight) hours.   Discontinued Medications    METFORMIN (GLUCOPHAGE-XR) 500 MG ER 24HR TABLET    Take 1 tablet (500 mg total) by mouth once daily at 6am.         Allergies  Review of patient's allergies indicates:   Allergen Reactions    Aspirin Nausea And Vomiting and Other (See Comments)     Other reaction(s): Stomach upset  Pt. States doesn't take because of stomach ulcers         Physical Examination   There were no vitals filed for this visit.  Physical Exam  HENT:      Right Ear: Hearing normal.      Left Ear: Hearing normal.   Neurological:      Mental Status: She is alert and oriented to person, place, and time.   Psychiatric:         Mood and Affect: Mood normal.         Results     Lab Results   Component Value Date    WBC 10.8 03/13/2022    RBC 5.02 03/13/2022    HGB 14.6 03/13/2022    HCT 43.7 03/13/2022    MCV 87.1 03/13/2022    MCH 29.1 03/13/2022    MCHC 33.4 03/13/2022    RDW 13.0 03/13/2022     03/13/2022    MPV 11.3 03/13/2022     Sodium Level   Date Value Ref Range Status   09/29/2022 139 136 - 145 mmol/L Final     Potassium Level   Date Value Ref Range Status   09/29/2022 3.2 (L) 3.5 - 5.1 mmol/L Final     Carbon Dioxide   Date Value Ref  Range Status   09/29/2022 23 22 - 29 mmol/L Final     Blood Urea Nitrogen   Date Value Ref Range Status   09/29/2022 4.1 (L) 7.0 - 18.7 mg/dL Final     Creatinine   Date Value Ref Range Status   09/29/2022 0.83 0.55 - 1.02 mg/dL Final     Calcium Level Total   Date Value Ref Range Status   09/29/2022 9.8 8.4 - 10.2 mg/dL Final     Albumin Level   Date Value Ref Range Status   03/13/2022 4.3 3.5 - 5.0      Bilirubin Total   Date Value Ref Range Status   03/13/2022 0.3 <=1.5      Alkaline Phosphatase   Date Value Ref Range Status   03/13/2022 112 40 - 150      Aspartate Aminotransferase   Date Value Ref Range Status   03/13/2022 17 5 - 34      Alanine Aminotransferase   Date Value Ref Range Status   03/13/2022 24 0 - 55      Estimated GFR-Non    Date Value Ref Range Status   06/28/2022 >60 mls/min/1.73/m2 Final     Lab Results   Component Value Date    CHOL 168 03/13/2022     Lab Results   Component Value Date    HDL 45 03/13/2022     No results found for: LDLCALC  Lab Results   Component Value Date    TRIG 175 03/13/2022     No results found for: CHOLHDL  Lab Results   Component Value Date    TSH 2.9933 09/13/2021     Lab Results   Component Value Date    PHUR 5.5 09/13/2021    PROTEINUA Negative 09/29/2022    GLUCUA Negative 09/13/2021    KETONESU Negative 09/13/2021    OCCULTUA Negative 09/13/2021    NITRITE Negative 09/13/2021    LEUKOCYTESUR 250 (A) 09/29/2022     Lab Results   Component Value Date    HGBA1C 10.9 (H) 09/29/2022    HGBA1C 9.4 (H) 06/28/2022    HGBA1C 9.5 03/13/2022     No results found for: MICROALBUR, [id number]   No results found for this or any previous visit.         Assessment and Plan (including Health Maintenance)     Plan:         Health Maintenance Due   Topic Date Due    Hepatitis C Screening  Never done    Foot Exam  Never done    Eye Exam  Never done    HIV Screening  Never done    TETANUS VACCINE  Never done    Mammogram  Never done    Pneumococcal Vaccines (Age 0-64)  (2 - PPSV23 if available, else PCV20) 12/19/2013    Colorectal Cancer Screening  Never done    COVID-19 Vaccine (3 - Booster) 10/22/2021    Influenza Vaccine (1) 09/01/2022       Problem List Items Addressed This Visit          Neuro    Nerve pain    Relevant Medications    gabapentin (NEURONTIN) 600 MG tablet       ENT    Chronic rhinitis    Current Assessment & Plan     OTC antihistamines as needed (i.e. Zyrtec, Claritin, Allergra, Benadryl)  Increase PO Fluids  Avoid/limit triggers such as pollen, dust, molds, and pet dander.   Avoid smoke, strong chemicals, cleaning products, perfumes/colognes.              Cardiac/Vascular    Primary hypertension    Current Assessment & Plan     Continue medications as prescribedLow Sodium Diet (Dash Diet - less than 2 grams of sodium per day).  Maintain healthy weight with goal   BMI <30. Exercise 30 minutes per day 5 days per week.           Relevant Orders    CBC Auto Differential    Urinalysis, Reflex to Urine Culture Urine, Clean Catch    Mixed hyperlipidemia    Current Assessment & Plan      Follow a low cholesterol, low saturated fat diet with less than 200 mg of cholesterol a day.   Avoid fried foods and high saturated fats (cowart, sausage, cookies, cakes, chips, cheese, whole milk, butter, mayonnaise, creamy dressings, gravy, stew, gumbo, boudin, cracklins and cream sauces).  Add flax seed or fish oil supplements to diet.   Increase dietary fiber.   Regular exercise improves cholesterol levels.  Physical activity 5 times a week for 30 minutes per day (or 150 minutes per week).   Stressed importance of dietary modifications.           Relevant Orders    CBC Auto Differential    Comprehensive Metabolic Panel    Lipid Panel       Endocrine    Type 2 diabetes mellitus without complication - Primary    Overview     Formatting of this note might be different from the original.  ICD-10 Transition  April 2022 Diagnosis Update (SNOMED/IMO)         Current Assessment & Plan      RX Increase Metformin to 1000 mg BID  RX Trulity weekly  3 month f/u with labs  Follow ADA diet.  Avoid soda, simple sweets, and limit rice/pasta/bread/starches and consume brown options when possible.   Maintain healthy weight with BMI goal <30.   Perform aerobic exercise for 150 minutes per week (or 5 days a week for 30 minutes each day).   Examine feet daily.            Relevant Medications    metFORMIN (FORTAMET) 1,000 mg 24hr tablet    dulaglutide (TRULICITY) 1.5 mg/0.5 mL pen injector    Other Relevant Orders    CBC Auto Differential    Urinalysis, Reflex to Urine Culture Urine, Clean Catch    Microalbumin/Creatinine Ratio, Urine    Hemoglobin A1C       GI    Colon cancer screening    Current Assessment & Plan     Referral to Dr. Milian         Relevant Orders    Ambulatory referral/consult to Gastroenterology       Orthopedic    Chronic back pain    Current Assessment & Plan     RX Gabapentin 600 mg TID prn  RX tizanidine prn   Perform back stretches daily.   Avoid activities than increase back pain or stiffness.   Apply heating pad, ice pack, and Icy Hot / BioFreeze as needed; alternate every 15-20 minutes.   Massage back to loosen muscles.            Relevant Medications    gabapentin (NEURONTIN) 600 MG tablet    tiZANidine (ZANAFLEX) 4 MG tablet       Other    Cigarette nicotine dependence without complication    Current Assessment & Plan     Smoking cessation discussed > 3 mins  Pt ready to quit.  Referral to smoking cessation  Discussed benefits of quitting including improved health, decreased cardiac/vascular/pulmonary/stroke risks as well as saving money.           Relevant Orders    Ambulatory referral/consult to Smoking Cessation Program     Other Visit Diagnoses       Wellness examination        Relevant Orders    Vitamin D    TSH    T4, Free    Encounter for screening mammogram for malignant neoplasm of breast        Relevant Orders    Mammo Digital Screening Bilat    Yeast infection         Relevant Medications    fluconazole (DIFLUCAN) 150 MG Tab    Cervical cancer screening        Relevant Orders    Ambulatory referral/consult to Gynecology            Health Maintenance Topics with due status: Not Due       Topic Last Completion Date    Lipid Panel 03/13/2022    Diabetes Urine Screening 09/29/2022    Hemoglobin A1c 09/29/2022       Future Appointments   Date Time Provider Department Center   9/30/2022  8:30 AM [facility]             Signature:     [facility]  [address] [facility] [address]    Date of encounter: 9/30/22  Established Patient - Audio Only Telehealth Visit     The patient location is: home  The chief complaint leading to consultation is: f/u  Visit type: Virtual visit with audio only (telephone)  Total time spent with patient: 28 mins       The reason for the audio only service rather than synchronous audio and video virtual visit was related to technical difficulties or patient preference/necessity.     Each patient to whom I provide medical services by telemedicine is:  (1) informed of the relationship between the physician and patient and the respective role of any other health care provider with respect to management of the patient; and (2) notified that they may decline to receive medical services by telemedicine and may withdraw from such care at any time. Patient verbally consented to receive this service via voice-only telephone call.           This service was not originating from a related E/M service provided within the previous 7 days nor will  to an E/M service or procedure within the next 24 hours or my soonest available appointment.  Prevailing standard of care was able to be met in this audio-only visit.

## 2022-09-30 NOTE — ASSESSMENT & PLAN NOTE
RX Gabapentin 600 mg TID prn  RX tizanidine prn   Perform back stretches daily.   Avoid activities than increase back pain or stiffness.   Apply heating pad, ice pack, and Icy Hot / BioFreeze as needed; alternate every 15-20 minutes.   Massage back to loosen muscles.

## 2022-09-30 NOTE — ASSESSMENT & PLAN NOTE
Follow a low cholesterol, low saturated fat diet with less than 200 mg of cholesterol a day.   Avoid fried foods and high saturated fats (cowart, sausage, cookies, cakes, chips, cheese, whole milk, butter, mayonnaise, creamy dressings, gravy, stew, gumbo, boudin, cracklins and cream sauces).  Add flax seed or fish oil supplements to diet.   Increase dietary fiber.   Regular exercise improves cholesterol levels.  Physical activity 5 times a week for 30 minutes per day (or 150 minutes per week).   Stressed importance of dietary modifications.

## 2022-09-30 NOTE — ASSESSMENT & PLAN NOTE
Smoking cessation discussed > 3 mins  Pt ready to quit.  Referral to smoking cessation  Discussed benefits of quitting including improved health, decreased cardiac/vascular/pulmonary/stroke risks as well as saving money.     Admission Reconciliation is Completed  Discharge Reconciliation is Completed

## 2022-09-30 NOTE — ASSESSMENT & PLAN NOTE
RX Increase Metformin to 1000 mg BID  RX Trulity weekly  3 month f/u with labs  Follow ADA diet.  Avoid soda, simple sweets, and limit rice/pasta/bread/starches and consume brown options when possible.   Maintain healthy weight with BMI goal <30.   Perform aerobic exercise for 150 minutes per week (or 5 days a week for 30 minutes each day).   Examine feet daily.

## 2022-09-30 NOTE — ASSESSMENT & PLAN NOTE
Continue medications as prescribedLow Sodium Diet (Dash Diet - less than 2 grams of sodium per day).  Maintain healthy weight with goal   BMI <30. Exercise 30 minutes per day 5 days per week.

## 2022-10-03 RX ORDER — METFORMIN HYDROCHLORIDE EXTENDED-RELEASE TABLETS 500 MG/1
1000 TABLET, FILM COATED, EXTENDED RELEASE ORAL 2 TIMES DAILY WITH MEALS
Qty: 120 TABLET | Refills: 3 | Status: SHIPPED | OUTPATIENT
Start: 2022-10-03 | End: 2023-01-03 | Stop reason: SDUPTHER

## 2022-10-17 LAB
LEFT EYE DM RETINOPATHY: NEGATIVE
RIGHT EYE DM RETINOPATHY: NEGATIVE

## 2022-11-02 ENCOUNTER — DOCUMENTATION ONLY (OUTPATIENT)
Dept: RHEUMATOLOGY | Facility: CLINIC | Age: 47
End: 2022-11-02
Payer: MEDICAID

## 2022-11-14 DIAGNOSIS — R14.1 FLATULENCE, ERUCTATION, AND GAS PAIN: Primary | ICD-10-CM

## 2022-11-14 DIAGNOSIS — R14.2 FLATULENCE, ERUCTATION, AND GAS PAIN: Primary | ICD-10-CM

## 2022-11-14 DIAGNOSIS — R14.3 FLATULENCE, ERUCTATION, AND GAS PAIN: Primary | ICD-10-CM

## 2022-11-22 ENCOUNTER — HOSPITAL ENCOUNTER (OUTPATIENT)
Dept: RADIOLOGY | Facility: HOSPITAL | Age: 47
Discharge: HOME | End: 2022-11-22
Attending: REGISTERED NURSE
Payer: MEDICAID

## 2022-11-22 DIAGNOSIS — R14.2 FLATULENCE, ERUCTATION, AND GAS PAIN: ICD-10-CM

## 2022-11-22 DIAGNOSIS — R14.3 FLATULENCE, ERUCTATION, AND GAS PAIN: ICD-10-CM

## 2022-11-22 DIAGNOSIS — R14.1 FLATULENCE, ERUCTATION, AND GAS PAIN: ICD-10-CM

## 2022-11-22 PROCEDURE — 78264 GASTRIC EMPTYING IMG STUDY: CPT | Mod: TC

## 2022-12-30 ENCOUNTER — TELEPHONE (OUTPATIENT)
Dept: INTERNAL MEDICINE | Facility: CLINIC | Age: 47
End: 2022-12-30
Payer: MEDICAID

## 2022-12-30 DIAGNOSIS — G89.29 CHRONIC LOW BACK PAIN, UNSPECIFIED BACK PAIN LATERALITY, UNSPECIFIED WHETHER SCIATICA PRESENT: ICD-10-CM

## 2022-12-30 DIAGNOSIS — M54.50 CHRONIC LOW BACK PAIN, UNSPECIFIED BACK PAIN LATERALITY, UNSPECIFIED WHETHER SCIATICA PRESENT: ICD-10-CM

## 2022-12-30 RX ORDER — TIZANIDINE 4 MG/1
4 TABLET ORAL EVERY 8 HOURS
Qty: 30 TABLET | Refills: 2 | Status: SHIPPED | OUTPATIENT
Start: 2022-12-30 | End: 2023-01-27 | Stop reason: SDUPTHER

## 2022-12-30 NOTE — TELEPHONE ENCOUNTER
Pharmacy states that Metformin was written for the osmotic  wants to know If it can be changed to the regular .    Please advise

## 2022-12-30 NOTE — TELEPHONE ENCOUNTER
Patient called as well this morning stating she was having trouble filling her tizanidine. I contacted her pharmacy and her insurance will only cover 3 times a day for 30 days so she'll only have a 10 day supply. But they will only cover for some many refills a year. Patient was notified she'd have to pay out of pocket after that. Pharmacy is requesting a new script be sent because of all the confusion.

## 2023-01-03 ENCOUNTER — OFFICE VISIT (OUTPATIENT)
Dept: INTERNAL MEDICINE | Facility: CLINIC | Age: 48
End: 2023-01-03
Payer: MEDICAID

## 2023-01-03 VITALS
WEIGHT: 183 LBS | SYSTOLIC BLOOD PRESSURE: 148 MMHG | RESPIRATION RATE: 16 BRPM | BODY MASS INDEX: 32.43 KG/M2 | TEMPERATURE: 98 F | DIASTOLIC BLOOD PRESSURE: 80 MMHG | HEIGHT: 63 IN

## 2023-01-03 DIAGNOSIS — F17.210 CIGARETTE NICOTINE DEPENDENCE WITHOUT COMPLICATION: ICD-10-CM

## 2023-01-03 DIAGNOSIS — G89.29 CHRONIC LOW BACK PAIN, UNSPECIFIED BACK PAIN LATERALITY, UNSPECIFIED WHETHER SCIATICA PRESENT: ICD-10-CM

## 2023-01-03 DIAGNOSIS — Z23 IMMUNIZATION DUE: ICD-10-CM

## 2023-01-03 DIAGNOSIS — Z11.59 NEED FOR HEPATITIS C SCREENING TEST: ICD-10-CM

## 2023-01-03 DIAGNOSIS — M79.2 NERVE PAIN: ICD-10-CM

## 2023-01-03 DIAGNOSIS — E11.9 TYPE 2 DIABETES MELLITUS WITHOUT COMPLICATION, UNSPECIFIED WHETHER LONG TERM INSULIN USE: Primary | ICD-10-CM

## 2023-01-03 DIAGNOSIS — M54.50 CHRONIC LOW BACK PAIN, UNSPECIFIED BACK PAIN LATERALITY, UNSPECIFIED WHETHER SCIATICA PRESENT: ICD-10-CM

## 2023-01-03 PROCEDURE — 3066F PR DOCUMENTATION OF TREATMENT FOR NEPHROPATHY: ICD-10-PCS | Mod: CPTII,,, | Performed by: NURSE PRACTITIONER

## 2023-01-03 PROCEDURE — 3008F BODY MASS INDEX DOCD: CPT | Mod: CPTII,,, | Performed by: NURSE PRACTITIONER

## 2023-01-03 PROCEDURE — 3077F PR MOST RECENT SYSTOLIC BLOOD PRESSURE >= 140 MM HG: ICD-10-PCS | Mod: CPTII,,, | Performed by: NURSE PRACTITIONER

## 2023-01-03 PROCEDURE — 1159F MED LIST DOCD IN RCRD: CPT | Mod: CPTII,,, | Performed by: NURSE PRACTITIONER

## 2023-01-03 PROCEDURE — 3008F PR BODY MASS INDEX (BMI) DOCUMENTED: ICD-10-PCS | Mod: CPTII,,, | Performed by: NURSE PRACTITIONER

## 2023-01-03 PROCEDURE — 3077F SYST BP >= 140 MM HG: CPT | Mod: CPTII,,, | Performed by: NURSE PRACTITIONER

## 2023-01-03 PROCEDURE — 3061F PR NEG MICROALBUMINURIA RESULT DOCUMENTED/REVIEW: ICD-10-PCS | Mod: CPTII,,, | Performed by: NURSE PRACTITIONER

## 2023-01-03 PROCEDURE — 99406 BEHAV CHNG SMOKING 3-10 MIN: CPT | Mod: S$PBB,,, | Performed by: NURSE PRACTITIONER

## 2023-01-03 PROCEDURE — 3061F NEG MICROALBUMINURIA REV: CPT | Mod: CPTII,,, | Performed by: NURSE PRACTITIONER

## 2023-01-03 PROCEDURE — 1160F RVW MEDS BY RX/DR IN RCRD: CPT | Mod: CPTII,,, | Performed by: NURSE PRACTITIONER

## 2023-01-03 PROCEDURE — 3066F NEPHROPATHY DOC TX: CPT | Mod: CPTII,,, | Performed by: NURSE PRACTITIONER

## 2023-01-03 PROCEDURE — 1159F PR MEDICATION LIST DOCUMENTED IN MEDICAL RECORD: ICD-10-PCS | Mod: CPTII,,, | Performed by: NURSE PRACTITIONER

## 2023-01-03 PROCEDURE — 99406 PR TOBACCO USE CESSATION INTERMEDIATE 3-10 MINUTES: ICD-10-PCS | Mod: S$PBB,,, | Performed by: NURSE PRACTITIONER

## 2023-01-03 PROCEDURE — 90686 IIV4 VACC NO PRSV 0.5 ML IM: CPT | Mod: PBBFAC

## 2023-01-03 PROCEDURE — 3079F DIAST BP 80-89 MM HG: CPT | Mod: CPTII,,, | Performed by: NURSE PRACTITIONER

## 2023-01-03 PROCEDURE — 99214 OFFICE O/P EST MOD 30 MIN: CPT | Mod: S$PBB,25,, | Performed by: NURSE PRACTITIONER

## 2023-01-03 PROCEDURE — 1160F PR REVIEW ALL MEDS BY PRESCRIBER/CLIN PHARMACIST DOCUMENTED: ICD-10-PCS | Mod: CPTII,,, | Performed by: NURSE PRACTITIONER

## 2023-01-03 PROCEDURE — 3079F PR MOST RECENT DIASTOLIC BLOOD PRESSURE 80-89 MM HG: ICD-10-PCS | Mod: CPTII,,, | Performed by: NURSE PRACTITIONER

## 2023-01-03 PROCEDURE — 99215 OFFICE O/P EST HI 40 MIN: CPT | Mod: PBBFAC | Performed by: NURSE PRACTITIONER

## 2023-01-03 PROCEDURE — 99214 PR OFFICE/OUTPT VISIT, EST, LEVL IV, 30-39 MIN: ICD-10-PCS | Mod: S$PBB,25,, | Performed by: NURSE PRACTITIONER

## 2023-01-03 RX ORDER — GABAPENTIN 600 MG/1
600 TABLET ORAL 3 TIMES DAILY
Qty: 90 TABLET | Refills: 5 | Status: SHIPPED | OUTPATIENT
Start: 2023-01-03 | End: 2023-05-09 | Stop reason: SDUPTHER

## 2023-01-03 RX ORDER — METFORMIN HYDROCHLORIDE EXTENDED-RELEASE TABLETS 500 MG/1
1000 TABLET, FILM COATED, EXTENDED RELEASE ORAL 2 TIMES DAILY WITH MEALS
Qty: 120 TABLET | Refills: 3 | Status: SHIPPED | OUTPATIENT
Start: 2023-01-03 | End: 2023-04-17 | Stop reason: SDUPTHER

## 2023-01-03 RX ORDER — DULAGLUTIDE 1.5 MG/.5ML
1.5 INJECTION, SOLUTION SUBCUTANEOUS
COMMUNITY
Start: 2022-02-17 | End: 2023-04-17

## 2023-01-03 RX ORDER — METOCLOPRAMIDE 5 MG/1
5 TABLET ORAL 4 TIMES DAILY
COMMUNITY
Start: 2022-12-22

## 2023-01-03 RX ORDER — SODIUM, POTASSIUM,MAG SULFATES 17.5-3.13G
SOLUTION, RECONSTITUTED, ORAL ORAL
COMMUNITY
Start: 2022-11-14

## 2023-01-03 RX ORDER — LUBIPROSTONE 24 UG/1
24 CAPSULE, GELATIN COATED ORAL 2 TIMES DAILY
COMMUNITY
Start: 2022-12-11 | End: 2024-02-01 | Stop reason: SDUPTHER

## 2023-01-03 NOTE — PROGRESS NOTES
Flory De La Cruz, JOSE RAFAEL   [facility]  [address] [facility] [address]      PATIENT NAME: Kaye Spring  : 1975  DATE: 1/3/23  MRN: [id number]      Reason for Visit / Chief Complaint: Follow-up (LAB REVIEW )       History of Present Illness / Problem Focused Workflow     Kaye Spring presents to the clinic with Follow-up (LAB REVIEW )     45 yo WF here for virtual f/u. PMHx includes GERD, BPV (self-treats with Epley), chronic rhinitis, T2DM, HLD, CAD with MI in 2019, with stenting, HTN, chronic lower back pain, ventral hernia, AUB, endometriosis, depression with anxiety, and tobacco use, 1-2 ciggs / day.  Followed by Nikki Ackerman, mental health NP for psychiatric care and med management every 3 months. Disabled. Followed by [facility].      Cervical Cancer Screening - F/U [facility] Annually  Breast Cancer Screening -  Osteoporosis Screening -3/16/22 Vitamin D Level 25.8  Diabetic Screening - 22 Fundal Ordered . 22 Foot Exam. ANA ROSA    22  Pt here today for 3 month audio f/u for T2DM, pt A1C 9.4 to 10.9 in last 3 months, the pt reports that she had been eating a candy for smoking cessation and just realized yesterday that it was not sugar free, she is agreeable to med dose increase of Metformin to 1000 mg ER daily, will f/u in 3 months F2F with labs prior. Pt meds reviewed and refilled appropriately. Agreeable to MMG, Colonoscopy referral, and GYN referral today. She repots with yeast life symptoms of slight burning and discomfort, has used OTC applications with no relief, will send diflucan today. Denies any other acute issues at this time.     22  Pt here today for 3 month f/u with some labs available for review. A1C only decreased to 10.0 in the last 3 months after increase of Metformin to 1000 mg ER daily which pt reports today she's not taking due to not having the script, unsure why the pharmacy  did not fill. Will refill today, pt agreeable to repeat labs again in 3 months and f/u via audio virtual. Pt denies any  other issues today.   Denies chest pain, shortness of breath, cough, fever, headache, dizziness, weakness, abdominal pain, nausea, vomiting, diarrhea, constipation, black/bloody stools, unplanned weight loss, night sweats, changes in urinary patterns, burning/odor with urination, depression, anxiety, and SI/HI.       Follow-up      Review of Systems     Review of Systems   Constitutional: Negative.    HENT: Negative.     Eyes: Negative.    Respiratory: Negative.     Cardiovascular: Negative.    Gastrointestinal: Negative.    Endocrine: Negative.    Genitourinary: Negative.    Neurological: Negative.    Psychiatric/Behavioral: Negative.         Medications and Allergies     Medications  Medication List with Changes/Refills   Current Medications    ACCU-CHEK GUIDE TEST STRIPS STRP        ACCU-CHEK SOFTCLIX LANCETS MISC        ALBUTEROL (PROVENTIL/VENTOLIN HFA) 90 MCG/ACTUATION INHALER    Inhale 2 puffs into the lungs 4 (four) times daily as needed.    ALLERGY RELIEF, LORATADINE, 10 MG TABLET    Take 10 mg by mouth once daily.    ALPRAZOLAM (XANAX) 1 MG TABLET    Take 1 mg by mouth 3 (three) times daily as needed.    AMITIZA 24 MCG CAP    Take 24 mcg by mouth 2 (two) times daily.    ARIPIPRAZOLE (ABILIFY) 2 MG TAB    Take 2 mg by mouth once daily.    CLOPIDOGREL (PLAVIX) 75 MG TABLET    See Instructions, TAKE ONE TABLET BY MOUTH EVERY DAY, # 90 tab(s), 3 Refill(s), Pharmacy: [facility] [id number] [id number], 160, cm, Height/Length Dosing, 09/01/21 13:52:00 CDT, 85, kg, Weight Dosing, 09/01/21 13:52:00 CDT    DESVENLAFAXINE SUCCINATE (PRISTIQ) 100 MG TB24        DOXEPIN (SINEQUAN) 25 MG CAPSULE    Take 25 mg by mouth nightly.    DULAGLUTIDE (TRULICITY) 1.5 MG/0.5 ML PEN INJECTOR    Inject 1.5 mg into the skin.    EZETIMIBE (ZETIA) 10 MG TABLET    Take 1 tablet (10 mg total) by mouth once daily.    FLUTICASONE  PROPIONATE (FLONASE) 50 MCG/ACTUATION NASAL SPRAY        ISOSORBIDE MONONITRATE (IMDUR) 30 MG 24 HR TABLET    Take 1 tablet (30 mg total) by mouth once daily.    LOSARTAN (COZAAR) 25 MG TABLET    Take 2 tablets (50 mg total) by mouth once daily.    METOCLOPRAMIDE HCL (REGLAN) 5 MG TABLET    Take 5 mg by mouth 4 (four) times daily.    METOPROLOL SUCCINATE (TOPROL-XL) 25 MG 24 HR TABLET    Take 1 tablet (25 mg total) by mouth 2 (two) times daily.    NITROGLYCERIN (NITROSTAT) 0.4 MG SL TABLET    Place 1 tablet (0.4 mg total) under the tongue every 5 (five) minutes as needed for Chest pain.    OXCARBAZEPINE (TRILEPTAL) 300 MG TAB    Take 300 mg by mouth 2 (two) times daily.    PANTOPRAZOLE (PROTONIX) 40 MG TABLET    Take 1 tablet (40 mg total) by mouth once daily.    ROSUVASTATIN (CRESTOR) 40 MG TAB    Take 1 tablet (40 mg total) by mouth once daily.    SUPREP BOWEL PREP KIT 17.5-3.13-1.6 GRAM SOLR    SMARTSI Kit(s) By Mouth Once    TIZANIDINE (ZANAFLEX) 4 MG TABLET    Take 1 tablet (4 mg total) by mouth every 8 (eight) hours.   Changed and/or Refilled Medications    Modified Medication Previous Medication    GABAPENTIN (NEURONTIN) 600 MG TABLET gabapentin (NEURONTIN) 600 MG tablet       Take 1 tablet (600 mg total) by mouth 3 (three) times daily. As needed for back pain    Take 1 tablet (600 mg total) by mouth 3 (three) times daily. As needed for back pain    METFORMIN (FORTAMET) 500 MG 24HR TABLET metFORMIN (FORTAMET) 500 mg 24hr tablet       Take 2 tablets (1,000 mg total) by mouth 2 (two) times daily with meals.    Take 2 tablets (1,000 mg total) by mouth 2 (two) times daily with meals.   Discontinued Medications    FLUCONAZOLE (DIFLUCAN) 150 MG TAB    Take 1 tablet (150 mg total) by mouth once daily. Can repeat in 7 days if needed.         Allergies  Review of patient's allergies indicates:   Allergen Reactions    Aspirin Nausea And Vomiting and Other (See Comments)     Other reaction(s): Stomach upset  Pt.  States doesn't take because of stomach ulcers         Physical Examination     Vitals:    01/03/23 1255   BP: (!) 148/80   Resp:    Temp:      Physical Exam  Vitals reviewed.   Constitutional:       Appearance: Normal appearance. She is normal weight.   HENT:      Head: Normocephalic.   Cardiovascular:      Rate and Rhythm: Normal rate and regular rhythm.      Pulses: Normal pulses.      Heart sounds: Normal heart sounds.   Pulmonary:      Effort: Pulmonary effort is normal.      Breath sounds: Normal breath sounds.   Abdominal:      General: Abdomen is flat.      Palpations: Abdomen is soft.   Musculoskeletal:         General: Normal range of motion.      Cervical back: Normal range of motion.   Skin:     General: Skin is warm and dry.   Neurological:      Mental Status: She is alert.   Psychiatric:         Mood and Affect: Mood normal.         Results     Lab Results   Component Value Date    WBC 11.6 (H) 01/03/2023    RBC 5.11 01/03/2023    HGB 14.8 01/03/2023    HCT 43.1 01/03/2023    MCV 84.3 01/03/2023    MCH 29.0 01/03/2023    MCHC 34.3 01/03/2023    RDW 12.4 01/03/2023     01/03/2023    MPV 10.8 01/03/2023     Sodium Level   Date Value Ref Range Status   01/03/2023 139 136 - 145 mmol/L Final     Potassium Level   Date Value Ref Range Status   01/03/2023 3.5 3.5 - 5.1 mmol/L Final     Carbon Dioxide   Date Value Ref Range Status   01/03/2023 25 22 - 29 mmol/L Final     Blood Urea Nitrogen   Date Value Ref Range Status   01/03/2023 4.5 (L) 7.0 - 18.7 mg/dL Final     Creatinine   Date Value Ref Range Status   01/03/2023 0.75 0.55 - 1.02 mg/dL Final     Calcium Level Total   Date Value Ref Range Status   01/03/2023 9.6 8.4 - 10.2 mg/dL Final     Albumin Level   Date Value Ref Range Status   01/03/2023 4.2 3.5 - 5.0 g/dL Final     Bilirubin Total   Date Value Ref Range Status   01/03/2023 0.3 <=1.5 mg/dL Final     Alkaline Phosphatase   Date Value Ref Range Status   01/03/2023 94 40 - 150 unit/L Final      Aspartate Aminotransferase   Date Value Ref Range Status   01/03/2023 15 5 - 34 unit/L Final     Alanine Aminotransferase   Date Value Ref Range Status   01/03/2023 17 0 - 55 unit/L Final     Estimated GFR-Non    Date Value Ref Range Status   06/28/2022 >60 mls/min/1.73/m2 Final     Lab Results   Component Value Date    CHOL 133 01/03/2023     Lab Results   Component Value Date    HDL 37 01/03/2023     No results found for: LDLCALC  Lab Results   Component Value Date    TRIG 192 (H) 01/03/2023     No results found for: CHOLHDL  Lab Results   Component Value Date    TSH 1.831 01/03/2023     Lab Results   Component Value Date    PHUR 5.5 09/13/2021    PROTEINUA Negative 01/03/2023    GLUCUA Negative 09/13/2021    KETONESU Negative 09/13/2021    OCCULTUA Negative 09/13/2021    NITRITE Negative 09/13/2021    LEUKOCYTESUR Negative 01/03/2023     Lab Results   Component Value Date    HGBA1C 10.0 (H) 01/03/2023    HGBA1C 10.9 (H) 09/29/2022    HGBA1C 9.4 (H) 06/28/2022           Assessment and Plan      Plan:       Problem List Items Addressed This Visit          Neuro    Nerve pain    Relevant Medications    gabapentin (NEURONTIN) 600 MG tablet       Endocrine    Type 2 diabetes mellitus without complication - Primary    Overview     Formatting of this note might be different from the original.  ICD-10 Transition  April 2022 Diagnosis Update (SNOMED/IMO)         Current Assessment & Plan     RX Metformin ER 2 tabs BID  Follow ADA diet.  Avoid soda, simple sweets, and limit rice/pasta/bread/starches and consume brown options when possible.   Maintain healthy weight with BMI goal <30.   Perform aerobic exercise for 150 minutes per week (or 5 days a week for 30 minutes each day).   Examine feet daily.            Relevant Medications    dulaglutide (TRULICITY) 1.5 mg/0.5 mL pen injector    metFORMIN (FORTAMET) 500 mg 24hr tablet    Other Relevant Orders    Microalbumin/Creatinine Ratio, Urine     Urinalysis, Reflex to Urine Culture Urine, Clean Catch    Hemoglobin A1C    Basic Metabolic Panel       Orthopedic    Chronic back pain    Relevant Medications    gabapentin (NEURONTIN) 600 MG tablet       Other    Cigarette nicotine dependence without complication    Current Assessment & Plan     Smoking cessation discussed > 3 mins.  Pt not ready to quit.  Discussed benefits of quitting including improved health, decreased cardiac/vascular/pulmonary/stroke risks as well as saving money.            Other Visit Diagnoses       Need for hepatitis C screening test        Relevant Orders    Hepatitis C Antibody    Immunization due        Relevant Orders    Influenza - Quadrivalent *Preferred* (6 months+) (PF) (Completed)              Future Appointments   Date Time Provider Department Center   9/19/2023  1:10 PM JYOTI Cabrales [facility] [place]            Signature:     [facility]  [address] [facility] [address]    Date of encounter: 1/3/23

## 2023-01-03 NOTE — ASSESSMENT & PLAN NOTE
RX Metformin ER 2 tabs BID  Follow ADA diet.  Avoid soda, simple sweets, and limit rice/pasta/bread/starches and consume brown options when possible.   Maintain healthy weight with BMI goal <30.   Perform aerobic exercise for 150 minutes per week (or 5 days a week for 30 minutes each day).   Examine feet daily.

## 2023-04-12 DIAGNOSIS — G89.29 CHRONIC LOW BACK PAIN, UNSPECIFIED BACK PAIN LATERALITY, UNSPECIFIED WHETHER SCIATICA PRESENT: ICD-10-CM

## 2023-04-12 DIAGNOSIS — M54.50 CHRONIC LOW BACK PAIN, UNSPECIFIED BACK PAIN LATERALITY, UNSPECIFIED WHETHER SCIATICA PRESENT: ICD-10-CM

## 2023-04-12 RX ORDER — TIZANIDINE 4 MG/1
4 TABLET ORAL EVERY 8 HOURS
Qty: 30 TABLET | Refills: 2 | OUTPATIENT
Start: 2023-04-12

## 2023-04-17 ENCOUNTER — OFFICE VISIT (OUTPATIENT)
Dept: INTERNAL MEDICINE | Facility: CLINIC | Age: 48
End: 2023-04-17
Payer: MEDICAID

## 2023-04-17 VITALS — TEMPERATURE: 97 F | DIASTOLIC BLOOD PRESSURE: 83 MMHG | SYSTOLIC BLOOD PRESSURE: 132 MMHG | HEART RATE: 93 BPM

## 2023-04-17 DIAGNOSIS — Z12.11 COLON CANCER SCREENING: ICD-10-CM

## 2023-04-17 DIAGNOSIS — Z12.31 BREAST CANCER SCREENING BY MAMMOGRAM: ICD-10-CM

## 2023-04-17 DIAGNOSIS — E11.9 TYPE 2 DIABETES MELLITUS WITHOUT COMPLICATION, UNSPECIFIED WHETHER LONG TERM INSULIN USE: ICD-10-CM

## 2023-04-17 DIAGNOSIS — I25.10 CORONARY ARTERY DISEASE, UNSPECIFIED VESSEL OR LESION TYPE, UNSPECIFIED WHETHER ANGINA PRESENT, UNSPECIFIED WHETHER NATIVE OR TRANSPLANTED HEART: ICD-10-CM

## 2023-04-17 DIAGNOSIS — F17.210 CIGARETTE NICOTINE DEPENDENCE WITHOUT COMPLICATION: Primary | ICD-10-CM

## 2023-04-17 PROCEDURE — 3075F PR MOST RECENT SYSTOLIC BLOOD PRESS GE 130-139MM HG: ICD-10-PCS | Mod: CPTII,,, | Performed by: NURSE PRACTITIONER

## 2023-04-17 PROCEDURE — 99215 OFFICE O/P EST HI 40 MIN: CPT | Mod: PBBFAC | Performed by: NURSE PRACTITIONER

## 2023-04-17 PROCEDURE — 3060F PR POS MICROALBUMINURIA RESULT DOCUMENTED/REVIEW: ICD-10-PCS | Mod: CPTII,,, | Performed by: NURSE PRACTITIONER

## 2023-04-17 PROCEDURE — 3079F PR MOST RECENT DIASTOLIC BLOOD PRESSURE 80-89 MM HG: ICD-10-PCS | Mod: CPTII,,, | Performed by: NURSE PRACTITIONER

## 2023-04-17 PROCEDURE — 1159F MED LIST DOCD IN RCRD: CPT | Mod: CPTII,,, | Performed by: NURSE PRACTITIONER

## 2023-04-17 PROCEDURE — 3075F SYST BP GE 130 - 139MM HG: CPT | Mod: CPTII,,, | Performed by: NURSE PRACTITIONER

## 2023-04-17 PROCEDURE — 3066F PR DOCUMENTATION OF TREATMENT FOR NEPHROPATHY: ICD-10-PCS | Mod: CPTII,,, | Performed by: NURSE PRACTITIONER

## 2023-04-17 PROCEDURE — 3060F POS MICROALBUMINURIA REV: CPT | Mod: CPTII,,, | Performed by: NURSE PRACTITIONER

## 2023-04-17 PROCEDURE — 99214 PR OFFICE/OUTPT VISIT, EST, LEVL IV, 30-39 MIN: ICD-10-PCS | Mod: S$PBB,,, | Performed by: NURSE PRACTITIONER

## 2023-04-17 PROCEDURE — 3066F NEPHROPATHY DOC TX: CPT | Mod: CPTII,,, | Performed by: NURSE PRACTITIONER

## 2023-04-17 PROCEDURE — 1160F RVW MEDS BY RX/DR IN RCRD: CPT | Mod: CPTII,,, | Performed by: NURSE PRACTITIONER

## 2023-04-17 PROCEDURE — 3079F DIAST BP 80-89 MM HG: CPT | Mod: CPTII,,, | Performed by: NURSE PRACTITIONER

## 2023-04-17 PROCEDURE — 4010F PR ACE/ARB THEARPY RXD/TAKEN: ICD-10-PCS | Mod: CPTII,,, | Performed by: NURSE PRACTITIONER

## 2023-04-17 PROCEDURE — 1159F PR MEDICATION LIST DOCUMENTED IN MEDICAL RECORD: ICD-10-PCS | Mod: CPTII,,, | Performed by: NURSE PRACTITIONER

## 2023-04-17 PROCEDURE — 99214 OFFICE O/P EST MOD 30 MIN: CPT | Mod: S$PBB,,, | Performed by: NURSE PRACTITIONER

## 2023-04-17 PROCEDURE — 1160F PR REVIEW ALL MEDS BY PRESCRIBER/CLIN PHARMACIST DOCUMENTED: ICD-10-PCS | Mod: CPTII,,, | Performed by: NURSE PRACTITIONER

## 2023-04-17 PROCEDURE — 4010F ACE/ARB THERAPY RXD/TAKEN: CPT | Mod: CPTII,,, | Performed by: NURSE PRACTITIONER

## 2023-04-17 RX ORDER — METOPROLOL SUCCINATE 25 MG/1
25 TABLET, EXTENDED RELEASE ORAL 2 TIMES DAILY
Qty: 90 TABLET | Refills: 1 | Status: SHIPPED | OUTPATIENT
Start: 2023-04-17 | End: 2023-06-26 | Stop reason: SDUPTHER

## 2023-04-17 RX ORDER — METFORMIN HYDROCHLORIDE EXTENDED-RELEASE TABLETS 500 MG/1
1000 TABLET, FILM COATED, EXTENDED RELEASE ORAL 2 TIMES DAILY WITH MEALS
Qty: 120 TABLET | Refills: 3 | Status: SHIPPED | OUTPATIENT
Start: 2023-04-17 | End: 2023-05-09 | Stop reason: ALTCHOICE

## 2023-04-17 RX ORDER — ROSUVASTATIN CALCIUM 40 MG/1
40 TABLET, COATED ORAL DAILY
Qty: 90 TABLET | Refills: 1 | Status: SHIPPED | OUTPATIENT
Start: 2023-04-17 | End: 2023-06-26 | Stop reason: SDUPTHER

## 2023-04-17 RX ORDER — PEN NEEDLE, DIABETIC 30 GX3/16"
NEEDLE, DISPOSABLE MISCELLANEOUS
Qty: 100 EACH | Refills: 6 | Status: SHIPPED | OUTPATIENT
Start: 2023-04-17 | End: 2023-06-26 | Stop reason: SDUPTHER

## 2023-04-17 RX ORDER — INSULIN GLARGINE 100 [IU]/ML
10 INJECTION, SOLUTION SUBCUTANEOUS NIGHTLY
Qty: 3 ML | Refills: 1 | Status: SHIPPED | OUTPATIENT
Start: 2023-04-17 | End: 2023-05-09

## 2023-04-17 NOTE — PROGRESS NOTES
Flory De La Cruz, JOSE RAFAEL   [facility]  [address] [facility] [address]      PATIENT NAME: Kaye Spring  : 1975  DATE: 23  MRN: [id number]      Reason for Visit / Chief Complaint: Follow-up (Smoking cessation/ lab review )       History of Present Illness / Problem Focused Workflow     Kaye Spring presents to the clinic with Follow-up (Smoking cessation/ lab review )     47 yo WF here for virtual f/u. PMHx includes GERD, BPV (self-treats with Epley), chronic rhinitis, T2DM, HLD, CAD with MI in 2019, with stenting, HTN, chronic lower back pain, ventral hernia, AUB, endometriosis, depression with anxiety, and tobacco use, 1-2 ciggs / day.  Followed by Nikki Ackerman, mental health NP for psychiatric care and med management every 3 months. Disabled. Followed by [facility].      Cervical Cancer Screening - F/U [facility] Annually  Breast Cancer Screening -  Osteoporosis Screening -3/16/22 Vitamin D Level 25.8  Diabetic Screening - 10/17/22 Fundal Ordered . 22 Foot Exam. ANA ROSA        22  Pt here today for 3 month f/u with some labs available for review. A1C only decreased to 10.0 in the last 3 months after increase of Metformin to 1000 mg ER daily which pt reports today she's not taking due to not having the script, unsure why the pharmacy did not fill. Will refill today, pt agreeable to repeat labs again in 3 months and f/u via audio virtual. Pt denies any  other issues today.   Denies chest pain, shortness of breath, cough, fever, headache, dizziness, weakness, abdominal pain, nausea, vomiting, diarrhea, constipation, black/bloody stools, unplanned weight loss, night sweats, changes in urinary patterns, burning/odor with urination, depression, anxiety, and SI/HI.     2023  Pt here today for 3 month f/u, labs completed just prior to OV. Pt with a critical glucose of 534 and an A1C that has increased to >  "14. The patient reports she is drinking about a 12 pack of soda a day every day, we discussed this in detail how concerning this is. The pt reports compliance with the medicines mostly; today we will increase the Trulicity to 3 mg weekly and also start Lantus q hs 10 units. Pt given glucose log today and will f/u in 3 weeks F2F for evaluation. We discussed consequences of uncontrolled T2DM including death, the pt verbalized understanding.   Denies chest pain, shortness of breath, cough, fever, headache, dizziness, weakness, abdominal pain, nausea, vomiting, diarrhea, constipation, black/bloody stools, unplanned weight loss, night sweats, changes in urinary patterns, burning/odor with urination, depression, anxiety, and SI/HI.       Follow-up        Review of Systems     Review of Systems   Constitutional: Negative.    HENT: Negative.     Eyes: Negative.    Respiratory: Negative.     Cardiovascular: Negative.    Gastrointestinal: Negative.    Endocrine: Negative.    Genitourinary: Negative.    Neurological: Negative.    Psychiatric/Behavioral: Negative.         Medications and Allergies     Medications  Medication List with Changes/Refills   New Medications    DULAGLUTIDE (TRULICITY) 3 MG/0.5 ML PEN INJECTOR    Inject 3 mg into the skin every 7 days. For Diabetes    INSULIN (LANTUS SOLOSTAR U-100 INSULIN) GLARGINE 100 UNITS/ML SUBQ PEN    Inject 10 Units into the skin every evening.    PEN NEEDLE, DIABETIC 32 GAUGE X 5/32" NDLE    For Nightly Lantus Injection. ICD 10 E11.9   Current Medications    ACCU-CHEK GUIDE TEST STRIPS STRP        ACCU-CHEK SOFTCLIX LANCETS MISC        ALBUTEROL (PROVENTIL/VENTOLIN HFA) 90 MCG/ACTUATION INHALER    Inhale 2 puffs into the lungs 4 (four) times daily as needed for Wheezing or Shortness of Breath.    ALLERGY RELIEF, LORATADINE, 10 MG TABLET    Take 10 mg by mouth once daily.    ALPRAZOLAM (XANAX) 1 MG TABLET    Take 1 mg by mouth 3 (three) times daily as needed.    AMITIZA 24 MCG " CAP    Take 24 mcg by mouth 2 (two) times daily.    ARIPIPRAZOLE (ABILIFY) 2 MG TAB    Take 2 mg by mouth once daily.    CLOPIDOGREL (PLAVIX) 75 MG TABLET    See Instructions, TAKE ONE TABLET BY MOUTH EVERY DAY, # 90 tab(s), 3 Refill(s), Pharmacy: [facility] [id number] [id number], 160, cm, Height/Length Dosing, 21 13:52:00 CDT, 85, kg, Weight Dosing, 21 13:52:00 CDT    DESVENLAFAXINE SUCCINATE (PRISTIQ) 100 MG TB24        DOXEPIN (SINEQUAN) 25 MG CAPSULE    Take 25 mg by mouth nightly.    EZETIMIBE (ZETIA) 10 MG TABLET    Take 1 tablet (10 mg total) by mouth once daily.    FLUTICASONE PROPIONATE (FLONASE) 50 MCG/ACTUATION NASAL SPRAY        GABAPENTIN (NEURONTIN) 600 MG TABLET    Take 1 tablet (600 mg total) by mouth 3 (three) times daily. As needed for back pain    ISOSORBIDE MONONITRATE (IMDUR) 30 MG 24 HR TABLET    Take 1 tablet (30 mg total) by mouth once daily.    LOSARTAN (COZAAR) 25 MG TABLET    Take 2 tablets (50 mg total) by mouth once daily.    METOCLOPRAMIDE HCL (REGLAN) 5 MG TABLET    Take 5 mg by mouth 4 (four) times daily.    NITROGLYCERIN (NITROSTAT) 0.4 MG SL TABLET    Place 1 tablet (0.4 mg total) under the tongue every 5 (five) minutes as needed for Chest pain.    OXCARBAZEPINE (TRILEPTAL) 300 MG TAB    Take 300 mg by mouth 2 (two) times daily.    PANTOPRAZOLE (PROTONIX) 40 MG TABLET    Take 1 tablet (40 mg total) by mouth once daily.    SUPREP BOWEL PREP KIT 17.5-3.13-1.6 GRAM SOLR    SMARTSI Kit(s) By Mouth Once    TIZANIDINE (ZANAFLEX) 4 MG TABLET    Take 1 tablet (4 mg total) by mouth every 8 (eight) hours.   Changed and/or Refilled Medications    Modified Medication Previous Medication    METFORMIN (FORTAMET) 500 MG 24HR TABLET metFORMIN (FORTAMET) 500 mg 24hr tablet       Take 2 tablets (1,000 mg total) by mouth 2 (two) times daily with meals.    Take 2 tablets (1,000 mg total) by mouth 2 (two) times daily with meals.    METOPROLOL SUCCINATE (TOPROL-XL) 25 MG 24 HR TABLET metoprolol  succinate (TOPROL-XL) 25 MG 24 hr tablet       Take 1 tablet (25 mg total) by mouth 2 (two) times daily.    Take 1 tablet (25 mg total) by mouth 2 (two) times daily.    ROSUVASTATIN (CRESTOR) 40 MG TAB rosuvastatin (CRESTOR) 40 MG Tab       Take 1 tablet (40 mg total) by mouth once daily.    Take 1 tablet (40 mg total) by mouth once daily.   Discontinued Medications    DULAGLUTIDE (TRULICITY) 1.5 MG/0.5 ML PEN INJECTOR    Inject 1.5 mg into the skin.         Allergies  Review of patient's allergies indicates:   Allergen Reactions    Aspirin Nausea And Vomiting and Other (See Comments)     Other reaction(s): Stomach upset  Pt. States doesn't take because of stomach ulcers         Physical Examination     Vitals:    04/17/23 0900   BP: 132/83   Pulse: 93   Temp: 97.4 °F (36.3 °C)     Physical Exam  Constitutional:       Appearance: Normal appearance.   Cardiovascular:      Rate and Rhythm: Normal rate and regular rhythm.      Pulses:           Dorsalis pedis pulses are 2+ on the right side and 2+ on the left side.        Posterior tibial pulses are 2+ on the right side and 2+ on the left side.   Pulmonary:      Effort: Pulmonary effort is normal.      Breath sounds: Normal breath sounds.   Musculoskeletal:         General: Normal range of motion.      Cervical back: Normal range of motion.   Feet:      Right foot:      Protective Sensation: 9 sites tested.  9 sites sensed.      Skin integrity: Dry skin present.      Toenail Condition: Right toenails are abnormally thick.      Left foot:      Protective Sensation: 9 sites tested.  9 sites sensed.      Skin integrity: Dry skin present.      Toenail Condition: Left toenails are abnormally thick.   Skin:     General: Skin is warm and dry.   Neurological:      General: No focal deficit present.      Mental Status: She is alert and oriented to person, place, and time.   Psychiatric:         Mood and Affect: Mood normal.         Results     Lab Results   Component Value Date     WBC 11.6 (H) 01/03/2023    RBC 5.11 01/03/2023    HGB 14.8 01/03/2023    HCT 43.1 01/03/2023    MCV 84.3 01/03/2023    MCH 29.0 01/03/2023    MCHC 34.3 01/03/2023    RDW 12.4 01/03/2023     01/03/2023    MPV 10.8 01/03/2023     Sodium Level   Date Value Ref Range Status   04/17/2023 138 136 - 145 mmol/L Final     Potassium Level   Date Value Ref Range Status   04/17/2023 3.7 3.5 - 5.1 mmol/L Final     Carbon Dioxide   Date Value Ref Range Status   04/17/2023 27 22 - 29 mmol/L Final     Blood Urea Nitrogen   Date Value Ref Range Status   04/17/2023 4.0 (L) 7.0 - 18.7 mg/dL Final     Creatinine   Date Value Ref Range Status   04/17/2023 0.99 0.55 - 1.02 mg/dL Final     Calcium Level Total   Date Value Ref Range Status   04/17/2023 10.1 8.4 - 10.2 mg/dL Final     Albumin Level   Date Value Ref Range Status   01/03/2023 4.2 3.5 - 5.0 g/dL Final     Bilirubin Total   Date Value Ref Range Status   01/03/2023 0.3 <=1.5 mg/dL Final     Alkaline Phosphatase   Date Value Ref Range Status   01/03/2023 94 40 - 150 unit/L Final     Aspartate Aminotransferase   Date Value Ref Range Status   01/03/2023 15 5 - 34 unit/L Final     Alanine Aminotransferase   Date Value Ref Range Status   01/03/2023 17 0 - 55 unit/L Final     Estimated GFR-Non    Date Value Ref Range Status   06/28/2022 >60 mls/min/1.73/m2 Final     Lab Results   Component Value Date    CHOL 133 01/03/2023     Lab Results   Component Value Date    HDL 37 01/03/2023     No results found for: LDLCALC  Lab Results   Component Value Date    TRIG 192 (H) 01/03/2023     No results found for: CHOLHDL  Lab Results   Component Value Date    TSH 1.831 01/03/2023     Lab Results   Component Value Date    PHUR 5.5 09/13/2021    PROTEINUA Negative 04/17/2023    GLUCUA Negative 09/13/2021    KETONESU Negative 09/13/2021    OCCULTUA Negative 09/13/2021    NITRITE Negative 09/13/2021    LEUKOCYTESUR 500 (A) 04/17/2023     Lab Results   Component Value Date  "   HGBA1C >14.0 (H) 04/17/2023    HGBA1C 10.0 (H) 01/03/2023    HGBA1C 10.9 (H) 09/29/2022           Assessment         ICD-10-CM ICD-9-CM   1. Cigarette nicotine dependence without complication  F17.210 305.1   2. Coronary artery disease, unspecified vessel or lesion type, unspecified whether angina present, unspecified whether native or transplanted heart  I25.10 414.00   3. Type 2 diabetes mellitus without complication, unspecified whether long term insulin use  E11.9 250.00   4. Breast cancer screening by mammogram  Z12.31 V76.12   5. Colon cancer screening  Z12.11 V76.51       Plan      Problem List Items Addressed This Visit          Endocrine    Type 2 diabetes mellitus without complication    Overview     Formatting of this note might be different from the original.  ICD-10 Transition  April 2022 Diagnosis Update (SNOMED/IMO)           Current Assessment & Plan     A1C > 14.0 Critical Glucose today of 534 fasting.   Continue Metformin 1000 mg ER  Increase Trulicity to 3 mg q week  Start Lantus 10 units q hs  3 week f/u with glucose log for review.            Relevant Medications    metFORMIN (FORTAMET) 500 mg 24hr tablet    dulaglutide (TRULICITY) 3 mg/0.5 mL pen injector    insulin (LANTUS SOLOSTAR U-100 INSULIN) glargine 100 units/mL SubQ pen    pen needle, diabetic 32 gauge x 5/32" Ndle       GI    Colon cancer screening    Relevant Orders    Cologuard Screening (Multitarget Stool DNA)       Other    Cigarette nicotine dependence without complication - Primary    Current Assessment & Plan     Smoking cessation discussed > 3 mins.  Pt ready to quit.Referred to Tobacco cessation again.   Discussed benefits of quitting including improved health, decreased cardiac/vascular/pulmonary/stroke risks as well as saving money.             Relevant Orders    Ambulatory referral/consult to Smoking Cessation Program     Other Visit Diagnoses       Coronary artery disease, unspecified vessel or lesion type, unspecified " whether angina present, unspecified whether native or transplanted heart        Relevant Medications    rosuvastatin (CRESTOR) 40 MG Tab    metoprolol succinate (TOPROL-XL) 25 MG 24 hr tablet    Breast cancer screening by mammogram        Relevant Orders    Mammo Digital Screening Bilat w/ Alcides            Future Appointments   Date Time Provider Department Center   9/19/2023  1:10 PM JYOTI Cabrales [facility]            Signature:     [facility]  [address] [facility] [address] [address]    Date of encounter: 4/17/23

## 2023-04-17 NOTE — ASSESSMENT & PLAN NOTE
Smoking cessation discussed > 3 mins.  Pt ready to quit.Referred to Tobacco cessation again.   Discussed benefits of quitting including improved health, decreased cardiac/vascular/pulmonary/stroke risks as well as saving money.

## 2023-04-17 NOTE — ASSESSMENT & PLAN NOTE
A1C > 14.0 Critical Glucose today of 534 fasting.   Continue Metformin 1000 mg ER  Increase Trulicity to 3 mg q week  Start Lantus 10 units q hs  3 week f/u with glucose log for review.

## 2023-04-18 ENCOUNTER — TELEPHONE (OUTPATIENT)
Dept: INTERNAL MEDICINE | Facility: CLINIC | Age: 48
End: 2023-04-18
Payer: MEDICAID

## 2023-04-18 RX ORDER — METFORMIN HYDROCHLORIDE 500 MG/1
1000 TABLET, EXTENDED RELEASE ORAL 2 TIMES DAILY WITH MEALS
Qty: 360 TABLET | Refills: 0 | Status: SHIPPED | OUTPATIENT
Start: 2023-04-18 | End: 2023-05-09 | Stop reason: SDUPTHER

## 2023-04-18 NOTE — TELEPHONE ENCOUNTER
Patient's pharmacy called stating the metformin that was sent in is incorrect they need 24hr tablet but w/o fortamet. Please advise.

## 2023-04-20 ENCOUNTER — TELEPHONE (OUTPATIENT)
Dept: INTERNAL MEDICINE | Facility: CLINIC | Age: 48
End: 2023-04-20
Payer: MEDICAID

## 2023-04-20 DIAGNOSIS — N30.00 ACUTE CYSTITIS WITHOUT HEMATURIA: Primary | ICD-10-CM

## 2023-04-20 RX ORDER — CIPROFLOXACIN 500 MG/1
500 TABLET ORAL EVERY 12 HOURS
Qty: 14 TABLET | Refills: 0 | Status: SHIPPED | OUTPATIENT
Start: 2023-04-20 | End: 2023-05-09 | Stop reason: ALTCHOICE

## 2023-04-20 NOTE — TELEPHONE ENCOUNTER
Please inform patient that her urine culture from 4/17 returned abnl. I am sending:   Ciprofloxacin <=0.25  Sensitive   She needs to take all  until completed.  1. Drink 6-8 glasses of water/day  2. Wipe from front to back after urinating  3. Avoid use of scented soaps, lotions, perfumes, etc in vaginal region    Inform patient to not take Tizanidine (her muscle relaxer) while taking the Cipro!!      F/u with PCP Handy as scheduled.

## 2023-04-21 NOTE — TELEPHONE ENCOUNTER
Informed patient about results are abnormal, take Cipro, do not take Tizanidine with Cipro. Drink 6-8 glasses of water. Wipe from front to back and to avoid any scents in the vaginal area. Patient voiced understanding

## 2023-04-24 DIAGNOSIS — G89.29 CHRONIC LOW BACK PAIN, UNSPECIFIED BACK PAIN LATERALITY, UNSPECIFIED WHETHER SCIATICA PRESENT: ICD-10-CM

## 2023-04-24 DIAGNOSIS — M54.50 CHRONIC LOW BACK PAIN, UNSPECIFIED BACK PAIN LATERALITY, UNSPECIFIED WHETHER SCIATICA PRESENT: ICD-10-CM

## 2023-04-24 DIAGNOSIS — E11.9 TYPE 2 DIABETES MELLITUS WITHOUT COMPLICATION, UNSPECIFIED WHETHER LONG TERM INSULIN USE: Primary | ICD-10-CM

## 2023-04-24 RX ORDER — TIZANIDINE 4 MG/1
4 TABLET ORAL EVERY 8 HOURS
Qty: 30 TABLET | Refills: 2 | Status: SHIPPED | OUTPATIENT
Start: 2023-04-24 | End: 2023-05-09 | Stop reason: SDUPTHER

## 2023-04-25 RX ORDER — LANCETS
EACH MISCELLANEOUS
Qty: 100 EACH | Refills: 6 | Status: SHIPPED | OUTPATIENT
Start: 2023-04-25

## 2023-04-25 RX ORDER — INSULIN PUMP SYRINGE, 3 ML
EACH MISCELLANEOUS
Qty: 1 EACH | Refills: 0 | Status: SHIPPED | OUTPATIENT
Start: 2023-04-25

## 2023-04-25 NOTE — TELEPHONE ENCOUNTER
Patient need a new script for Glucometer, test strips and lancets. Patient states her old one is broken

## 2023-04-28 ENCOUNTER — HOSPITAL ENCOUNTER (OUTPATIENT)
Dept: RADIOLOGY | Facility: HOSPITAL | Age: 48
Discharge: HOME | End: 2023-04-28
Attending: NURSE PRACTITIONER
Payer: MEDICAID

## 2023-04-28 DIAGNOSIS — Z12.31 BREAST CANCER SCREENING BY MAMMOGRAM: ICD-10-CM

## 2023-04-28 PROCEDURE — 77067 SCR MAMMO BI INCL CAD: CPT | Mod: 26,,, | Performed by: RADIOLOGY

## 2023-04-28 PROCEDURE — 77067 SCR MAMMO BI INCL CAD: CPT | Mod: TC

## 2023-04-28 PROCEDURE — 77067 MAMMO DIGITAL SCREENING BILAT WITH TOMO: ICD-10-PCS | Mod: 26,,, | Performed by: RADIOLOGY

## 2023-04-28 PROCEDURE — 77063 BREAST TOMOSYNTHESIS BI: CPT | Mod: 26,,, | Performed by: RADIOLOGY

## 2023-04-28 PROCEDURE — 77063 MAMMO DIGITAL SCREENING BILAT WITH TOMO: ICD-10-PCS | Mod: 26,,, | Performed by: RADIOLOGY

## 2023-05-02 RX ORDER — PANTOPRAZOLE SODIUM 40 MG/1
40 TABLET, DELAYED RELEASE ORAL DAILY
Qty: 30 TABLET | Refills: 0 | OUTPATIENT
Start: 2023-05-02 | End: 2023-06-01

## 2023-05-09 ENCOUNTER — OFFICE VISIT (OUTPATIENT)
Dept: INTERNAL MEDICINE | Facility: CLINIC | Age: 48
End: 2023-05-09
Payer: MEDICAID

## 2023-05-09 VITALS
TEMPERATURE: 98 F | RESPIRATION RATE: 16 BRPM | WEIGHT: 170 LBS | HEART RATE: 94 BPM | SYSTOLIC BLOOD PRESSURE: 126 MMHG | HEIGHT: 63 IN | BODY MASS INDEX: 30.12 KG/M2 | DIASTOLIC BLOOD PRESSURE: 84 MMHG

## 2023-05-09 DIAGNOSIS — Z11.4 SCREENING FOR HIV (HUMAN IMMUNODEFICIENCY VIRUS): ICD-10-CM

## 2023-05-09 DIAGNOSIS — M54.50 CHRONIC LOW BACK PAIN, UNSPECIFIED BACK PAIN LATERALITY, UNSPECIFIED WHETHER SCIATICA PRESENT: ICD-10-CM

## 2023-05-09 DIAGNOSIS — K21.9 GASTROESOPHAGEAL REFLUX DISEASE, UNSPECIFIED WHETHER ESOPHAGITIS PRESENT: ICD-10-CM

## 2023-05-09 DIAGNOSIS — G89.29 CHRONIC LOW BACK PAIN, UNSPECIFIED BACK PAIN LATERALITY, UNSPECIFIED WHETHER SCIATICA PRESENT: ICD-10-CM

## 2023-05-09 DIAGNOSIS — M79.2 NERVE PAIN: ICD-10-CM

## 2023-05-09 DIAGNOSIS — E11.9 TYPE 2 DIABETES MELLITUS WITHOUT COMPLICATION, UNSPECIFIED WHETHER LONG TERM INSULIN USE: Primary | ICD-10-CM

## 2023-05-09 PROCEDURE — 3060F POS MICROALBUMINURIA REV: CPT | Mod: CPTII,,, | Performed by: NURSE PRACTITIONER

## 2023-05-09 PROCEDURE — 1159F MED LIST DOCD IN RCRD: CPT | Mod: CPTII,,, | Performed by: NURSE PRACTITIONER

## 2023-05-09 PROCEDURE — 3074F SYST BP LT 130 MM HG: CPT | Mod: CPTII,,, | Performed by: NURSE PRACTITIONER

## 2023-05-09 PROCEDURE — 3066F PR DOCUMENTATION OF TREATMENT FOR NEPHROPATHY: ICD-10-PCS | Mod: CPTII,,, | Performed by: NURSE PRACTITIONER

## 2023-05-09 PROCEDURE — 3079F DIAST BP 80-89 MM HG: CPT | Mod: CPTII,,, | Performed by: NURSE PRACTITIONER

## 2023-05-09 PROCEDURE — 3008F BODY MASS INDEX DOCD: CPT | Mod: CPTII,,, | Performed by: NURSE PRACTITIONER

## 2023-05-09 PROCEDURE — 1160F PR REVIEW ALL MEDS BY PRESCRIBER/CLIN PHARMACIST DOCUMENTED: ICD-10-PCS | Mod: CPTII,,, | Performed by: NURSE PRACTITIONER

## 2023-05-09 PROCEDURE — 3074F PR MOST RECENT SYSTOLIC BLOOD PRESSURE < 130 MM HG: ICD-10-PCS | Mod: CPTII,,, | Performed by: NURSE PRACTITIONER

## 2023-05-09 PROCEDURE — 1160F RVW MEDS BY RX/DR IN RCRD: CPT | Mod: CPTII,,, | Performed by: NURSE PRACTITIONER

## 2023-05-09 PROCEDURE — 3079F PR MOST RECENT DIASTOLIC BLOOD PRESSURE 80-89 MM HG: ICD-10-PCS | Mod: CPTII,,, | Performed by: NURSE PRACTITIONER

## 2023-05-09 PROCEDURE — 99214 PR OFFICE/OUTPT VISIT, EST, LEVL IV, 30-39 MIN: ICD-10-PCS | Mod: S$PBB,,, | Performed by: NURSE PRACTITIONER

## 2023-05-09 PROCEDURE — 3008F PR BODY MASS INDEX (BMI) DOCUMENTED: ICD-10-PCS | Mod: CPTII,,, | Performed by: NURSE PRACTITIONER

## 2023-05-09 PROCEDURE — 99214 OFFICE O/P EST MOD 30 MIN: CPT | Mod: S$PBB,,, | Performed by: NURSE PRACTITIONER

## 2023-05-09 PROCEDURE — 3060F PR POS MICROALBUMINURIA RESULT DOCUMENTED/REVIEW: ICD-10-PCS | Mod: CPTII,,, | Performed by: NURSE PRACTITIONER

## 2023-05-09 PROCEDURE — 99215 OFFICE O/P EST HI 40 MIN: CPT | Mod: PBBFAC | Performed by: NURSE PRACTITIONER

## 2023-05-09 PROCEDURE — 3066F NEPHROPATHY DOC TX: CPT | Mod: CPTII,,, | Performed by: NURSE PRACTITIONER

## 2023-05-09 PROCEDURE — 1159F PR MEDICATION LIST DOCUMENTED IN MEDICAL RECORD: ICD-10-PCS | Mod: CPTII,,, | Performed by: NURSE PRACTITIONER

## 2023-05-09 PROCEDURE — 4010F ACE/ARB THERAPY RXD/TAKEN: CPT | Mod: CPTII,,, | Performed by: NURSE PRACTITIONER

## 2023-05-09 PROCEDURE — 4010F PR ACE/ARB THEARPY RXD/TAKEN: ICD-10-PCS | Mod: CPTII,,, | Performed by: NURSE PRACTITIONER

## 2023-05-09 RX ORDER — PANTOPRAZOLE SODIUM 40 MG/1
40 TABLET, DELAYED RELEASE ORAL DAILY
Qty: 90 TABLET | Refills: 1 | Status: SHIPPED | OUTPATIENT
Start: 2023-05-09 | End: 2023-06-26 | Stop reason: SDUPTHER

## 2023-05-09 RX ORDER — INSULIN GLARGINE 100 [IU]/ML
15 INJECTION, SOLUTION SUBCUTANEOUS NIGHTLY
Qty: 3 ML | Refills: 0 | Status: SHIPPED | OUTPATIENT
Start: 2023-05-09 | End: 2023-06-26 | Stop reason: SDUPTHER

## 2023-05-09 RX ORDER — METFORMIN HYDROCHLORIDE 500 MG/1
1000 TABLET, EXTENDED RELEASE ORAL 2 TIMES DAILY WITH MEALS
Qty: 360 TABLET | Refills: 1 | Status: SHIPPED | OUTPATIENT
Start: 2023-05-09 | End: 2023-06-26 | Stop reason: SDUPTHER

## 2023-05-09 RX ORDER — GABAPENTIN 600 MG/1
600 TABLET ORAL 3 TIMES DAILY
Qty: 90 TABLET | Refills: 5 | Status: SHIPPED | OUTPATIENT
Start: 2023-05-09 | End: 2023-06-26 | Stop reason: SDUPTHER

## 2023-05-09 RX ORDER — TIZANIDINE 4 MG/1
4 TABLET ORAL EVERY 8 HOURS
Qty: 90 TABLET | Refills: 2 | Status: SHIPPED | OUTPATIENT
Start: 2023-05-09 | End: 2023-06-26 | Stop reason: SDUPTHER

## 2023-05-09 RX ORDER — BLOOD-GLUCOSE METER
EACH MISCELLANEOUS
COMMUNITY
Start: 2023-04-25

## 2023-05-09 NOTE — PROGRESS NOTES
Flory De La Cruz, JOSE RAFAEL   [facility]  [address] [facility] [address]      PATIENT NAME: Kaye Spring  : 1975  DATE: 23  MRN: [id number]      Reason for Visit / Chief Complaint: Follow-up (Lab review )       History of Present Illness / Problem Focused Workflow     Kaye Spring presents to the clinic with Follow-up (Lab review )     47 yo WF here for virtual f/u. PMHx includes GERD, BPV (self-treats with Epley), chronic rhinitis, T2DM, HLD, CAD with MI in 2019, with stenting, HTN, chronic lower back pain, ventral hernia, AUB, endometriosis, depression with anxiety, and tobacco use, 1-2 ciggs / day.  Followed by Nikki Ackerman, mental health NP for psychiatric care and med management every 3 months. Disabled. Followed by [facility] for Cardiology Services.      Cervical Cancer Screening - F/U [facility] GYN Annually  Breast Cancer Screening - 23 MMG  Osteoporosis Screening -3/16/22 Vitamin D Level 25.8  Diabetic Screening - 10/17/22 Fundal Ordered . 22 Foot Exam. ANA ROSA    2023  Pt here today for 3 month f/u, labs completed just prior to OV. Pt with a critical glucose of 534 and an A1C that has increased to > 14. The patient reports she is drinking about a 12 pack of soda a day every day, we discussed this in detail how concerning this is. The pt reports compliance with the medicines mostly; today we will increase the Trulicity to 3 mg weekly and also start Lantus q hs 10 units. Pt given glucose log today and will f/u in 3 weeks F2F for evaluation. We discussed consequences of uncontrolled T2DM including death, the pt verbalized understanding.     2023  Pt here today for 3 week f/u for evaluation of Gluocose logs. Averages 150's 200's for AM fasting levels, will increase Lantus today to 15 units q hs. Pt reports compliance with all medicines at this time, reports she has decreased herself to only 2 sodas a day and will  continue to work to eliminate them completely. Will f/u in 6 weeks for routine f/u with fasting labs. Denies any other issues or concerns at this time. Reports she turned in Cologuard, results pending.   Denies chest pain, shortness of breath, cough, fever, headache, dizziness, weakness, abdominal pain, nausea, vomiting, diarrhea, constipation, black/bloody stools, unplanned weight loss, night sweats, changes in urinary patterns, burning/odor with urination, depression, anxiety, and SI/HI.       Follow-up        Review of Systems     Review of Systems   Constitutional: Negative.    HENT: Negative.     Eyes: Negative.    Respiratory: Negative.     Cardiovascular: Negative.    Gastrointestinal: Negative.    Endocrine: Negative.    Genitourinary: Negative.    Neurological: Negative.    Psychiatric/Behavioral: Negative.         Medications and Allergies     Medications  Medication List with Changes/Refills   Current Medications    ACCU-CHEK GUIDE ME GLUCOSE MTR MISC        ALBUTEROL (PROVENTIL/VENTOLIN HFA) 90 MCG/ACTUATION INHALER    Inhale 2 puffs into the lungs 4 (four) times daily as needed for Wheezing or Shortness of Breath.    ALPRAZOLAM (XANAX) 1 MG TABLET    Take 1 mg by mouth 3 (three) times daily as needed.    AMITIZA 24 MCG CAP    Take 24 mcg by mouth 2 (two) times daily.    ARIPIPRAZOLE (ABILIFY) 2 MG TAB    Take 2 mg by mouth once daily.    BLOOD SUGAR DIAGNOSTIC STRP    To check BG 2 times daily, to use with insurance preferred meter E11.9    BLOOD-GLUCOSE METER KIT    To check BG 2 times daily, to use with insurance preferred meter E11.9    CLOPIDOGREL (PLAVIX) 75 MG TABLET    See Instructions, TAKE ONE TABLET BY MOUTH EVERY DAY, # 90 tab(s), 3 Refill(s), Pharmacy: [place] [facility] [id number] [id number], 160, cm, Height/Length Dosing, 09/01/21 13:52:00 CDT, 85, kg, Weight Dosing, 09/01/21 13:52:00 CDT    DESVENLAFAXINE SUCCINATE (PRISTIQ) 100 MG TB24        DOXEPIN (SINEQUAN) 25 MG CAPSULE    Take 25 mg by mouth nightly.  "   DULAGLUTIDE (TRULICITY) 3 MG/0.5 ML PEN INJECTOR    Inject 3 mg into the skin every 7 days. For Diabetes    EZETIMIBE (ZETIA) 10 MG TABLET    Take 1 tablet (10 mg total) by mouth once daily.    ISOSORBIDE MONONITRATE (IMDUR) 30 MG 24 HR TABLET    Take 1 tablet (30 mg total) by mouth once daily.    LANCETS MISC    To check BG 2 times daily, to use with insurance preferred meter E11.9    LOSARTAN (COZAAR) 25 MG TABLET    Take 2 tablets (50 mg total) by mouth once daily.    METOCLOPRAMIDE HCL (REGLAN) 5 MG TABLET    Take 5 mg by mouth 4 (four) times daily.    METOPROLOL SUCCINATE (TOPROL-XL) 25 MG 24 HR TABLET    Take 1 tablet (25 mg total) by mouth 2 (two) times daily.    NITROGLYCERIN (NITROSTAT) 0.4 MG SL TABLET    Place 1 tablet (0.4 mg total) under the tongue every 5 (five) minutes as needed for Chest pain.    OXCARBAZEPINE (TRILEPTAL) 300 MG TAB    Take 300 mg by mouth 2 (two) times daily.    PEN NEEDLE, DIABETIC 32 GAUGE X 5/32" NDLE    For Nightly Lantus Injection. ICD 10 E11.9    ROSUVASTATIN (CRESTOR) 40 MG TAB    Take 1 tablet (40 mg total) by mouth once daily.    SUPREP BOWEL PREP KIT 17.5-3.13-1.6 GRAM SOLR    SMARTSI Kit(s) By Mouth Once   Changed and/or Refilled Medications    Modified Medication Previous Medication    GABAPENTIN (NEURONTIN) 600 MG TABLET gabapentin (NEURONTIN) 600 MG tablet       Take 1 tablet (600 mg total) by mouth 3 (three) times daily. As needed for back pain    Take 1 tablet (600 mg total) by mouth 3 (three) times daily. As needed for back pain    INSULIN (LANTUS SOLOSTAR U-100 INSULIN) GLARGINE 100 UNITS/ML SUBQ PEN insulin (LANTUS SOLOSTAR U-100 INSULIN) glargine 100 units/mL SubQ pen       Inject 15 Units into the skin every evening.    Inject 10 Units into the skin every evening.    METFORMIN (GLUCOPHAGE-XR) 500 MG ER 24HR TABLET metFORMIN (GLUCOPHAGE-XR) 500 MG ER 24hr tablet       Take 2 tablets (1,000 mg total) by mouth 2 (two) times daily with meals.    Take 2 tablets " (1,000 mg total) by mouth 2 (two) times daily with meals.    PANTOPRAZOLE (PROTONIX) 40 MG TABLET pantoprazole (PROTONIX) 40 MG tablet       Take 1 tablet (40 mg total) by mouth once daily. For Acid Reflux    Take 1 tablet (40 mg total) by mouth once daily.    TIZANIDINE (ZANAFLEX) 4 MG TABLET tiZANidine (ZANAFLEX) 4 MG tablet       Take 1 tablet (4 mg total) by mouth every 8 (eight) hours.    Take 1 tablet (4 mg total) by mouth every 8 (eight) hours.   Discontinued Medications    ACCU-CHEK GUIDE TEST STRIPS STRP        ACCU-CHEK SOFTCLIX LANCETS MISC        ALLERGY RELIEF, LORATADINE, 10 MG TABLET    Take 10 mg by mouth once daily.    CIPROFLOXACIN HCL (CIPRO) 500 MG TABLET    Take 1 tablet (500 mg total) by mouth every 12 (twelve) hours. for 7 days    FLUTICASONE PROPIONATE (FLONASE) 50 MCG/ACTUATION NASAL SPRAY        METFORMIN (FORTAMET) 500 MG 24HR TABLET    Take 2 tablets (1,000 mg total) by mouth 2 (two) times daily with meals.         Allergies  Review of patient's allergies indicates:   Allergen Reactions    Aspirin Nausea And Vomiting and Other (See Comments)     Other reaction(s): Stomach upset  Pt. States doesn't take because of stomach ulcers         Physical Examination     Vitals:    05/09/23 0839   BP: 126/84   Pulse: 94   Resp: 16   Temp: 98.2 °F (36.8 °C)     Physical Exam  Vitals reviewed.   Constitutional:       Appearance: Normal appearance. She is normal weight.   HENT:      Head: Normocephalic.   Cardiovascular:      Rate and Rhythm: Normal rate and regular rhythm.      Pulses: Normal pulses.      Heart sounds: Normal heart sounds.   Pulmonary:      Effort: Pulmonary effort is normal.      Breath sounds: Normal breath sounds.   Abdominal:      General: Abdomen is flat.      Palpations: Abdomen is soft.   Musculoskeletal:         General: Normal range of motion.      Cervical back: Normal range of motion.   Skin:     General: Skin is warm and dry.   Neurological:      Mental Status: She is  alert.   Psychiatric:         Mood and Affect: Mood normal.         Results     Lab Results   Component Value Date    WBC 11.6 (H) 01/03/2023    RBC 5.11 01/03/2023    HGB 14.8 01/03/2023    HCT 43.1 01/03/2023    MCV 84.3 01/03/2023    MCH 29.0 01/03/2023    MCHC 34.3 01/03/2023    RDW 12.4 01/03/2023     01/03/2023    MPV 10.8 01/03/2023     Sodium Level   Date Value Ref Range Status   04/17/2023 138 136 - 145 mmol/L Final     Potassium Level   Date Value Ref Range Status   04/17/2023 3.7 3.5 - 5.1 mmol/L Final     Carbon Dioxide   Date Value Ref Range Status   04/17/2023 27 22 - 29 mmol/L Final     Blood Urea Nitrogen   Date Value Ref Range Status   04/17/2023 4.0 (L) 7.0 - 18.7 mg/dL Final     Creatinine   Date Value Ref Range Status   04/17/2023 0.99 0.55 - 1.02 mg/dL Final     Calcium Level Total   Date Value Ref Range Status   04/17/2023 10.1 8.4 - 10.2 mg/dL Final     Albumin Level   Date Value Ref Range Status   01/03/2023 4.2 3.5 - 5.0 g/dL Final     Bilirubin Total   Date Value Ref Range Status   01/03/2023 0.3 <=1.5 mg/dL Final     Alkaline Phosphatase   Date Value Ref Range Status   01/03/2023 94 40 - 150 unit/L Final     Aspartate Aminotransferase   Date Value Ref Range Status   01/03/2023 15 5 - 34 unit/L Final     Alanine Aminotransferase   Date Value Ref Range Status   01/03/2023 17 0 - 55 unit/L Final     Estimated GFR-Non    Date Value Ref Range Status   06/28/2022 >60 mls/min/1.73/m2 Final     Lab Results   Component Value Date    CHOL 133 01/03/2023     Lab Results   Component Value Date    HDL 37 01/03/2023     No results found for: LDLCALC  Lab Results   Component Value Date    TRIG 192 (H) 01/03/2023     No results found for: CHOLHDL  Lab Results   Component Value Date    TSH 1.831 01/03/2023     Lab Results   Component Value Date    PHUR 5.5 09/13/2021    PROTEINUA Negative 04/17/2023    GLUCUA Negative 09/13/2021    KETONESU Negative 09/13/2021    OCCULTUA Negative  09/13/2021    NITRITE Negative 09/13/2021    LEUKOCYTESUR 500 (A) 04/17/2023     Lab Results   Component Value Date    HGBA1C >14.0 (H) 04/17/2023    HGBA1C 10.0 (H) 01/03/2023    HGBA1C 10.9 (H) 09/29/2022           Assessment         ICD-10-CM ICD-9-CM   1. Type 2 diabetes mellitus without complication, unspecified whether long term insulin use  E11.9 250.00   2. Screening for HIV (human immunodeficiency virus)  Z11.4 V73.89   3. Gastroesophageal reflux disease, unspecified whether esophagitis present  K21.9 530.81   4. Nerve pain  M79.2 729.2   5. Chronic low back pain, unspecified back pain laterality, unspecified whether sciatica present  M54.50 724.2    G89.29 338.29       Plan      Problem List Items Addressed This Visit          Neuro    Nerve pain    Relevant Medications    gabapentin (NEURONTIN) 600 MG tablet       Endocrine    Type 2 diabetes mellitus without complication - Primary    Overview     Formatting of this note might be different from the original.  ICD-10 Transition  April 2022 Diagnosis Update (SNOMED/IMO)           Current Assessment & Plan     Increase Lantus to 15 units at bedtime.   Continue RX Metformin 1000 mg, RX Trulicity 3 mg weekly             Relevant Medications    insulin (LANTUS SOLOSTAR U-100 INSULIN) glargine 100 units/mL SubQ pen    metFORMIN (GLUCOPHAGE-XR) 500 MG ER 24hr tablet    Other Relevant Orders    Urinalysis, Reflex to Urine Culture    Microalbumin/Creatinine Ratio, Urine    Hemoglobin A1C    Basic Metabolic Panel       Orthopedic    Chronic back pain    Relevant Medications    gabapentin (NEURONTIN) 600 MG tablet    tiZANidine (ZANAFLEX) 4 MG tablet     Other Visit Diagnoses       Screening for HIV (human immunodeficiency virus)        Relevant Orders    HIV 1/2 Ag/Ab (4th Gen)    Gastroesophageal reflux disease, unspecified whether esophagitis present        Relevant Medications    pantoprazole (PROTONIX) 40 MG tablet            Future Appointments   Date Time  Provider Department Center   6/26/2023 10:00 AM [facility]   9/19/2023  1:10 PM JYOTI Cabrales [facility]            Signature:     [facility]  [address] [facility] [address]    Date of encounter: 5/9/23

## 2023-05-09 NOTE — ASSESSMENT & PLAN NOTE
Increase Lantus to 15 units at bedtime.   Continue RX Metformin 1000 mg, RX Trulicity 3 mg weekly

## 2023-05-10 ENCOUNTER — TELEPHONE (OUTPATIENT)
Dept: INTERNAL MEDICINE | Facility: CLINIC | Age: 48
End: 2023-05-10
Payer: MEDICAID

## 2023-05-10 LAB — NONINV COLON CA DNA+OCC BLD SCRN STL QL: POSITIVE

## 2023-05-10 NOTE — TELEPHONE ENCOUNTER
Patient voiced understanding and would like to see Dr. Yadira Encinas in [place]. I gave patient the phone number an advised her to call Dr. Encinas in office and check on her referral.

## 2023-05-19 DIAGNOSIS — I25.10 CORONARY ARTERY DISEASE, UNSPECIFIED VESSEL OR LESION TYPE, UNSPECIFIED WHETHER ANGINA PRESENT, UNSPECIFIED WHETHER NATIVE OR TRANSPLANTED HEART: ICD-10-CM

## 2023-05-19 RX ORDER — ISOSORBIDE MONONITRATE 30 MG/1
30 TABLET, EXTENDED RELEASE ORAL DAILY
Qty: 90 TABLET | Refills: 0 | Status: SHIPPED | OUTPATIENT
Start: 2023-05-19 | End: 2023-06-26 | Stop reason: SDUPTHER

## 2023-05-19 RX ORDER — LOSARTAN POTASSIUM 25 MG/1
50 TABLET ORAL DAILY
Qty: 90 TABLET | Refills: 0 | Status: SHIPPED | OUTPATIENT
Start: 2023-05-19 | End: 2023-06-26 | Stop reason: SDUPTHER

## 2023-05-19 RX ORDER — CLOPIDOGREL BISULFATE 75 MG/1
TABLET ORAL
Qty: 30 TABLET | Refills: 2 | Status: SHIPPED | OUTPATIENT
Start: 2023-05-19 | End: 2023-08-14 | Stop reason: SDUPTHER

## 2023-05-19 RX ORDER — EZETIMIBE 10 MG/1
10 TABLET ORAL DAILY
Qty: 90 TABLET | Refills: 0 | Status: SHIPPED | OUTPATIENT
Start: 2023-05-19 | End: 2023-08-23 | Stop reason: SDUPTHER

## 2023-05-19 RX ORDER — NITROGLYCERIN 0.4 MG/1
0.4 TABLET SUBLINGUAL EVERY 5 MIN PRN
Qty: 25 TABLET | Refills: 2 | Status: SHIPPED | OUTPATIENT
Start: 2023-05-19

## 2023-05-26 DIAGNOSIS — M79.2 NERVE PAIN: ICD-10-CM

## 2023-05-26 DIAGNOSIS — M54.50 CHRONIC LOW BACK PAIN, UNSPECIFIED BACK PAIN LATERALITY, UNSPECIFIED WHETHER SCIATICA PRESENT: ICD-10-CM

## 2023-05-26 DIAGNOSIS — G89.29 CHRONIC LOW BACK PAIN, UNSPECIFIED BACK PAIN LATERALITY, UNSPECIFIED WHETHER SCIATICA PRESENT: ICD-10-CM

## 2023-05-27 RX ORDER — GABAPENTIN 600 MG/1
600 TABLET ORAL 3 TIMES DAILY
Qty: 90 TABLET | Refills: 5 | OUTPATIENT
Start: 2023-05-27 | End: 2023-11-23

## 2023-05-27 RX ORDER — TIZANIDINE 4 MG/1
4 TABLET ORAL EVERY 8 HOURS
Qty: 90 TABLET | Refills: 2 | OUTPATIENT
Start: 2023-05-27 | End: 2023-11-23

## 2023-06-21 ENCOUNTER — HOSPITAL ENCOUNTER (EMERGENCY)
Facility: HOSPITAL | Age: 48
Discharge: HOME | End: 2023-06-21
Attending: EMERGENCY MEDICINE
Payer: MEDICAID

## 2023-06-21 VITALS
DIASTOLIC BLOOD PRESSURE: 87 MMHG | SYSTOLIC BLOOD PRESSURE: 166 MMHG | OXYGEN SATURATION: 98 % | TEMPERATURE: 99 F | RESPIRATION RATE: 21 BRPM | HEIGHT: 64 IN | WEIGHT: 170 LBS | HEART RATE: 83 BPM | BODY MASS INDEX: 29.02 KG/M2

## 2023-06-21 DIAGNOSIS — R07.9 CHEST PAIN: ICD-10-CM

## 2023-06-21 DIAGNOSIS — R07.9 CHEST PAIN, UNSPECIFIED TYPE: Primary | ICD-10-CM

## 2023-06-21 LAB
ALBUMIN SERPL-MCNC: 4.1 G/DL (ref 3.5–5)
ALBUMIN/GLOB SERPL: 1.2 RATIO (ref 1.1–2)
ALP SERPL-CCNC: 81 UNIT/L (ref 40–150)
ALT SERPL-CCNC: 18 UNIT/L (ref 0–55)
APTT PPP: 28.9 SECONDS (ref 23.2–33.7)
AST SERPL-CCNC: 26 UNIT/L (ref 5–34)
BASOPHILS # BLD AUTO: 0.04 X10(3)/MCL
BASOPHILS NFR BLD AUTO: 0.3 %
BILIRUBIN DIRECT+TOT PNL SERPL-MCNC: 0.3 MG/DL
BNP BLD-MCNC: 19.4 PG/ML
BUN SERPL-MCNC: 3 MG/DL (ref 7–18.7)
CALCIUM SERPL-MCNC: 9.7 MG/DL (ref 8.4–10.2)
CHLORIDE SERPL-SCNC: 107 MMOL/L (ref 98–107)
CO2 SERPL-SCNC: 23 MMOL/L (ref 22–29)
CREAT SERPL-MCNC: 0.8 MG/DL (ref 0.55–1.02)
EOSINOPHIL # BLD AUTO: 0.13 X10(3)/MCL (ref 0–0.9)
EOSINOPHIL NFR BLD AUTO: 1 %
ERYTHROCYTE [DISTWIDTH] IN BLOOD BY AUTOMATED COUNT: 12.8 % (ref 11.5–17)
GFR SERPLBLD CREATININE-BSD FMLA CKD-EPI: >60 MLS/MIN/1.73/M2
GLOBULIN SER-MCNC: 3.4 GM/DL (ref 2.4–3.5)
GLUCOSE SERPL-MCNC: 124 MG/DL (ref 74–100)
HCT VFR BLD AUTO: 46.5 % (ref 37–47)
HGB BLD-MCNC: 15.4 G/DL (ref 12–16)
IMM GRANULOCYTES # BLD AUTO: 0.08 X10(3)/MCL (ref 0–0.04)
IMM GRANULOCYTES NFR BLD AUTO: 0.6 %
INR BLD: 1.02 (ref 0–1.3)
LYMPHOCYTES # BLD AUTO: 3.75 X10(3)/MCL (ref 0.6–4.6)
LYMPHOCYTES NFR BLD AUTO: 27.5 %
MCH RBC QN AUTO: 28.5 PG (ref 27–31)
MCHC RBC AUTO-ENTMCNC: 33.1 G/DL (ref 33–36)
MCV RBC AUTO: 86.1 FL (ref 80–94)
MONOCYTES # BLD AUTO: 0.62 X10(3)/MCL (ref 0.1–1.3)
MONOCYTES NFR BLD AUTO: 4.5 %
NEUTROPHILS # BLD AUTO: 9.01 X10(3)/MCL (ref 2.1–9.2)
NEUTROPHILS NFR BLD AUTO: 66.1 %
NRBC BLD AUTO-RTO: 0 %
PLATELET # BLD AUTO: 298 X10(3)/MCL (ref 130–400)
PMV BLD AUTO: 11.1 FL (ref 7.4–10.4)
POTASSIUM SERPL-SCNC: 4.2 MMOL/L (ref 3.5–5.1)
PROT SERPL-MCNC: 7.5 GM/DL (ref 6.4–8.3)
PROTHROMBIN TIME: 13.3 SECONDS (ref 12.5–14.5)
RBC # BLD AUTO: 5.4 X10(6)/MCL (ref 4.2–5.4)
SODIUM SERPL-SCNC: 143 MMOL/L (ref 136–145)
TROPONIN I SERPL-MCNC: <0.01 NG/ML (ref 0–0.04)
TROPONIN I SERPL-MCNC: <0.01 NG/ML (ref 0–0.04)
WBC # SPEC AUTO: 13.63 X10(3)/MCL (ref 4.5–11.5)

## 2023-06-21 PROCEDURE — 85025 COMPLETE CBC W/AUTO DIFF WBC: CPT | Performed by: PHYSICIAN ASSISTANT

## 2023-06-21 PROCEDURE — 85730 THROMBOPLASTIN TIME PARTIAL: CPT | Performed by: PHYSICIAN ASSISTANT

## 2023-06-21 PROCEDURE — 93010 ELECTROCARDIOGRAM REPORT: CPT | Mod: 76,,, | Performed by: INTERNAL MEDICINE

## 2023-06-21 PROCEDURE — 93005 ELECTROCARDIOGRAM TRACING: CPT

## 2023-06-21 PROCEDURE — 93010 EKG 12-LEAD: ICD-10-PCS | Mod: ,,, | Performed by: INTERNAL MEDICINE

## 2023-06-21 PROCEDURE — 84484 ASSAY OF TROPONIN QUANT: CPT | Performed by: EMERGENCY MEDICINE

## 2023-06-21 PROCEDURE — 83880 ASSAY OF NATRIURETIC PEPTIDE: CPT | Performed by: PHYSICIAN ASSISTANT

## 2023-06-21 PROCEDURE — 85610 PROTHROMBIN TIME: CPT | Performed by: PHYSICIAN ASSISTANT

## 2023-06-21 PROCEDURE — 93010 ELECTROCARDIOGRAM REPORT: CPT | Mod: ,,, | Performed by: INTERNAL MEDICINE

## 2023-06-21 PROCEDURE — 80053 COMPREHEN METABOLIC PANEL: CPT | Performed by: PHYSICIAN ASSISTANT

## 2023-06-21 PROCEDURE — 99285 EMERGENCY DEPT VISIT HI MDM: CPT | Mod: 25

## 2023-06-21 PROCEDURE — 84484 ASSAY OF TROPONIN QUANT: CPT | Performed by: PHYSICIAN ASSISTANT

## 2023-06-21 NOTE — ED PROVIDER NOTES
"Encounter Date: 6/21/2023    SCRIBE #1 NOTE: I, Minal García, am scribing for, and in the presence of,  Bernabe Arzola III, MD. I have scribed the following portions of the note - the EKG reading. Other sections scribed: HPI, ROS, PE, MDM.     History     Chief Complaint   Patient presents with    Chest Pain     C/o sharp stabbing L sided cp w/ hx double bypass x2yr ago. Denies sob/n/v/or radiating pain. Today states heart feels like "fluttering". Unk hx of afib, states on plavix at home.      47 Y.O. female with a history of CAD, CABG (2 years ago), DM II, HTN, HLD, CVA, and anxiety presents to the ED for CP onset two days ago. Reports that the sharp, intermittent CP does not radiate and is similar to how it felt prior to her double bypass. Also reports having fluttering sensation last night. Complains of cough. Denies congestion, rhinorrhea, neck pain, and headache. States that she could possibly be stressed due to currently moving homes. Takes xanax and plavix. Smokes cigarettes regularly. Pt's PCP is JOSE RAFAEL Vu.    The history is provided by the patient. No  was used.   Chest Pain  The current episode started two days ago. Chest pain occurs intermittently. The quality of the pain is described as sharp and similar to previous episodes. The pain does not radiate. Primary symptoms include cough. Pertinent negatives for primary symptoms include no fever, no fatigue, no shortness of breath, no abdominal pain, no nausea and no vomiting.   Pertinent negatives for associated symptoms include no numbness and no weakness. Risk factors include stress and smoking/tobacco exposure.   Her past medical history is significant for CAD, diabetes and hypertension.   Review of patient's allergies indicates:   Allergen Reactions    Aspirin Nausea And Vomiting and Other (See Comments)     Other reaction(s): Stomach upset  Pt. States doesn't take because of stomach ulcers       Past Medical History: "   Diagnosis Date    Anxiety     Coronary artery disease     Depression     Diabetes mellitus     Hyperlipidemia     Hypertension     Stroke      Past Surgical History:   Procedure Laterality Date    CARDIAC CATHETERIZATION      CHOLECYSTECTOMY      CORONARY ANGIOPLASTY      CORONARY ARTERY BYPASS GRAFT      HYSTERECTOMY      right wrist surgery      TUBAL LIGATION       Family History   Problem Relation Age of Onset    Arrhythmia Mother     Clotting disorder Mother     Heart disease Mother     Heart attack Mother     Hyperlipidemia Mother     Heart failure Mother     Stroke Mother     Hypertension Mother     Diabetes Mother     Hyperlipidemia Brother     Hypertension Brother      Social History     Tobacco Use    Smoking status: Every Day     Packs/day: 0.25     Types: Cigarettes    Smokeless tobacco: Never   Substance Use Topics    Alcohol use: Not Currently    Drug use: Never     Review of Systems   Constitutional:  Negative for chills, fatigue and fever.   HENT:  Negative for congestion, rhinorrhea and sore throat.    Eyes:  Negative for visual disturbance.   Respiratory:  Positive for cough. Negative for shortness of breath.    Cardiovascular:  Positive for chest pain.   Gastrointestinal:  Negative for abdominal pain, diarrhea, nausea and vomiting.   Genitourinary:  Negative for dysuria.   Musculoskeletal:  Negative for myalgias.   Skin:  Negative for rash.   Neurological:  Negative for weakness, numbness and headaches.   All other systems reviewed and are negative.    Physical Exam     Initial Vitals [06/21/23 1511]   BP Pulse Resp Temp SpO2   (!) 145/92 103 18 98.7 °F (37.1 °C) 97 %      MAP       --         Physical Exam    Nursing note and vitals reviewed.  Constitutional: No distress.   HENT:   Head: Normocephalic and atraumatic.   Neck: Trachea normal.   Cardiovascular:  Normal rate and regular rhythm.           No murmur heard.  Pulmonary/Chest: Breath sounds normal. No respiratory distress.   Abdominal:  Abdomen is soft. Bowel sounds are normal. She exhibits no distension. There is no abdominal tenderness.   Musculoskeletal:         General: Normal range of motion.      Lumbar back: Normal range of motion.     Neurological: She is alert and oriented to person, place, and time. She has normal strength. No cranial nerve deficit.   Skin: Skin is warm and dry. No rash noted.   Psychiatric: She has a normal mood and affect. Judgment normal.       ED Course   Procedures  Labs Reviewed   COMPREHENSIVE METABOLIC PANEL - Abnormal; Notable for the following components:       Result Value    Glucose Level 124 (*)     Blood Urea Nitrogen 3.0 (*)     All other components within normal limits   CBC WITH DIFFERENTIAL - Abnormal; Notable for the following components:    WBC 13.63 (*)     MPV 11.1 (*)     IG# 0.08 (*)     All other components within normal limits   APTT - Normal   PROTIME-INR - Normal   TROPONIN I - Normal   B-TYPE NATRIURETIC PEPTIDE - Normal   TROPONIN I - Normal   CBC W/ AUTO DIFFERENTIAL    Narrative:     The following orders were created for panel order CBC auto differential.  Procedure                               Abnormality         Status                     ---------                               -----------         ------                     CBC with Differential[[id number]]        Abnormal            Final result                 Please view results for these tests on the individual orders.     EKG Readings: (Independently Interpreted)   Initial Reading: No STEMI. Rhythm: Normal Sinus Rhythm. Ectopy: No Ectopy. Conduction: Normal. ST Segments: Normal ST Segments. T Waves: Normal. Clinical Impression: Normal Sinus Rhythm   EKG performed at 1510 on 6/21/23.   Other EKG Interpretations: Repeat EKG performed at 1742 on 6/21/23. Normal sinus rhythm.     Imaging Results              X-Ray Chest PA And Lateral (Final result)  Result time 06/21/23 15:38:18      Final result by Mello Boyd MD (06/21/23  15:38:18)                   Impression:      No acute cardiopulmonary process.      Electronically signed by: Mello Boyd  Date:    06/21/2023  Time:    15:38               Narrative:    EXAMINATION:  XR CHEST PA AND LATERAL    CLINICAL HISTORY:  Chest pain, unspecified    TECHNIQUE:  Two views of the chest    COMPARISON:  02/23/2022    FINDINGS:  No focal opacification, pleural effusion, or pneumothorax.    The cardiomediastinal silhouette is within normal limits.    No acute osseous abnormality.                                     Medical Decision Making  The differential diagnosis includes, but is not limited to:   noncardiac chest pain STEMI acute coronary syndrome NSTEMI    EKG initial cardiac enzymes without abnormality no chest pain while patient in emergency room patient states that this pain is not nearly as bad very sharp lasted about a 2nd and not consistent with her previous cardiac chest pain.  Cardiac enzymes negative x2 EKG negative times acute discussed case with cardiology patient has follow-up comfortable with discharge Nicole was nurse practitioner that discussed with    Problems Addressed:  Chest pain: complicated acute illness or injury that poses a threat to life or bodily functions    Amount and/or Complexity of Data Reviewed  External Data Reviewed: labs, radiology, ECG and notes.  Labs: ordered.  Radiology: ordered and independent interpretation performed.         Medications - No data to display           Scribe Attestation:   Scribe #1: I performed the above scribed service and the documentation accurately describes the services I performed. I attest to the accuracy of the note.    Attending Attestation:           Physician Attestation for Scribe:  Physician Attestation Statement for Scribe #1: I, Bernabe Arzola III, MD, reviewed documentation, as scribed by Minal García in my presence, and it is both accurate and complete.                        Clinical Impression:   Final  diagnoses:  [R07.9] Chest pain  [R07.9] Chest pain, unspecified type (Primary)        ED Disposition Condition    Discharge Stable          ED Prescriptions    None       Follow-up Information       Follow up With Specialties Details Why Contact Info    JOSE RAFAEL Vu Nurse Practitioner In 3 days  [address] [facility] [address] [phone]      [facility]  In 3 days  Call the cardiology clinic tomorrow and see if they can see you within a week             Bernabe Arzola III, MD  06/21/23 3070

## 2023-06-26 ENCOUNTER — OFFICE VISIT (OUTPATIENT)
Dept: INTERNAL MEDICINE | Facility: CLINIC | Age: 48
End: 2023-06-26
Payer: MEDICAID

## 2023-06-26 VITALS
HEART RATE: 98 BPM | DIASTOLIC BLOOD PRESSURE: 86 MMHG | HEIGHT: 64 IN | TEMPERATURE: 98 F | BODY MASS INDEX: 28.34 KG/M2 | WEIGHT: 166 LBS | SYSTOLIC BLOOD PRESSURE: 135 MMHG

## 2023-06-26 DIAGNOSIS — Z23 IMMUNIZATION DUE: ICD-10-CM

## 2023-06-26 DIAGNOSIS — K21.9 GASTROESOPHAGEAL REFLUX DISEASE, UNSPECIFIED WHETHER ESOPHAGITIS PRESENT: ICD-10-CM

## 2023-06-26 DIAGNOSIS — M79.2 NERVE PAIN: ICD-10-CM

## 2023-06-26 DIAGNOSIS — M54.50 CHRONIC LOW BACK PAIN, UNSPECIFIED BACK PAIN LATERALITY, UNSPECIFIED WHETHER SCIATICA PRESENT: ICD-10-CM

## 2023-06-26 DIAGNOSIS — E11.9 TYPE 2 DIABETES MELLITUS WITHOUT COMPLICATION, UNSPECIFIED WHETHER LONG TERM INSULIN USE: Primary | ICD-10-CM

## 2023-06-26 DIAGNOSIS — F17.210 CIGARETTE NICOTINE DEPENDENCE WITHOUT COMPLICATION: ICD-10-CM

## 2023-06-26 DIAGNOSIS — Z00.00 WELLNESS EXAMINATION: ICD-10-CM

## 2023-06-26 DIAGNOSIS — R19.5 POSITIVE COLORECTAL CANCER SCREENING USING COLOGUARD TEST: ICD-10-CM

## 2023-06-26 DIAGNOSIS — I25.10 CORONARY ARTERY DISEASE, UNSPECIFIED VESSEL OR LESION TYPE, UNSPECIFIED WHETHER ANGINA PRESENT, UNSPECIFIED WHETHER NATIVE OR TRANSPLANTED HEART: ICD-10-CM

## 2023-06-26 DIAGNOSIS — G89.29 CHRONIC LOW BACK PAIN, UNSPECIFIED BACK PAIN LATERALITY, UNSPECIFIED WHETHER SCIATICA PRESENT: ICD-10-CM

## 2023-06-26 PROCEDURE — 3079F DIAST BP 80-89 MM HG: CPT | Mod: CPTII,,, | Performed by: NURSE PRACTITIONER

## 2023-06-26 PROCEDURE — 3079F PR MOST RECENT DIASTOLIC BLOOD PRESSURE 80-89 MM HG: ICD-10-PCS | Mod: CPTII,,, | Performed by: NURSE PRACTITIONER

## 2023-06-26 PROCEDURE — 99396 PR PREVENTIVE VISIT,EST,40-64: ICD-10-PCS | Mod: S$PBB,25,, | Performed by: NURSE PRACTITIONER

## 2023-06-26 PROCEDURE — 3075F PR MOST RECENT SYSTOLIC BLOOD PRESS GE 130-139MM HG: ICD-10-PCS | Mod: CPTII,,, | Performed by: NURSE PRACTITIONER

## 2023-06-26 PROCEDURE — 3060F POS MICROALBUMINURIA REV: CPT | Mod: CPTII,,, | Performed by: NURSE PRACTITIONER

## 2023-06-26 PROCEDURE — 1160F PR REVIEW ALL MEDS BY PRESCRIBER/CLIN PHARMACIST DOCUMENTED: ICD-10-PCS | Mod: CPTII,,, | Performed by: NURSE PRACTITIONER

## 2023-06-26 PROCEDURE — 4010F PR ACE/ARB THEARPY RXD/TAKEN: ICD-10-PCS | Mod: CPTII,,, | Performed by: NURSE PRACTITIONER

## 2023-06-26 PROCEDURE — 3008F PR BODY MASS INDEX (BMI) DOCUMENTED: ICD-10-PCS | Mod: CPTII,,, | Performed by: NURSE PRACTITIONER

## 2023-06-26 PROCEDURE — 1159F PR MEDICATION LIST DOCUMENTED IN MEDICAL RECORD: ICD-10-PCS | Mod: CPTII,,, | Performed by: NURSE PRACTITIONER

## 2023-06-26 PROCEDURE — 99215 OFFICE O/P EST HI 40 MIN: CPT | Mod: PBBFAC | Performed by: NURSE PRACTITIONER

## 2023-06-26 PROCEDURE — 3075F SYST BP GE 130 - 139MM HG: CPT | Mod: CPTII,,, | Performed by: NURSE PRACTITIONER

## 2023-06-26 PROCEDURE — 99396 PREV VISIT EST AGE 40-64: CPT | Mod: S$PBB,25,, | Performed by: NURSE PRACTITIONER

## 2023-06-26 PROCEDURE — 1159F MED LIST DOCD IN RCRD: CPT | Mod: CPTII,,, | Performed by: NURSE PRACTITIONER

## 2023-06-26 PROCEDURE — 3060F PR POS MICROALBUMINURIA RESULT DOCUMENTED/REVIEW: ICD-10-PCS | Mod: CPTII,,, | Performed by: NURSE PRACTITIONER

## 2023-06-26 PROCEDURE — 3066F NEPHROPATHY DOC TX: CPT | Mod: CPTII,,, | Performed by: NURSE PRACTITIONER

## 2023-06-26 PROCEDURE — 3066F PR DOCUMENTATION OF TREATMENT FOR NEPHROPATHY: ICD-10-PCS | Mod: CPTII,,, | Performed by: NURSE PRACTITIONER

## 2023-06-26 PROCEDURE — 90677 PCV20 VACCINE IM: CPT | Mod: PBBFAC

## 2023-06-26 PROCEDURE — 99406 BEHAV CHNG SMOKING 3-10 MIN: CPT | Mod: S$PBB,,, | Performed by: NURSE PRACTITIONER

## 2023-06-26 PROCEDURE — 99406 PR TOBACCO USE CESSATION INTERMEDIATE 3-10 MINUTES: ICD-10-PCS | Mod: S$PBB,,, | Performed by: NURSE PRACTITIONER

## 2023-06-26 PROCEDURE — 1160F RVW MEDS BY RX/DR IN RCRD: CPT | Mod: CPTII,,, | Performed by: NURSE PRACTITIONER

## 2023-06-26 PROCEDURE — 4010F ACE/ARB THERAPY RXD/TAKEN: CPT | Mod: CPTII,,, | Performed by: NURSE PRACTITIONER

## 2023-06-26 PROCEDURE — 3008F BODY MASS INDEX DOCD: CPT | Mod: CPTII,,, | Performed by: NURSE PRACTITIONER

## 2023-06-26 RX ORDER — PEN NEEDLE, DIABETIC 30 GX3/16"
NEEDLE, DISPOSABLE MISCELLANEOUS
Qty: 100 EACH | Refills: 6 | Status: SHIPPED | OUTPATIENT
Start: 2023-06-26 | End: 2023-07-14 | Stop reason: SDUPTHER

## 2023-06-26 RX ORDER — INSULIN GLARGINE 100 [IU]/ML
15 INJECTION, SOLUTION SUBCUTANEOUS NIGHTLY
Qty: 3 ML | Refills: 2 | Status: SHIPPED | OUTPATIENT
Start: 2023-06-26 | End: 2024-02-01

## 2023-06-26 RX ORDER — METOPROLOL SUCCINATE 25 MG/1
25 TABLET, EXTENDED RELEASE ORAL 2 TIMES DAILY
Qty: 90 TABLET | Refills: 1 | Status: SHIPPED | OUTPATIENT
Start: 2023-06-26 | End: 2023-08-14 | Stop reason: SDUPTHER

## 2023-06-26 RX ORDER — GABAPENTIN 600 MG/1
600 TABLET ORAL 3 TIMES DAILY
Qty: 90 TABLET | Refills: 5 | Status: SHIPPED | OUTPATIENT
Start: 2023-06-26 | End: 2023-12-21 | Stop reason: SDUPTHER

## 2023-06-26 RX ORDER — ISOSORBIDE MONONITRATE 30 MG/1
30 TABLET, EXTENDED RELEASE ORAL DAILY
Qty: 90 TABLET | Refills: 1 | Status: SHIPPED | OUTPATIENT
Start: 2023-06-26 | End: 2023-08-14 | Stop reason: SDUPTHER

## 2023-06-26 RX ORDER — ROSUVASTATIN CALCIUM 40 MG/1
40 TABLET, COATED ORAL DAILY
Qty: 90 TABLET | Refills: 1 | Status: SHIPPED | OUTPATIENT
Start: 2023-06-26 | End: 2023-08-14 | Stop reason: SDUPTHER

## 2023-06-26 RX ORDER — PANTOPRAZOLE SODIUM 40 MG/1
40 TABLET, DELAYED RELEASE ORAL DAILY
Qty: 90 TABLET | Refills: 1 | Status: SHIPPED | OUTPATIENT
Start: 2023-06-26 | End: 2023-11-13 | Stop reason: SDUPTHER

## 2023-06-26 RX ORDER — LOSARTAN POTASSIUM 25 MG/1
50 TABLET ORAL DAILY
Qty: 180 TABLET | Refills: 1 | Status: SHIPPED | OUTPATIENT
Start: 2023-06-26 | End: 2023-08-14 | Stop reason: SDUPTHER

## 2023-06-26 RX ORDER — TIZANIDINE 4 MG/1
4 TABLET ORAL EVERY 8 HOURS
Qty: 90 TABLET | Refills: 2 | Status: SHIPPED | OUTPATIENT
Start: 2023-06-26 | End: 2023-11-14 | Stop reason: SDUPTHER

## 2023-06-26 RX ORDER — METFORMIN HYDROCHLORIDE 500 MG/1
1000 TABLET, EXTENDED RELEASE ORAL 2 TIMES DAILY WITH MEALS
Qty: 360 TABLET | Refills: 1 | Status: SHIPPED | OUTPATIENT
Start: 2023-06-26 | End: 2024-02-01 | Stop reason: SDUPTHER

## 2023-06-26 NOTE — ASSESSMENT & PLAN NOTE
A1C 7.7  Continue RX metformin 1000 mg BID, Trulicity 3 mg weekly, and Lantus 15 units q hs  6 month f/u

## 2023-06-26 NOTE — PROGRESS NOTES
Flory De La Cruz, JOSE RAFAEL   [facility]  [address] [address]      PATIENT NAME: Kaye Spring  : 1975  DATE: 23  MRN: [id number]      Reason for Visit / Chief Complaint: Follow-up (Lab review )       History of Present Illness / Problem Focused Workflow     Kaye Spring presents to the clinic with Follow-up (Lab review )     48 yo WF here for virtual f/u. PMHx includes GERD, BPV (self-treats with Epley), chronic rhinitis, T2DM, HLD, CAD with MI in 2019, with stenting, HTN, chronic lower back pain, ventral hernia, AUB, endometriosis, depression with anxiety, and tobacco use, 1-2 ciggs / day.  Followed by Nikki Ackerman, mental health NP for psychiatric care and med management every 3 months. Disabled. Followed by [facility] for Cardiology Services.      Cervical Cancer Screening - F/U [facility] GYN Annually  Breast Cancer Screening - 23 MMG  Osteoporosis Screening -3/16/22 Vitamin D Level 25.8  Diabetic Screening - 10/17/22 Fundal Ordered . 22 Foot Exam.   Eye Screening-   Dental Screening -      2023  Pt here today for 3 month f/u, labs completed just prior to OV. Pt with a critical glucose of 534 and an A1C that has increased to > 14. The patient reports she is drinking about a 12 pack of soda a day every day, we discussed this in detail how concerning this is. The pt reports compliance with the medicines mostly; today we will increase the Trulicity to 3 mg weekly and also start Lantus q hs 10 units. Pt given glucose log today and will f/u in 3 weeks F2F for evaluation. We discussed consequences of uncontrolled T2DM including death, the pt verbalized understanding.      2023  Pt here today for 3 week f/u for evaluation of Gluocose logs. Averages 150's 200's for AM fasting levels, will increase Lantus today to 15 units q hs. Pt reports compliance with all medicines at this time, reports she has decreased herself  to only 2 sodas a day and will continue to work to eliminate them completely. Will f/u in 6 weeks for routine f/u with fasting labs. Denies any other issues or concerns at this time. Reports she turned in Cologuard, results pending.     06/26/2023  Pt here today for routine for T2DM, A1C much improved today to 7.7 since our last check, pt reports compliance with medications and lifestyle changes. BP WNL.  Pt agreeable to PCV 20 today. Will f/u in 6 months for routine labs and f/u. Recent ED visit with c/o chest pain, has f/u with Cardiology on 07/13 and pt is aware. Denies any other issues or concerns today. Discussed positive Cologuard test, agreeable to referral to Dr.Maxie Encinas. Will f/u with pt in 6 months for routine f/u and wellness with fasting labs.   Denies chest pain, shortness of breath, cough, fever, headache, dizziness, weakness, abdominal pain, nausea, vomiting, diarrhea, constipation, black/bloody stools, unplanned weight loss, night sweats, changes in urinary patterns, burning/odor with urination, depression, anxiety, and SI/HI.       Follow-up        Review of Systems     Review of Systems   Constitutional: Negative.    HENT: Negative.     Eyes: Negative.    Respiratory: Negative.     Cardiovascular: Negative.    Gastrointestinal: Negative.    Endocrine: Negative.    Genitourinary: Negative.    Neurological: Negative.    Psychiatric/Behavioral: Negative.         Medications and Allergies     Medications  Medication List with Changes/Refills   Current Medications    ACCU-CHEK GUIDE ME GLUCOSE MTR MISC        ALBUTEROL (PROVENTIL/VENTOLIN HFA) 90 MCG/ACTUATION INHALER    Inhale 2 puffs into the lungs 4 (four) times daily as needed for Wheezing or Shortness of Breath.    ALPRAZOLAM (XANAX) 1 MG TABLET    Take 1 mg by mouth 3 (three) times daily as needed.    AMITIZA 24 MCG CAP    Take 24 mcg by mouth 2 (two) times daily.    ARIPIPRAZOLE (ABILIFY) 2 MG TAB    Take 2 mg by mouth once daily.    BLOOD  SUGAR DIAGNOSTIC STRP    To check BG 2 times daily, to use with insurance preferred meter E11.9    BLOOD-GLUCOSE METER KIT    To check BG 2 times daily, to use with insurance preferred meter E11.9    CLOPIDOGREL (PLAVIX) 75 MG TABLET    See Instructions, TAKE ONE TABLET BY MOUTH EVERY DAY, # 90 tab(s), 3 Refill(s), Pharmacy: [place] [facility] [id number] [id number], 160, cm, Height/Length Dosing, 21 13:52:00 CDT, 85, kg, Weight Dosing, 21 13:52:00 CDT    DESVENLAFAXINE SUCCINATE (PRISTIQ) 100 MG TB24        DOXEPIN (SINEQUAN) 25 MG CAPSULE    Take 25 mg by mouth nightly.    DULAGLUTIDE (TRULICITY) 3 MG/0.5 ML PEN INJECTOR    Inject 3 mg into the skin every 7 days. For Diabetes    EZETIMIBE (ZETIA) 10 MG TABLET    Take 1 tablet (10 mg total) by mouth once daily.    LANCETS MISC    To check BG 2 times daily, to use with insurance preferred meter E11.9    METOCLOPRAMIDE HCL (REGLAN) 5 MG TABLET    Take 5 mg by mouth 4 (four) times daily.    NITROGLYCERIN (NITROSTAT) 0.4 MG SL TABLET    Place 1 tablet (0.4 mg total) under the tongue every 5 (five) minutes as needed for Chest pain.    OXCARBAZEPINE (TRILEPTAL) 300 MG TAB    Take 300 mg by mouth 2 (two) times daily.    SUPREP BOWEL PREP KIT 17.5-3.13-1.6 GRAM SOLR    SMARTSI Kit(s) By Mouth Once   Changed and/or Refilled Medications    Modified Medication Previous Medication    GABAPENTIN (NEURONTIN) 600 MG TABLET gabapentin (NEURONTIN) 600 MG tablet       Take 1 tablet (600 mg total) by mouth 3 (three) times daily. As needed for back pain    Take 1 tablet (600 mg total) by mouth 3 (three) times daily. As needed for back pain    INSULIN (LANTUS SOLOSTAR U-100 INSULIN) GLARGINE 100 UNITS/ML SUBQ PEN insulin (LANTUS SOLOSTAR U-100 INSULIN) glargine 100 units/mL SubQ pen       Inject 15 Units into the skin every evening.    Inject 15 Units into the skin every evening.    ISOSORBIDE MONONITRATE (IMDUR) 30 MG 24 HR TABLET isosorbide mononitrate (IMDUR) 30 MG 24 hr tablet        "Take 1 tablet (30 mg total) by mouth once daily.    Take 1 tablet (30 mg total) by mouth once daily.    LOSARTAN (COZAAR) 25 MG TABLET losartan (COZAAR) 25 MG tablet       Take 2 tablets (50 mg total) by mouth once daily.    Take 2 tablets (50 mg total) by mouth once daily.    METFORMIN (GLUCOPHAGE-XR) 500 MG ER 24HR TABLET metFORMIN (GLUCOPHAGE-XR) 500 MG ER 24hr tablet       Take 2 tablets (1,000 mg total) by mouth 2 (two) times daily with meals.    Take 2 tablets (1,000 mg total) by mouth 2 (two) times daily with meals.    METOPROLOL SUCCINATE (TOPROL-XL) 25 MG 24 HR TABLET metoprolol succinate (TOPROL-XL) 25 MG 24 hr tablet       Take 1 tablet (25 mg total) by mouth 2 (two) times daily.    Take 1 tablet (25 mg total) by mouth 2 (two) times daily.    PANTOPRAZOLE (PROTONIX) 40 MG TABLET pantoprazole (PROTONIX) 40 MG tablet       Take 1 tablet (40 mg total) by mouth once daily. For Acid Reflux    Take 1 tablet (40 mg total) by mouth once daily. For Acid Reflux    PEN NEEDLE, DIABETIC 32 GAUGE X 5/32" NDLE pen needle, diabetic 32 gauge x 5/32" Ndle       For Nightly Lantus Injection. ICD 10 E11.9    For Nightly Lantus Injection. ICD 10 E11.9    ROSUVASTATIN (CRESTOR) 40 MG TAB rosuvastatin (CRESTOR) 40 MG Tab       Take 1 tablet (40 mg total) by mouth once daily.    Take 1 tablet (40 mg total) by mouth once daily.    TIZANIDINE (ZANAFLEX) 4 MG TABLET tiZANidine (ZANAFLEX) 4 MG tablet       Take 1 tablet (4 mg total) by mouth every 8 (eight) hours.    Take 1 tablet (4 mg total) by mouth every 8 (eight) hours.         Allergies  Review of patient's allergies indicates:   Allergen Reactions    Aspirin Nausea And Vomiting and Other (See Comments)     Other reaction(s): Stomach upset  Pt. States doesn't take because of stomach ulcers         Physical Examination     Vitals:    06/26/23 0934   BP: 135/86   Pulse: 98   Temp: 97.9 °F (36.6 °C)     Physical Exam  Vitals reviewed.   Constitutional:       Appearance: Normal " appearance. She is normal weight.   HENT:      Head: Normocephalic.   Cardiovascular:      Rate and Rhythm: Normal rate and regular rhythm.      Pulses: Normal pulses.      Heart sounds: Normal heart sounds.   Pulmonary:      Effort: Pulmonary effort is normal.      Breath sounds: Normal breath sounds.   Abdominal:      General: Abdomen is flat.      Palpations: Abdomen is soft.   Musculoskeletal:         General: Normal range of motion.      Cervical back: Normal range of motion.   Skin:     General: Skin is warm and dry.   Neurological:      Mental Status: She is alert.   Psychiatric:         Mood and Affect: Mood normal.         Results     Lab Results   Component Value Date    WBC 13.63 (H) 06/21/2023    RBC 5.40 06/21/2023    HGB 15.4 06/21/2023    HCT 46.5 06/21/2023    MCV 86.1 06/21/2023    MCH 28.5 06/21/2023    MCHC 33.1 06/21/2023    RDW 12.8 06/21/2023     06/21/2023    MPV 11.1 (H) 06/21/2023     Sodium Level   Date Value Ref Range Status   06/26/2023 142 136 - 145 mmol/L Final     Potassium Level   Date Value Ref Range Status   06/26/2023 3.4 (L) 3.5 - 5.1 mmol/L Final     Carbon Dioxide   Date Value Ref Range Status   06/26/2023 25 22 - 29 mmol/L Final     Blood Urea Nitrogen   Date Value Ref Range Status   06/26/2023 4.0 (L) 7.0 - 18.7 mg/dL Final     Creatinine   Date Value Ref Range Status   06/26/2023 0.72 0.55 - 1.02 mg/dL Final     Calcium Level Total   Date Value Ref Range Status   06/26/2023 9.6 8.4 - 10.2 mg/dL Final     Albumin Level   Date Value Ref Range Status   06/21/2023 4.1 3.5 - 5.0 g/dL Final     Bilirubin Total   Date Value Ref Range Status   06/21/2023 0.3 <=1.5 mg/dL Final     Alkaline Phosphatase   Date Value Ref Range Status   06/21/2023 81 40 - 150 unit/L Final     Aspartate Aminotransferase   Date Value Ref Range Status   06/21/2023 26 5 - 34 unit/L Final     Alanine Aminotransferase   Date Value Ref Range Status   06/21/2023 18 0 - 55 unit/L Final     Estimated GFR-Non     Date Value Ref Range Status   06/28/2022 >60 mls/min/1.73/m2 Final     Lab Results   Component Value Date    CHOL 133 01/03/2023     Lab Results   Component Value Date    HDL 37 01/03/2023     No results found for: LDLCALC  Lab Results   Component Value Date    TRIG 192 (H) 01/03/2023     No results found for: CHOLHDL  Lab Results   Component Value Date    TSH 1.831 01/03/2023     Lab Results   Component Value Date    PHUR 5.5 09/13/2021    PROTEINUA Negative 06/26/2023    GLUCUA Negative 09/13/2021    KETONESU Negative 09/13/2021    OCCULTUA Negative 09/13/2021    NITRITE Negative 09/13/2021    LEUKOCYTESUR Negative 06/26/2023     Lab Results   Component Value Date    HGBA1C 7.7 (H) 06/26/2023    HGBA1C >14.0 (H) 04/17/2023    HGBA1C 10.0 (H) 01/03/2023           Assessment         ICD-10-CM ICD-9-CM   1. Type 2 diabetes mellitus without complication, unspecified whether long term insulin use  E11.9 250.00   2. Coronary artery disease, unspecified vessel or lesion type, unspecified whether angina present, unspecified whether native or transplanted heart  I25.10 414.00   3. Nerve pain  M79.2 729.2   4. Chronic low back pain, unspecified back pain laterality, unspecified whether sciatica present  M54.50 724.2    G89.29 338.29   5. Gastroesophageal reflux disease, unspecified whether esophagitis present  K21.9 530.81   6. Positive colorectal cancer screening using Cologuard test  R19.5 787.7   7. Immunization due  Z23 V05.9   8. Wellness examination  Z00.00 V70.0       Plan      Problem List Items Addressed This Visit          Neuro    Nerve pain    Relevant Medications    gabapentin (NEURONTIN) 600 MG tablet       Cardiac/Vascular    Coronary artery disease    Relevant Medications    isosorbide mononitrate (IMDUR) 30 MG 24 hr tablet    losartan (COZAAR) 25 MG tablet    rosuvastatin (CRESTOR) 40 MG Tab    metoprolol succinate (TOPROL-XL) 25 MG 24 hr tablet       Oncology    Positive colorectal  "cancer screening using Cologuard test    Relevant Orders    Ambulatory referral/consult to Gastroenterology       Endocrine    Type 2 diabetes mellitus without complication - Primary    Overview     Follow ADA diet.  Avoid soda, simple sweets, and limit rice/pasta/bread/starches and consume brown options when possible.   Maintain healthy weight with BMI goal <30.   Perform aerobic exercise for 150 minutes per week (or 5 days a week for 30 minutes each day).   Examine feet daily.            Current Assessment & Plan     A1C 7.7  Continue RX metformin 1000 mg BID, Trulicity 3 mg weekly, and Lantus 15 units q hs  6 month f/u          Relevant Medications    metFORMIN (GLUCOPHAGE-XR) 500 MG ER 24hr tablet    insulin (LANTUS SOLOSTAR U-100 INSULIN) glargine 100 units/mL SubQ pen    pen needle, diabetic 32 gauge x 5/32" Ndle    Other Relevant Orders    Hemoglobin A1C    MICROALBUMIN / CREATININE RATIO URINE    Diabetic Eye Screening Photo       GI    Gastroesophageal reflux disease    Relevant Medications    pantoprazole (PROTONIX) 40 MG tablet       Orthopedic    Chronic back pain    Overview     Perform back stretches daily.   Avoid activities than increase back pain or stiffness.   Apply heating pad, ice pack, and Icy Hot / BioFreeze as needed; alternate every 15-20 minutes.   Massage back to loosen muscles.            Current Assessment & Plan     Stable  Continue RX gabapentin 600 mg TID PRN and RX tizanidine prn          Relevant Medications    gabapentin (NEURONTIN) 600 MG tablet    tiZANidine (ZANAFLEX) 4 MG tablet     Other Visit Diagnoses       Immunization due        Relevant Orders    (In Office Administered) Pneumococcal Conjugate Vaccine (20 Valent) (IM) (Completed)    Wellness examination        Relevant Orders    Vitamin D    Urinalysis, Reflex to Urine Culture    T4, Free    TSH    Lipid Panel    Comprehensive Metabolic Panel    CBC Auto Differential            Future Appointments   Date Time Provider " Department Center   6/26/2023 10:00 AM JOSE RAFAEL Vu [facility]TMED [place]   7/13/2023 10:30 AM Tyler Stevenson MD [facility] CARD [place]   9/19/2023  1:10 PM JYOTI Cabrales [facility] GYN [place]            Signature:     [facility]  [address] [facility] [id number]    Date of encounter: 6/26/23

## 2023-07-13 ENCOUNTER — OFFICE VISIT (OUTPATIENT)
Dept: CARDIOLOGY | Facility: CLINIC | Age: 48
End: 2023-07-13
Payer: MEDICAID

## 2023-07-13 VITALS
RESPIRATION RATE: 20 BRPM | OXYGEN SATURATION: 97 % | TEMPERATURE: 98 F | SYSTOLIC BLOOD PRESSURE: 118 MMHG | WEIGHT: 168.19 LBS | HEIGHT: 64 IN | DIASTOLIC BLOOD PRESSURE: 75 MMHG | BODY MASS INDEX: 28.71 KG/M2 | HEART RATE: 91 BPM

## 2023-07-13 DIAGNOSIS — I10 PRIMARY HYPERTENSION: ICD-10-CM

## 2023-07-13 DIAGNOSIS — E78.2 MIXED HYPERLIPIDEMIA: ICD-10-CM

## 2023-07-13 DIAGNOSIS — Z72.0 TOBACCO ABUSE: ICD-10-CM

## 2023-07-13 DIAGNOSIS — I25.10 ARTERIOSCLEROSIS OF CORONARY ARTERY: Primary | ICD-10-CM

## 2023-07-13 DIAGNOSIS — R00.2 PALPITATIONS: ICD-10-CM

## 2023-07-13 PROBLEM — K21.9 GASTROESOPHAGEAL REFLUX DISEASE: Status: RESOLVED | Noted: 2023-06-26 | Resolved: 2023-07-13

## 2023-07-13 PROCEDURE — 99215 OFFICE O/P EST HI 40 MIN: CPT | Mod: PBBFAC | Performed by: INTERNAL MEDICINE

## 2023-07-13 RX ORDER — VARENICLINE TARTRATE 0.5 (11)-1
KIT ORAL
Qty: 1 EACH | Refills: 0 | Status: SHIPPED | OUTPATIENT
Start: 2023-07-13

## 2023-07-13 NOTE — PROGRESS NOTES
07/13/2023 10:31 AM    Subjective:     CHIEF COMPLAINT:   Chief Complaint   Patient presents with    f/u sts heart still feels fluttering feels like stabbing in    wants to stop smoking wants pill                           HPI:    Ms. Kaye Spring is a 47 y.o. female.      Today the patient comes for a follow-up appointment.    CP:  The patient has no chest discomfort since being seen in ER at [facility] 3 weeks ago.      SOB:  The patient has shortness of breath Has ESPARZA    EDEMA:  The patient has rare edema in left lower ext (SVG taken from Left leg)      ORTHOPNEA:  The patient denies orthopnea.  No PND.      SYNCOPE:  The patient denies near syncope.  No syncope.   Denies getting lightheaded.    PALPITATIONS:  The patient has palpitations.  Has fluttering for the past month.      LEVEL OF EXERTION:  The patient walks some.  The patient has symptoms with this level of exertion.  The patient's level of exertion is limited.  METS:  The patient does the following activities:    walk (3 METS)        Past Medical History    Patient Active Problem List   Diagnosis    Arteriosclerosis of coronary artery    Other synovitis and tenosynovitis, unspecified hand    Anxiety    Benign paroxysmal positional vertigo    Chronic back pain    Chronic rhinitis    Gastroesophageal reflux disease with esophagitis    Diastasis of rectus abdominis    Primary hypertension    Leukocytosis    Microcytic hypochromic anemia    Mixed anxiety depressive disorder    Mixed hyperlipidemia    Obesity    Spondylosis of lumbar spine    Thrombocythemia    Type 2 diabetes mellitus without complication    Cigarette nicotine dependence without complication    Colon cancer screening    Nerve pain    Positive colorectal cancer screening using Cologuard test       Surgical History    Past Surgical History:   Procedure Laterality Date    CARDIAC CATHETERIZATION      CARDIAC VALVE REPLACEMENT      CHOLECYSTECTOMY      CORONARY ANGIOPLASTY      CORONARY ARTERY  BYPASS GRAFT      HYSTERECTOMY      right wrist surgery      TUBAL LIGATION         Social History     Socioeconomic History    Marital status: Single   Tobacco Use    Smoking status: Every Day     Packs/day: 0.25     Years: 10.00     Pack years: 2.50     Types: Cigarettes    Smokeless tobacco: Never   Substance and Sexual Activity    Alcohol use: Not Currently    Drug use: Never    Sexual activity: Yes     Social Determinants of Health     Financial Resource Strain: Low Risk     Difficulty of Paying Living Expenses: Not hard at all   Food Insecurity: No Food Insecurity    Worried About Running Out of Food in the Last Year: Never true    Ran Out of Food in the Last Year: Never true   Transportation Needs: No Transportation Needs    Lack of Transportation (Medical): No    Lack of Transportation (Non-Medical): No   Physical Activity: Sufficiently Active    Days of Exercise per Week: 5 days    Minutes of Exercise per Session: 40 min   Stress: No Stress Concern Present    Feeling of Stress : Not at all   Social Connections: Moderately Isolated    Frequency of Communication with Friends and Family: More than three times a week    Frequency of Social Gatherings with Friends and Family: More than three times a week    Attends Zoroastrianism Services: More than 4 times per year    Active Member of Clubs or Organizations: No    Attends Club or Organization Meetings: Never    Marital Status: Never    Housing Stability: Unknown    Unable to Pay for Housing in the Last Year: No    Unstable Housing in the Last Year: No       Family History   Problem Relation Age of Onset    Arrhythmia Mother     Clotting disorder Mother     Heart disease Mother     Heart attack Mother     Hyperlipidemia Mother     Heart failure Mother     Stroke Mother     Hypertension Mother     Diabetes Mother     Depression Mother     Hyperlipidemia Brother     Hypertension Brother      Review of patient's allergies indicates:   Allergen Reactions     Aspirin Nausea And Vomiting and Other (See Comments)     Other reaction(s): Stomach upset  Pt. States doesn't take because of stomach ulcers         Current Medications    Current Outpatient Medications   Medication Instructions    ACCU-CHEK GUIDE ME GLUCOSE MTR Misc No dose, route, or frequency recorded.    albuterol (PROVENTIL/VENTOLIN HFA) 90 mcg/actuation inhaler 2 puffs, Inhalation, 4 times daily PRN    ALPRAZolam (XANAX) 1 mg, Oral, 3 times daily PRN    AMITIZA 24 mcg, Oral, 2 times daily    ARIPiprazole (ABILIFY) 2 mg, Oral, Daily    blood sugar diagnostic Strp To check BG 2 times daily, to use with insurance preferred meter E11.9    blood-glucose meter kit To check BG 2 times daily, to use with insurance preferred meter E11.9    clopidogreL (PLAVIX) 75 mg tablet <BR>See Instructions, TAKE ONE TABLET BY MOUTH EVERY DAY, # 90 tab(s), 3 Refill(s), Pharmacy: [facility] [id number] [id number], 160, cm, Height/Length Dosing, 09/01/21 13:52:00 CDT, 85, kg, Weight Dosing, 09/01/21 13:52:00 CDT    desvenlafaxine succinate (PRISTIQ) 100 MG Tb24 No dose, route, or frequency recorded.    doxepin (SINEQUAN) 25 mg, Oral, Nightly    dulaglutide (TRULICITY) 3 mg, Subcutaneous, Every 7 days, For Diabetes    ezetimibe (ZETIA) 10 mg, Oral, Daily    gabapentin (NEURONTIN) 600 mg, Oral, 3 times daily, As needed for back pain    insulin (LANTUS SOLOSTAR U-100 INSULIN) 15 Units, Subcutaneous, Nightly    isosorbide mononitrate (IMDUR) 30 mg, Oral, Daily    lancets Misc To check BG 2 times daily, to use with insurance preferred meter E11.9    losartan (COZAAR) 50 mg, Oral, Daily    metFORMIN (GLUCOPHAGE-XR) 1,000 mg, Oral, 2 times daily with meals    metoclopramide HCl (REGLAN) 5 mg, Oral, 4 times daily    metoprolol succinate (TOPROL-XL) 25 mg, Oral, 2 times daily    nitroGLYCERIN (NITROSTAT) 0.4 mg, Sublingual, Every 5 min PRN    OXcarbazepine (TRILEPTAL) 300 mg, Oral, 2 times daily    pantoprazole (PROTONIX) 40 mg, Oral, Daily, For Acid  "Reflux    pen needle, diabetic 32 gauge x " Ndle For Nightly Lantus Injection. ICD 10 E11.9    rosuvastatin (CRESTOR) 40 mg, Oral, Daily    SUPREP BOWEL PREP KIT 17.5-3.13-1.6 gram SolR SMARTSI Kit(s) By Mouth Once    tiZANidine (ZANAFLEX) 4 mg, Oral, Every 8 hours         ROS:   refer to HPI   GEN   No fever  No weakness  RESP  + SOB  + wheezing  SKIN  No pruritus  No rash  CARD  +/- CP  + palpitations        VASC  No cyanosis  No claudication  HEM   No adenopathy  + easy bruising   GI  + heart burn  No melena    NEURO  No dizziness  No syncope        Objective:     BP Readings from Last 3 Encounters:   23 118/75   23 135/86   23 (!) 166/87        Pulse Readings from Last 3 Encounters:   23 91   23 98   23 83        Temp Readings from Last 3 Encounters:   23 98.4 °F (36.9 °C) (Oral)   23 97.9 °F (36.6 °C)   23 98.7 °F (37.1 °C) (Oral)       Wt Readings from Last 3 Encounters:   23 76.3 kg (168 lb 3.4 oz)   23 75.3 kg (166 lb)   23 77.1 kg (170 lb)         PE:  Blood pressure 118/75, pulse 91, temperature 98.4 °F (36.9 °C), temperature source Oral, resp. rate 20, height 5' 4" (1.626 m), weight 76.3 kg (168 lb 3.4 oz), SpO2 97 %.   GENERAL:  Ambulates  CONSTITUTIONAL:  No acute distress.  Not ill appearing.  NECK:  No cervical adenopathy.  No carotid bruit.  CARDIOVASCULAR:  Normal rate.  Regular rhythm.  No murmur.  PULMONARY:  No respiratory distress.  No wheezing.  No crackles.  ABDOMINAL:  No distention.  No tenderness.  MUSCULOSKELETAL:  No deformity.  No edema  SKIN:  No bruising.  No rash.  NEUROLOGICAL:  Oriented x 3.  No weakness.                                                                                                                                                                                                                                                                                                                   "                                                                                                                                                             CARDIAC TESTING:  Echocardiogram  No results found for this or any previous visit.      Stress Test  No results found for this or any previous visit.     Coronary Angiogram  Results for orders placed in visit on 06/21/21    CATH LAB PROCEDURE     Holter Monitor  No cardiac monitor results found for the past 12 months    Last BMP BMP  Lab Results   Component Value Date     06/26/2023    K 3.4 (L) 06/26/2023    CO2 25 06/26/2023    BUN 4.0 (L) 06/26/2023    CREATININE 0.72 06/26/2023    CALCIUM 9.6 06/26/2023    EGFRNORACEVR >60 06/26/2023      Last CBC     Lab Results   Component Value Date    WBC 13.63 (H) 06/21/2023    HGB 15.4 06/21/2023    HCT 46.5 06/21/2023    MCV 86.1 06/21/2023     06/21/2023           BNP    Lab Results   Component Value Date    BNP 19.4 06/21/2023    BNP 13.3 02/23/2022    BNP 28.0 06/19/2021     Last lipids    Lab Results   Component Value Date    CHOL 133 01/03/2023    CHOL 168 03/13/2022    CHOL 108 09/13/2021    HDL 37 01/03/2023    HDL 45 03/13/2022    HDL 35 09/13/2021    LDL 58.00 01/03/2023    LDL 88.00 03/13/2022    LDL 55.00 09/13/2021    TRIG 192 (H) 01/03/2023    TRIG 175 03/13/2022    TRIG 90 09/13/2021    TOTALCHOLEST 4 01/03/2023    TOTALCHOLEST 4 03/13/2022    TOTALCHOLEST 3 09/13/2021      LFT     Lab Results   Component Value Date    ALT 18 06/21/2023    ALT 17 01/03/2023    ALT 24 03/13/2022    AST 26 06/21/2023    AST 15 01/03/2023    AST 17 03/13/2022       Assessment:     1. Arteriosclerosis of coronary artery    2. Primary hypertension    3. Mixed hyperlipidemia    10 Year Cardiovascular Risk:  The 10-year ASCVD risk score (Yuly GIFFORD, et al., 2019) is: 6.6%    Values used to calculate the score:      Age: 47 years      Sex: Female      Is Non- : No      Diabetic: Yes       Tobacco smoker: Yes      Systolic Blood Pressure: 118 mmHg      Is BP treated: Yes      HDL Cholesterol: 37 mg/dL      Total Cholesterol: 133 mg/dL  BMI:  Body mass index is 28.87 kg/m².  Patient is overweight (BMI 25-29.0)  Tobacco:  1 PPD.  Patient would like chantix.  Alcohol:  none  Substance abuse:  none     Last PCP visit:  6/26/2023    Plan:     MEDICATIONS:  prescribed chantix     WORK UP:  Monitor:  Order Holter monitor      SMOKING CESSATION encouraged to quit Patient declined smoking cessation referral  Smoking cessation education provided.    DIET:  Avoid processed foods and drinks, sugar, salt, fried/greasy foods, and fast foods    Recommend 10 servings of fruits and vegetables per day    EXERCISE:  on regular basis    FOLLOW UP:  6 months    Tyler Stevenson MD  Cardiology Attending     Future Appointments   Date Time Provider Department Center   9/19/2023  1:10 PM JYOTI Cabrales [facility]   1/22/2024 10:00 AM [facility]EL [facility] [facility]

## 2023-07-14 DIAGNOSIS — E11.9 TYPE 2 DIABETES MELLITUS WITHOUT COMPLICATION, UNSPECIFIED WHETHER LONG TERM INSULIN USE: ICD-10-CM

## 2023-07-14 RX ORDER — PEN NEEDLE, DIABETIC 30 GX3/16"
NEEDLE, DISPOSABLE MISCELLANEOUS
Qty: 100 EACH | Refills: 6 | Status: SHIPPED | OUTPATIENT
Start: 2023-07-14

## 2023-08-14 DIAGNOSIS — I25.10 CORONARY ARTERY DISEASE, UNSPECIFIED VESSEL OR LESION TYPE, UNSPECIFIED WHETHER ANGINA PRESENT, UNSPECIFIED WHETHER NATIVE OR TRANSPLANTED HEART: ICD-10-CM

## 2023-08-14 NOTE — TELEPHONE ENCOUNTER
Last visit in July 2023, meds not refilled. Pt will not have enough to last until next appt in Jan 2024.

## 2023-08-15 RX ORDER — ISOSORBIDE MONONITRATE 30 MG/1
30 TABLET, EXTENDED RELEASE ORAL DAILY
Qty: 90 TABLET | Refills: 2 | Status: SHIPPED | OUTPATIENT
Start: 2023-08-15 | End: 2024-02-01 | Stop reason: SDUPTHER

## 2023-08-15 RX ORDER — CLOPIDOGREL BISULFATE 75 MG/1
TABLET ORAL
Qty: 90 TABLET | Refills: 2 | Status: SHIPPED | OUTPATIENT
Start: 2023-08-15 | End: 2024-02-23 | Stop reason: SDUPTHER

## 2023-08-15 RX ORDER — LOSARTAN POTASSIUM 25 MG/1
50 TABLET ORAL DAILY
Qty: 180 TABLET | Refills: 2 | Status: SHIPPED | OUTPATIENT
Start: 2023-08-15 | End: 2024-02-01 | Stop reason: SDUPTHER

## 2023-08-15 RX ORDER — ROSUVASTATIN CALCIUM 40 MG/1
40 TABLET, COATED ORAL DAILY
Qty: 90 TABLET | Refills: 2 | Status: SHIPPED | OUTPATIENT
Start: 2023-08-15 | End: 2024-02-23 | Stop reason: SDUPTHER

## 2023-08-15 RX ORDER — METOPROLOL SUCCINATE 25 MG/1
25 TABLET, EXTENDED RELEASE ORAL 2 TIMES DAILY
Qty: 90 TABLET | Refills: 2 | Status: SHIPPED | OUTPATIENT
Start: 2023-08-15 | End: 2024-02-23 | Stop reason: SDUPTHER

## 2023-08-18 DIAGNOSIS — G89.29 CHRONIC LOW BACK PAIN, UNSPECIFIED BACK PAIN LATERALITY, UNSPECIFIED WHETHER SCIATICA PRESENT: ICD-10-CM

## 2023-08-18 DIAGNOSIS — M54.50 CHRONIC LOW BACK PAIN, UNSPECIFIED BACK PAIN LATERALITY, UNSPECIFIED WHETHER SCIATICA PRESENT: ICD-10-CM

## 2023-08-18 RX ORDER — TIZANIDINE 4 MG/1
4 TABLET ORAL EVERY 8 HOURS
Qty: 90 TABLET | Refills: 4 | OUTPATIENT
Start: 2023-08-18 | End: 2024-02-14

## 2023-08-23 DIAGNOSIS — I25.10 CORONARY ARTERY DISEASE, UNSPECIFIED VESSEL OR LESION TYPE, UNSPECIFIED WHETHER ANGINA PRESENT, UNSPECIFIED WHETHER NATIVE OR TRANSPLANTED HEART: ICD-10-CM

## 2023-08-23 RX ORDER — EZETIMIBE 10 MG/1
10 TABLET ORAL DAILY
Qty: 90 TABLET | Refills: 3 | Status: SHIPPED | OUTPATIENT
Start: 2023-08-23 | End: 2024-02-23 | Stop reason: SDUPTHER

## 2023-08-25 ENCOUNTER — TELEPHONE (OUTPATIENT)
Dept: INTERNAL MEDICINE | Facility: CLINIC | Age: 48
End: 2023-08-25
Payer: MEDICAID

## 2023-10-27 DIAGNOSIS — E11.9 TYPE 2 DIABETES MELLITUS WITHOUT COMPLICATION, UNSPECIFIED WHETHER LONG TERM INSULIN USE: ICD-10-CM

## 2023-10-27 RX ORDER — DULAGLUTIDE 3 MG/.5ML
INJECTION, SOLUTION SUBCUTANEOUS
Qty: 3 PEN | Refills: 3 | Status: SHIPPED | OUTPATIENT
Start: 2023-10-27 | End: 2024-02-01 | Stop reason: SDUPTHER

## 2023-11-13 DIAGNOSIS — K21.9 GASTROESOPHAGEAL REFLUX DISEASE, UNSPECIFIED WHETHER ESOPHAGITIS PRESENT: ICD-10-CM

## 2023-11-13 RX ORDER — PANTOPRAZOLE SODIUM 40 MG/1
40 TABLET, DELAYED RELEASE ORAL DAILY
Qty: 90 TABLET | Refills: 1 | Status: SHIPPED | OUTPATIENT
Start: 2023-11-13 | End: 2024-05-11

## 2023-11-14 DIAGNOSIS — M54.50 CHRONIC LOW BACK PAIN, UNSPECIFIED BACK PAIN LATERALITY, UNSPECIFIED WHETHER SCIATICA PRESENT: ICD-10-CM

## 2023-11-14 DIAGNOSIS — G89.29 CHRONIC LOW BACK PAIN, UNSPECIFIED BACK PAIN LATERALITY, UNSPECIFIED WHETHER SCIATICA PRESENT: ICD-10-CM

## 2023-11-14 RX ORDER — TIZANIDINE 4 MG/1
4 TABLET ORAL EVERY 8 HOURS
Qty: 90 TABLET | Refills: 2 | Status: SHIPPED | OUTPATIENT
Start: 2023-11-14 | End: 2024-02-15 | Stop reason: SDUPTHER

## 2023-12-21 DIAGNOSIS — M79.2 NERVE PAIN: ICD-10-CM

## 2023-12-21 DIAGNOSIS — M54.50 CHRONIC LOW BACK PAIN, UNSPECIFIED BACK PAIN LATERALITY, UNSPECIFIED WHETHER SCIATICA PRESENT: ICD-10-CM

## 2023-12-21 DIAGNOSIS — G89.29 CHRONIC LOW BACK PAIN, UNSPECIFIED BACK PAIN LATERALITY, UNSPECIFIED WHETHER SCIATICA PRESENT: ICD-10-CM

## 2023-12-21 RX ORDER — GABAPENTIN 600 MG/1
600 TABLET ORAL 3 TIMES DAILY
Qty: 90 TABLET | Refills: 0 | Status: SHIPPED | OUTPATIENT
Start: 2023-12-21 | End: 2024-02-01 | Stop reason: SDUPTHER

## 2023-12-21 NOTE — TELEPHONE ENCOUNTER
----- Message from Arin Soria sent at 12/21/2023 11:27 AM CST -----  .Type:  RX Refill Request    Who Called: pt   Refill or New Rx:refill   RX Name and Strength:gabapentin (NEURONTIN) 600 MG tablet  How is the patient currently taking it? (ex. 1XDay):Take 1 tablet (600 mg total) by mouth 3 (three) times daily. As needed for back pain - Oral  Is this a 30 day or 90 day RX:30  Preferred Pharmacy with phone number:[id number] - [place], [place] - [address]  Local or Mail Order:local   Ordering Provider:Handy   Would the patient rather a call back or a response via [facility]? Call back   Best Call Back Number:[phone]  Additional Information: pt states that the  pharmacy has been trying to get a response for refill on this medication

## 2023-12-21 NOTE — TELEPHONE ENCOUNTER
Pt requesting refill on gabapentin 600mg TID. LOV was on 6/26/23, next appt scheduled for 1/22/24. Last rx of Gabapentin 600mg TID was sent to pharmacy on 6/26/23 for a 30 day supply with 5 refills.

## 2024-01-31 ENCOUNTER — LAB VISIT (OUTPATIENT)
Dept: LAB | Facility: HOSPITAL | Age: 49
End: 2024-01-31
Attending: NURSE PRACTITIONER
Payer: MEDICAID

## 2024-01-31 DIAGNOSIS — Z00.00 WELLNESS EXAMINATION: ICD-10-CM

## 2024-01-31 DIAGNOSIS — E11.9 TYPE 2 DIABETES MELLITUS WITHOUT COMPLICATION, UNSPECIFIED WHETHER LONG TERM INSULIN USE: ICD-10-CM

## 2024-01-31 LAB
ALBUMIN SERPL-MCNC: 4.2 G/DL (ref 3.5–5)
ALBUMIN/GLOB SERPL: 1.2 RATIO (ref 1.1–2)
ALP SERPL-CCNC: 82 UNIT/L (ref 40–150)
ALT SERPL-CCNC: 30 UNIT/L (ref 0–55)
APPEARANCE UR: ABNORMAL
AST SERPL-CCNC: 25 UNIT/L (ref 5–34)
BACTERIA #/AREA URNS AUTO: ABNORMAL /HPF
BASOPHILS # BLD AUTO: 0.04 X10(3)/MCL
BASOPHILS NFR BLD AUTO: 0.3 %
BILIRUB SERPL-MCNC: 0.2 MG/DL
BILIRUB UR QL STRIP.AUTO: NEGATIVE
BUN SERPL-MCNC: 3.5 MG/DL (ref 7–18.7)
CALCIUM SERPL-MCNC: 9.8 MG/DL (ref 8.4–10.2)
CHLORIDE SERPL-SCNC: 107 MMOL/L (ref 98–107)
CHOLEST SERPL-MCNC: 111 MG/DL
CHOLEST/HDLC SERPL: 3 {RATIO} (ref 0–5)
CO2 SERPL-SCNC: 28 MMOL/L (ref 22–29)
COLOR UR AUTO: ABNORMAL
CREAT SERPL-MCNC: 0.86 MG/DL (ref 0.55–1.02)
CREAT UR-MCNC: 83.7 MG/DL (ref 45–106)
DEPRECATED CALCIDIOL+CALCIFEROL SERPL-MC: 39.3 NG/ML (ref 30–80)
EOSINOPHIL # BLD AUTO: 0.12 X10(3)/MCL (ref 0–0.9)
EOSINOPHIL NFR BLD AUTO: 0.9 %
ERYTHROCYTE [DISTWIDTH] IN BLOOD BY AUTOMATED COUNT: 12.7 % (ref 11.5–17)
EST. AVERAGE GLUCOSE BLD GHB EST-MCNC: 154.2 MG/DL
GFR SERPLBLD CREATININE-BSD FMLA CKD-EPI: >60 MLS/MIN/1.73/M2
GLOBULIN SER-MCNC: 3.6 GM/DL (ref 2.4–3.5)
GLUCOSE SERPL-MCNC: 98 MG/DL (ref 74–100)
GLUCOSE UR QL STRIP.AUTO: NORMAL
HBA1C MFR BLD: 7 %
HCT VFR BLD AUTO: 46.1 % (ref 37–47)
HDLC SERPL-MCNC: 38 MG/DL (ref 35–60)
HGB BLD-MCNC: 15.5 G/DL (ref 12–16)
HYALINE CASTS #/AREA URNS LPF: ABNORMAL /LPF
IMM GRANULOCYTES # BLD AUTO: 0.05 X10(3)/MCL (ref 0–0.04)
IMM GRANULOCYTES NFR BLD AUTO: 0.4 %
KETONES UR QL STRIP.AUTO: NEGATIVE
LDLC SERPL CALC-MCNC: 43 MG/DL (ref 50–140)
LEUKOCYTE ESTERASE UR QL STRIP.AUTO: 250
LYMPHOCYTES # BLD AUTO: 4.37 X10(3)/MCL (ref 0.6–4.6)
LYMPHOCYTES NFR BLD AUTO: 33.6 %
MCH RBC QN AUTO: 28.5 PG (ref 27–31)
MCHC RBC AUTO-ENTMCNC: 33.6 G/DL (ref 33–36)
MCV RBC AUTO: 84.9 FL (ref 80–94)
MICROALBUMIN UR-MCNC: 8.4 UG/ML
MICROALBUMIN/CREAT RATIO PNL UR: 10 MG/GM CR (ref 0–30)
MONOCYTES # BLD AUTO: 0.66 X10(3)/MCL (ref 0.1–1.3)
MONOCYTES NFR BLD AUTO: 5.1 %
MUCOUS THREADS URNS QL MICRO: ABNORMAL /LPF
NEUTROPHILS # BLD AUTO: 7.75 X10(3)/MCL (ref 2.1–9.2)
NEUTROPHILS NFR BLD AUTO: 59.7 %
NITRITE UR QL STRIP.AUTO: ABNORMAL
NRBC BLD AUTO-RTO: 0 %
PH UR STRIP.AUTO: 5.5 [PH]
PLATELET # BLD AUTO: 304 X10(3)/MCL (ref 130–400)
PMV BLD AUTO: 10.9 FL (ref 7.4–10.4)
POTASSIUM SERPL-SCNC: 4.5 MMOL/L (ref 3.5–5.1)
PROT SERPL-MCNC: 7.8 GM/DL (ref 6.4–8.3)
PROT UR QL STRIP.AUTO: NEGATIVE
RBC # BLD AUTO: 5.43 X10(6)/MCL (ref 4.2–5.4)
RBC #/AREA URNS AUTO: ABNORMAL /HPF
RBC UR QL AUTO: NEGATIVE
SODIUM SERPL-SCNC: 144 MMOL/L (ref 136–145)
SP GR UR STRIP.AUTO: 1.01 (ref 1–1.03)
SQUAMOUS #/AREA URNS LPF: ABNORMAL /HPF
T4 FREE SERPL-MCNC: 0.8 NG/DL (ref 0.7–1.48)
TRIGL SERPL-MCNC: 152 MG/DL (ref 37–140)
TSH SERPL-ACNC: 2.6 UIU/ML (ref 0.35–4.94)
UROBILINOGEN UR STRIP-ACNC: NORMAL
VLDLC SERPL CALC-MCNC: 30 MG/DL
WBC # SPEC AUTO: 12.99 X10(3)/MCL (ref 4.5–11.5)
WBC #/AREA URNS AUTO: ABNORMAL /HPF

## 2024-01-31 PROCEDURE — 87086 URINE CULTURE/COLONY COUNT: CPT

## 2024-01-31 PROCEDURE — 80053 COMPREHEN METABOLIC PANEL: CPT

## 2024-01-31 PROCEDURE — 82043 UR ALBUMIN QUANTITATIVE: CPT

## 2024-01-31 PROCEDURE — 83036 HEMOGLOBIN GLYCOSYLATED A1C: CPT

## 2024-01-31 PROCEDURE — 36415 COLL VENOUS BLD VENIPUNCTURE: CPT

## 2024-01-31 PROCEDURE — 82306 VITAMIN D 25 HYDROXY: CPT

## 2024-01-31 PROCEDURE — 80061 LIPID PANEL: CPT

## 2024-01-31 PROCEDURE — 81001 URINALYSIS AUTO W/SCOPE: CPT

## 2024-01-31 PROCEDURE — 84439 ASSAY OF FREE THYROXINE: CPT

## 2024-01-31 PROCEDURE — 85025 COMPLETE CBC W/AUTO DIFF WBC: CPT

## 2024-01-31 PROCEDURE — 84443 ASSAY THYROID STIM HORMONE: CPT

## 2024-02-01 ENCOUNTER — OFFICE VISIT (OUTPATIENT)
Dept: INTERNAL MEDICINE | Facility: CLINIC | Age: 49
End: 2024-02-01
Payer: MEDICAID

## 2024-02-01 VITALS
DIASTOLIC BLOOD PRESSURE: 76 MMHG | HEIGHT: 64 IN | WEIGHT: 185 LBS | BODY MASS INDEX: 31.58 KG/M2 | RESPIRATION RATE: 16 BRPM | SYSTOLIC BLOOD PRESSURE: 148 MMHG | TEMPERATURE: 98 F | HEART RATE: 85 BPM

## 2024-02-01 DIAGNOSIS — I25.10 CORONARY ARTERY DISEASE, UNSPECIFIED VESSEL OR LESION TYPE, UNSPECIFIED WHETHER ANGINA PRESENT, UNSPECIFIED WHETHER NATIVE OR TRANSPLANTED HEART: ICD-10-CM

## 2024-02-01 DIAGNOSIS — G89.29 CHRONIC LOW BACK PAIN, UNSPECIFIED BACK PAIN LATERALITY, UNSPECIFIED WHETHER SCIATICA PRESENT: ICD-10-CM

## 2024-02-01 DIAGNOSIS — Z12.31 BREAST CANCER SCREENING BY MAMMOGRAM: Primary | ICD-10-CM

## 2024-02-01 DIAGNOSIS — M25.512 ACUTE PAIN OF LEFT SHOULDER: ICD-10-CM

## 2024-02-01 DIAGNOSIS — M79.2 NERVE PAIN: ICD-10-CM

## 2024-02-01 DIAGNOSIS — E11.9 TYPE 2 DIABETES MELLITUS WITHOUT COMPLICATION, UNSPECIFIED WHETHER LONG TERM INSULIN USE: ICD-10-CM

## 2024-02-01 DIAGNOSIS — E11.9 TYPE 2 DIABETES MELLITUS WITHOUT COMPLICATION, WITHOUT LONG-TERM CURRENT USE OF INSULIN: ICD-10-CM

## 2024-02-01 DIAGNOSIS — K59.09 CHRONIC CONSTIPATION: ICD-10-CM

## 2024-02-01 DIAGNOSIS — M54.50 CHRONIC LOW BACK PAIN, UNSPECIFIED BACK PAIN LATERALITY, UNSPECIFIED WHETHER SCIATICA PRESENT: ICD-10-CM

## 2024-02-01 DIAGNOSIS — F17.210 CIGARETTE NICOTINE DEPENDENCE WITHOUT COMPLICATION: ICD-10-CM

## 2024-02-01 DIAGNOSIS — N39.0 URINARY TRACT INFECTION WITHOUT HEMATURIA, SITE UNSPECIFIED: ICD-10-CM

## 2024-02-01 PROBLEM — Z12.11 COLON CANCER SCREENING: Status: RESOLVED | Noted: 2022-09-30 | Resolved: 2024-02-01

## 2024-02-01 PROCEDURE — 99214 OFFICE O/P EST MOD 30 MIN: CPT | Mod: S$PBB,25,, | Performed by: NURSE PRACTITIONER

## 2024-02-01 PROCEDURE — 3008F BODY MASS INDEX DOCD: CPT | Mod: CPTII,,, | Performed by: NURSE PRACTITIONER

## 2024-02-01 PROCEDURE — 99406 BEHAV CHNG SMOKING 3-10 MIN: CPT | Mod: S$PBB,,, | Performed by: NURSE PRACTITIONER

## 2024-02-01 PROCEDURE — 3061F NEG MICROALBUMINURIA REV: CPT | Mod: CPTII,,, | Performed by: NURSE PRACTITIONER

## 2024-02-01 PROCEDURE — 3051F HG A1C>EQUAL 7.0%<8.0%: CPT | Mod: CPTII,,, | Performed by: NURSE PRACTITIONER

## 2024-02-01 PROCEDURE — 3077F SYST BP >= 140 MM HG: CPT | Mod: CPTII,,, | Performed by: NURSE PRACTITIONER

## 2024-02-01 PROCEDURE — 3066F NEPHROPATHY DOC TX: CPT | Mod: CPTII,,, | Performed by: NURSE PRACTITIONER

## 2024-02-01 PROCEDURE — 3078F DIAST BP <80 MM HG: CPT | Mod: CPTII,,, | Performed by: NURSE PRACTITIONER

## 2024-02-01 PROCEDURE — 99215 OFFICE O/P EST HI 40 MIN: CPT | Mod: PBBFAC | Performed by: NURSE PRACTITIONER

## 2024-02-01 PROCEDURE — 1159F MED LIST DOCD IN RCRD: CPT | Mod: CPTII,,, | Performed by: NURSE PRACTITIONER

## 2024-02-01 PROCEDURE — 1160F RVW MEDS BY RX/DR IN RCRD: CPT | Mod: CPTII,,, | Performed by: NURSE PRACTITIONER

## 2024-02-01 RX ORDER — DULAGLUTIDE 3 MG/.5ML
3 INJECTION, SOLUTION SUBCUTANEOUS
Qty: 4 PEN | Refills: 8 | Status: SHIPPED | OUTPATIENT
Start: 2024-02-01 | End: 2024-10-28

## 2024-02-01 RX ORDER — METFORMIN HYDROCHLORIDE 500 MG/1
1000 TABLET, EXTENDED RELEASE ORAL 2 TIMES DAILY WITH MEALS
Qty: 360 TABLET | Refills: 1 | Status: SHIPPED | OUTPATIENT
Start: 2024-02-01 | End: 2024-07-30

## 2024-02-01 RX ORDER — GABAPENTIN 600 MG/1
600 TABLET ORAL 3 TIMES DAILY
Qty: 90 TABLET | Refills: 8 | Status: SHIPPED | OUTPATIENT
Start: 2024-02-01 | End: 2024-10-28

## 2024-02-01 RX ORDER — NITROFURANTOIN 25; 75 MG/1; MG/1
100 CAPSULE ORAL 2 TIMES DAILY
Qty: 20 CAPSULE | Refills: 0 | Status: SHIPPED | OUTPATIENT
Start: 2024-02-01 | End: 2024-02-02

## 2024-02-01 RX ORDER — LOSARTAN POTASSIUM 100 MG/1
100 TABLET ORAL DAILY
Qty: 90 TABLET | Refills: 0 | Status: SHIPPED | OUTPATIENT
Start: 2024-02-01 | End: 2024-02-23 | Stop reason: SDUPTHER

## 2024-02-01 RX ORDER — LUBIPROSTONE 24 UG/1
24 CAPSULE, GELATIN COATED ORAL 2 TIMES DAILY
Qty: 180 CAPSULE | Refills: 2 | Status: SHIPPED | OUTPATIENT
Start: 2024-02-01 | End: 2024-10-28

## 2024-02-01 RX ORDER — DULAGLUTIDE 3 MG/.5ML
INJECTION, SOLUTION SUBCUTANEOUS
COMMUNITY
Start: 2024-01-26 | End: 2024-02-01 | Stop reason: SDUPTHER

## 2024-02-01 RX ORDER — ISOSORBIDE MONONITRATE 30 MG/1
30 TABLET, EXTENDED RELEASE ORAL DAILY
Qty: 90 TABLET | Refills: 2 | Status: SHIPPED | OUTPATIENT
Start: 2024-02-01 | End: 2024-10-28

## 2024-02-01 RX ORDER — ACETAMINOPHEN AND CODEINE PHOSPHATE 300; 30 MG/1; MG/1
1 TABLET ORAL EVERY 6 HOURS PRN
COMMUNITY
Start: 2023-12-15

## 2024-02-01 NOTE — ASSESSMENT & PLAN NOTE
Stable  Continue RX gabapentin 600 mg TID PRN and tizanidine prn  Perform back stretches daily.   Avoid activities than increase back pain or stiffness.   Apply heating pad, ice pack, and Icy Hot / BioFreeze as needed; alternate every 15-20 minutes.   Massage back to loosen muscles.

## 2024-02-01 NOTE — ASSESSMENT & PLAN NOTE
A1C At Goal  Continue RX metformin 1000 mg BID, Trulicity 3 mg weekly, and Lantus 15 units q hs  6 month f/u  Follow ADA diet.  Avoid soda, simple sweets, and limit rice/pasta/bread/starches and consume brown options when possible.   Maintain healthy weight with BMI goal <30.   Perform aerobic exercise for 150 minutes per week (or 5 days a week for 30 minutes each day).   Examine feet daily.     Lab Results   Component Value Date    HGBA1C 7.0 01/31/2024

## 2024-02-01 NOTE — PROGRESS NOTES
Flory De La Cruz, JOSE RAFAEL   [facility]  [address] [facility] [address]      PATIENT NAME: Kaye Spring  : 1975  DATE: 24  MRN: [id number]      Reason for Visit / Chief Complaint: Health Maintenance (Lab review )       History of Present Illness / Problem Focused Workflow     Kaye Spring presents to the clinic with Health Maintenance (Lab review )     49 yo WF here for f/u. Medical problems includes GERD, BPV (self-treats with Epley), chronic rhinitis, T2DM, HLD, CAD with MI in 2019, with stenting, HTN, chronic lower back pain, ventral hernia, AUB, endometriosis, depression with anxiety, and tobacco use, 1-2 ciggs / day.  Followed by Nikki Ackerman, mental health NP for psychiatric care and med management every 3 months. Disabled. Followed by [facility] for Cardiology Services.      Cervical Cancer Screening - F/U [facility] GYN Annually  Breast Cancer Screening - 23 MMG  Osteoporosis Screening -24 Vitamin D Level 39.3  Diabetic Screening -    Eye Screening-   Dental Screening -      2024  Pt here today for yearly wellness OV with labs completed prior to OV; reviewed and discussed with no questions or concerns at this time. A1C at goal. Meds reviewed and refilled appropriately. Pt reports with continued left shoulder pain, more in the muscle area, elevated her arm up causes it to pull and radiates down into her elbow. Reports the pulling feeling when reaching around her back as well. [place] [facility] [place] an injury where she fell on her shoulder. The pt is agreeable to referral to PT for eval and tx. Pt reports she is using ROM exercise to help with some of the pain. Pt BP elevated today, reports compliance with meds, unsure why it is high as she is compliant with meds. Will increase losartan today to 100 mg daily. Pt with Cards visit in 2 weeks, will set reminder to eval BP. Will f/u in 6 months for routine f/u  with labs. Agreeable to referral to Smoking Cessation today.   Denies chest pain, shortness of breath, cough, fever, headache, dizziness, weakness, abdominal pain, nausea, vomiting, diarrhea, constipation, black/bloody stools, unplanned weight loss, night sweats, changes in urinary patterns, burning/odor with urination, depression, anxiety, and SI/HI.               Review of Systems     Review of Systems   Constitutional: Negative.    HENT: Negative.     Eyes: Negative.    Respiratory: Negative.     Cardiovascular: Negative.    Gastrointestinal: Negative.    Endocrine: Negative.    Genitourinary: Negative.    Neurological: Negative.    Psychiatric/Behavioral: Negative.           Medications and Allergies     Medications  Medication List with Changes/Refills   New Medications    NITROFURANTOIN, MACROCRYSTAL-MONOHYDRATE, (MACROBID) 100 MG CAPSULE    Take 1 capsule (100 mg total) by mouth 2 (two) times daily. For UTI. for 10 days   Current Medications    ACCU-CHEK GUIDE ME GLUCOSE MTR MISC        ACETAMINOPHEN-CODEINE 300-30MG (TYLENOL #3) 300-30 MG TAB    Take 1 tablet by mouth every 6 (six) hours as needed.    ALBUTEROL (PROVENTIL/VENTOLIN HFA) 90 MCG/ACTUATION INHALER    Inhale 2 puffs into the lungs 4 (four) times daily as needed for Wheezing or Shortness of Breath.    ALPRAZOLAM (XANAX) 1 MG TABLET    Take 1 mg by mouth 3 (three) times daily as needed.    ARIPIPRAZOLE (ABILIFY) 2 MG TAB    Take 2 mg by mouth once daily.    BLOOD SUGAR DIAGNOSTIC STRP    To check BG 2 times daily, to use with insurance preferred meter E11.9    BLOOD-GLUCOSE METER KIT    To check BG 2 times daily, to use with insurance preferred meter E11.9    CLOPIDOGREL (PLAVIX) 75 MG TABLET    See Instructions, TAKE ONE TABLET BY MOUTH EVERY DAY, # 90 tab(s), 3 Refill(s), Pharmacy: [facility] [id number] [id number], 160, cm, Height/Length Dosing, 09/01/21 13:52:00 CDT, 85, kg, Weight Dosing, 09/01/21 13:52:00 CDT    DESVENLAFAXINE SUCCINATE (PRISTIQ) 100 MG  "TB24        DOXEPIN (SINEQUAN) 25 MG CAPSULE    Take 25 mg by mouth nightly.    EZETIMIBE (ZETIA) 10 MG TABLET    Take 1 tablet (10 mg total) by mouth once daily.    LANCETS MISC    To check BG 2 times daily, to use with insurance preferred meter E11.9    METOCLOPRAMIDE HCL (REGLAN) 5 MG TABLET    Take 5 mg by mouth 4 (four) times daily.    METOPROLOL SUCCINATE (TOPROL-XL) 25 MG 24 HR TABLET    Take 1 tablet (25 mg total) by mouth 2 (two) times daily.    NITROGLYCERIN (NITROSTAT) 0.4 MG SL TABLET    Place 1 tablet (0.4 mg total) under the tongue every 5 (five) minutes as needed for Chest pain.    OXCARBAZEPINE (TRILEPTAL) 300 MG TAB    Take 300 mg by mouth 2 (two) times daily.    PANTOPRAZOLE (PROTONIX) 40 MG TABLET    Take 1 tablet (40 mg total) by mouth once daily. For Acid Reflux    PEN NEEDLE, DIABETIC 32 GAUGE X 5/32" NDLE    For Nightly Lantus Injection. ICD 10 E11.9    ROSUVASTATIN (CRESTOR) 40 MG TAB    Take 1 tablet (40 mg total) by mouth once daily.    SUPREP BOWEL PREP KIT 17.5-3.13-1.6 GRAM SOLR    SMARTSI Kit(s) By Mouth Once    TIZANIDINE (ZANAFLEX) 4 MG TABLET    Take 1 tablet (4 mg total) by mouth every 8 (eight) hours.    VARENICLINE (CHANTIX STARTING MONTH BOX) 0.5 MG (11)- 1 MG (42) TABLET    Take one 0.5mg tab by mouth once daily X3 days,then increase to one 0.5mg tab twice daily X4 days,then increase to one 1mg tab twice daily   Changed and/or Refilled Medications    Modified Medication Previous Medication    AMITIZA 24 MCG CAP AMITIZA 24 mcg Cap       Take 1 capsule (24 mcg total) by mouth 2 (two) times daily.    Take 24 mcg by mouth 2 (two) times daily.    DULAGLUTIDE (TRULICITY) 3 MG/0.5 ML PEN INJECTOR TRULICITY 3 mg/0.5 mL pen injector       Inject 3 mg into the skin every 7 days.    INJECT 3 MG INTO THE SKIN EVERY 7 DAYS. E11. 9FOR DIABETES    GABAPENTIN (NEURONTIN) 600 MG TABLET gabapentin (NEURONTIN) 600 MG tablet       Take 1 tablet (600 mg total) by mouth 3 (three) times daily. As " needed for back pain    Take 1 tablet (600 mg total) by mouth 3 (three) times daily. As needed for back pain    ISOSORBIDE MONONITRATE (IMDUR) 30 MG 24 HR TABLET isosorbide mononitrate (IMDUR) 30 MG 24 hr tablet       Take 1 tablet (30 mg total) by mouth once daily.    Take 1 tablet (30 mg total) by mouth once daily.    LOSARTAN (COZAAR) 100 MG TABLET losartan (COZAAR) 25 MG tablet       Take 1 tablet (100 mg total) by mouth once daily. For High Blood Pressure    Take 2 tablets (50 mg total) by mouth once daily.    METFORMIN (GLUCOPHAGE-XR) 500 MG ER 24HR TABLET metFORMIN (GLUCOPHAGE-XR) 500 MG ER 24hr tablet       Take 2 tablets (1,000 mg total) by mouth 2 (two) times daily with meals.    Take 2 tablets (1,000 mg total) by mouth 2 (two) times daily with meals.   Discontinued Medications    INSULIN (LANTUS SOLOSTAR U-100 INSULIN) GLARGINE 100 UNITS/ML SUBQ PEN    Inject 15 Units into the skin every evening.    TRULICITY 3 MG/0.5 ML PEN INJECTOR    INJECT 3 MG INTO THE SKIN EVERY 7 DAYS FOR DIABETES         Allergies  Review of patient's allergies indicates:   Allergen Reactions    Aspirin Nausea And Vomiting and Other (See Comments)     Other reaction(s): Stomach upset  Pt. States doesn't take because of stomach ulcers         Physical Examination     Vitals:    02/01/24 1418   BP: (!) 148/76   Pulse:    Resp:    Temp:      Physical Exam  Vitals reviewed.   Constitutional:       Appearance: Normal appearance. She is normal weight.   HENT:      Head: Normocephalic.   Cardiovascular:      Rate and Rhythm: Normal rate and regular rhythm.      Pulses: Normal pulses.      Heart sounds: Normal heart sounds.   Pulmonary:      Effort: Pulmonary effort is normal.      Breath sounds: Normal breath sounds.   Abdominal:      General: Abdomen is flat.      Palpations: Abdomen is soft.   Musculoskeletal:         General: Normal range of motion.      Cervical back: Normal range of motion.   Skin:     General: Skin is warm and dry.    Neurological:      Mental Status: She is alert.   Psychiatric:         Mood and Affect: Mood normal.           Results     Lab Results   Component Value Date    WBC 12.99 (H) 01/31/2024    RBC 5.43 (H) 01/31/2024    HGB 15.5 01/31/2024    HCT 46.1 01/31/2024    MCV 84.9 01/31/2024    MCH 28.5 01/31/2024    MCHC 33.6 01/31/2024    RDW 12.7 01/31/2024     01/31/2024    MPV 10.9 (H) 01/31/2024     Sodium   Date Value Ref Range Status   09/30/2023 134 (L) 136 - 145 mmol/L Final     Sodium Level   Date Value Ref Range Status   01/31/2024 144 136 - 145 mmol/L Final     Potassium   Date Value Ref Range Status   09/30/2023 4.2 3.5 - 5.1 mmol/L Final     Comment:     Slight hemolysis present, interpret results with caution     Potassium Level   Date Value Ref Range Status   01/31/2024 4.5 3.5 - 5.1 mmol/L Final     Chloride   Date Value Ref Range Status   09/30/2023 102 100 - 109 mmol/L Final     Carbon Dioxide   Date Value Ref Range Status   01/31/2024 28 22 - 29 mmol/L Final   09/30/2023 20 (L) 22 - 33 mmol/L Final     Blood Urea Nitrogen   Date Value Ref Range Status   01/31/2024 3.5 (L) 7.0 - 18.7 mg/dL Final   09/30/2023 8 5 - 25 mg/dL Final     Creatinine   Date Value Ref Range Status   01/31/2024 0.86 0.55 - 1.02 mg/dL Final   09/30/2023 0.80 0.57 - 1.25 mg/dL Final     Calcium   Date Value Ref Range Status   09/30/2023 8.4 (L) 8.8 - 10.6 mg/dL Final     Calcium Level Total   Date Value Ref Range Status   01/31/2024 9.8 8.4 - 10.2 mg/dL Final     Albumin Level   Date Value Ref Range Status   01/31/2024 4.2 3.5 - 5.0 g/dL Final     Bilirubin Total   Date Value Ref Range Status   01/31/2024 0.2 <=1.5 mg/dL Final     Alkaline Phosphatase   Date Value Ref Range Status   01/31/2024 82 40 - 150 unit/L Final     Aspartate Aminotransferase   Date Value Ref Range Status   01/31/2024 25 5 - 34 unit/L Final     Alanine Aminotransferase   Date Value Ref Range Status   01/31/2024 30 0 - 55 unit/L Final     Anion Gap    Date Value Ref Range Status   09/30/2023 12 8 - 16 mmol/L Final     eGFR    Date Value Ref Range Status   09/30/2023 91 mL/min/1.73mSq Final     Comment:     In accordance with NKF-ASN Task Force recommendation, calculation based on the Chronic Kidney Disease Epidemiology Collaboration (CKD-EPI) equation without adjustment for race. eGFR adjusted for gender and age and calculated in ml/min/1.73mSquared. eGFR cannot be calculated if patient is under 18 years of age.     Reference Range:   >= 60 ml/min/1.73mSquared.     Estimated GFR-Non    Date Value Ref Range Status   06/28/2022 >60 mls/min/1.73/m2 Final     Lab Results   Component Value Date    CHOL 111 01/31/2024     Lab Results   Component Value Date    HDL 38 01/31/2024     Lab Results   Component Value Date    TRIG 152 (H) 01/31/2024     Lab Results   Component Value Date    VLDL 30 01/31/2024     Lab Results   Component Value Date    LDL 43.00 (L) 01/31/2024     Lab Results   Component Value Date    TSH 2.601 01/31/2024     Lab Results   Component Value Date    PHUR 5.5 09/13/2021    PROTEINUA Negative 01/31/2024    GLUCUA Negative 09/13/2021    KETONESU Negative 09/13/2021    OCCULTUA Negative 09/13/2021    NITRITE Negative 09/13/2021    LEUKOCYTESUR 250 (A) 01/31/2024     Lab Results   Component Value Date    HGBA1C 7.0 01/31/2024    HGBA1C 7.7 (H) 06/26/2023    HGBA1C >14.0 (H) 04/17/2023     XR Shoulder 2+ View Left    Result Date: 12/15/2023  Indication: Left shoulder pain initial encounter, December 15, 2023 at 11:15 AM Findings: Multiple views were obtained of the left shoulder. The bone density and trabecular pattern is unremarkable. Joint spaces are well-aligned and well maintained. There are no fractures or dislocations. There is no soft tissue abnormality. Impression: No fractures or dislocations.       Assessment         ICD-10-CM ICD-9-CM   1. Breast cancer screening by mammogram  Z12.31 V76.12   2. Type 2  diabetes mellitus without complication, without long-term current use of insulin  E11.9 250.00   3. Nerve pain  M79.2 729.2   4. Chronic low back pain, unspecified back pain laterality, unspecified whether sciatica present  M54.50 724.2    G89.29 338.29   5. Coronary artery disease, unspecified vessel or lesion type, unspecified whether angina present, unspecified whether native or transplanted heart  I25.10 414.00   6. Cigarette nicotine dependence without complication  F17.210 305.1   7. Acute pain of left shoulder  M25.512 719.41   8. Chronic constipation  K59.09 564.00   9. Urinary tract infection without hematuria, site unspecified  N39.0 599.0   10. Type 2 diabetes mellitus without complication, unspecified whether long term insulin use  E11.9 250.00       Plan      Problem List Items Addressed This Visit          Neuro    RESOLVED: Nerve pain    Relevant Medications    gabapentin (NEURONTIN) 600 MG tablet       Cardiac/Vascular    Coronary artery disease    Relevant Medications    isosorbide mononitrate (IMDUR) 30 MG 24 hr tablet    losartan (COZAAR) 100 MG tablet       Endocrine    Type 2 diabetes mellitus without complication, without long-term current use of insulin    Current Assessment & Plan     A1C At Goal  Continue RX metformin 1000 mg BID, Trulicity 3 mg weekly, and Lantus 15 units q hs  6 month f/u  Follow ADA diet.  Avoid soda, simple sweets, and limit rice/pasta/bread/starches and consume brown options when possible.   Maintain healthy weight with BMI goal <30.   Perform aerobic exercise for 150 minutes per week (or 5 days a week for 30 minutes each day).   Examine feet daily.     Lab Results   Component Value Date    HGBA1C 7.0 01/31/2024            Relevant Medications    dulaglutide (TRULICITY) 3 mg/0.5 mL pen injector    metFORMIN (GLUCOPHAGE-XR) 500 MG ER 24hr tablet    Other Relevant Orders    Diabetic Eye Screening Photo    Urinalysis, Reflex to Urine Culture    Microalbumin/Creatinine  Ratio, Urine    Lipid Panel    Hemoglobin A1C    Comprehensive Metabolic Panel    CBC Auto Differential       Orthopedic    Chronic back pain    Current Assessment & Plan     Stable  Continue RX gabapentin 600 mg TID PRN and tizanidine prn  Perform back stretches daily.   Avoid activities than increase back pain or stiffness.   Apply heating pad, ice pack, and Icy Hot / BioFreeze as needed; alternate every 15-20 minutes.   Massage back to loosen muscles.            Relevant Medications    gabapentin (NEURONTIN) 600 MG tablet       Other    Cigarette nicotine dependence without complication    Current Assessment & Plan     Dangers of cigarette smoking were reviewed with patient in detail. Patient was Counseled for 3-10 minutes. Nicotine replacement options were discussed. Nicotine replacement was discussed- not prescribed per patient's request  Referral to Smoking Cessation         Relevant Orders    Ambulatory referral/consult to Smoking Cessation Program     Other Visit Diagnoses       Breast cancer screening by mammogram    -  Primary    Relevant Orders    Mammo Digital Screening Bilat    Acute pain of left shoulder        Relevant Orders    Ambulatory referral/consult to Outpatient Case Management    Chronic constipation        Relevant Medications    AMITIZA 24 mcg Cap    Urinary tract infection without hematuria, site unspecified        Relevant Medications    nitrofurantoin, macrocrystal-monohydrate, (MACROBID) 100 MG capsule    Type 2 diabetes mellitus without complication, unspecified whether long term insulin use        Relevant Medications    dulaglutide (TRULICITY) 3 mg/0.5 mL pen injector    metFORMIN (GLUCOPHAGE-XR) 500 MG ER 24hr tablet            Future Appointments   Date Time Provider Department Center   2/1/2024  2:40 PM [facility]   2/15/2024  1:30 PM Tyler Stevenson MD [facility] CARD [facility]   9/5/2024 12:30 PM Emy Padilla, ANP [facility] GYN [facility]             Signature:     [facility]  [address] [address]    Date of encounter: 2/1/24

## 2024-02-01 NOTE — ASSESSMENT & PLAN NOTE
Dangers of cigarette smoking were reviewed with patient in detail. Patient was Counseled for 3-10 minutes. Nicotine replacement options were discussed. Nicotine replacement was discussed- not prescribed per patient's request  Referral to Smoking Cessation

## 2024-02-02 ENCOUNTER — TELEPHONE (OUTPATIENT)
Dept: CASE MANAGEMENT | Facility: HOSPITAL | Age: 49
End: 2024-02-02
Payer: MEDICAID

## 2024-02-02 ENCOUNTER — TELEPHONE (OUTPATIENT)
Dept: INTERNAL MEDICINE | Facility: CLINIC | Age: 49
End: 2024-02-02
Payer: MEDICAID

## 2024-02-02 DIAGNOSIS — N39.0 URINARY TRACT INFECTION WITHOUT HEMATURIA, SITE UNSPECIFIED: Primary | ICD-10-CM

## 2024-02-02 LAB — BACTERIA UR CULT: ABNORMAL

## 2024-02-02 RX ORDER — CIPROFLOXACIN 500 MG/1
500 TABLET ORAL EVERY 12 HOURS
Qty: 20 TABLET | Refills: 0 | Status: SHIPPED | OUTPATIENT
Start: 2024-02-02 | End: 2024-02-12

## 2024-02-02 NOTE — TELEPHONE ENCOUNTER
----- Message from JOSE RAFAEL Vu sent at 2/2/2024 11:03 AM CST -----  Please inform the patient that while we will discuss her lab work results at her upcoming Office visit, her urine sample did grow out a bacteria indicating a UTI. I have sent a prescription for her to the pharmacy for her to start taking. Also, advise the following.    Advise the patient that I have changed the antibiotic.   Complete the full course of the medication.  Report any continuing signs such as nausea/vomiting,visible blood in urine, increased low back or flank pain, worsening burning upon urination after antibiotic completion or fever.  Drink plenty of water.  Avoid soda or carbonated beverages.   Urinate frequently; do not hold urine for extended periods of time.  Wear cotton underwear, avoid tight fitting pants.  Use OTC AZO or pyridium for relief of urinary spasms.  Women: wipe front to back, urinate after sexual intercourse, and avoid scented or irritating feminine products.    Thanks,    JOSE RAFAEL Calvin     I attempted to contact patient , but unable to reach . I left v/m provider Flory De La Cruz sent  medication to pharmacy.  Return call to [facility] at  if any questions or concerns.

## 2024-02-02 NOTE — TELEPHONE ENCOUNTER
Pt called and results given along with advised to begin new antibiotic and advised to drink plenty of water, wear cotton underwear, avoid caffeine and alcohol, wipe front to back, take AZO for spasms and contact us if symptoms worsen or not improved after completing treatment. Pt verbalized understanding and will call with any questions or concerns.

## 2024-02-02 NOTE — TELEPHONE ENCOUNTER
Called and informed pt of provider's message.  Pt denied any further questions and verbalized understanding. Informed pt if she has any further questions to contact [facility].                Provider's message :        Please inform the patient that while we will discuss her lab work results at her upcoming Office visit, her urine sample did grow out a bacteria indicating a UTI. I have sent a prescription for her to the pharmacy for her to start taking. Also, advise the following.     Advise the patient that I have changed the antibiotic.   Complete the full course of the medication.   Report any continuing signs such as nausea/vomiting,visible blood in urine, increased low back or flank pain, worsening burning upon urination after antibiotic completion or fever.   Drink plenty of water.   Avoid soda or carbonated beverages.   Urinate frequently; do not hold urine for extended periods of time.   Wear cotton underwear, avoid tight fitting pants.   Use OTC AZO or pyridium for relief of urinary spasms.   Women: wipe front to back, urinate after sexual intercourse, and avoid scented or irritating feminine products.     Thanks,

## 2024-02-06 ENCOUNTER — TELEPHONE (OUTPATIENT)
Dept: INTERNAL MEDICINE | Facility: CLINIC | Age: 49
End: 2024-02-06
Payer: MEDICAID

## 2024-02-06 NOTE — TELEPHONE ENCOUNTER
----- Message from [facility] sent at 2/6/2024  7:59 AM CST -----  .Type:  Needs Medical Advice    Who Called:  pt    Symptoms (please be specific):  no     How long has patient had these symptoms:   no    Pharmacy name and phone #:   no    Would the patient rather a call back or a response via [facility]?  CB if need    Best Call Back Number:  [phone]    Additional Information:  pt states to please send most recent lab work including urine test to Dr. Nikki Bradley call # [phone]

## 2024-02-06 NOTE — TELEPHONE ENCOUNTER
Spoke to pt. States she wants lab faxed over to  Dr. Nikki Bradley . Faxed over labs , will scan into chart once successful confirmation is received.

## 2024-02-15 ENCOUNTER — OFFICE VISIT (OUTPATIENT)
Dept: CARDIOLOGY | Facility: CLINIC | Age: 49
End: 2024-02-15
Payer: MEDICAID

## 2024-02-15 VITALS
BODY MASS INDEX: 31.99 KG/M2 | HEIGHT: 64 IN | SYSTOLIC BLOOD PRESSURE: 127 MMHG | HEART RATE: 93 BPM | OXYGEN SATURATION: 98 % | WEIGHT: 187.38 LBS | DIASTOLIC BLOOD PRESSURE: 80 MMHG | RESPIRATION RATE: 20 BRPM | TEMPERATURE: 98 F

## 2024-02-15 DIAGNOSIS — E78.2 MIXED HYPERLIPIDEMIA: ICD-10-CM

## 2024-02-15 DIAGNOSIS — I10 PRIMARY HYPERTENSION: Primary | ICD-10-CM

## 2024-02-15 DIAGNOSIS — I25.10 CORONARY ARTERY DISEASE, UNSPECIFIED VESSEL OR LESION TYPE, UNSPECIFIED WHETHER ANGINA PRESENT, UNSPECIFIED WHETHER NATIVE OR TRANSPLANTED HEART: ICD-10-CM

## 2024-02-15 DIAGNOSIS — G89.29 CHRONIC LOW BACK PAIN, UNSPECIFIED BACK PAIN LATERALITY, UNSPECIFIED WHETHER SCIATICA PRESENT: ICD-10-CM

## 2024-02-15 DIAGNOSIS — M54.50 CHRONIC LOW BACK PAIN, UNSPECIFIED BACK PAIN LATERALITY, UNSPECIFIED WHETHER SCIATICA PRESENT: ICD-10-CM

## 2024-02-15 PROCEDURE — 99213 OFFICE O/P EST LOW 20 MIN: CPT | Mod: PBBFAC | Performed by: INTERNAL MEDICINE

## 2024-02-15 RX ORDER — TIZANIDINE 4 MG/1
4 TABLET ORAL EVERY 8 HOURS
Qty: 90 TABLET | Refills: 2 | Status: SHIPPED | OUTPATIENT
Start: 2024-02-15 | End: 2024-04-04

## 2024-02-15 NOTE — PROGRESS NOTES
CHIEF COMPLAINT:   Chief Complaint   Patient presents with    f/u sts has had chest pain monthly has ESPARZA questions on kristine                 Review of patient's allergies indicates:   Allergen Reactions    Aspirin Nausea And Vomiting and Other (See Comments)     Other reaction(s): Stomach upset  Pt. States doesn't take because of stomach ulcers                        HPI:     PMHx includes  CAD with MI in February 2019, with stenting now s/p CABG LIMA->LAD in 6/2021 complicated by wound dehiscence and infection treated, HTN, T2DM, HLD, chronic lower back pain, ventral hernia, AUB, endometriosis, depression with anxiety, and tobacco use.  Patient states she has been doing well since her last visit.  She still has occasional chest pain that she attributes to anxiety and stress, states there has been family stressors starting more recently.  Patient states these episodes usually happen at nighttime, do not happen currently on exertion.  She states she was not able to complete Holter monitor that was prescribed at last visit, fluttering and palpitations have been minimal since that time.  Continues to have occasional popping feeling in chest where sternal flap was placed.  Continues with some shoulder and back pain that she is scheduled to see physical therapy for.  Patient states blood pressure has been in good range at home, had 1 episode of hypotension proximally 3 days ago where she got lightheaded.  Losartan recently increased to 100 mg by primary care NP.  Blood pressure at goal today 127/80, patient without other complaints at this time                                                                                                                                                                                                                                                                                                                                                                                                                            Past Surgical History:   Procedure Laterality Date    CARDIAC CATHETERIZATION      CARDIAC VALVE REPLACEMENT      CHOLECYSTECTOMY      CORONARY ANGIOPLASTY      CORONARY ARTERY BYPASS GRAFT      HYSTERECTOMY      right wrist surgery      TUBAL LIGATION       Social History     Socioeconomic History    Marital status: Single   Tobacco Use    Smoking status: Every Day     Current packs/day: 0.25     Average packs/day: 0.3 packs/day for 29.1 years (7.3 ttl pk-yrs)     Types: Cigarettes     Start date: 1995    Smokeless tobacco: Never   Substance and Sexual Activity    Alcohol use: Not Currently    Drug use: Never    Sexual activity: Yes     Social Determinants of Health     Financial Resource Strain: Low Risk  (1/29/2024)    Overall Financial Resource Strain (CARDIA)     Difficulty of Paying Living Expenses: Not very hard   Food Insecurity: Food Insecurity Present (1/29/2024)    Hunger Vital Sign     Worried About Running Out of Food in the Last Year: Sometimes true     Ran Out of Food in the Last Year: Sometimes true   Transportation Needs: No Transportation Needs (1/29/2024)    PRAPARE - Transportation     Lack of Transportation (Medical): No     Lack of Transportation (Non-Medical): No   Physical Activity: Inactive (1/29/2024)    Exercise Vital Sign     Days of Exercise per Week: 0 days     Minutes of Exercise per Session: 20 min   Stress: No Stress Concern Present (1/29/2024)    [employer] [employer] - Occupational Stress Questionnaire     Feeling of Stress : Not at all   Social Connections: Unknown (1/29/2024)    Social Connection and Isolation Panel [NHANES]     Frequency of Communication with Friends and Family: More than three times a week     Frequency of Social Gatherings with Friends and Family: Once a week     Active Member of Clubs or Organizations: No     Attends Club or Organization Meetings: Never     Marital Status: Living with partner   Housing Stability:  Low Risk  (1/29/2024)    Housing Stability Vital Sign     Unable to Pay for Housing in the Last Year: No     Number of Places Lived in the Last Year: 1     Unstable Housing in the Last Year: No        Family History   Problem Relation Age of Onset    Arrhythmia Mother     Clotting disorder Mother     Heart disease Mother     Heart attack Mother     Hyperlipidemia Mother     Heart failure Mother     Stroke Mother     Hypertension Mother     Diabetes Mother     Depression Mother     Hyperlipidemia Brother     Hypertension Brother        Current Outpatient Medications:     albuterol (PROVENTIL/VENTOLIN HFA) 90 mcg/actuation inhaler, Inhale 2 puffs into the lungs 4 (four) times daily as needed for Wheezing or Shortness of Breath., Disp: 18 g, Rfl: 0    ALPRAZolam (XANAX) 1 MG tablet, Take 1 mg by mouth 3 (three) times daily as needed., Disp: , Rfl:     AMITIZA 24 mcg Cap, Take 1 capsule (24 mcg total) by mouth 2 (two) times daily., Disp: 180 capsule, Rfl: 2    ARIPiprazole (ABILIFY) 2 MG Tab, Take 2 mg by mouth once daily., Disp: , Rfl:     clopidogreL (PLAVIX) 75 mg tablet, See Instructions, TAKE ONE TABLET BY MOUTH EVERY DAY, # 90 tab(s), 3 Refill(s), Pharmacy: [facility] [id number] [id number], 160, cm, Height/Length Dosing, 09/01/21 13:52:00 CDT, 85, kg, Weight Dosing, 09/01/21 13:52:00 CDT, Disp: 90 tablet, Rfl: 2    desvenlafaxine succinate (PRISTIQ) 100 MG Tb24, , Disp: , Rfl:     doxepin (SINEQUAN) 25 MG capsule, Take 25 mg by mouth nightly., Disp: , Rfl:     dulaglutide (TRULICITY) 3 mg/0.5 mL pen injector, Inject 3 mg into the skin every 7 days., Disp: 4 pen , Rfl: 8    ezetimibe (ZETIA) 10 mg tablet, Take 1 tablet (10 mg total) by mouth once daily., Disp: 90 tablet, Rfl: 3    gabapentin (NEURONTIN) 600 MG tablet, Take 1 tablet (600 mg total) by mouth 3 (three) times daily. As needed for back pain, Disp: 90 tablet, Rfl: 8    isosorbide mononitrate (IMDUR) 30 MG 24 hr tablet, Take 1 tablet (30 mg total) by mouth once  "daily., Disp: 90 tablet, Rfl: 2    losartan (COZAAR) 100 MG tablet, Take 1 tablet (100 mg total) by mouth once daily. For High Blood Pressure, Disp: 90 tablet, Rfl: 0    metFORMIN (GLUCOPHAGE-XR) 500 MG ER 24hr tablet, Take 2 tablets (1,000 mg total) by mouth 2 (two) times daily with meals., Disp: 360 tablet, Rfl: 1    metoclopramide HCl (REGLAN) 5 MG tablet, Take 5 mg by mouth 4 (four) times daily., Disp: , Rfl:     metoprolol succinate (TOPROL-XL) 25 MG 24 hr tablet, Take 1 tablet (25 mg total) by mouth 2 (two) times daily., Disp: 90 tablet, Rfl: 2    nitroGLYCERIN (NITROSTAT) 0.4 MG SL tablet, Place 1 tablet (0.4 mg total) under the tongue every 5 (five) minutes as needed for Chest pain., Disp: 25 tablet, Rfl: 2    OXcarbazepine (TRILEPTAL) 300 MG Tab, Take 300 mg by mouth 2 (two) times daily., Disp: , Rfl:     pantoprazole (PROTONIX) 40 MG tablet, Take 1 tablet (40 mg total) by mouth once daily. For Acid Reflux, Disp: 90 tablet, Rfl: 1    rosuvastatin (CRESTOR) 40 MG Tab, Take 1 tablet (40 mg total) by mouth once daily., Disp: 90 tablet, Rfl: 2    ACCU-CHEK GUIDE ME GLUCOSE MTR Misc, , Disp: , Rfl:     acetaminophen-codeine 300-30mg (TYLENOL #3) 300-30 mg Tab, Take 1 tablet by mouth every 6 (six) hours as needed., Disp: , Rfl:     blood sugar diagnostic Strp, To check BG 2 times daily, to use with insurance preferred meter E11.9, Disp: 100 each, Rfl: 6    blood-glucose meter kit, To check BG 2 times daily, to use with insurance preferred meter E11.9, Disp: 1 each, Rfl: 0    lancets Misc, To check BG 2 times daily, to use with insurance preferred meter E11.9, Disp: 100 each, Rfl: 6    pen needle, diabetic 32 gauge x 5/32" Ndle, For Nightly Lantus Injection. ICD 10 E11.9, Disp: 100 each, Rfl: 6    SUPREP BOWEL PREP KIT 17.5-3.13-1.6 gram SolR, SMARTSI Kit(s) By Mouth Once, Disp: , Rfl:     tiZANidine (ZANAFLEX) 4 MG tablet, Take 1 tablet (4 mg total) by mouth every 8 (eight) hours., Disp: 90 tablet, Rfl: 2    " "varenicline (CHANTIX STARTING MONTH BOX) 0.5 mg (11)- 1 mg (42) tablet, Take one 0.5mg tab by mouth once daily X3 days,then increase to one 0.5mg tab twice daily X4 days,then increase to one 1mg tab twice daily, Disp: 1 each, Rfl: 0   Laboratory:  Most Recent Data:  CBC:   Lab Results   Component Value Date    WBC 12.99 (H) 01/31/2024    HGB 15.5 01/31/2024    HCT 46.1 01/31/2024     01/31/2024    MCV 84.9 01/31/2024    RDW 12.7 01/31/2024   BMP:   Lab Results   Component Value Date     01/31/2024    K 4.5 01/31/2024     09/30/2023    CO2 28 01/31/2024    BUN 3.5 (L) 01/31/2024    CALCIUM 9.8 01/31/2024    MG 1.80 02/23/2022    PHOS 4.8 (H) 08/25/2021   LFTs:   Lab Results   Component Value Date    ALBUMIN 4.2 01/31/2024    BILITOT 0.2 01/31/2024    AST 25 01/31/2024    ALKPHOS 82 01/31/2024    ALT 30 01/31/2024   Coags:   Lab Results   Component Value Date    INR 1.02 06/21/2023    PROTIME 13.3 06/21/2023    PTT 28.9 06/21/2023   FLP:   Lab Results   Component Value Date    CHOL 111 01/31/2024    HDL 38 01/31/2024    TRIG 152 (H) 01/31/2024         Review of Systems   Review of Systems   Constitutional:  Negative for chills and fever.   Respiratory:  Negative for cough and shortness of breath.    Cardiovascular:  Positive for chest pain. Negative for palpitations and leg swelling.   Gastrointestinal:  Negative for abdominal pain.   Genitourinary:  Negative for dysuria.   Musculoskeletal:  Positive for back pain and myalgias.   Neurological:  Negative for dizziness and headaches.                                                                                                                                          Blood pressure 127/80, pulse 93, temperature 98.1 °F (36.7 °C), temperature source Oral, resp. rate 20, height 5' 4" (1.626 m), weight 85 kg (187 lb 6.3 oz), SpO2 98 %.   PE:  Physical Exam:  Neck: normal carotids, no bruits; normal JVP  Lungs :clear to auscultation, no abnormal lung " sounds  Heart: RR, normal S1,S2, no murmurs, no gallops, sternotomy scar noted  Abd: no masses; no bruits; no tenderness to palpation  Exts: normal DP and PT pulses bilaterally, no edema noted , left leg graft harvest site CDI    ASSESSMENT/PLAN:    CAD s/p CABG x2 in 2021  CABG complicated by wound dehiscence and infection with sternal flap placed  -has had intermittent chest pain since then, thought to be noncardiac in nature   -continuing to take Plavix 75 mg daily     Palpitations   -Patient had complaint of palpitations at last visit, was scheduled for Holter monitor but was unable to   -patient states palpitations have resolved, can revisit Holter monitor if symptoms recur    Hypertension   -blood pressure at goal today, 127/80   -recently had losartan increased to 100 mg daily, continues on metoprolol 25 mg daily, Imdur 30 mg daily   -patient does state 1 episode of hypotension recently home, should check BP consistently and report if more episodes of hypotension occur    Hyperlipidemia   -continue rosuvastatin 40 mg daily, Zetia 10 mg daily   -last lipid panel at goal    Nicotine dependence   -encouraged smoking cessation    Highest level activity over the last 2 weeks approximately 3 METS    Watson Giraldo DO

## 2024-02-15 NOTE — PROGRESS NOTES
Cardiology Attending    I evaluated Kaye Spring and discussed the patient's symptoms, findings, and management plan with the resident.  Please see the Cardiology note for details.

## 2024-02-15 NOTE — TELEPHONE ENCOUNTER
Pt requesting refill for tiZANidine (ZANAFLEX) 4 MG tablet .    LOV: 2/1/24        NOV: 8/1/24

## 2024-02-15 NOTE — TELEPHONE ENCOUNTER
----- Message from Arin Soria sent at 2/15/2024 10:35 AM CST -----  .Type:  RX Refill Request    Who Called: pt   Refill or New Rx:refill   RX Name and Strength:tiZANidine (ZANAFLEX) 4 MG tablet  How is the patient currently taking it? (ex. 1XDay):3x   Is this a 30 day or 90 day RX:10  Preferred Pharmacy with phone number:[facility] #[id number] - [place] - [address]  Local or Mail Order:local   Ordering Provider:handy   Would the patient rather a call back or a response via [facility]? Call back   Best Call Back Number:[phone]  Additional Information:

## 2024-02-23 DIAGNOSIS — I25.10 CORONARY ARTERY DISEASE, UNSPECIFIED VESSEL OR LESION TYPE, UNSPECIFIED WHETHER ANGINA PRESENT, UNSPECIFIED WHETHER NATIVE OR TRANSPLANTED HEART: ICD-10-CM

## 2024-02-23 RX ORDER — LOSARTAN POTASSIUM 100 MG/1
100 TABLET ORAL DAILY
Qty: 90 TABLET | Refills: 3 | Status: SHIPPED | OUTPATIENT
Start: 2024-02-23 | End: 2025-02-17

## 2024-02-23 RX ORDER — ROSUVASTATIN CALCIUM 40 MG/1
40 TABLET, COATED ORAL DAILY
Qty: 90 TABLET | Refills: 3 | Status: SHIPPED | OUTPATIENT
Start: 2024-02-23

## 2024-02-23 RX ORDER — CLOPIDOGREL BISULFATE 75 MG/1
TABLET ORAL
Qty: 90 TABLET | Refills: 3 | Status: SHIPPED | OUTPATIENT
Start: 2024-02-23 | End: 2025-02-21

## 2024-02-23 RX ORDER — METOPROLOL SUCCINATE 25 MG/1
25 TABLET, EXTENDED RELEASE ORAL 2 TIMES DAILY
Qty: 90 TABLET | Refills: 3 | Status: SHIPPED | OUTPATIENT
Start: 2024-02-23

## 2024-02-23 RX ORDER — EZETIMIBE 10 MG/1
10 TABLET ORAL DAILY
Qty: 90 TABLET | Refills: 3 | Status: SHIPPED | OUTPATIENT
Start: 2024-02-23 | End: 2025-02-22

## 2024-03-14 ENCOUNTER — PATIENT OUTREACH (OUTPATIENT)
Dept: ADMINISTRATIVE | Facility: OTHER | Age: 49
End: 2024-03-14
Payer: MEDICAID

## 2024-03-15 ENCOUNTER — TELEPHONE (OUTPATIENT)
Dept: INTERNAL MEDICINE | Facility: CLINIC | Age: 49
End: 2024-03-15
Payer: MEDICAID

## 2024-04-04 DIAGNOSIS — G89.29 CHRONIC LOW BACK PAIN, UNSPECIFIED BACK PAIN LATERALITY, UNSPECIFIED WHETHER SCIATICA PRESENT: ICD-10-CM

## 2024-04-04 DIAGNOSIS — M54.50 CHRONIC LOW BACK PAIN, UNSPECIFIED BACK PAIN LATERALITY, UNSPECIFIED WHETHER SCIATICA PRESENT: ICD-10-CM

## 2024-04-04 RX ORDER — TIZANIDINE 4 MG/1
4 TABLET ORAL EVERY 8 HOURS
Qty: 270 TABLET | Refills: 1 | Status: SHIPPED | OUTPATIENT
Start: 2024-04-04

## 2024-05-06 DIAGNOSIS — K21.9 GASTROESOPHAGEAL REFLUX DISEASE, UNSPECIFIED WHETHER ESOPHAGITIS PRESENT: ICD-10-CM

## 2024-05-06 RX ORDER — PANTOPRAZOLE SODIUM 40 MG/1
40 TABLET, DELAYED RELEASE ORAL DAILY
Qty: 90 TABLET | Refills: 1 | Status: SHIPPED | OUTPATIENT
Start: 2024-05-06 | End: 2024-11-02

## 2024-05-06 NOTE — TELEPHONE ENCOUNTER
Pharmacy requesting refill for pt's pantoprazole (PROTONIX) 40 MG tablet     LOV: 2/1/24    NOV:8/1/24

## 2024-05-06 NOTE — TELEPHONE ENCOUNTER
----- Message from Vicky Haskins sent at 5/6/2024  9:20 AM CDT -----  Regarding: refill  .Type:  RX Refill Request    Who Called: pt  Refill or New Rx:refill  RX Name and Strength: Disp Refills Start End KRISTINE  pantoprazole (PROTONIX) 40 MG tablet        How is the patient currently taking it? (ex. 1XDay):Take 1 tablet (40 mg total) by mouth once daily. For Acid Reflux -  Is this a 30 day or 90 day RX:  Preferred Pharmacy with phone number:[facility] #[id number] - [place] - [address]

## 2024-05-20 ENCOUNTER — TELEPHONE (OUTPATIENT)
Dept: INTERNAL MEDICINE | Facility: CLINIC | Age: 49
End: 2024-05-20
Payer: MEDICAID

## 2024-05-20 DIAGNOSIS — E11.9 TYPE 2 DIABETES MELLITUS WITHOUT COMPLICATION, WITHOUT LONG-TERM CURRENT USE OF INSULIN: Primary | ICD-10-CM

## 2024-05-20 RX ORDER — DULAGLUTIDE 1.5 MG/.5ML
1.5 INJECTION, SOLUTION SUBCUTANEOUS
Qty: 4 PEN | Refills: 2 | Status: SHIPPED | OUTPATIENT
Start: 2024-05-20 | End: 2024-08-18

## 2024-05-20 NOTE — TELEPHONE ENCOUNTER
Spoke to pt. Pt stated she has called around to different Pharmacies and they do not have her dulaglutide (TRULICITY) 3 mg/0.5 mL pen injector . I called [facility] and they do not have the (TRULICITY) 3 mg/0.5 mL pen injector . Pt requesting alternate medication. Says she is willing to try alternate medication , but not Ozempic. Please advise.

## 2024-05-20 NOTE — TELEPHONE ENCOUNTER
In attempting to keep her on the same type of medication w/o changing to Ozempic I can only offer that I will send the Trulicty 1.5 mg dosage for now until we are able to get her back tot he 3 mg. I have sent this to [facility] pharmacy for her.     Thanks,    Flory De La Cruz,ALBINOP

## 2024-06-13 ENCOUNTER — HOSPITAL ENCOUNTER (OUTPATIENT)
Dept: RADIOLOGY | Facility: HOSPITAL | Age: 49
Discharge: HOME | End: 2024-06-13
Attending: NURSE PRACTITIONER
Payer: MEDICAID

## 2024-06-13 DIAGNOSIS — Z12.31 BREAST CANCER SCREENING BY MAMMOGRAM: ICD-10-CM

## 2024-06-13 PROCEDURE — 77063 BREAST TOMOSYNTHESIS BI: CPT | Mod: TC

## 2024-06-13 PROCEDURE — 77067 SCR MAMMO BI INCL CAD: CPT | Mod: TC

## 2024-06-24 NOTE — PROGRESS NOTES
CHIEF COMPLAINT: No chief complaint on file.                                                 HPI:  Kaye Spring 48 y.o. female  PMHx includes  CAD with MI in February 2019, with stenting now s/p CABG LIMA->LAD in 6/2021 complicated by wound dehiscence and infection treated, HTN, T2DM, HLD, chronic lower back pain, ventral hernia, AUB, endometriosis, depression with anxiety, and tobacco use.  Patient presents for follow up and ongoing care.  She states that overall she has been feeling well from her last office visit, however in the last 2 days she has been having significant acid reflux/GERD/heartburn pain.  She states that recently she was told by her pharmacist that her current GERD medications but no longer breathe covered for 6 months out of the year due to messing up her stomach even more, so she has only been taking her GERD medications as needed.  Over the last 2 days she has been having significant chest discomfort that she associates with her acid reflux.  She states that it was relieved with belching and with rubbing on her chest.  She denies trying nitroglycerin.  She denies any exertional symptoms.  She denies any further cardiac complaints such as SOB, ESPARZA, palpitations, PND, orthopnea, lightheadedness, dizziness, syncope, peripheral edema, or claudication symptoms.  Patient was unable to recall which medication she has been holding for her acid reflux.  She was able to complete ADLs without any issues or ischemic symptoms.  She states that she has not very physically active due to significant bilateral lower extremity pain and back pain.  She reports compliance with the current medications.  She continues to smoke approximately 1 pack of cigarettes per day and is interested in quitting.  She is requesting nicotine patches.                                                                                                                                                                                                                                                                                                                                                                                                                                                                                       CARDIAC TESTING:  No results found for this or any previous visit.    No results found for this or any previous visit.     Results for orders placed in visit on 06/21/21    CATH LAB PROCEDURE       Patient Active Problem List   Diagnosis    Coronary artery disease    Other synovitis and tenosynovitis, unspecified hand    Anxiety    Benign paroxysmal positional vertigo    Chronic back pain    Chronic rhinitis    Gastroesophageal reflux disease with esophagitis    Diastasis of rectus abdominis    Primary hypertension    Leukocytosis    Microcytic hypochromic anemia    Mixed anxiety depressive disorder    Mixed hyperlipidemia    Obesity    Spondylosis of lumbar spine    Thrombocythemia    Type 2 diabetes mellitus without complication, without long-term current use of insulin    Cigarette nicotine dependence without complication    Positive colorectal cancer screening using Cologuard test     Past Surgical History:   Procedure Laterality Date    CARDIAC CATHETERIZATION      CARDIAC VALVE REPLACEMENT      CHOLECYSTECTOMY      CORONARY ANGIOPLASTY      CORONARY ARTERY BYPASS GRAFT      HYSTERECTOMY      right wrist surgery      TUBAL LIGATION       Social History     Socioeconomic History    Marital status: Single   Tobacco Use    Smoking status: Every Day     Current packs/day: 0.25     Average packs/day: 0.3 packs/day for 29.5 years (7.4 ttl pk-yrs)     Types: Cigarettes     Start date: 1995    Smokeless tobacco: Never   Substance and Sexual Activity    Alcohol use: Not Currently    Drug use: Never    Sexual activity: Yes     Social Determinants of Health     Financial Resource Strain: Low Risk  (1/29/2024)    Overall Financial  Resource Strain (CARDIA)     Difficulty of Paying Living Expenses: Not very hard   Food Insecurity: Food Insecurity Present (1/29/2024)    Hunger Vital Sign     Worried About Running Out of Food in the Last Year: Sometimes true     Ran Out of Food in the Last Year: Sometimes true   Transportation Needs: No Transportation Needs (1/29/2024)    PRAPARE - Transportation     Lack of Transportation (Medical): No     Lack of Transportation (Non-Medical): No   Physical Activity: Inactive (1/29/2024)    Exercise Vital Sign     Days of Exercise per Week: 0 days     Minutes of Exercise per Session: 20 min   Stress: No Stress Concern Present (1/29/2024)    [employer] - Occupational Stress Questionnaire     Feeling of Stress : Not at all   Housing Stability: Low Risk  (1/29/2024)    Housing Stability Vital Sign     Unable to Pay for Housing in the Last Year: No     Number of Places Lived in the Last Year: 1     Unstable Housing in the Last Year: No        Family History   Problem Relation Name Age of Onset    Arrhythmia Mother Marianela     Clotting disorder Mother Marianela     Heart disease Mother Marianela     Heart attack Mother Marianela     Hyperlipidemia Mother Marianela     Heart failure Mother Marianela     Stroke Mother Marianela     Hypertension Mother Marianela     Diabetes Mother Marianela     Depression Mother Marianela     Hyperlipidemia Brother Domingo     Hypertension Brother Domingo      Review of patient's allergies indicates:   Allergen Reactions    Aspirin Nausea And Vomiting and Other (See Comments)     Other reaction(s): Stomach upset  Pt. States doesn't take because of stomach ulcers           ROS:  Review of Systems   Constitutional: Negative.  Negative for malaise/fatigue.   HENT: Negative.     Eyes: Negative.    Respiratory: Negative.  Negative for shortness of breath.    Cardiovascular:  Positive for chest pain. Negative for palpitations, orthopnea, claudication, leg swelling and PND.    Gastrointestinal:  Positive for heartburn and nausea. Negative for vomiting.   Genitourinary: Negative.    Musculoskeletal:  Positive for back pain, joint pain and myalgias.   Skin: Negative.    Neurological: Negative.    Endo/Heme/Allergies: Negative.    Psychiatric/Behavioral: Negative.                                                                                                                                                                                  Negative except as stated in the history of present illness. See HPI for details.    PHYSICAL EXAM:  There were no vitals taken for this visit.    Physical Exam  Constitutional:       Appearance: Normal appearance. She is obese. She is not ill-appearing.   HENT:      Head: Normocephalic.      Nose: Nose normal.      Mouth/Throat:      Mouth: Mucous membranes are moist.   Eyes:      Extraocular Movements: Extraocular movements intact.   Neck:      Vascular: No carotid bruit.   Cardiovascular:      Rate and Rhythm: Normal rate and regular rhythm.      Pulses: Normal pulses.      Heart sounds: No murmur heard.  Pulmonary:      Effort: Pulmonary effort is normal.   Abdominal:      General: There is no distension.   Musculoskeletal:         General: Normal range of motion.      Cervical back: Normal range of motion.      Right lower leg: No edema.      Left lower leg: No edema.   Skin:     General: Skin is warm.   Neurological:      General: No focal deficit present.      Mental Status: She is alert and oriented to person, place, and time.   Psychiatric:         Mood and Affect: Mood normal.         Current Outpatient Medications   Medication Instructions    ACCU-CHEK GUIDE ME GLUCOSE MTR Misc No dose, route, or frequency recorded.    acetaminophen-codeine 300-30mg (TYLENOL #3) 300-30 mg Tab 1 tablet, Oral, Every 6 hours PRN    albuterol (PROVENTIL/VENTOLIN HFA) 90 mcg/actuation inhaler 2 puffs, Inhalation, 4 times daily PRN    ALPRAZolam (XANAX) 1 mg, Oral, 3  "times daily PRN    AMITIZA 24 mcg, Oral, 2 times daily    ARIPiprazole (ABILIFY) 2 mg, Oral, Daily    blood sugar diagnostic Strp To check BG 2 times daily, to use with insurance preferred meter E11.9    blood-glucose meter kit To check BG 2 times daily, to use with insurance preferred meter E11.9    clopidogreL (PLAVIX) 75 mg tablet <BR>See Instructions, TAKE ONE TABLET BY MOUTH EVERY DAY, # 90 tab(s), 3 Refill(s), Pharmacy: [id number], 160, cm, Height/Length Dosing, 21 13:52:00 CDT, 85, kg, Weight Dosing, 21 13:52:00 CDT    desvenlafaxine succinate (PRISTIQ) 100 MG Tb24 No dose, route, or frequency recorded.    doxepin (SINEQUAN) 25 mg, Oral, Nightly    ezetimibe (ZETIA) 10 mg, Oral, Daily    gabapentin (NEURONTIN) 600 mg, Oral, 3 times daily, As needed for back pain    isosorbide mononitrate (IMDUR) 30 mg, Oral, Daily    lancets Misc To check BG 2 times daily, to use with insurance preferred meter E11.9    losartan (COZAAR) 100 mg, Oral, Daily, For High Blood Pressure    metFORMIN (GLUCOPHAGE-XR) 1,000 mg, Oral, 2 times daily with meals    metoclopramide HCl (REGLAN) 5 mg, Oral, 4 times daily    metoprolol succinate (TOPROL-XL) 25 mg, Oral, 2 times daily    nitroGLYCERIN (NITROSTAT) 0.4 mg, Sublingual, Every 5 min PRN    OXcarbazepine (TRILEPTAL) 300 mg, Oral, 2 times daily    pantoprazole (PROTONIX) 40 mg, Oral, Daily, For Acid Reflux    pen needle, diabetic 32 gauge x 5/32" Ndle For Nightly Lantus Injection. ICD 10 E11.9    rosuvastatin (CRESTOR) 40 mg, Oral, Daily    SUPREP BOWEL PREP KIT 17.5-3.13-1.6 gram SolR SMARTSI Kit(s) By Mouth Once    tiZANidine (ZANAFLEX) 4 mg, Oral, Every 8 hours    TRULICITY 3 mg, Subcutaneous, Every 7 days    TRULICITY 1.5 mg, Subcutaneous, Every 7 days, For Diabetes    varenicline (CHANTIX STARTING MONTH BOX) 0.5 mg (11)- 1 mg (42) tablet Take one 0.5mg tab by mouth once daily X3 days,then increase to one 0.5mg tab twice daily X4 days,then increase to " one 1mg tab twice daily        All medications, laboratory studies, cardiac diagnostic imaging reviewed.     Lab Results   Component Value Date    LDL 43.00 (L) 01/31/2024    LDL 58.00 01/03/2023    TRIG 152 (H) 01/31/2024    TRIG 192 (H) 01/03/2023    CREATININE 0.86 01/31/2024    MG 1.80 02/23/2022    K 4.5 01/31/2024        ASSESSMENT/PLAN:    CAD s/p CABG x2 in 2021  CABG complicated by wound dehiscence and infection with sternal flap placed  -Reports chest pain that she associates with acid reflux/heartburn for the last 2 days   - She states that she has not been compliant with her acid reflux medication as the pharmacist told her that it could make her stomach problems worse and that insurance would only pay for it for 6 months out of the year.  - She denies trying nitroglycerin for chest pain  - She denies any exertional angina or other anginal equivalents   - We will have patient complete nuclear stress test to assess for any underlying ischemic causes of chest pain.  Patient was unable to walk on treadmill as she is chronic low back and bilateral lower extremity pain.  She states that she was unable to walk on surface his that are too flat and too fast  -Continuing to take Plavix 75 mg daily   -EKG today with sinus rhythm with occasional PVCs, possible LAE, T-wave abnormality, consider lateral ischemia, abnormal EKG, event rate 90 beats per minute-this is similar to EKG completed in June of 2023  - Continue current medications as listed above  - will increase Imdur to 60 mg daily for better blood pressure control and antianginal effects   Patient to return to clinic for nurse visit for BP/symptom check in approximately 1 month  - Continue SL nitro for as needed chest pain  -Strict ED precautions given    Palpitations   -Resolved  -No indication for further testing today      Hypertension   -Blood pressure above goal today  -Will increase Imdur to 60 MG daily   -Continue Losartan 100 mg daily and  Metoprolol 25 mg daily  -Counseled on the importance of following a low-sodium, heart healthy diet and exercise as tolerated     Hyperlipidemia   -LDL 43 per labs January 2024  -Continue rosuvastatin 40 mg daily, Zetia 10 mg daily   -Counseled on the importance of following a low-cholesterol, low-fat diet and exercise as tolerated     Nicotine dependence   -Encouraged smoking cessation  -Currently smoking 1 ppd and is interested in quitting   -Will prescribe nicotine patches     Highest level activity over the last 2 weeks approximately 3 METS    Increase Isosorbide mononitrate to 60 mg daily  Return to clinic for nurse visit for BP/symptom check in approximately 1 month   Complete exercise nuclear stress test   Follow up in cardiology clinic in 3 months or sooner if needed   Follow up with PCP as directed  Strict ED precautions given

## 2024-06-27 ENCOUNTER — OFFICE VISIT (OUTPATIENT)
Dept: CARDIOLOGY | Facility: CLINIC | Age: 49
End: 2024-06-27
Payer: MEDICAID

## 2024-06-27 VITALS
RESPIRATION RATE: 18 BRPM | TEMPERATURE: 97 F | HEART RATE: 86 BPM | HEIGHT: 64 IN | DIASTOLIC BLOOD PRESSURE: 92 MMHG | BODY MASS INDEX: 31.24 KG/M2 | WEIGHT: 183 LBS | SYSTOLIC BLOOD PRESSURE: 159 MMHG

## 2024-06-27 DIAGNOSIS — R07.9 CHEST PAIN, UNSPECIFIED TYPE: Primary | ICD-10-CM

## 2024-06-27 DIAGNOSIS — E78.2 MIXED HYPERLIPIDEMIA: ICD-10-CM

## 2024-06-27 DIAGNOSIS — I25.10 CORONARY ARTERY DISEASE, UNSPECIFIED VESSEL OR LESION TYPE, UNSPECIFIED WHETHER ANGINA PRESENT, UNSPECIFIED WHETHER NATIVE OR TRANSPLANTED HEART: ICD-10-CM

## 2024-06-27 DIAGNOSIS — F17.210 CIGARETTE NICOTINE DEPENDENCE WITHOUT COMPLICATION: ICD-10-CM

## 2024-06-27 DIAGNOSIS — I10 PRIMARY HYPERTENSION: ICD-10-CM

## 2024-06-27 DIAGNOSIS — E11.9 TYPE 2 DIABETES MELLITUS WITHOUT COMPLICATION, WITHOUT LONG-TERM CURRENT USE OF INSULIN: ICD-10-CM

## 2024-06-27 PROCEDURE — 1160F RVW MEDS BY RX/DR IN RCRD: CPT | Mod: CPTII,,,

## 2024-06-27 PROCEDURE — 3066F NEPHROPATHY DOC TX: CPT | Mod: CPTII,,,

## 2024-06-27 PROCEDURE — 3077F SYST BP >= 140 MM HG: CPT | Mod: CPTII,,,

## 2024-06-27 PROCEDURE — 99214 OFFICE O/P EST MOD 30 MIN: CPT | Mod: PBBFAC

## 2024-06-27 PROCEDURE — 3061F NEG MICROALBUMINURIA REV: CPT | Mod: CPTII,,,

## 2024-06-27 PROCEDURE — 3008F BODY MASS INDEX DOCD: CPT | Mod: CPTII,,,

## 2024-06-27 PROCEDURE — 99214 OFFICE O/P EST MOD 30 MIN: CPT | Mod: S$PBB,,,

## 2024-06-27 PROCEDURE — 3080F DIAST BP >= 90 MM HG: CPT | Mod: CPTII,,,

## 2024-06-27 PROCEDURE — 3051F HG A1C>EQUAL 7.0%<8.0%: CPT | Mod: CPTII,,,

## 2024-06-27 PROCEDURE — 1159F MED LIST DOCD IN RCRD: CPT | Mod: CPTII,,,

## 2024-06-27 PROCEDURE — 4010F ACE/ARB THERAPY RXD/TAKEN: CPT | Mod: CPTII,,,

## 2024-06-27 RX ORDER — ISOSORBIDE MONONITRATE 60 MG/1
60 TABLET, EXTENDED RELEASE ORAL DAILY
Qty: 90 TABLET | Refills: 2 | Status: SHIPPED | OUTPATIENT
Start: 2024-06-27 | End: 2025-03-24

## 2024-06-27 RX ORDER — IBUPROFEN 200 MG
1 TABLET ORAL DAILY
Qty: 90 PATCH | Refills: 0 | Status: SHIPPED | OUTPATIENT
Start: 2024-06-27 | End: 2024-09-25

## 2024-06-27 NOTE — PATIENT INSTRUCTIONS
Increase Isosorbide mononitrate to 60 mg daily  Return to clinic for nurse visit for BP/symptom check in approximately 1 month   Complete exercise nuclear stress test   Follow up in cardiology clinic in 3 months or sooner if needed   Follow up with PCP as directed  Strict ED precautions given

## 2024-07-15 DIAGNOSIS — G89.29 CHRONIC LOW BACK PAIN, UNSPECIFIED BACK PAIN LATERALITY, UNSPECIFIED WHETHER SCIATICA PRESENT: ICD-10-CM

## 2024-07-15 DIAGNOSIS — M54.50 CHRONIC LOW BACK PAIN, UNSPECIFIED BACK PAIN LATERALITY, UNSPECIFIED WHETHER SCIATICA PRESENT: ICD-10-CM

## 2024-07-15 RX ORDER — TIZANIDINE 4 MG/1
4 TABLET ORAL EVERY 8 HOURS
Qty: 270 TABLET | Refills: 1 | Status: SHIPPED | OUTPATIENT
Start: 2024-07-15

## 2024-07-15 NOTE — TELEPHONE ENCOUNTER
----- Message from Lisa Jack sent at 7/11/2024 10:00 AM CDT -----  Regarding: refill  Who Called: Kaye Spring    Refill or New Rx:Refill  RX Name and Strength:tiZANidine (ZANAFLEX) 4 MG tablet 270 tablet 1 4/4/2024 - No  Sig - Route: TAKE 1 TABLET BY MOUTH EVERY 8 HOURS. - Oral      How is the patient currently taking it? (ex. 1XDay):    Is this a 30 day or 90 day RX:    Local or Mail Order:local    List of preferred pharmacies on file (remove unneeded): [facility]/pharm[facility] #[id number] - [place], [place] - [address]   Phone: [phone]  Fax: [phone]        Ordering Provider:        Preferred Method of Contact: Phone Call  Patient's Preferred Phone Number on File: [phone]   Best Call Back Number, if different:  Additional Information: refill request; please advise. Pt stated that she also has other meds that need refills, but she's unsure of the name, so the pharmacy has faxed over requests for the others.

## 2024-07-30 ENCOUNTER — LAB VISIT (OUTPATIENT)
Dept: LAB | Facility: HOSPITAL | Age: 49
End: 2024-07-30
Attending: NURSE PRACTITIONER
Payer: MEDICAID

## 2024-07-30 DIAGNOSIS — E11.9 TYPE 2 DIABETES MELLITUS WITHOUT COMPLICATION, WITHOUT LONG-TERM CURRENT USE OF INSULIN: ICD-10-CM

## 2024-07-30 LAB
ALBUMIN SERPL-MCNC: 5.1 G/DL (ref 3.4–5)
ALBUMIN/GLOB SERPL: 1.7 RATIO
ALP SERPL-CCNC: 94 UNIT/L (ref 50–144)
ALT SERPL-CCNC: 52 UNIT/L (ref 1–45)
ANION GAP SERPL CALC-SCNC: 13 MEQ/L (ref 2–13)
AST SERPL-CCNC: 44 UNIT/L (ref 14–36)
BASOPHILS # BLD AUTO: 0.06 X10(3)/MCL (ref 0.01–0.08)
BASOPHILS NFR BLD AUTO: 0.4 % (ref 0.1–1.2)
BILIRUB SERPL-MCNC: 0.5 MG/DL (ref 0–1)
BILIRUB UR QL STRIP.AUTO: NEGATIVE
BUN SERPL-MCNC: 4 MG/DL (ref 7–20)
CALCIUM SERPL-MCNC: 10.9 MG/DL (ref 8.4–10.2)
CHLORIDE SERPL-SCNC: 104 MMOL/L (ref 98–110)
CHOLEST SERPL-MCNC: 118 MG/DL (ref 0–200)
CLARITY UR: CLEAR
CO2 SERPL-SCNC: 24 MMOL/L (ref 21–32)
COLOR UR AUTO: YELLOW
CREAT SERPL-MCNC: 0.66 MG/DL (ref 0.66–1.25)
CREAT/UREA NIT SERPL: 6 (ref 12–20)
EOSINOPHIL # BLD AUTO: 0.11 X10(3)/MCL (ref 0.04–0.36)
EOSINOPHIL NFR BLD AUTO: 0.8 % (ref 0.7–7)
ERYTHROCYTE [DISTWIDTH] IN BLOOD BY AUTOMATED COUNT: 12.5 % (ref 11–14.5)
EST. AVERAGE GLUCOSE BLD GHB EST-MCNC: 269 MG/DL (ref 70–115)
GFR SERPLBLD CREATININE-BSD FMLA CKD-EPI: >90 ML/MIN/1.73/M2
GLOBULIN SER-MCNC: 3 GM/DL (ref 2–3.9)
GLUCOSE SERPL-MCNC: 208 MG/DL (ref 70–115)
GLUCOSE UR QL STRIP: 100
HBA1C MFR BLD: 11 % (ref 4–6)
HCT VFR BLD AUTO: 45.6 % (ref 36–48)
HDLC SERPL-MCNC: 47 MG/DL (ref 40–60)
HGB BLD-MCNC: 15.8 G/DL (ref 11.8–16)
HGB UR QL STRIP: NEGATIVE
IMM GRANULOCYTES # BLD AUTO: 0.08 X10(3)/MCL (ref 0–0.03)
IMM GRANULOCYTES NFR BLD AUTO: 0.6 % (ref 0–0.5)
KETONES UR QL STRIP: NEGATIVE
LDLC SERPL DIRECT ASSAY-SCNC: 52.9 MG/DL (ref 30–100)
LEUKOCYTE ESTERASE UR QL STRIP: NEGATIVE
LYMPHOCYTES # BLD AUTO: 3.98 X10(3)/MCL (ref 1.16–3.74)
LYMPHOCYTES NFR BLD AUTO: 28.5 % (ref 20–55)
MCH RBC QN AUTO: 29.4 PG (ref 27–34)
MCHC RBC AUTO-ENTMCNC: 34.6 G/DL (ref 31–37)
MCV RBC AUTO: 84.9 FL (ref 79–99)
MONOCYTES # BLD AUTO: 0.56 X10(3)/MCL (ref 0.24–0.36)
MONOCYTES NFR BLD AUTO: 4 % (ref 4.7–12.5)
NEUTROPHILS # BLD AUTO: 9.17 X10(3)/MCL (ref 1.56–6.13)
NEUTROPHILS NFR BLD AUTO: 65.7 % (ref 37–73)
NITRITE UR QL STRIP: NEGATIVE
NRBC BLD AUTO-RTO: 0 %
PH UR STRIP: 6 [PH]
PLATELET # BLD AUTO: 284 X10(3)/MCL (ref 140–371)
PMV BLD AUTO: 10.9 FL (ref 9.4–12.4)
POTASSIUM SERPL-SCNC: 4.9 MMOL/L (ref 3.5–5.1)
PROT SERPL-MCNC: 8.1 GM/DL (ref 6.3–8.2)
PROT UR QL STRIP: NEGATIVE
RBC # BLD AUTO: 5.37 X10(6)/MCL (ref 4–5.1)
SODIUM SERPL-SCNC: 141 MMOL/L (ref 136–145)
SP GR UR STRIP.AUTO: 1.01 (ref 1–1.03)
TRIGL SERPL-MCNC: 241 MG/DL (ref 30–200)
UROBILINOGEN UR STRIP-ACNC: 0.2
WBC # BLD AUTO: 13.96 X10(3)/MCL (ref 4–11.5)

## 2024-07-30 PROCEDURE — 80053 COMPREHEN METABOLIC PANEL: CPT

## 2024-07-30 PROCEDURE — 80061 LIPID PANEL: CPT

## 2024-07-30 PROCEDURE — 82043 UR ALBUMIN QUANTITATIVE: CPT

## 2024-07-30 PROCEDURE — 36415 COLL VENOUS BLD VENIPUNCTURE: CPT

## 2024-07-30 PROCEDURE — 83036 HEMOGLOBIN GLYCOSYLATED A1C: CPT

## 2024-07-30 PROCEDURE — 81003 URINALYSIS AUTO W/O SCOPE: CPT

## 2024-07-30 PROCEDURE — 85025 COMPLETE CBC W/AUTO DIFF WBC: CPT

## 2024-07-30 PROCEDURE — 82570 ASSAY OF URINE CREATININE: CPT

## 2024-07-31 LAB
CREAT UR-MCNC: 49.8 MG/DL (ref 45–106)
MICROALBUMIN UR-MCNC: 5.6 UG/ML
MICROALBUMIN/CREAT RATIO PNL UR: 11.2 MG/GM CR (ref 0–30)

## 2024-08-01 ENCOUNTER — OFFICE VISIT (OUTPATIENT)
Dept: INTERNAL MEDICINE | Facility: CLINIC | Age: 49
End: 2024-08-01
Payer: MEDICAID

## 2024-08-01 ENCOUNTER — CLINICAL SUPPORT (OUTPATIENT)
Dept: INTERNAL MEDICINE | Facility: CLINIC | Age: 49
End: 2024-08-01
Attending: NURSE PRACTITIONER
Payer: MEDICAID

## 2024-08-01 VITALS
BODY MASS INDEX: 32.33 KG/M2 | HEIGHT: 64 IN | WEIGHT: 189.38 LBS | TEMPERATURE: 98 F | DIASTOLIC BLOOD PRESSURE: 84 MMHG | HEART RATE: 94 BPM | SYSTOLIC BLOOD PRESSURE: 133 MMHG | RESPIRATION RATE: 16 BRPM

## 2024-08-01 DIAGNOSIS — M79.2 NERVE PAIN: ICD-10-CM

## 2024-08-01 DIAGNOSIS — E11.9 TYPE 2 DIABETES MELLITUS WITHOUT COMPLICATION, WITHOUT LONG-TERM CURRENT USE OF INSULIN: ICD-10-CM

## 2024-08-01 DIAGNOSIS — G89.29 CHRONIC LOW BACK PAIN, UNSPECIFIED BACK PAIN LATERALITY, UNSPECIFIED WHETHER SCIATICA PRESENT: ICD-10-CM

## 2024-08-01 DIAGNOSIS — M54.50 CHRONIC LOW BACK PAIN, UNSPECIFIED BACK PAIN LATERALITY, UNSPECIFIED WHETHER SCIATICA PRESENT: ICD-10-CM

## 2024-08-01 DIAGNOSIS — E11.9 TYPE 2 DIABETES MELLITUS WITHOUT COMPLICATION, WITHOUT LONG-TERM CURRENT USE OF INSULIN: Primary | ICD-10-CM

## 2024-08-01 PROCEDURE — 99215 OFFICE O/P EST HI 40 MIN: CPT | Mod: PBBFAC | Performed by: NURSE PRACTITIONER

## 2024-08-01 PROCEDURE — 92228 IMG RTA DETC/MNTR DS PHY/QHP: CPT | Mod: PBBFAC

## 2024-08-01 PROCEDURE — 92228 IMG RTA DETC/MNTR DS PHY/QHP: CPT | Mod: TC,PBBFAC

## 2024-08-01 RX ORDER — GABAPENTIN 600 MG/1
600 TABLET ORAL 3 TIMES DAILY
Qty: 90 TABLET | Refills: 8 | Status: SHIPPED | OUTPATIENT
Start: 2024-08-01 | End: 2025-04-28

## 2024-08-01 RX ORDER — DULAGLUTIDE 3 MG/.5ML
3 INJECTION, SOLUTION SUBCUTANEOUS
Qty: 4 PEN | Refills: 2 | Status: SHIPPED | OUTPATIENT
Start: 2024-08-01 | End: 2024-10-30

## 2024-08-01 RX ORDER — METFORMIN HYDROCHLORIDE 500 MG/1
1000 TABLET, EXTENDED RELEASE ORAL 2 TIMES DAILY WITH MEALS
Qty: 360 TABLET | Refills: 1 | Status: SHIPPED | OUTPATIENT
Start: 2024-08-01 | End: 2025-01-28

## 2024-08-01 RX ORDER — MUPIROCIN 20 MG/G
OINTMENT TOPICAL 2 TIMES DAILY
COMMUNITY
Start: 2024-07-06

## 2024-08-01 NOTE — PROGRESS NOTES
Flory De La Cruz, JOSE RAFAEL   [facility]  [address] [address]      PATIENT NAME: Kaye Spring  : 1975  DATE: 24  MRN: [id number]      Reason for Visit / Chief Complaint: Diabetes (Taking diabetes medication as prescribed )       History of Present Illness / Problem Focused Workflow     Kaye Spring presents to the clinic with Diabetes (Taking diabetes medication as prescribed )     47 yo WF here for f/u. Medical problems includes GERD, BPV (self-treats with Epley), chronic rhinitis, T2DM, HLD, CAD with MI in 2019, with stenting, HTN, chronic lower back pain, ventral hernia, AUB, endometriosis, depression with anxiety, and tobacco use, 1-2 ciggs / day.  Followed by Nikki Ackerman, mental health NP for psychiatric care and med management every 3 months. Disabled. Followed by [facility] Cardiology Services.      Cervical Cancer Screening - F/U [facility] Annually  Breast Cancer Screening - 23 MMG  Osteoporosis Screening -24 Vitamin D Level 39.3  Diabetic Screening -    Eye Screening-   Dental Screening -      2024  Pt here today for yearly wellness OV with labs completed prior to OV; reviewed and discussed with no questions or concerns at this time. A1C at goal. Meds reviewed and refilled appropriately. Pt reports with continued left shoulder pain, more in the muscle area, elevated her arm up causes it to pull and radiates down into her elbow. Reports the pulling feeling when reaching around her back as well. Pt was [place] [facility] [place] an injury where she fell on her shoulder. The pt is agreeable to referral to PT for eval and tx. Pt reports she is using ROM exercise to help with some of the pain. Pt BP elevated today, reports compliance with meds, unsure why it is high as she is compliant with meds. Will increase losartan today to 100 mg daily. Pt with Cards visit in 2 weeks, will set reminder to wilbur  BP. Will f/u in 6 months for routine f/u with labs.     08/01/2024  Patient here today for routine follow up appointment with labs completed.  A1c tremendously increased to 11.0 since last evaluation.  Patient reports that she is eating an excessive amount of watermelon over the last couple of weeks.  Reports that she will stop eating watermelon at this time.  Also was only doing the Trulicity 1.5 mg due to the pharmacy being out of the 3 mg.  We will recent prescription for 3 mg dosage today.  If not available we will send prescription for 4.5 mg instead.  We will follow up with patient in about 6 weeks with labs for evaluation again.  Patient agreeable to foot and fundal exam today as well.  Medications reviewed and refilled appropriately.            Review of Systems     Review of Systems   Constitutional: Negative.    HENT: Negative.     Eyes: Negative.    Respiratory: Negative.     Cardiovascular: Negative.    Gastrointestinal: Negative.    Endocrine: Negative.    Genitourinary: Negative.    Neurological: Negative.    Psychiatric/Behavioral: Negative.           Medications and Allergies     Medications  Medication List with Changes/Refills   Current Medications    ACCU-CHEK GUIDE ME GLUCOSE MTR MISC        ALBUTEROL (PROVENTIL/VENTOLIN HFA) 90 MCG/ACTUATION INHALER    Inhale 2 puffs into the lungs 4 (four) times daily as needed for Wheezing or Shortness of Breath.    ALPRAZOLAM (XANAX) 1 MG TABLET    Take 1 mg by mouth 3 (three) times daily as needed.    AMITIZA 24 MCG CAP    Take 1 capsule (24 mcg total) by mouth 2 (two) times daily.    ARIPIPRAZOLE (ABILIFY) 2 MG TAB    Take 2 mg by mouth once daily.    BLOOD SUGAR DIAGNOSTIC STRP    To check BG 2 times daily, to use with insurance preferred meter E11.9    BLOOD-GLUCOSE METER KIT    To check BG 2 times daily, to use with insurance preferred meter E11.9    CLOPIDOGREL (PLAVIX) 75 MG TABLET    See Instructions, TAKE ONE TABLET BY MOUTH EVERY DAY, # 90 tab(s), 3  Refill(s), Pharmacy: [facility] [id number] [id number], 160, cm, Height/Length Dosing, 09/01/21 13:52:00 CDT, 85, kg, Weight Dosing, 09/01/21 13:52:00 CDT    DESVENLAFAXINE SUCCINATE (PRISTIQ) 100 MG TB24        DOXEPIN (SINEQUAN) 25 MG CAPSULE    Take 25 mg by mouth nightly.    EZETIMIBE (ZETIA) 10 MG TABLET    Take 1 tablet (10 mg total) by mouth once daily.    ISOSORBIDE MONONITRATE (IMDUR) 60 MG 24 HR TABLET    Take 1 tablet (60 mg total) by mouth once daily.    LANCETS MISC    To check BG 2 times daily, to use with insurance preferred meter E11.9    LOSARTAN (COZAAR) 100 MG TABLET    Take 1 tablet (100 mg total) by mouth once daily. For High Blood Pressure    METOCLOPRAMIDE HCL (REGLAN) 5 MG TABLET    Take 5 mg by mouth 4 (four) times daily.    METOPROLOL SUCCINATE (TOPROL-XL) 25 MG 24 HR TABLET    Take 1 tablet (25 mg total) by mouth 2 (two) times daily.    MUPIROCIN (BACTROBAN) 2 % OINTMENT    Apply topically 2 (two) times daily.    NICOTINE (NICODERM CQ) 21 MG/24 HR    Place 1 patch onto the skin once daily.    NITROGLYCERIN (NITROSTAT) 0.4 MG SL TABLET    Place 1 tablet (0.4 mg total) under the tongue every 5 (five) minutes as needed for Chest pain.    OXCARBAZEPINE (TRILEPTAL) 300 MG TAB    Take 300 mg by mouth 2 (two) times daily.    PANTOPRAZOLE (PROTONIX) 40 MG TABLET    TAKE 1 TABLET (40 MG TOTAL) BY MOUTH ONCE DAILY FOR ACID REFLUX    ROSUVASTATIN (CRESTOR) 40 MG TAB    Take 1 tablet (40 mg total) by mouth once daily.    TIZANIDINE (ZANAFLEX) 4 MG TABLET    Take 1 tablet (4 mg total) by mouth every 8 (eight) hours.   Changed and/or Refilled Medications    Modified Medication Previous Medication    DULAGLUTIDE (TRULICITY) 3 MG/0.5 ML PEN INJECTOR dulaglutide (TRULICITY) 3 mg/0.5 mL pen injector       Inject 3 mg into the skin every 7 days. For Diabetes    Inject 3 mg into the skin every 7 days.    GABAPENTIN (NEURONTIN) 600 MG TABLET gabapentin (NEURONTIN) 600 MG tablet       Take 1 tablet (600 mg total) by mouth  "3 (three) times daily. As needed for back pain    Take 1 tablet (600 mg total) by mouth 3 (three) times daily. As needed for back pain    METFORMIN (GLUCOPHAGE-XR) 500 MG ER 24HR TABLET metFORMIN (GLUCOPHAGE-XR) 500 MG ER 24hr tablet       Take 2 tablets (1,000 mg total) by mouth 2 (two) times daily with meals.    Take 2 tablets (1,000 mg total) by mouth 2 (two) times daily with meals.   Discontinued Medications    ACETAMINOPHEN-CODEINE 300-30MG (TYLENOL #3) 300-30 MG TAB    Take 1 tablet by mouth every 6 (six) hours as needed.    DULAGLUTIDE (TRULICITY) 1.5 MG/0.5 ML PEN INJECTOR    Inject 1.5 mg into the skin every 7 days. For Diabetes    PEN NEEDLE, DIABETIC 32 GAUGE X 32" NDLE    For Nightly Lantus Injection. ICD 10 E11.9    SUPREP BOWEL PREP KIT 17.5-3.13-1.6 GRAM SOLR    SMARTSI Kit(s) By Mouth Once    VARENICLINE (CHANTIX STARTING MONTH BOX) 0.5 MG (11)- 1 MG (42) TABLET    Take one 0.5mg tab by mouth once daily X3 days,then increase to one 0.5mg tab twice daily X4 days,then increase to one 1mg tab twice daily         Allergies  Review of patient's allergies indicates:   Allergen Reactions    Aspirin Nausea And Vomiting and Other (See Comments)     Other reaction(s): Stomach upset  Pt. States doesn't take because of stomach ulcers         Physical Examination     Vitals:    24 1353   BP: 133/84   Pulse: 94   Resp: 16   Temp: 97.9 °F (36.6 °C)     Physical Exam  Constitutional:       Appearance: Normal appearance.   Cardiovascular:      Rate and Rhythm: Normal rate and regular rhythm.      Pulses:           Dorsalis pedis pulses are 2+ on the right side and 2+ on the left side.        Posterior tibial pulses are 2+ on the right side and 2+ on the left side.   Pulmonary:      Effort: Pulmonary effort is normal.      Breath sounds: Normal breath sounds.   Musculoskeletal:         General: Normal range of motion.      Cervical back: Normal range of motion.   Feet:      Right foot:      Protective " Sensation: 9 sites tested.  9 sites sensed.      Skin integrity: Skin integrity normal.      Toenail Condition: Right toenails are normal.      Left foot:      Protective Sensation: 9 sites tested.  9 sites sensed.      Skin integrity: Skin integrity normal.      Toenail Condition: Left toenails are normal.   Skin:     General: Skin is warm and dry.   Neurological:      General: No focal deficit present.      Mental Status: She is alert and oriented to person, place, and time.   Psychiatric:         Mood and Affect: Mood normal.           Results     Lab Results   Component Value Date    WBC 13.96 (H) 07/30/2024    RBC 5.37 (H) 07/30/2024    HGB 15.8 07/30/2024    HCT 45.6 07/30/2024    MCV 84.9 07/30/2024    MCH 29.4 07/30/2024    MCHC 34.6 07/30/2024    RDW 12.5 07/30/2024     07/30/2024    MPV 10.9 07/30/2024     Sodium   Date Value Ref Range Status   07/30/2024 141 136 - 145 mmol/L Final   09/30/2023 134 (L) 136 - 145 mmol/L Final     Potassium   Date Value Ref Range Status   07/30/2024 4.9 3.5 - 5.1 mmol/L Final   09/30/2023 4.2 3.5 - 5.1 mmol/L Final     Comment:     Slight hemolysis present, interpret results with caution     Chloride   Date Value Ref Range Status   07/30/2024 104 98 - 110 mmol/L Final   09/30/2023 102 100 - 109 mmol/L Final     CO2   Date Value Ref Range Status   07/30/2024 24 21 - 32 mmol/L Final     Carbon Dioxide   Date Value Ref Range Status   09/30/2023 20 (L) 22 - 33 mmol/L Final     Blood Urea Nitrogen   Date Value Ref Range Status   07/30/2024 4 (L) 7.0 - 20.0 mg/dL Final   09/30/2023 8 5 - 25 mg/dL Final     Creatinine   Date Value Ref Range Status   07/30/2024 0.66 0.66 - 1.25 mg/dL Final   09/30/2023 0.80 0.57 - 1.25 mg/dL Final     Calcium   Date Value Ref Range Status   07/30/2024 10.9 (H) 8.4 - 10.2 mg/dL Final   09/30/2023 8.4 (L) 8.8 - 10.6 mg/dL Final     Albumin   Date Value Ref Range Status   07/30/2024 5.1 (H) 3.4 - 5.0 g/dL Final     Bilirubin Total   Date Value  Ref Range Status   07/30/2024 0.5 0.0 - 1.0 mg/dL Final     ALP   Date Value Ref Range Status   07/30/2024 94 50 - 144 unit/L Final     AST   Date Value Ref Range Status   07/30/2024 44 (H) 14 - 36 unit/L Final     ALT   Date Value Ref Range Status   07/30/2024 52 (H) 1 - 45 unit/L Final     Anion Gap   Date Value Ref Range Status   09/30/2023 12 8 - 16 mmol/L Final     eGFR    Date Value Ref Range Status   09/30/2023 91 mL/min/1.73mSq Final     Comment:     In accordance with NKF-ASN Task Force recommendation, calculation based on the Chronic Kidney Disease Epidemiology Collaboration (CKD-EPI) equation without adjustment for race. eGFR adjusted for gender and age and calculated in ml/min/1.73mSquared. eGFR cannot be calculated if patient is under 18 years of age.     Reference Range:   >= 60 ml/min/1.73mSquared.     Estimated GFR-Non    Date Value Ref Range Status   06/28/2022 >60 mls/min/1.73/m2 Final     Lab Results   Component Value Date    CHOL 118 07/30/2024     Lab Results   Component Value Date    HDL 47 07/30/2024     Lab Results   Component Value Date    TRIG 241 (H) 07/30/2024     Lab Results   Component Value Date    VLDL 30 01/31/2024     Lab Results   Component Value Date    LDL 43.00 (L) 01/31/2024     Lab Results   Component Value Date    TSH 2.601 01/31/2024     Lab Results   Component Value Date    PHUR 6.0 07/30/2024    PROTEINUA Negative 07/30/2024    GLUCUA 100 (A) 07/30/2024    KETONESU Negative 09/13/2021    OCCULTUA Negative 07/30/2024    NITRITE Negative 07/30/2024    LEUKOCYTESUR Negative 07/30/2024     Lab Results   Component Value Date    HGBA1C 11.0 (H) 07/30/2024    HGBA1C 7.0 01/31/2024    HGBA1C 7.7 (H) 06/26/2023           Assessment         ICD-10-CM ICD-9-CM   1. Type 2 diabetes mellitus without complication, without long-term current use of insulin  E11.9 250.00   2. Nerve pain  M79.2 729.2   3. Chronic low back pain, unspecified back pain  laterality, unspecified whether sciatica present  M54.50 724.2    G89.29 338.29       Plan      Problem List Items Addressed This Visit          Endocrine    Type 2 diabetes mellitus without complication, without long-term current use of insulin - Primary    Current Assessment & Plan     A1C Above goal   Continue RX metformin 1000 mg BID, Trulicity 3 mg weekly, and Lantus 15 units q hs  6 week f/u with labs   Follow ADA diet.  Avoid soda, simple sweets, and limit rice/pasta/bread/starches and consume brown options when possible.   Maintain healthy weight with BMI goal <30.   Perform aerobic exercise for 150 minutes per week (or 5 days a week for 30 minutes each day).   Examine feet daily.     Lab Results   Component Value Date    HGBA1C 11.0 (H) 07/30/2024              Relevant Medications    metFORMIN (GLUCOPHAGE-XR) 500 MG ER 24hr tablet    dulaglutide (TRULICITY) 3 mg/0.5 mL pen injector    Other Relevant Orders    Urinalysis, Reflex to Urine Culture    Microalbumin/Creatinine Ratio, Urine    Hemoglobin A1C    Basic Metabolic Panel    Diabetic Eye Screening Photo       Orthopedic    Chronic back pain    Relevant Medications    gabapentin (NEURONTIN) 600 MG tablet     Other Visit Diagnoses       Nerve pain        Relevant Medications    gabapentin (NEURONTIN) 600 MG tablet            Future Appointments   Date Time Provider Department Center   8/21/2024  8:30 AM CV UL EXERCISE STRESS 01 [facility] CARDIA [place]   8/21/2024  9:30 AM [facility] NM2  LB LIMIT [facility] [place]   8/21/2024 10:00 AM [facility] NM2  LB LIMIT [facility] [place]   9/12/2024  1:40 PM Flory De La Cruz FNP [facility] [place]   9/24/2024  2:00 PM Yael Guerrero PA-C [facility] CARD [place]            Signature:     [facility]  [address] [facility] [address]    Date of encounter: 8/1/24

## 2024-08-01 NOTE — ASSESSMENT & PLAN NOTE
A1C Above goal   Continue RX metformin 1000 mg BID, Trulicity 3 mg weekly, and Lantus 15 units q hs  6 week f/u with labs   Follow ADA diet.  Avoid soda, simple sweets, and limit rice/pasta/bread/starches and consume brown options when possible.   Maintain healthy weight with BMI goal <30.   Perform aerobic exercise for 150 minutes per week (or 5 days a week for 30 minutes each day).   Examine feet daily.     Lab Results   Component Value Date    HGBA1C 11.0 (H) 07/30/2024

## 2024-08-05 ENCOUNTER — TELEPHONE (OUTPATIENT)
Dept: INTERNAL MEDICINE | Facility: CLINIC | Age: 49
End: 2024-08-05
Payer: MEDICAID

## 2024-08-05 DIAGNOSIS — E11.9 TYPE 2 DIABETES MELLITUS WITHOUT COMPLICATION, WITHOUT LONG-TERM CURRENT USE OF INSULIN: Primary | ICD-10-CM

## 2024-08-05 NOTE — TELEPHONE ENCOUNTER
Please inform the patient that the Cologuard test (stool test) detected the presence of blood in the stool.  This test does not determine the source of the blood, therefore I am referring them to a GI clinic for further work-up. The GI Clinic here at [facility] is currently not accepting patients at this time due to an extensive back log. I would like to refer the patient to an external GI, Dr. Milian in [place] or Dr. Yadira Encinas in [place]. Please advise if patient is okay with this and I will put in the referral.  Give patient the phone number to his office for them to follow up in a few weeks if they do not hear from them.      Thank you!  JOSE RAFAEL Calvin     negative

## 2024-08-21 ENCOUNTER — HOSPITAL ENCOUNTER (OUTPATIENT)
Dept: CARDIOLOGY | Facility: HOSPITAL | Age: 49
Discharge: HOME | End: 2024-08-21
Payer: MEDICAID

## 2024-08-21 ENCOUNTER — HOSPITAL ENCOUNTER (OUTPATIENT)
Dept: RADIOLOGY | Facility: HOSPITAL | Age: 49
Discharge: HOME | End: 2024-08-21
Payer: MEDICAID

## 2024-08-21 VITALS
DIASTOLIC BLOOD PRESSURE: 82 MMHG | SYSTOLIC BLOOD PRESSURE: 140 MMHG | BODY MASS INDEX: 32.33 KG/M2 | HEIGHT: 64 IN | WEIGHT: 189.38 LBS | RESPIRATION RATE: 20 BRPM | HEART RATE: 96 BPM

## 2024-08-21 DIAGNOSIS — R07.9 CHEST PAIN, UNSPECIFIED TYPE: ICD-10-CM

## 2024-08-21 DIAGNOSIS — I25.10 CORONARY ARTERY DISEASE, UNSPECIFIED VESSEL OR LESION TYPE, UNSPECIFIED WHETHER ANGINA PRESENT, UNSPECIFIED WHETHER NATIVE OR TRANSPLANTED HEART: ICD-10-CM

## 2024-08-21 LAB
CV STRESS BASE HR: 93 BPM
DIASTOLIC BLOOD PRESSURE: 82 MMHG
NUC REST EJECTION FRACTION: 77
OHS CV CPX 85 PERCENT MAX PREDICTED HEART RATE MALE: 145
OHS CV CPX ESTIMATED METS: 2
OHS CV CPX MAX PREDICTED HEART RATE: 171
OHS CV CPX PATIENT IS FEMALE: 1
OHS CV CPX PATIENT IS MALE: 0
OHS CV CPX PEAK DIASTOLIC BLOOD PRESSURE: 75 MMHG
OHS CV CPX PEAK HEAR RATE: 137 BPM
OHS CV CPX PEAK RATE PRESSURE PRODUCT: NORMAL
OHS CV CPX PEAK SYSTOLIC BLOOD PRESSURE: 186 MMHG
OHS CV CPX PERCENT MAX PREDICTED HEART RATE ACHIEVED: 84
OHS CV CPX RATE PRESSURE PRODUCT PRESENTING: NORMAL
OHS QRS DURATION: 100 MS
OHS QTC CALCULATION: 457 MS
STRESS ECHO POST EXERCISE DUR MIN: 2 MINUTES
STRESS ECHO POST EXERCISE DUR SEC: 38 SECONDS
SYSTOLIC BLOOD PRESSURE: 140 MMHG

## 2024-08-21 PROCEDURE — 63600175 PHARM REV CODE 636 W HCPCS

## 2024-08-21 PROCEDURE — 93017 CV STRESS TEST TRACING ONLY: CPT

## 2024-08-21 PROCEDURE — A9502 TC99M TETROFOSMIN: HCPCS

## 2024-08-21 PROCEDURE — 78452 HT MUSCLE IMAGE SPECT MULT: CPT

## 2024-08-21 RX ORDER — REGADENOSON 0.08 MG/ML
0.4 INJECTION, SOLUTION INTRAVENOUS
Status: COMPLETED | OUTPATIENT
Start: 2024-08-21 | End: 2024-08-21

## 2024-08-21 RX ADMIN — TETROFOSMIN 32.2 MILLICURIE: 1.38 INJECTION, POWDER, LYOPHILIZED, FOR SOLUTION INTRAVENOUS at 08:08

## 2024-08-21 RX ADMIN — REGADENOSON 0.4 MG: 0.08 INJECTION, SOLUTION INTRAVENOUS at 07:08

## 2024-08-21 RX ADMIN — TETROFOSMIN 11 MILLICURIE: 1.38 INJECTION, POWDER, LYOPHILIZED, FOR SOLUTION INTRAVENOUS at 07:08

## 2024-08-22 DIAGNOSIS — I25.10 CORONARY ARTERY DISEASE, UNSPECIFIED VESSEL OR LESION TYPE, UNSPECIFIED WHETHER ANGINA PRESENT, UNSPECIFIED WHETHER NATIVE OR TRANSPLANTED HEART: ICD-10-CM

## 2024-08-22 RX ORDER — NITROGLYCERIN 0.4 MG/1
0.4 TABLET SUBLINGUAL EVERY 5 MIN PRN
Qty: 25 TABLET | Refills: 2 | Status: SHIPPED | OUTPATIENT
Start: 2024-08-22

## 2024-08-22 NOTE — TELEPHONE ENCOUNTER
Patient called waiting to speak with her provider or nurse stating she's recently had a stress test done and seen her results on her portal and states she's a little concern because it reads as though she has 70% blockage. Patient would like a call back to further discuss.    I did inform patient that usually if after the provider reviews the results, and if it warrants to come in sooner than f/u apt, then pt will get a call to come in sooner to review results. But pt insist on someone giving her a call back stating she's very anxious.

## 2024-08-26 ENCOUNTER — OFFICE VISIT (OUTPATIENT)
Dept: CARDIOLOGY | Facility: CLINIC | Age: 49
End: 2024-08-26
Payer: MEDICAID

## 2024-08-26 VITALS
OXYGEN SATURATION: 95 % | RESPIRATION RATE: 18 BRPM | WEIGHT: 190.5 LBS | HEART RATE: 88 BPM | HEIGHT: 64 IN | DIASTOLIC BLOOD PRESSURE: 77 MMHG | BODY MASS INDEX: 32.52 KG/M2 | TEMPERATURE: 99 F | SYSTOLIC BLOOD PRESSURE: 132 MMHG

## 2024-08-26 DIAGNOSIS — R07.9 CHEST PAIN, UNSPECIFIED TYPE: ICD-10-CM

## 2024-08-26 DIAGNOSIS — I10 PRIMARY HYPERTENSION: ICD-10-CM

## 2024-08-26 DIAGNOSIS — E66.9 OBESITY, UNSPECIFIED CLASSIFICATION, UNSPECIFIED OBESITY TYPE, UNSPECIFIED WHETHER SERIOUS COMORBIDITY PRESENT: ICD-10-CM

## 2024-08-26 DIAGNOSIS — E78.2 MIXED HYPERLIPIDEMIA: ICD-10-CM

## 2024-08-26 DIAGNOSIS — F17.210 CIGARETTE NICOTINE DEPENDENCE WITHOUT COMPLICATION: ICD-10-CM

## 2024-08-26 DIAGNOSIS — R06.02 SHORTNESS OF BREATH: ICD-10-CM

## 2024-08-26 DIAGNOSIS — E11.9 TYPE 2 DIABETES MELLITUS WITHOUT COMPLICATION, WITHOUT LONG-TERM CURRENT USE OF INSULIN: ICD-10-CM

## 2024-08-26 DIAGNOSIS — R94.39 ABNORMAL STRESS TEST: ICD-10-CM

## 2024-08-26 DIAGNOSIS — I25.10 CORONARY ARTERY DISEASE, UNSPECIFIED VESSEL OR LESION TYPE, UNSPECIFIED WHETHER ANGINA PRESENT, UNSPECIFIED WHETHER NATIVE OR TRANSPLANTED HEART: Primary | ICD-10-CM

## 2024-08-26 PROCEDURE — 1159F MED LIST DOCD IN RCRD: CPT | Mod: CPTII,,,

## 2024-08-26 PROCEDURE — 1160F RVW MEDS BY RX/DR IN RCRD: CPT | Mod: CPTII,,,

## 2024-08-26 PROCEDURE — 3075F SYST BP GE 130 - 139MM HG: CPT | Mod: CPTII,,,

## 2024-08-26 PROCEDURE — 3008F BODY MASS INDEX DOCD: CPT | Mod: CPTII,,,

## 2024-08-26 PROCEDURE — 3061F NEG MICROALBUMINURIA REV: CPT | Mod: CPTII,,,

## 2024-08-26 PROCEDURE — 99214 OFFICE O/P EST MOD 30 MIN: CPT | Mod: S$PBB,,,

## 2024-08-26 PROCEDURE — 3066F NEPHROPATHY DOC TX: CPT | Mod: CPTII,,,

## 2024-08-26 PROCEDURE — 3078F DIAST BP <80 MM HG: CPT | Mod: CPTII,,,

## 2024-08-26 PROCEDURE — 3046F HEMOGLOBIN A1C LEVEL >9.0%: CPT | Mod: CPTII,,,

## 2024-08-26 PROCEDURE — 99214 OFFICE O/P EST MOD 30 MIN: CPT | Mod: PBBFAC

## 2024-08-26 PROCEDURE — 4010F ACE/ARB THERAPY RXD/TAKEN: CPT | Mod: CPTII,,,

## 2024-08-26 RX ORDER — SODIUM CHLORIDE 9 MG/ML
INJECTION, SOLUTION INTRAVENOUS ONCE
OUTPATIENT
Start: 2024-08-26 | End: 2024-08-26

## 2024-08-26 RX ORDER — SODIUM CHLORIDE 0.9 % (FLUSH) 0.9 %
10 SYRINGE (ML) INJECTION
Status: SHIPPED | OUTPATIENT
Start: 2024-08-26

## 2024-08-26 RX ORDER — DIAZEPAM 5 MG/1
5 TABLET ORAL ONCE
Status: ACTIVE | OUTPATIENT
Start: 2024-08-26

## 2024-08-26 RX ORDER — DIPHENHYDRAMINE HCL 25 MG
25 CAPSULE ORAL
OUTPATIENT
Start: 2024-08-26

## 2024-08-26 NOTE — H&P (VIEW-ONLY)
CHIEF COMPLAINT: No chief complaint on file.                                                 HPI:  Kaye Spring 48 y.o. female  PMHx includes  CAD with MI in February 2019, with stenting now s/p CABG LIMA->LAD in 6/2021 complicated by wound dehiscence and infection treated, HTN, T2DM, HLD, chronic lower back pain, ventral hernia, AUB, endometriosis, depression with anxiety, and tobacco use.  Patient presents for follow up and ongoing care.  At last office visit patient endorsed worsening chest pain/acid reflux/GERD/heartburn pain.  Given her worsening symptoms and status change from prior nuclear stress test was ordered.  Nuclear stress test revealed an abnormal myocardial perfusion scan.  There was a moderate intensity, medium size, reversible perfusion abnormality in the mid to apical lateral apical walls in the typical distribution of the left circumflex territory.  See full report below.    Today the patient continues to have chest discomfort that has relieved with nitroglycerin.  She states that it occurs at rest and with exertion.  She also endorses occasional SOB/ESPARZA.  She denies any further complaints such as palpitations, PND, orthopnea, lightheadedness, dizziness, syncope, peripheral edema, or claudication symptoms.  She was able to complete her ADLs, however she has noticed more frequent chest pain and ESPARZA over the last few months.  She was not extremely physically active at this point due to significant bilateral lower extremity pain and back pain.  She reports compliance with all her current medications and is tolerating them well.  She continues to smoke approximately 1 pack of cigarettes per day and is trying to quit.                                                                                                                                                                                                                                                                                                                                                                                                                                                                                   CARDIAC TESTING:  Nuclear Stress Test 8.21.24    Abnormal myocardial perfusion scan.    moderate intensity, medium sized,  in the  mid to apical lateral apical wall(s) in the typical distribution of the LCX territory.    There are no other significant perfusion abnormalities.    The gated perfusion images showed an ejection fraction of 77% at rest.    The patient reported no chest pain during the stress test.    There were no arrhythmias during stress.  The nuclear stress test is  fair quality.    On the nuclear stress test the EF is normal.  If the patient has an echo, the EF on the echo is considered more accurate.   The EF drops from rest to stress.     The patient has a moderate risk nuclear stress test due to lateral and apical reversible defect.  If clinically indicated, consider further evaluation with coronary angiogram.          Results for orders placed in visit on 06/21/21    CATH LAB PROCEDURE       Patient Active Problem List   Diagnosis    Coronary artery disease    Other synovitis and tenosynovitis, unspecified hand    Anxiety    Benign paroxysmal positional vertigo    Chronic back pain    Chronic rhinitis    Gastroesophageal reflux disease with esophagitis    Diastasis of rectus abdominis    Primary hypertension    Leukocytosis    Microcytic hypochromic anemia    Mixed anxiety depressive disorder    Mixed hyperlipidemia    Obesity    Spondylosis of lumbar spine    Thrombocythemia    Type 2 diabetes mellitus without complication, without long-term current use of insulin    Cigarette nicotine dependence without complication    Positive colorectal cancer screening using Cologuard test    Chest pain     Past Surgical History:   Procedure Laterality Date    CARDIAC CATHETERIZATION      CARDIAC VALVE REPLACEMENT       CHOLECYSTECTOMY      CORONARY ANGIOPLASTY      CORONARY ARTERY BYPASS GRAFT      HYSTERECTOMY      right wrist surgery      TUBAL LIGATION       Social History     Socioeconomic History    Marital status: Single   Tobacco Use    Smoking status: Every Day     Current packs/day: 0.25     Average packs/day: 0.3 packs/day for 29.7 years (7.4 ttl pk-yrs)     Types: Cigarettes     Start date: 1995    Smokeless tobacco: Never   Substance and Sexual Activity    Alcohol use: Not Currently    Drug use: Never    Sexual activity: Yes     Social Determinants of Health     Financial Resource Strain: Low Risk  (1/29/2024)    Overall Financial Resource Strain (CARDIA)     Difficulty of Paying Living Expenses: Not very hard   Food Insecurity: Food Insecurity Present (1/29/2024)    Hunger Vital Sign     Worried About Running Out of Food in the Last Year: Sometimes true     Ran Out of Food in the Last Year: Sometimes true   Transportation Needs: No Transportation Needs (1/29/2024)    PRAPARE - Transportation     Lack of Transportation (Medical): No     Lack of Transportation (Non-Medical): No   Physical Activity: Inactive (1/29/2024)    Exercise Vital Sign     Days of Exercise per Week: 0 days     Minutes of Exercise per Session: 20 min   Stress: No Stress Concern Present (1/29/2024)    [employer] - Occupational Stress Questionnaire     Feeling of Stress : Not at all   Housing Stability: Low Risk  (1/29/2024)    Housing Stability Vital Sign     Unable to Pay for Housing in the Last Year: No     Number of Places Lived in the Last Year: 1     Unstable Housing in the Last Year: No        Family History   Problem Relation Name Age of Onset    Arrhythmia Mother Marianela     Clotting disorder Mother Marianela     Heart disease Mother Marianela     Heart attack Mother Marianela     Hyperlipidemia Mother Marianela     Heart failure Mother Marianela     Stroke Mother Marianela     Hypertension Mother Marianela     Diabetes Mother Marianela      Depression Mother Marianela     Hyperlipidemia Brother Domingo     Hypertension Brother Domingo      Review of patient's allergies indicates:   Allergen Reactions    Aspirin Nausea And Vomiting and Other (See Comments)     Other reaction(s): Stomach upset  Pt. States doesn't take because of stomach ulcers           ROS:  Review of Systems   Constitutional: Negative.  Negative for malaise/fatigue.   HENT: Negative.     Eyes: Negative.    Respiratory:  Positive for shortness of breath.    Cardiovascular:  Positive for chest pain. Negative for palpitations, orthopnea, claudication, leg swelling and PND.   Gastrointestinal:  Positive for heartburn and nausea. Negative for vomiting.   Genitourinary: Negative.    Musculoskeletal:  Positive for back pain, joint pain and myalgias.   Skin: Negative.    Neurological: Negative.    Endo/Heme/Allergies: Negative.    Psychiatric/Behavioral: Negative.                                                                                                                                                                                  Negative except as stated in the history of present illness. See HPI for details.    PHYSICAL EXAM:  There were no vitals taken for this visit.    Physical Exam  Constitutional:       Appearance: Normal appearance. She is obese. She is not ill-appearing.   HENT:      Head: Normocephalic.      Nose: Nose normal.      Mouth/Throat:      Mouth: Mucous membranes are moist.   Eyes:      Extraocular Movements: Extraocular movements intact.   Neck:      Vascular: No carotid bruit.   Cardiovascular:      Rate and Rhythm: Normal rate and regular rhythm.      Pulses: Normal pulses.      Heart sounds: No murmur heard.  Pulmonary:      Effort: Pulmonary effort is normal.   Abdominal:      General: There is no distension.   Musculoskeletal:         General: Normal range of motion.      Cervical back: Normal range of motion.      Right lower leg: No edema.      Left lower  leg: No edema.   Skin:     General: Skin is warm.   Neurological:      General: No focal deficit present.      Mental Status: She is alert and oriented to person, place, and time.   Psychiatric:         Mood and Affect: Mood normal.       Current Outpatient Medications   Medication Instructions    ACCU-CHEK GUIDE ME GLUCOSE MTR Misc No dose, route, or frequency recorded.    albuterol (PROVENTIL/VENTOLIN HFA) 90 mcg/actuation inhaler 2 puffs, Inhalation, 4 times daily PRN    ALPRAZolam (XANAX) 1 mg, Oral, 3 times daily PRN    AMITIZA 24 mcg, Oral, 2 times daily    ARIPiprazole (ABILIFY) 2 mg, Oral, Daily    blood sugar diagnostic Strp To check BG 2 times daily, to use with insurance preferred meter E11.9    blood-glucose meter kit To check BG 2 times daily, to use with insurance preferred meter E11.9    clopidogreL (PLAVIX) 75 mg tablet <BR>See Instructions, TAKE ONE TABLET BY MOUTH EVERY DAY, # 90 tab(s), 3 Refill(s), Pharmacy: [facility] [id number] [id number], 160, cm, Height/Length Dosing, 09/01/21 13:52:00 CDT, 85, kg, Weight Dosing, 09/01/21 13:52:00 CDT    desvenlafaxine succinate (PRISTIQ) 100 MG Tb24 No dose, route, or frequency recorded.    doxepin (SINEQUAN) 25 mg, Oral, Nightly    ezetimibe (ZETIA) 10 mg, Oral, Daily    gabapentin (NEURONTIN) 600 mg, Oral, 3 times daily, As needed for back pain    isosorbide mononitrate (IMDUR) 60 mg, Oral, Daily    lancets Misc To check BG 2 times daily, to use with insurance preferred meter E11.9    losartan (COZAAR) 100 mg, Oral, Daily, For High Blood Pressure    metFORMIN (GLUCOPHAGE-XR) 1,000 mg, Oral, 2 times daily with meals    metoclopramide HCl (REGLAN) 5 mg, Oral, 4 times daily    metoprolol succinate (TOPROL-XL) 25 mg, Oral, 2 times daily    mupirocin (BACTROBAN) 2 % ointment Topical (Top), 2 times daily    nicotine (NICODERM CQ) 21 mg/24 hr 1 patch, Transdermal, Daily    nitroGLYCERIN (NITROSTAT) 0.4 mg, Sublingual, Every 5 min PRN    OXcarbazepine (TRILEPTAL) 300 mg,  Oral, 2 times daily    pantoprazole (PROTONIX) 40 mg, Oral, Daily, For Acid Reflux    rosuvastatin (CRESTOR) 40 mg, Oral, Daily    tiZANidine (ZANAFLEX) 4 mg, Oral, Every 8 hours    TRULICITY 3 mg, Subcutaneous, Every 7 days, For Diabetes        All medications, laboratory studies, cardiac diagnostic imaging reviewed.     Lab Results   Component Value Date    LDL 43.00 (L) 01/31/2024    LDL 58.00 01/03/2023    TRIG 241 (H) 07/30/2024    TRIG 152 (H) 01/31/2024    CREATININE 0.66 07/30/2024    MG 1.80 02/23/2022    K 4.9 07/30/2024        ASSESSMENT/PLAN:    CAD s/p CABG x2 in 2021  CABG complicated by wound dehiscence and infection with sternal flap placed  - Reports ongoing CP that is relieved with Nitro. She states that it occurs with rest and exertion  - Also endorsing some SOB/ESPARZA that may be considered anginal equivalent  - Has also tried xanax for CP symptoms without any relief  - Nuclear stress test as above- abnormal myocardial perfusion scan there was a moderate intensity, medium size, reversible perfusion abnormality in the mid to apical lateral apical walls in the typical distribution of the left circumflex territory.  See full report above  - Given patient's abnormal stress test, current anginal symptoms, significant CAD and cardiac history, decision made to proceed with [facility] for further evaluation  - Risk, Benefits and Alternatives Reviewed and Discussed with the PT and their Family and they wish to proceed with above Procedure.   - Case discussed with Dr. Stevenson, staff cardiologist  - Obtain consents at PAT visit or day of procedure   -Continuing to take Plavix 75 mg daily   - Continue current medications as listed above  - Antianginals: Metoprolol, Imdur   - Continue SL nitro for as needed chest pain  -Strict ED precautions given  - Will order updated echocardiogram to assess for any underlying heart failure, valvular disease, or structural disease that might also be contributing to  symptoms    Palpitations   -Resolved  -No indication for further testing today      Hypertension   - /77  - Continue current medication as above   -Counseled on the importance of following a low-sodium, heart healthy diet and exercise as tolerated     Hyperlipidemia   -LDL 52.9 per labs January 2024  -Continue rosuvastatin 40 mg daily, Zetia 10 mg daily   -Counseled on the importance of following a low-cholesterol, low-fat diet and exercise as tolerated     Nicotine dependence   -Encouraged smoking cessation  -Currently smoking 1 ppd and is interested in quitting   -Will prescribe nicotine patches     Highest level activity over the last 2 weeks approximately 3 METS      Complete updated Echocardiogram   We will proceed with [facility] (Coronary Angiogram)  Return to clinic for PAT visit   You will follow up in clinic after C  Follow up with PCP as directed   Please notify clinic of any new concerns or any change in symptoms   Strict ED precautions given

## 2024-08-26 NOTE — PATIENT INSTRUCTIONS
Complete updated Echocardiogram   We will proceed with [facility] (Coronary Angiogram)  Return to clinic for PAT visit   You will follow up in clinic after [facility]  Follow up with PCP as directed   Please notify clinic of any new concerns or any change in symptoms   Strict ED precautions given

## 2024-08-26 NOTE — PROGRESS NOTES
CHIEF COMPLAINT: No chief complaint on file.                                                 HPI:  Kaye Spring 48 y.o. female  PMHx includes  CAD with MI in February 2019, with stenting now s/p CABG LIMA->LAD in 6/2021 complicated by wound dehiscence and infection treated, HTN, T2DM, HLD, chronic lower back pain, ventral hernia, AUB, endometriosis, depression with anxiety, and tobacco use.  Patient presents for follow up and ongoing care.  At last office visit patient endorsed worsening chest pain/acid reflux/GERD/heartburn pain.  Given her worsening symptoms and status change from prior nuclear stress test was ordered.  Nuclear stress test revealed an abnormal myocardial perfusion scan.  There was a moderate intensity, medium size, reversible perfusion abnormality in the mid to apical lateral apical walls in the typical distribution of the left circumflex territory.  See full report below.    Today the patient continues to have chest discomfort that has relieved with nitroglycerin.  She states that it occurs at rest and with exertion.  She also endorses occasional SOB/ESPARZA.  She denies any further complaints such as palpitations, PND, orthopnea, lightheadedness, dizziness, syncope, peripheral edema, or claudication symptoms.  She was able to complete her ADLs, however she has noticed more frequent chest pain and ESPARZA over the last few months.  She was not extremely physically active at this point due to significant bilateral lower extremity pain and back pain.  She reports compliance with all her current medications and is tolerating them well.  She continues to smoke approximately 1 pack of cigarettes per day and is trying to quit.                                                                                                                                                                                                                                                                                                                                                                                                                                                                                   CARDIAC TESTING:  Nuclear Stress Test 8.21.24    Abnormal myocardial perfusion scan.    moderate intensity, medium sized,  in the  mid to apical lateral apical wall(s) in the typical distribution of the LCX territory.    There are no other significant perfusion abnormalities.    The gated perfusion images showed an ejection fraction of 77% at rest.    The patient reported no chest pain during the stress test.    There were no arrhythmias during stress.  The nuclear stress test is  fair quality.    On the nuclear stress test the EF is normal.  If the patient has an echo, the EF on the echo is considered more accurate.   The EF drops from rest to stress.     The patient has a moderate risk nuclear stress test due to lateral and apical reversible defect.  If clinically indicated, consider further evaluation with coronary angiogram.          Results for orders placed in visit on 06/21/21    CATH LAB PROCEDURE       Patient Active Problem List   Diagnosis    Coronary artery disease    Other synovitis and tenosynovitis, unspecified hand    Anxiety    Benign paroxysmal positional vertigo    Chronic back pain    Chronic rhinitis    Gastroesophageal reflux disease with esophagitis    Diastasis of rectus abdominis    Primary hypertension    Leukocytosis    Microcytic hypochromic anemia    Mixed anxiety depressive disorder    Mixed hyperlipidemia    Obesity    Spondylosis of lumbar spine    Thrombocythemia    Type 2 diabetes mellitus without complication, without long-term current use of insulin    Cigarette nicotine dependence without complication    Positive colorectal cancer screening using Cologuard test    Chest pain     Past Surgical History:   Procedure Laterality Date    CARDIAC CATHETERIZATION      CARDIAC VALVE REPLACEMENT       CHOLECYSTECTOMY      CORONARY ANGIOPLASTY      CORONARY ARTERY BYPASS GRAFT      HYSTERECTOMY      right wrist surgery      TUBAL LIGATION       Social History     Socioeconomic History    Marital status: Single   Tobacco Use    Smoking status: Every Day     Current packs/day: 0.25     Average packs/day: 0.3 packs/day for 29.7 years (7.4 ttl pk-yrs)     Types: Cigarettes     Start date: 1995    Smokeless tobacco: Never   Substance and Sexual Activity    Alcohol use: Not Currently    Drug use: Never    Sexual activity: Yes     Social Determinants of Health     Financial Resource Strain: Low Risk  (1/29/2024)    Overall Financial Resource Strain (CARDIA)     Difficulty of Paying Living Expenses: Not very hard   Food Insecurity: Food Insecurity Present (1/29/2024)    Hunger Vital Sign     Worried About Running Out of Food in the Last Year: Sometimes true     Ran Out of Food in the Last Year: Sometimes true   Transportation Needs: No Transportation Needs (1/29/2024)    PRAPARE - Transportation     Lack of Transportation (Medical): No     Lack of Transportation (Non-Medical): No   Physical Activity: Inactive (1/29/2024)    Exercise Vital Sign     Days of Exercise per Week: 0 days     Minutes of Exercise per Session: 20 min   Stress: No Stress Concern Present (1/29/2024)    [employer] - Occupational Stress Questionnaire     Feeling of Stress : Not at all   Housing Stability: Low Risk  (1/29/2024)    Housing Stability Vital Sign     Unable to Pay for Housing in the Last Year: No     Number of Places Lived in the Last Year: 1     Unstable Housing in the Last Year: No        Family History   Problem Relation Name Age of Onset    Arrhythmia Mother Marianela     Clotting disorder Mother Marianela     Heart disease Mother Marianela     Heart attack Mother Marianela     Hyperlipidemia Mother Marianela     Heart failure Mother Marianela     Stroke Mother Marianela     Hypertension Mother Marianela     Diabetes Mother Marianela      Depression Mother Marianela     Hyperlipidemia Brother Domingo     Hypertension Brother Domingo      Review of patient's allergies indicates:   Allergen Reactions    Aspirin Nausea And Vomiting and Other (See Comments)     Other reaction(s): Stomach upset  Pt. States doesn't take because of stomach ulcers           ROS:  Review of Systems   Constitutional: Negative.  Negative for malaise/fatigue.   HENT: Negative.     Eyes: Negative.    Respiratory:  Positive for shortness of breath.    Cardiovascular:  Positive for chest pain. Negative for palpitations, orthopnea, claudication, leg swelling and PND.   Gastrointestinal:  Positive for heartburn and nausea. Negative for vomiting.   Genitourinary: Negative.    Musculoskeletal:  Positive for back pain, joint pain and myalgias.   Skin: Negative.    Neurological: Negative.    Endo/Heme/Allergies: Negative.    Psychiatric/Behavioral: Negative.                                                                                                                                                                                  Negative except as stated in the history of present illness. See HPI for details.    PHYSICAL EXAM:  There were no vitals taken for this visit.    Physical Exam  Constitutional:       Appearance: Normal appearance. She is obese. She is not ill-appearing.   HENT:      Head: Normocephalic.      Nose: Nose normal.      Mouth/Throat:      Mouth: Mucous membranes are moist.   Eyes:      Extraocular Movements: Extraocular movements intact.   Neck:      Vascular: No carotid bruit.   Cardiovascular:      Rate and Rhythm: Normal rate and regular rhythm.      Pulses: Normal pulses.      Heart sounds: No murmur heard.  Pulmonary:      Effort: Pulmonary effort is normal.   Abdominal:      General: There is no distension.   Musculoskeletal:         General: Normal range of motion.      Cervical back: Normal range of motion.      Right lower leg: No edema.      Left lower  leg: No edema.   Skin:     General: Skin is warm.   Neurological:      General: No focal deficit present.      Mental Status: She is alert and oriented to person, place, and time.   Psychiatric:         Mood and Affect: Mood normal.       Current Outpatient Medications   Medication Instructions    ACCU-CHEK GUIDE ME GLUCOSE MTR Misc No dose, route, or frequency recorded.    albuterol (PROVENTIL/VENTOLIN HFA) 90 mcg/actuation inhaler 2 puffs, Inhalation, 4 times daily PRN    ALPRAZolam (XANAX) 1 mg, Oral, 3 times daily PRN    AMITIZA 24 mcg, Oral, 2 times daily    ARIPiprazole (ABILIFY) 2 mg, Oral, Daily    blood sugar diagnostic Strp To check BG 2 times daily, to use with insurance preferred meter E11.9    blood-glucose meter kit To check BG 2 times daily, to use with insurance preferred meter E11.9    clopidogreL (PLAVIX) 75 mg tablet <BR>See Instructions, TAKE ONE TABLET BY MOUTH EVERY DAY, # 90 tab(s), 3 Refill(s), Pharmacy: [facility] [id number] [id number], 160, cm, Height/Length Dosing, 09/01/21 13:52:00 CDT, 85, kg, Weight Dosing, 09/01/21 13:52:00 CDT    desvenlafaxine succinate (PRISTIQ) 100 MG Tb24 No dose, route, or frequency recorded.    doxepin (SINEQUAN) 25 mg, Oral, Nightly    ezetimibe (ZETIA) 10 mg, Oral, Daily    gabapentin (NEURONTIN) 600 mg, Oral, 3 times daily, As needed for back pain    isosorbide mononitrate (IMDUR) 60 mg, Oral, Daily    lancets Misc To check BG 2 times daily, to use with insurance preferred meter E11.9    losartan (COZAAR) 100 mg, Oral, Daily, For High Blood Pressure    metFORMIN (GLUCOPHAGE-XR) 1,000 mg, Oral, 2 times daily with meals    metoclopramide HCl (REGLAN) 5 mg, Oral, 4 times daily    metoprolol succinate (TOPROL-XL) 25 mg, Oral, 2 times daily    mupirocin (BACTROBAN) 2 % ointment Topical (Top), 2 times daily    nicotine (NICODERM CQ) 21 mg/24 hr 1 patch, Transdermal, Daily    nitroGLYCERIN (NITROSTAT) 0.4 mg, Sublingual, Every 5 min PRN    OXcarbazepine (TRILEPTAL) 300 mg,  Oral, 2 times daily    pantoprazole (PROTONIX) 40 mg, Oral, Daily, For Acid Reflux    rosuvastatin (CRESTOR) 40 mg, Oral, Daily    tiZANidine (ZANAFLEX) 4 mg, Oral, Every 8 hours    TRULICITY 3 mg, Subcutaneous, Every 7 days, For Diabetes        All medications, laboratory studies, cardiac diagnostic imaging reviewed.     Lab Results   Component Value Date    LDL 43.00 (L) 01/31/2024    LDL 58.00 01/03/2023    TRIG 241 (H) 07/30/2024    TRIG 152 (H) 01/31/2024    CREATININE 0.66 07/30/2024    MG 1.80 02/23/2022    K 4.9 07/30/2024        ASSESSMENT/PLAN:    CAD s/p CABG x2 in 2021  CABG complicated by wound dehiscence and infection with sternal flap placed  - Reports ongoing CP that is relieved with Nitro. She states that it occurs with rest and exertion  - Also endorsing some SOB/ESPARZA that may be considered anginal equivalent  - Has also tried xanax for CP symptoms without any relief  - Nuclear stress test as above- abnormal myocardial perfusion scan there was a moderate intensity, medium size, reversible perfusion abnormality in the mid to apical lateral apical walls in the typical distribution of the left circumflex territory.  See full report above  - Given patient's abnormal stress test, current anginal symptoms, significant CAD and cardiac history, decision made to proceed with [facility] for further evaluation  - Risk, Benefits and Alternatives Reviewed and Discussed with the PT and their Family and they wish to proceed with above Procedure.   - Case discussed with Dr. Stevenson, staff cardiologist  - Obtain consents at PAT visit or day of procedure   -Continuing to take Plavix 75 mg daily   - Continue current medications as listed above  - Antianginals: Metoprolol, Imdur   - Continue SL nitro for as needed chest pain  -Strict ED precautions given  - Will order updated echocardiogram to assess for any underlying heart failure, valvular disease, or structural disease that might also be contributing to  symptoms    Palpitations   -Resolved  -No indication for further testing today      Hypertension   - /77  - Continue current medication as above   -Counseled on the importance of following a low-sodium, heart healthy diet and exercise as tolerated     Hyperlipidemia   -LDL 52.9 per labs January 2024  -Continue rosuvastatin 40 mg daily, Zetia 10 mg daily   -Counseled on the importance of following a low-cholesterol, low-fat diet and exercise as tolerated     Nicotine dependence   -Encouraged smoking cessation  -Currently smoking 1 ppd and is interested in quitting   -Will prescribe nicotine patches     Highest level activity over the last 2 weeks approximately 3 METS      Complete updated Echocardiogram   We will proceed with [facility] (Coronary Angiogram)  Return to clinic for PAT visit   You will follow up in clinic after C  Follow up with PCP as directed   Please notify clinic of any new concerns or any change in symptoms   Strict ED precautions given

## 2024-09-03 ENCOUNTER — CLINICAL SUPPORT (OUTPATIENT)
Dept: CARDIOLOGY | Facility: CLINIC | Age: 49
End: 2024-09-03
Payer: MEDICAID

## 2024-09-03 ENCOUNTER — LAB VISIT (OUTPATIENT)
Dept: LAB | Facility: HOSPITAL | Age: 49
End: 2024-09-03
Payer: MEDICAID

## 2024-09-03 VITALS
SYSTOLIC BLOOD PRESSURE: 139 MMHG | OXYGEN SATURATION: 97 % | WEIGHT: 187 LBS | HEART RATE: 83 BPM | HEIGHT: 64 IN | BODY MASS INDEX: 31.92 KG/M2 | DIASTOLIC BLOOD PRESSURE: 80 MMHG | RESPIRATION RATE: 20 BRPM

## 2024-09-03 DIAGNOSIS — R06.02 SHORTNESS OF BREATH: ICD-10-CM

## 2024-09-03 DIAGNOSIS — R94.39 ABNORMAL STRESS TEST: ICD-10-CM

## 2024-09-03 DIAGNOSIS — R07.9 CHEST PAIN, UNSPECIFIED TYPE: ICD-10-CM

## 2024-09-03 DIAGNOSIS — F17.200 NEEDS SMOKING CESSATION EDUCATION: ICD-10-CM

## 2024-09-03 DIAGNOSIS — I25.10 CORONARY ARTERY DISEASE INVOLVING NATIVE CORONARY ARTERY OF NATIVE HEART WITHOUT ANGINA PECTORIS: Primary | ICD-10-CM

## 2024-09-03 DIAGNOSIS — I25.10 CORONARY ARTERY DISEASE, UNSPECIFIED VESSEL OR LESION TYPE, UNSPECIFIED WHETHER ANGINA PRESENT, UNSPECIFIED WHETHER NATIVE OR TRANSPLANTED HEART: ICD-10-CM

## 2024-09-03 LAB
ALBUMIN SERPL-MCNC: 4.2 G/DL (ref 3.5–5)
ALBUMIN/GLOB SERPL: 1.2 RATIO (ref 1.1–2)
ALP SERPL-CCNC: 91 UNIT/L (ref 40–150)
ALT SERPL-CCNC: 42 UNIT/L (ref 0–55)
ANION GAP SERPL CALC-SCNC: 9 MEQ/L
APTT PPP: 28.6 SECONDS (ref 23.2–33.7)
AST SERPL-CCNC: 30 UNIT/L (ref 5–34)
BASOPHILS # BLD AUTO: 0.04 X10(3)/MCL
BASOPHILS NFR BLD AUTO: 0.3 %
BILIRUB SERPL-MCNC: 0.3 MG/DL
BUN SERPL-MCNC: 5 MG/DL (ref 7–18.7)
CALCIUM SERPL-MCNC: 10.7 MG/DL (ref 8.4–10.2)
CHLORIDE SERPL-SCNC: 104 MMOL/L (ref 98–107)
CO2 SERPL-SCNC: 26 MMOL/L (ref 22–29)
CREAT SERPL-MCNC: 0.77 MG/DL (ref 0.55–1.02)
CREAT/UREA NIT SERPL: 6
EOSINOPHIL # BLD AUTO: 0.09 X10(3)/MCL (ref 0–0.9)
EOSINOPHIL NFR BLD AUTO: 0.7 %
ERYTHROCYTE [DISTWIDTH] IN BLOOD BY AUTOMATED COUNT: 12.7 % (ref 11.5–17)
GFR SERPLBLD CREATININE-BSD FMLA CKD-EPI: >60 ML/MIN/1.73/M2
GLOBULIN SER-MCNC: 3.6 GM/DL (ref 2.4–3.5)
GLUCOSE SERPL-MCNC: 166 MG/DL (ref 74–100)
HCT VFR BLD AUTO: 44.2 % (ref 37–47)
HGB BLD-MCNC: 14.9 G/DL (ref 12–16)
IMM GRANULOCYTES # BLD AUTO: 0.07 X10(3)/MCL (ref 0–0.04)
IMM GRANULOCYTES NFR BLD AUTO: 0.5 %
INR PPP: 0.9
LYMPHOCYTES # BLD AUTO: 3.73 X10(3)/MCL (ref 0.6–4.6)
LYMPHOCYTES NFR BLD AUTO: 28.7 %
MCH RBC QN AUTO: 29.3 PG (ref 27–31)
MCHC RBC AUTO-ENTMCNC: 33.7 G/DL (ref 33–36)
MCV RBC AUTO: 87 FL (ref 80–94)
MONOCYTES # BLD AUTO: 0.65 X10(3)/MCL (ref 0.1–1.3)
MONOCYTES NFR BLD AUTO: 5 %
NEUTROPHILS # BLD AUTO: 8.41 X10(3)/MCL (ref 2.1–9.2)
NEUTROPHILS NFR BLD AUTO: 64.8 %
NRBC BLD AUTO-RTO: 0 %
PLATELET # BLD AUTO: 280 X10(3)/MCL (ref 130–400)
PMV BLD AUTO: 10.9 FL (ref 7.4–10.4)
POTASSIUM SERPL-SCNC: 3.9 MMOL/L (ref 3.5–5.1)
PROT SERPL-MCNC: 7.8 GM/DL (ref 6.4–8.3)
PROTHROMBIN TIME: 12.5 SECONDS (ref 11.4–14)
RBC # BLD AUTO: 5.08 X10(6)/MCL (ref 4.2–5.4)
SODIUM SERPL-SCNC: 139 MMOL/L (ref 136–145)
WBC # BLD AUTO: 12.99 X10(3)/MCL (ref 4.5–11.5)

## 2024-09-03 PROCEDURE — 99213 OFFICE O/P EST LOW 20 MIN: CPT | Mod: PBBFAC

## 2024-09-03 PROCEDURE — 36415 COLL VENOUS BLD VENIPUNCTURE: CPT

## 2024-09-03 PROCEDURE — 85025 COMPLETE CBC W/AUTO DIFF WBC: CPT

## 2024-09-03 PROCEDURE — 85730 THROMBOPLASTIN TIME PARTIAL: CPT

## 2024-09-03 PROCEDURE — 85610 PROTHROMBIN TIME: CPT

## 2024-09-03 PROCEDURE — 80053 COMPREHEN METABOLIC PANEL: CPT

## 2024-09-03 NOTE — PROGRESS NOTES
Here for Pre-op teaching, instructions discussed, questions encouraged and answered, print outs given.

## 2024-09-04 ENCOUNTER — HOSPITAL ENCOUNTER (OUTPATIENT)
Dept: CARDIOLOGY | Facility: HOSPITAL | Age: 49
Discharge: HOME | End: 2024-09-04
Payer: MEDICAID

## 2024-09-04 DIAGNOSIS — I25.10 CORONARY ARTERY DISEASE, UNSPECIFIED VESSEL OR LESION TYPE, UNSPECIFIED WHETHER ANGINA PRESENT, UNSPECIFIED WHETHER NATIVE OR TRANSPLANTED HEART: ICD-10-CM

## 2024-09-04 DIAGNOSIS — R07.9 CHEST PAIN, UNSPECIFIED TYPE: ICD-10-CM

## 2024-09-04 DIAGNOSIS — R06.02 SHORTNESS OF BREATH: ICD-10-CM

## 2024-09-04 LAB
AORTIC VALVE CUSP SEPERATION: 1.9 CM
AV INDEX (PROSTH): 0.92
AV MEAN GRADIENT: 2 MMHG
AV PEAK GRADIENT: 3 MMHG
AV VALVE AREA BY VELOCITY RATIO: 3.17 CM²
AV VALVE AREA: 2.95 CM²
AV VELOCITY RATIO: 0.99
CV ECHO LV RWT: 0.38 CM
DOP CALC AO PEAK VEL: 0.92 M/S
DOP CALC AO VTI: 21.9 CM
DOP CALC LVOT AREA: 3.2 CM2
DOP CALC LVOT DIAMETER: 2.02 CM
DOP CALC LVOT PEAK VEL: 0.91 M/S
DOP CALC LVOT STROKE VOLUME: 64.7 CM3
DOP CALCLVOT PEAK VEL VTI: 20.2 CM
E WAVE DECELERATION TIME: 213 MSEC
ECHO LV POSTERIOR WALL: 0.79 CM (ref 0.6–1.1)
FRACTIONAL SHORTENING: 27 % (ref 28–44)
INFERIOR VENA CAVA SIZE SNIFF: 0.1 CM
INTERVENTRICULAR SEPTUM: 0.72 CM (ref 0.6–1.1)
IVC DIAMETER: 1.7 CM
LEFT ATRIUM SIZE: 3.19 CM
LEFT ATRIUM VOLUME MOD: 34.1 CM3
LEFT INTERNAL DIMENSION IN SYSTOLE: 3.03 CM (ref 2.1–4)
LEFT VENTRICLE DIASTOLIC VOLUME: 76.8 ML
LEFT VENTRICLE END SYSTOLIC VOLUME APICAL 2 CHAMBER: 36.6 ML
LEFT VENTRICLE END SYSTOLIC VOLUME APICAL 4 CHAMBER: 30.5 ML
LEFT VENTRICLE SYSTOLIC VOLUME: 35.9 ML
LEFT VENTRICULAR INTERNAL DIMENSION IN DIASTOLE: 4.16 CM (ref 3.5–6)
LEFT VENTRICULAR MASS: 92.36 G
LVED V (TEICH): 76.8 ML
LVES V (TEICH): 35.9 ML
LVOT MG: 1.3 MMHG
LVOT MV: 0.48 CM/S
MV PEAK A VEL: 0.69 M/S
PISA TR MAX VEL: 0.76 M/S
RA PRESSURE ESTIMATED: 3 MMHG
RV TB RVSP: 4 MMHG
TDI LATERAL: 0.13 M/S
TDI SEPTAL: 0.12 M/S
TDI: 0.13 M/S
TR MAX PG: 2 MMHG
TRICUSPID ANNULAR PLANE SYSTOLIC EXCURSION: 1.41 CM
TV REST PULMONARY ARTERY PRESSURE: 5 MMHG

## 2024-09-04 PROCEDURE — 93306 TTE W/DOPPLER COMPLETE: CPT

## 2024-09-06 ENCOUNTER — PATIENT MESSAGE (OUTPATIENT)
Dept: ADMINISTRATIVE | Facility: OTHER | Age: 49
End: 2024-09-06
Payer: MEDICAID

## 2024-09-07 ENCOUNTER — PATIENT MESSAGE (OUTPATIENT)
Dept: ADMINISTRATIVE | Facility: OTHER | Age: 49
End: 2024-09-07
Payer: MEDICAID

## 2024-09-08 ENCOUNTER — PATIENT MESSAGE (OUTPATIENT)
Dept: ADMINISTRATIVE | Facility: OTHER | Age: 49
End: 2024-09-08
Payer: MEDICAID

## 2024-09-09 ENCOUNTER — HOSPITAL ENCOUNTER (OUTPATIENT)
Facility: HOSPITAL | Age: 49
Discharge: HOME | End: 2024-09-09
Attending: INTERNAL MEDICINE | Admitting: INTERNAL MEDICINE
Payer: MEDICAID

## 2024-09-09 VITALS
BODY MASS INDEX: 32.16 KG/M2 | DIASTOLIC BLOOD PRESSURE: 77 MMHG | WEIGHT: 188.38 LBS | HEART RATE: 98 BPM | TEMPERATURE: 98 F | RESPIRATION RATE: 16 BRPM | OXYGEN SATURATION: 93 % | HEIGHT: 64 IN | SYSTOLIC BLOOD PRESSURE: 148 MMHG

## 2024-09-09 DIAGNOSIS — F17.210 CIGARETTE NICOTINE DEPENDENCE WITHOUT COMPLICATION: ICD-10-CM

## 2024-09-09 DIAGNOSIS — E78.2 MIXED HYPERLIPIDEMIA: ICD-10-CM

## 2024-09-09 DIAGNOSIS — I10 PRIMARY HYPERTENSION: ICD-10-CM

## 2024-09-09 DIAGNOSIS — E66.9 OBESITY, UNSPECIFIED CLASSIFICATION, UNSPECIFIED OBESITY TYPE, UNSPECIFIED WHETHER SERIOUS COMORBIDITY PRESENT: ICD-10-CM

## 2024-09-09 DIAGNOSIS — E11.9 TYPE 2 DIABETES MELLITUS WITHOUT COMPLICATION, WITHOUT LONG-TERM CURRENT USE OF INSULIN: ICD-10-CM

## 2024-09-09 DIAGNOSIS — I25.10 CORONARY ARTERY DISEASE, UNSPECIFIED VESSEL OR LESION TYPE, UNSPECIFIED WHETHER ANGINA PRESENT, UNSPECIFIED WHETHER NATIVE OR TRANSPLANTED HEART: ICD-10-CM

## 2024-09-09 DIAGNOSIS — R07.9 CHEST PAIN, UNSPECIFIED TYPE: ICD-10-CM

## 2024-09-09 DIAGNOSIS — R94.39 ABNORMAL STRESS TEST: ICD-10-CM

## 2024-09-09 DIAGNOSIS — R06.02 SHORTNESS OF BREATH: ICD-10-CM

## 2024-09-09 PROCEDURE — 25000003 PHARM REV CODE 250: Performed by: INTERNAL MEDICINE

## 2024-09-09 PROCEDURE — 93459 L HRT ART/GRFT ANGIO: CPT | Performed by: INTERNAL MEDICINE

## 2024-09-09 PROCEDURE — 25000003 PHARM REV CODE 250

## 2024-09-09 PROCEDURE — 99152 MOD SED SAME PHYS/QHP 5/>YRS: CPT | Performed by: INTERNAL MEDICINE

## 2024-09-09 PROCEDURE — C1894 INTRO/SHEATH, NON-LASER: HCPCS | Performed by: INTERNAL MEDICINE

## 2024-09-09 PROCEDURE — C1760 CLOSURE DEV, VASC: HCPCS | Performed by: INTERNAL MEDICINE

## 2024-09-09 PROCEDURE — C1769 GUIDE WIRE: HCPCS | Performed by: INTERNAL MEDICINE

## 2024-09-09 PROCEDURE — 99153 MOD SED SAME PHYS/QHP EA: CPT | Performed by: INTERNAL MEDICINE

## 2024-09-09 PROCEDURE — 25500020 PHARM REV CODE 255: Performed by: INTERNAL MEDICINE

## 2024-09-09 PROCEDURE — 63600175 PHARM REV CODE 636 W HCPCS: Performed by: INTERNAL MEDICINE

## 2024-09-09 RX ORDER — SODIUM CHLORIDE 9 MG/ML
INJECTION, SOLUTION INTRAVENOUS ONCE
Status: COMPLETED | OUTPATIENT
Start: 2024-09-09 | End: 2024-09-09

## 2024-09-09 RX ORDER — LIDOCAINE HYDROCHLORIDE 10 MG/ML
INJECTION, SOLUTION INFILTRATION; PERINEURAL
Status: DISCONTINUED | OUTPATIENT
Start: 2024-09-09 | End: 2024-09-09 | Stop reason: HOSPADM

## 2024-09-09 RX ORDER — DIPHENHYDRAMINE HCL 25 MG
25 CAPSULE ORAL
Status: DISCONTINUED | OUTPATIENT
Start: 2024-09-09 | End: 2024-09-09 | Stop reason: HOSPADM

## 2024-09-09 RX ORDER — ONDANSETRON 4 MG/1
8 TABLET, ORALLY DISINTEGRATING ORAL EVERY 8 HOURS PRN
Status: DISCONTINUED | OUTPATIENT
Start: 2024-09-09 | End: 2024-09-09 | Stop reason: HOSPADM

## 2024-09-09 RX ORDER — ACETAMINOPHEN 325 MG/1
650 TABLET ORAL EVERY 4 HOURS PRN
Status: DISCONTINUED | OUTPATIENT
Start: 2024-09-09 | End: 2024-09-09 | Stop reason: HOSPADM

## 2024-09-09 RX ORDER — MIDAZOLAM HYDROCHLORIDE 1 MG/ML
INJECTION INTRAMUSCULAR; INTRAVENOUS
Status: DISCONTINUED | OUTPATIENT
Start: 2024-09-09 | End: 2024-09-09 | Stop reason: HOSPADM

## 2024-09-09 RX ORDER — DIAZEPAM 5 MG/1
5 TABLET ORAL
Status: DISCONTINUED | OUTPATIENT
Start: 2024-09-09 | End: 2024-09-09 | Stop reason: HOSPADM

## 2024-09-09 RX ORDER — FENTANYL CITRATE 50 UG/ML
INJECTION, SOLUTION INTRAMUSCULAR; INTRAVENOUS
Status: DISCONTINUED | OUTPATIENT
Start: 2024-09-09 | End: 2024-09-09 | Stop reason: HOSPADM

## 2024-09-09 RX ADMIN — DIPHENHYDRAMINE HYDROCHLORIDE 25 MG: 25 CAPSULE ORAL at 07:09

## 2024-09-09 RX ADMIN — SODIUM CHLORIDE: 9 INJECTION, SOLUTION INTRAVENOUS at 07:09

## 2024-09-09 RX ADMIN — DIAZEPAM 5 MG: 5 TABLET ORAL at 07:09

## 2024-09-09 NOTE — Clinical Note
71 ml of contrast were injected throughout the case. 10 mL of contrast was the total wasted during the case. 81 mL was the total amount used during the case.

## 2024-09-09 NOTE — Clinical Note
The catheter was repositioned into the ostial LIMA graft. An angiography was performed of the graft. Multiple views were taken. The angiography was performed via power injection.

## 2024-09-09 NOTE — INTERVAL H&P NOTE
The patient has been examined and the H&P has been reviewed:    I concur with the findings and no changes have occurred since H&P was written.    Procedure risks, benefits and alternative options discussed and understood by patient/family.      47yo female CAD with MI in February 2019, with stenting now s/p CABG LIMA->LAD in 6/2021, smokes, DM, HTN, HLD presented with ESPARZA and stress test positive for possible ischemia in Lcx territory. Presenting today for [facility].    There are no hospital problems to display for this patient.

## 2024-09-09 NOTE — DISCHARGE SUMMARY
[facility] - Cath Lab  Discharge Note  Short Stay    Procedure(s) (LRB):  Angiogram, Coronary, with Left Heart Cath (N/A)      OUTCOME: Patient tolerated treatment/procedure well without complication and is now ready for discharge.    DISPOSITION: Home or Self Care    FINAL DIAGNOSIS:  small patent LIMA-LAD with obstructive native artery disease LAD and Lcx. No PCI done    FOLLOWUP: In clinic    DISCHARGE INSTRUCTIONS:  No discharge procedures on file.     TIME SPENT ON DISCHARGE: 5 minutes

## 2024-09-09 NOTE — DISCHARGE INSTRUCTIONS
Groin site care:     Keep site clean and dry until bandage removal in 48 hours.  Remove bandage in shower after 48 hours, wash with warm soapy water, pat dry and leave open to air.  Monitor for infection. Report to the ER if infection symptoms develop. (Redness, swelling, drainage or if you develop a fever.   Monitor for bleeding. If bleeding occurs; lay flat, hold pressure and call 911.      Sometimes you will not see bleeding. If your site starts to swell, become painful, treat it like it is bleeding. Call for help, hold pressure and lay flat.  Call 911 if you feel like you are having a heart attack, stroke or other events.   Monitor for circulation issues; report to the ER if leg becomes painful, blue, pale and or cold.   Do not submerge site in standing water. No tub bath, whirlpool, no Jacuzzi, or swimming.   Do not lift anything over 10 lbs for 5 days.  NO excessive bending, sitting or squatting.  Keep follow up appointments.   Call clinic with questions or concerns.   Resume medications as directed.   Restart Metformin on Friday 11/3/23.  Call clinic with questions or concerns.     Thank you for allowing me to care for you today.  -Alma

## 2024-09-09 NOTE — Clinical Note
The catheter was repositioned into the left subclavian artery ostial LIMA graft. An angiography was performed Multiple views were taken. The angiography was performed via power injection.  The result was suboptimal..

## 2024-09-10 ENCOUNTER — PATIENT MESSAGE (OUTPATIENT)
Dept: ADMINISTRATIVE | Facility: OTHER | Age: 49
End: 2024-09-10
Payer: MEDICAID

## 2024-09-10 ENCOUNTER — TELEPHONE (OUTPATIENT)
Dept: INTERNAL MEDICINE | Facility: CLINIC | Age: 49
End: 2024-09-10
Payer: MEDICAID

## 2024-09-10 DIAGNOSIS — E11.9 TYPE 2 DIABETES MELLITUS WITHOUT COMPLICATION, WITHOUT LONG-TERM CURRENT USE OF INSULIN: ICD-10-CM

## 2024-09-10 LAB — POCT GLUCOSE: 173 MG/DL (ref 70–110)

## 2024-09-10 RX ORDER — DULAGLUTIDE 1.5 MG/.5ML
1.5 INJECTION, SOLUTION SUBCUTANEOUS
Refills: 2 | OUTPATIENT
Start: 2024-09-10 | End: 2024-12-09

## 2024-09-10 NOTE — TELEPHONE ENCOUNTER
----- Message from Patience Coleman sent at 9/10/2024  9:03 AM CDT -----  Who Called: Kaye Spring    Refill or New Rx:Refill  RX Name and Strength:tiZANidine (ZANAFLEX) 4 MG tablet   How is the patient currently taking it? (ex. 1XDay): 3 xday  Is this a 30 day or 90 day RX:270 tablet  Local or Mail Order: local  List of preferred pharmacies on file (remove unneeded): [unfilled]  If different Pharmacy is requested, enter Pharmacy information here including location and phone number: [facility]/pharmacy #[id number] - [place], [place] - [address]   Phone: [phone]  Fax: [phone]         Ordering Provider:handy      Preferred Method of Contact: Phone Call  Patient's Preferred Phone Number on File: [phone]   Best Call Back Number, if different:  Additional Information:  refill  request, please advise, thanks

## 2024-09-10 NOTE — TELEPHONE ENCOUNTER
I contacted patient. She concern of angiogram result of 95 % blockage . Patient concern of having to go to [facility]  hosp. For next surgery. I informed [facility] is acceptable hosp. For the referral from team at [facility]. Patient voiced understanding.

## 2024-09-10 NOTE — TELEPHONE ENCOUNTER
----- Message from Carlos Rudd sent at 9/10/2024 11:17 AM CDT -----  .Who Called: Kaye Spring    Patient is returning phone call    Who Left Message for Patient:Shira Huerta LPN  Does the patient know what this is regarding?:medication      Preferred Method of Contact: Phone Call  Patient's Preferred Phone Number on File: [phone]   Best Call Back Number, if different:  Additional Information:

## 2024-09-11 ENCOUNTER — PATIENT MESSAGE (OUTPATIENT)
Dept: ADMINISTRATIVE | Facility: OTHER | Age: 49
End: 2024-09-11
Payer: MEDICAID

## 2024-09-12 ENCOUNTER — TELEPHONE (OUTPATIENT)
Dept: CARDIOLOGY | Facility: CLINIC | Age: 49
End: 2024-09-12
Payer: MEDICAID

## 2024-09-14 ENCOUNTER — HOSPITAL ENCOUNTER (INPATIENT)
Facility: HOSPITAL | Age: 49
LOS: 3 days | Discharge: HOME | DRG: 322 | End: 2024-09-17
Attending: STUDENT IN AN ORGANIZED HEALTH CARE EDUCATION/TRAINING PROGRAM | Admitting: INTERNAL MEDICINE
Payer: MEDICAID

## 2024-09-14 DIAGNOSIS — I20.0 UNSTABLE ANGINA: Primary | ICD-10-CM

## 2024-09-14 DIAGNOSIS — I25.10 CORONARY ARTERY DISEASE INVOLVING NATIVE CORONARY ARTERY OF NATIVE HEART WITHOUT ANGINA PECTORIS: ICD-10-CM

## 2024-09-14 DIAGNOSIS — R07.9 CHEST PAIN: ICD-10-CM

## 2024-09-14 DIAGNOSIS — I25.10 CAD (CORONARY ARTERY DISEASE): ICD-10-CM

## 2024-09-14 LAB
ALBUMIN SERPL-MCNC: 4.1 G/DL (ref 3.5–5)
ALBUMIN/GLOB SERPL: 1.3 RATIO (ref 1.1–2)
ALP SERPL-CCNC: 87 UNIT/L (ref 40–150)
ALT SERPL-CCNC: 34 UNIT/L (ref 0–55)
ANION GAP SERPL CALC-SCNC: 15 MEQ/L
APTT PPP: 27.3 SECONDS (ref 23.2–33.7)
AST SERPL-CCNC: 22 UNIT/L (ref 5–34)
BASOPHILS # BLD AUTO: 0.08 X10(3)/MCL
BASOPHILS NFR BLD AUTO: 0.5 %
BILIRUB SERPL-MCNC: 0.3 MG/DL
BUN SERPL-MCNC: 4.9 MG/DL (ref 7–18.7)
CALCIUM SERPL-MCNC: 10.3 MG/DL (ref 8.4–10.2)
CHLORIDE SERPL-SCNC: 103 MMOL/L (ref 98–107)
CO2 SERPL-SCNC: 21 MMOL/L (ref 22–29)
CREAT SERPL-MCNC: 0.75 MG/DL (ref 0.55–1.02)
CREAT/UREA NIT SERPL: 7
EOSINOPHIL # BLD AUTO: 0.15 X10(3)/MCL (ref 0–0.9)
EOSINOPHIL NFR BLD AUTO: 0.9 %
ERYTHROCYTE [DISTWIDTH] IN BLOOD BY AUTOMATED COUNT: 13.1 % (ref 11.5–17)
GFR SERPLBLD CREATININE-BSD FMLA CKD-EPI: >60 ML/MIN/1.73/M2
GLOBULIN SER-MCNC: 3.2 GM/DL (ref 2.4–3.5)
GLUCOSE SERPL-MCNC: 185 MG/DL (ref 74–100)
HCT VFR BLD AUTO: 43.5 % (ref 37–47)
HGB BLD-MCNC: 14.9 G/DL (ref 12–16)
IMM GRANULOCYTES # BLD AUTO: 0.22 X10(3)/MCL (ref 0–0.04)
IMM GRANULOCYTES NFR BLD AUTO: 1.3 %
INR PPP: 1
LYMPHOCYTES # BLD AUTO: 4.61 X10(3)/MCL (ref 0.6–4.6)
LYMPHOCYTES NFR BLD AUTO: 26.4 %
MAGNESIUM SERPL-MCNC: 1.6 MG/DL (ref 1.6–2.6)
MCH RBC QN AUTO: 29.5 PG (ref 27–31)
MCHC RBC AUTO-ENTMCNC: 34.3 G/DL (ref 33–36)
MCV RBC AUTO: 86.1 FL (ref 80–94)
MONOCYTES # BLD AUTO: 0.98 X10(3)/MCL (ref 0.1–1.3)
MONOCYTES NFR BLD AUTO: 5.6 %
NEUTROPHILS # BLD AUTO: 11.43 X10(3)/MCL (ref 2.1–9.2)
NEUTROPHILS NFR BLD AUTO: 65.3 %
NRBC BLD AUTO-RTO: 0 %
PLATELET # BLD AUTO: 300 X10(3)/MCL (ref 130–400)
PMV BLD AUTO: 10.8 FL (ref 7.4–10.4)
POTASSIUM SERPL-SCNC: 4 MMOL/L (ref 3.5–5.1)
PROT SERPL-MCNC: 7.3 GM/DL (ref 6.4–8.3)
PROTHROMBIN TIME: 12.6 SECONDS (ref 12.5–14.5)
RBC # BLD AUTO: 5.05 X10(6)/MCL (ref 4.2–5.4)
SODIUM SERPL-SCNC: 139 MMOL/L (ref 136–145)
TROPONIN I SERPL-MCNC: <0.01 NG/ML (ref 0–0.04)
WBC # BLD AUTO: 17.47 X10(3)/MCL (ref 4.5–11.5)

## 2024-09-14 PROCEDURE — 85730 THROMBOPLASTIN TIME PARTIAL: CPT | Performed by: PHYSICIAN ASSISTANT

## 2024-09-14 PROCEDURE — G0378 HOSPITAL OBSERVATION PER HR: HCPCS

## 2024-09-14 PROCEDURE — 93010 ELECTROCARDIOGRAM REPORT: CPT | Mod: ,,, | Performed by: INTERNAL MEDICINE

## 2024-09-14 PROCEDURE — 85025 COMPLETE CBC W/AUTO DIFF WBC: CPT | Performed by: PHYSICIAN ASSISTANT

## 2024-09-14 PROCEDURE — 85610 PROTHROMBIN TIME: CPT | Performed by: PHYSICIAN ASSISTANT

## 2024-09-14 PROCEDURE — 93005 ELECTROCARDIOGRAM TRACING: CPT

## 2024-09-14 PROCEDURE — 84484 ASSAY OF TROPONIN QUANT: CPT | Performed by: PHYSICIAN ASSISTANT

## 2024-09-14 PROCEDURE — 80053 COMPREHEN METABOLIC PANEL: CPT | Performed by: PHYSICIAN ASSISTANT

## 2024-09-14 PROCEDURE — 25000242 PHARM REV CODE 250 ALT 637 W/ HCPCS: Performed by: STUDENT IN AN ORGANIZED HEALTH CARE EDUCATION/TRAINING PROGRAM

## 2024-09-14 PROCEDURE — 83735 ASSAY OF MAGNESIUM: CPT | Performed by: PHYSICIAN ASSISTANT

## 2024-09-14 PROCEDURE — 99285 EMERGENCY DEPT VISIT HI MDM: CPT | Mod: 25

## 2024-09-14 PROCEDURE — 11000001 HC ACUTE MED/SURG PRIVATE ROOM

## 2024-09-14 RX ORDER — SODIUM CHLORIDE 0.9 % (FLUSH) 0.9 %
10 SYRINGE (ML) INJECTION
Status: DISCONTINUED | OUTPATIENT
Start: 2024-09-14 | End: 2024-09-17 | Stop reason: HOSPADM

## 2024-09-14 RX ORDER — ACETAMINOPHEN 325 MG/1
650 TABLET ORAL EVERY 8 HOURS PRN
Status: DISCONTINUED | OUTPATIENT
Start: 2024-09-14 | End: 2024-09-16

## 2024-09-14 RX ORDER — TALC
6 POWDER (GRAM) TOPICAL NIGHTLY PRN
Status: DISCONTINUED | OUTPATIENT
Start: 2024-09-14 | End: 2024-09-17 | Stop reason: HOSPADM

## 2024-09-14 RX ORDER — ONDANSETRON HYDROCHLORIDE 2 MG/ML
4 INJECTION, SOLUTION INTRAVENOUS EVERY 8 HOURS PRN
Status: DISCONTINUED | OUTPATIENT
Start: 2024-09-14 | End: 2024-09-17 | Stop reason: HOSPADM

## 2024-09-14 RX ORDER — NITROGLYCERIN 0.4 MG/1
0.4 TABLET SUBLINGUAL
Status: COMPLETED | OUTPATIENT
Start: 2024-09-14 | End: 2024-09-14

## 2024-09-14 RX ADMIN — NITROGLYCERIN 0.4 MG: 0.4 TABLET, ORALLY DISINTEGRATING SUBLINGUAL at 08:09

## 2024-09-14 NOTE — Clinical Note
A handoff report was given to Elmer [facility]OME[facility]. [facility] will go to [facility] until sheath removed te=hen return back to [facility] room.

## 2024-09-14 NOTE — Clinical Note
The catheter was removed from the, insertion attempt was made into the and was inserted over the wire into the distal   circumflex. An angiography was performed of the left coronary arteries. Multiple views were taken. The angiography was performed via power injection.  The catheter was unable to engage the area..

## 2024-09-14 NOTE — Clinical Note
The catheter was inserted into the, was removed from the and was inserted over the wire into the distal   first OM artery. An angiography was performed of the left coronary arteries. Multiple views were taken. The angiography was performed via power injection.

## 2024-09-14 NOTE — Clinical Note
The catheter was inserted into the, was removed from the and was inserted over the wire into the distal   circumflex. IVUS performed- CFX

## 2024-09-14 NOTE — Clinical Note
The catheter was inserted into the, was removed from the and was inserted over the wire into the distal   circumflex. An angiography was performed of the left coronary arteries. Multiple views were taken. The angiography was performed via power injection.

## 2024-09-15 LAB
POCT GLUCOSE: 174 MG/DL (ref 70–110)
POCT GLUCOSE: 195 MG/DL (ref 70–110)
POCT GLUCOSE: 230 MG/DL (ref 70–110)
TROPONIN I SERPL-MCNC: <0.01 NG/ML (ref 0–0.04)

## 2024-09-15 PROCEDURE — 63600175 PHARM REV CODE 636 W HCPCS: Performed by: NURSE PRACTITIONER

## 2024-09-15 PROCEDURE — 25000003 PHARM REV CODE 250

## 2024-09-15 PROCEDURE — 36415 COLL VENOUS BLD VENIPUNCTURE: CPT | Performed by: STUDENT IN AN ORGANIZED HEALTH CARE EDUCATION/TRAINING PROGRAM

## 2024-09-15 PROCEDURE — 84484 ASSAY OF TROPONIN QUANT: CPT | Mod: 91 | Performed by: STUDENT IN AN ORGANIZED HEALTH CARE EDUCATION/TRAINING PROGRAM

## 2024-09-15 PROCEDURE — 25000003 PHARM REV CODE 250: Performed by: STUDENT IN AN ORGANIZED HEALTH CARE EDUCATION/TRAINING PROGRAM

## 2024-09-15 PROCEDURE — 96372 THER/PROPH/DIAG INJ SC/IM: CPT | Performed by: NURSE PRACTITIONER

## 2024-09-15 PROCEDURE — S4991 NICOTINE PATCH NONLEGEND: HCPCS

## 2024-09-15 PROCEDURE — G0378 HOSPITAL OBSERVATION PER HR: HCPCS

## 2024-09-15 PROCEDURE — 25000003 PHARM REV CODE 250: Performed by: NURSE PRACTITIONER

## 2024-09-15 PROCEDURE — S4991 NICOTINE PATCH NONLEGEND: HCPCS | Performed by: NURSE PRACTITIONER

## 2024-09-15 PROCEDURE — 11000001 HC ACUTE MED/SURG PRIVATE ROOM

## 2024-09-15 RX ORDER — ARIPIPRAZOLE 2 MG/1
2 TABLET ORAL DAILY
Status: DISCONTINUED | OUTPATIENT
Start: 2024-09-15 | End: 2024-09-17 | Stop reason: HOSPADM

## 2024-09-15 RX ORDER — IBUPROFEN 200 MG
24 TABLET ORAL
Status: DISCONTINUED | OUTPATIENT
Start: 2024-09-15 | End: 2024-09-17 | Stop reason: HOSPADM

## 2024-09-15 RX ORDER — IBUPROFEN 200 MG
1 TABLET ORAL DAILY
Status: COMPLETED | OUTPATIENT
Start: 2024-09-15 | End: 2024-09-17

## 2024-09-15 RX ORDER — ASPIRIN 325 MG
60 TABLET ORAL ONCE
Status: DISCONTINUED | OUTPATIENT
Start: 2024-09-16 | End: 2024-09-15

## 2024-09-15 RX ORDER — LOSARTAN POTASSIUM 50 MG/1
100 TABLET ORAL DAILY
Status: DISCONTINUED | OUTPATIENT
Start: 2024-09-15 | End: 2024-09-17 | Stop reason: HOSPADM

## 2024-09-15 RX ORDER — CLOPIDOGREL BISULFATE 75 MG/1
75 TABLET ORAL DAILY
Status: DISCONTINUED | OUTPATIENT
Start: 2024-09-16 | End: 2024-09-17 | Stop reason: HOSPADM

## 2024-09-15 RX ORDER — OXCARBAZEPINE 300 MG/1
300 TABLET, FILM COATED ORAL 2 TIMES DAILY
Status: DISCONTINUED | OUTPATIENT
Start: 2024-09-15 | End: 2024-09-17 | Stop reason: HOSPADM

## 2024-09-15 RX ORDER — ATORVASTATIN CALCIUM 40 MG/1
40 TABLET, FILM COATED ORAL DAILY
Status: DISCONTINUED | OUTPATIENT
Start: 2024-09-15 | End: 2024-09-17 | Stop reason: HOSPADM

## 2024-09-15 RX ORDER — CLOPIDOGREL BISULFATE 75 MG/1
300 TABLET ORAL ONCE
Status: COMPLETED | OUTPATIENT
Start: 2024-09-15 | End: 2024-09-15

## 2024-09-15 RX ORDER — ENOXAPARIN SODIUM 100 MG/ML
1 INJECTION SUBCUTANEOUS EVERY 12 HOURS
Status: COMPLETED | OUTPATIENT
Start: 2024-09-15 | End: 2024-09-15

## 2024-09-15 RX ORDER — NAPROXEN SODIUM 220 MG/1
81 TABLET, FILM COATED ORAL DAILY
Status: DISCONTINUED | OUTPATIENT
Start: 2024-09-15 | End: 2024-09-17 | Stop reason: HOSPADM

## 2024-09-15 RX ORDER — PANTOPRAZOLE SODIUM 40 MG/1
40 TABLET, DELAYED RELEASE ORAL DAILY
Status: DISCONTINUED | OUTPATIENT
Start: 2024-09-15 | End: 2024-09-17 | Stop reason: HOSPADM

## 2024-09-15 RX ORDER — EZETIMIBE 10 MG/1
10 TABLET ORAL DAILY
Status: DISCONTINUED | OUTPATIENT
Start: 2024-09-15 | End: 2024-09-17 | Stop reason: HOSPADM

## 2024-09-15 RX ORDER — TIZANIDINE 4 MG/1
4 TABLET ORAL EVERY 8 HOURS
Status: DISCONTINUED | OUTPATIENT
Start: 2024-09-15 | End: 2024-09-17 | Stop reason: HOSPADM

## 2024-09-15 RX ORDER — DOXEPIN HYDROCHLORIDE 25 MG/1
25 CAPSULE ORAL NIGHTLY
Status: DISCONTINUED | OUTPATIENT
Start: 2024-09-15 | End: 2024-09-17 | Stop reason: HOSPADM

## 2024-09-15 RX ORDER — INSULIN ASPART 100 [IU]/ML
0-10 INJECTION, SOLUTION INTRAVENOUS; SUBCUTANEOUS
Status: DISCONTINUED | OUTPATIENT
Start: 2024-09-15 | End: 2024-09-17 | Stop reason: HOSPADM

## 2024-09-15 RX ORDER — ASPIRIN 325 MG
650 TABLET ORAL ONCE
Status: DISCONTINUED | OUTPATIENT
Start: 2024-09-17 | End: 2024-09-15

## 2024-09-15 RX ORDER — METOPROLOL SUCCINATE 25 MG/1
25 TABLET, EXTENDED RELEASE ORAL 2 TIMES DAILY
Status: DISCONTINUED | OUTPATIENT
Start: 2024-09-15 | End: 2024-09-17 | Stop reason: HOSPADM

## 2024-09-15 RX ORDER — GABAPENTIN 300 MG/1
600 CAPSULE ORAL 3 TIMES DAILY
Status: DISCONTINUED | OUTPATIENT
Start: 2024-09-15 | End: 2024-09-17 | Stop reason: HOSPADM

## 2024-09-15 RX ORDER — ASPIRIN 325 MG
325 TABLET ORAL ONCE
Status: DISCONTINUED | OUTPATIENT
Start: 2024-09-17 | End: 2024-09-15

## 2024-09-15 RX ORDER — LUBIPROSTONE 24 UG/1
24 CAPSULE ORAL 2 TIMES DAILY
Status: DISCONTINUED | OUTPATIENT
Start: 2024-09-15 | End: 2024-09-17 | Stop reason: HOSPADM

## 2024-09-15 RX ORDER — IBUPROFEN 200 MG
16 TABLET ORAL
Status: DISCONTINUED | OUTPATIENT
Start: 2024-09-15 | End: 2024-09-17 | Stop reason: HOSPADM

## 2024-09-15 RX ORDER — ASPIRIN 325 MG
120 TABLET ORAL ONCE
Status: DISCONTINUED | OUTPATIENT
Start: 2024-09-16 | End: 2024-09-15

## 2024-09-15 RX ORDER — GLUCAGON 1 MG
1 KIT INJECTION
Status: DISCONTINUED | OUTPATIENT
Start: 2024-09-15 | End: 2024-09-17 | Stop reason: HOSPADM

## 2024-09-15 RX ORDER — ASPIRIN 325 MG
30 TABLET ORAL ONCE
Status: DISCONTINUED | OUTPATIENT
Start: 2024-09-16 | End: 2024-09-15

## 2024-09-15 RX ORDER — ASPIRIN 325 MG
150 TABLET ORAL ONCE
Status: DISCONTINUED | OUTPATIENT
Start: 2024-09-17 | End: 2024-09-15

## 2024-09-15 RX ORDER — ISOSORBIDE MONONITRATE 60 MG/1
60 TABLET, EXTENDED RELEASE ORAL DAILY
Status: DISCONTINUED | OUTPATIENT
Start: 2024-09-15 | End: 2024-09-17 | Stop reason: HOSPADM

## 2024-09-15 RX ORDER — ALPRAZOLAM 0.5 MG/1
1 TABLET ORAL 3 TIMES DAILY PRN
Status: DISCONTINUED | OUTPATIENT
Start: 2024-09-15 | End: 2024-09-17 | Stop reason: HOSPADM

## 2024-09-15 RX ADMIN — INSULIN ASPART 2 UNITS: 100 INJECTION, SOLUTION INTRAVENOUS; SUBCUTANEOUS at 08:09

## 2024-09-15 RX ADMIN — ARIPIPRAZOLE 2 MG: 2 TABLET ORAL at 08:09

## 2024-09-15 RX ADMIN — ASPIRIN 81 MG CHEWABLE TABLET 81 MG: 81 TABLET CHEWABLE at 10:09

## 2024-09-15 RX ADMIN — ENOXAPARIN SODIUM 90 MG: 100 INJECTION SUBCUTANEOUS at 10:09

## 2024-09-15 RX ADMIN — PANTOPRAZOLE SODIUM 40 MG: 40 TABLET, DELAYED RELEASE ORAL at 10:09

## 2024-09-15 RX ADMIN — ACETAMINOPHEN 650 MG: 325 TABLET, FILM COATED ORAL at 02:09

## 2024-09-15 RX ADMIN — ATORVASTATIN CALCIUM 40 MG: 40 TABLET, FILM COATED ORAL at 10:09

## 2024-09-15 RX ADMIN — INSULIN ASPART 2 UNITS: 100 INJECTION, SOLUTION INTRAVENOUS; SUBCUTANEOUS at 12:09

## 2024-09-15 RX ADMIN — EZETIMIBE 10 MG: 10 TABLET ORAL at 08:09

## 2024-09-15 RX ADMIN — GABAPENTIN 600 MG: 300 CAPSULE ORAL at 10:09

## 2024-09-15 RX ADMIN — ENOXAPARIN SODIUM 90 MG: 100 INJECTION SUBCUTANEOUS at 08:09

## 2024-09-15 RX ADMIN — LOSARTAN POTASSIUM 100 MG: 50 TABLET, FILM COATED ORAL at 10:09

## 2024-09-15 RX ADMIN — CLOPIDOGREL BISULFATE 300 MG: 75 TABLET ORAL at 10:09

## 2024-09-15 RX ADMIN — ISOSORBIDE MONONITRATE 60 MG: 60 TABLET, EXTENDED RELEASE ORAL at 10:09

## 2024-09-15 RX ADMIN — GABAPENTIN 600 MG: 300 CAPSULE ORAL at 08:09

## 2024-09-15 RX ADMIN — NICOTINE 1 PATCH: 21 PATCH, EXTENDED RELEASE TRANSDERMAL at 10:09

## 2024-09-15 RX ADMIN — LUBIPROSTONE 24 MCG: 24 CAPSULE, GELATIN COATED ORAL at 08:09

## 2024-09-15 RX ADMIN — METOPROLOL SUCCINATE 25 MG: 25 TABLET, EXTENDED RELEASE ORAL at 10:09

## 2024-09-15 RX ADMIN — DOXEPIN HYDROCHLORIDE 25 MG: 25 CAPSULE ORAL at 08:09

## 2024-09-15 RX ADMIN — GABAPENTIN 600 MG: 300 CAPSULE ORAL at 02:09

## 2024-09-15 RX ADMIN — INSULIN ASPART 2 UNITS: 100 INJECTION, SOLUTION INTRAVENOUS; SUBCUTANEOUS at 05:09

## 2024-09-15 RX ADMIN — OXCARBAZEPINE 300 MG: 300 TABLET, FILM COATED ORAL at 08:09

## 2024-09-15 RX ADMIN — ALPRAZOLAM 1 MG: 0.5 TABLET ORAL at 08:09

## 2024-09-15 RX ADMIN — TIZANIDINE 4 MG: 4 TABLET ORAL at 08:09

## 2024-09-15 RX ADMIN — TIZANIDINE 4 MG: 4 TABLET ORAL at 02:09

## 2024-09-15 RX ADMIN — OXCARBAZEPINE 300 MG: 300 TABLET, FILM COATED ORAL at 02:09

## 2024-09-15 RX ADMIN — ALPRAZOLAM 1 MG: 0.5 TABLET ORAL at 12:09

## 2024-09-15 NOTE — FIRST PROVIDER EVALUATION
"Medical screening examination initiated.  I have conducted a focused provider triage encounter, findings are as follows:    Brief history of present illness:  48yoF with CAD,HTN, CABG x2, DMII, HLD chest pain that started last night and now worsening. Had angiogram 6days ago. 90% in left side? 70% right side? No meds taken. Took nitroglycerin just pta w/improvement of symptoms. Takes plavix daily  Denies nausea,vomiting, abdominal pain, fever.     Cardio: [facility], Dr. Stevenson    Vitals:    09/14/24 1943   BP: 134/80   Pulse: 109   Resp: 18   Temp: 97.8 °F (36.6 °C)   TempSrc: Oral   SpO2: 98%   Weight: 85.3 kg (188 lb)   Height: 5' 4" (1.626 m)       Pertinent physical exam:  NAD. ambulatory    Brief workup plan:  labs and imaging    Preliminary workup initiated; this workup will be continued and followed by the physician or advanced practice provider that is assigned to the patient when roomed.  "

## 2024-09-15 NOTE — H&P
[facility]     Cardiology  History and Physical     Patient Name: Kaye Spring  MRN: [id number]  Admission Date: 9/14/2024  Code Status: Full Code   Attending Provider: Markie Franklin MD   Primary Care Physician: Flory De La Cruz FNP  Principal Problem:<principal problem not specified>    Patient information was obtained from patient, past medical records, and ER records.     Subjective:     Chief Complaint:  CP    HPI:  Ms. Spring is a 48-year-old female who is known to [facility].  The patient recently underwent a diagnostic angiogram on 09/09/2024 with results of multivessel CAD.  The patient's LIMA was known to be a small vessel and it was recommended that the patient continue medical therapy versus being evaluated for high-risk PCI.  The patient was discharged home in stable condition however on the day prior to her admission she developed substernal chest pain that radiated to her left arm and was associated with shortness of breath.  She reported that the pain was 7/10 on a verbal scale and described as a heaviness.  She took 2 sublingual nitros at home which did provide some relief however secondary to her history of multivessel CAD she became concerned and presented to the ER for evaluation.  She was admitted to Cardiology for further management.    PMH: Anxiety, MVCAD, Depression, DM II, HLD, HTN, CVA, MO, Tobacco Use  PSH: Angiogram, Cholecystectomy, JUS, R Wrist Surgery, Tubal Ligation  Family History: Father, D, Unknown; Mother, L, CAD/CABG, DM II  Social History: + 1 PPD for 25 + Years; Denies Illicit Drug and ETOH Use     Previous Cardiac Diagnostics:   [facility] 9.9.24:  The 2nd Mrg lesion was 70% stenosed.  The Mid Cx to Dist Cx lesion was 95% stenosed.  The Mid LAD lesion was 70% stenosed.  The pre-procedure left ventricular end diastolic pressure was 7.  The estimated blood loss was <50 mL.  There was two vessel coronary artery disease.  FINDINGS  LVEDP:  7  mmHg  Left Main:  No stenosis   LAD:  mid LAD lesion 70%, patent LIMA -LAD  Circumflex:  re-cannulized Lcx stents with 95% ISR, OM2 70% stenosis at ostium   RCA:  20% stenosis proximal   The patient has Significant two vessel disease patent graft  Blood loss:  less than 45 cc.  LIMA:  Small vessel.  No significant stenosis noted.   Right femoral access with closure device  Femoral artery:  small to moderate size   RECOMMENDATIONS  Treat with anti-anginals and consider complex PCI  Risk factor modifications -- smoking cessation  Activity -- avoid straining with affected limb for one week  Exercise on regular basis.  Precautions post D/C -- come to ER for hematoma, unusual pain, erythema, or unusual drainage at access site  Precautions post D/C -- if develop bleeding or hematoma at access site, hold pressure at access site, and come to ER   Antiplatelet:  Continue Plavix indefinitely    ECHO 6.14.19:  The study quality is average.   The left ventricle is normal in size. Global left ventricular systolic function is borderline normal. The left ventricular ejection fraction is 50%. The left ventricle diastolic function is impaired (Grade I) with normal left atrial pressure. Right ventricular systolic function is mildly decreased.  No evidence of significant valvular dysfunction is detected.     [facility] 2.8.19:  L Main Normal  LAD Mid 40-50%  LCX Mod Caliber OM2 of a 99% Stenosis Reduced to 0%  RCA Mid 40-50%    Review of patient's allergies indicates:   Allergen Reactions    Aspirin Nausea And Vomiting and Other (See Comments)     Other reaction(s): Stomach upset  Pt. States doesn't take because of stomach ulcers       Current Facility-Administered Medications on File Prior to Encounter   Medication    diazePAM tablet 5 mg    sodium chloride 0.9% flush 10 mL     Current Outpatient Medications on File Prior to Encounter   Medication Sig    albuterol (PROVENTIL/VENTOLIN HFA) 90 mcg/actuation inhaler Inhale 2 puffs into the  lungs 4 (four) times daily as needed for Wheezing or Shortness of Breath.    ALPRAZolam (XANAX) 1 MG tablet Take 1 mg by mouth 3 (three) times daily as needed.    AMITIZA 24 mcg Cap Take 1 capsule (24 mcg total) by mouth 2 (two) times daily.    ARIPiprazole (ABILIFY) 2 MG Tab Take 2 mg by mouth once daily.    clopidogreL (PLAVIX) 75 mg tablet See Instructions, TAKE ONE TABLET BY MOUTH EVERY DAY, # 90 tab(s), 3 Refill(s), Pharmacy: [place] [facility] [id number] [id number], 160, cm, Height/Length Dosing, 09/01/21 13:52:00 CDT, 85, kg, Weight Dosing, 09/01/21 13:52:00 CDT    doxepin (SINEQUAN) 25 MG capsule Take 25 mg by mouth nightly.    ezetimibe (ZETIA) 10 mg tablet Take 1 tablet (10 mg total) by mouth once daily.    gabapentin (NEURONTIN) 600 MG tablet Take 1 tablet (600 mg total) by mouth 3 (three) times daily. As needed for back pain    isosorbide mononitrate (IMDUR) 60 MG 24 hr tablet Take 1 tablet (60 mg total) by mouth once daily.    losartan (COZAAR) 100 MG tablet Take 1 tablet (100 mg total) by mouth once daily. For High Blood Pressure    metFORMIN (GLUCOPHAGE-XR) 500 MG ER 24hr tablet Take 2 tablets (1,000 mg total) by mouth 2 (two) times daily with meals.    metoclopramide HCl (REGLAN) 5 MG tablet Take 5 mg by mouth 4 (four) times daily.    metoprolol succinate (TOPROL-XL) 25 MG 24 hr tablet Take 1 tablet (25 mg total) by mouth 2 (two) times daily.    nitroGLYCERIN (NITROSTAT) 0.4 MG SL tablet Place 1 tablet (0.4 mg total) under the tongue every 5 (five) minutes as needed for Chest pain.    OXcarbazepine (TRILEPTAL) 300 MG Tab Take 300 mg by mouth 2 (two) times daily.    pantoprazole (PROTONIX) 40 MG tablet TAKE 1 TABLET (40 MG TOTAL) BY MOUTH ONCE DAILY FOR ACID REFLUX    rosuvastatin (CRESTOR) 40 MG Tab Take 1 tablet (40 mg total) by mouth once daily.    tiZANidine (ZANAFLEX) 4 MG tablet Take 1 tablet (4 mg total) by mouth every 8 (eight) hours.    ACCU-CHEK GUIDE ME GLUCOSE MTR Misc     blood sugar diagnostic Strp To  check BG 2 times daily, to use with insurance preferred meter E11.9    blood-glucose meter kit To check BG 2 times daily, to use with insurance preferred meter E11.9    desvenlafaxine succinate (PRISTIQ) 100 MG Tb24     dulaglutide (TRULICITY) 3 mg/0.5 mL pen injector Inject 3 mg into the skin every 7 days. For Diabetes    lancets Misc To check BG 2 times daily, to use with insurance preferred meter E11.9    mupirocin (BACTROBAN) 2 % ointment Apply topically 2 (two) times daily.    nicotine (NICODERM CQ) 21 mg/24 hr Place 1 patch onto the skin once daily. (Patient not taking: Reported on 8/26/2024)     Review of Systems   Constitutional: Positive for malaise/fatigue.   Cardiovascular:  Positive for chest pain. Negative for dyspnea on exertion, irregular heartbeat and palpitations.   Respiratory:  Positive for shortness of breath.    All other systems reviewed and are negative.    Objective:     Vital Signs (Most Recent):  Temp: 97.8 °F (36.6 °C) (09/14/24 1943)  Pulse: 94 (09/15/24 0748)  Resp: 20 (09/15/24 0748)  BP: 131/83 (09/15/24 0703)  SpO2: 98 % (09/15/24 0748) Vital Signs (24h Range):  Temp:  [97.8 °F (36.6 °C)] 97.8 °F (36.6 °C)  Pulse:  [] 94  Resp:  [11-20] 20  SpO2:  [95 %-98 %] 98 %  BP: (116-144)/() 131/83     Weight: 85.3 kg (188 lb)  Body mass index is 32.27 kg/m².    SpO2: 98 %       No intake or output data in the 24 hours ending 09/15/24 0839    Lines/Drains/Airways       Peripheral Intravenous Line  Duration                  Peripheral IV - Single Lumen 09/14/24 2059 20 G Posterior;Right Hand <1 day                  Physical Exam  Constitutional:       General: She is not in acute distress.     Appearance: Normal appearance. She is obese.   HENT:      Head: Normocephalic.   Eyes:      Extraocular Movements: Extraocular movements intact.      Conjunctiva/sclera: Conjunctivae normal.   Cardiovascular:      Rate and Rhythm: Normal rate and regular rhythm.      Heart sounds: Normal heart  sounds. No murmur heard.  Pulmonary:      Effort: Pulmonary effort is normal. No respiratory distress.      Breath sounds: Normal breath sounds.   Abdominal:      Palpations: Abdomen is soft.   Musculoskeletal:         General: Normal range of motion.   Skin:     General: Skin is warm and dry.      Capillary Refill: Capillary refill takes less than 2 seconds.   Neurological:      General: No focal deficit present.      Mental Status: She is alert and oriented to person, place, and time. Mental status is at baseline.   Psychiatric:         Mood and Affect: Mood normal.       EKG:       Telemetry: SR/ST    Home Medications:   Current Facility-Administered Medications on File Prior to Encounter   Medication Dose Route Frequency Provider Last Rate Last Admin    diazePAM tablet 5 mg  5 mg Oral Once JacobouterYael pritchard PA-C        sodium chloride 0.9% flush 10 mL  10 mL Intravenous PRN DauterYael pritchard PA-C         Current Outpatient Medications on File Prior to Encounter   Medication Sig Dispense Refill    albuterol (PROVENTIL/VENTOLIN HFA) 90 mcg/actuation inhaler Inhale 2 puffs into the lungs 4 (four) times daily as needed for Wheezing or Shortness of Breath. 18 g 0    ALPRAZolam (XANAX) 1 MG tablet Take 1 mg by mouth 3 (three) times daily as needed.      AMITIZA 24 mcg Cap Take 1 capsule (24 mcg total) by mouth 2 (two) times daily. 180 capsule 2    ARIPiprazole (ABILIFY) 2 MG Tab Take 2 mg by mouth once daily.      clopidogreL (PLAVIX) 75 mg tablet See Instructions, TAKE ONE TABLET BY MOUTH EVERY DAY, # 90 tab(s), 3 Refill(s), Pharmacy: [place] [id number] [id number], 160, cm, Height/Length Dosing, 09/01/21 13:52:00 CDT, 85, kg, Weight Dosing, 09/01/21 13:52:00 CDT 90 tablet 3    doxepin (SINEQUAN) 25 MG capsule Take 25 mg by mouth nightly.      ezetimibe (ZETIA) 10 mg tablet Take 1 tablet (10 mg total) by mouth once daily. 90 tablet 3    gabapentin (NEURONTIN) 600 MG tablet Take 1 tablet (600 mg total) by mouth 3  (three) times daily. As needed for back pain 90 tablet 8    isosorbide mononitrate (IMDUR) 60 MG 24 hr tablet Take 1 tablet (60 mg total) by mouth once daily. 90 tablet 2    losartan (COZAAR) 100 MG tablet Take 1 tablet (100 mg total) by mouth once daily. For High Blood Pressure 90 tablet 3    metFORMIN (GLUCOPHAGE-XR) 500 MG ER 24hr tablet Take 2 tablets (1,000 mg total) by mouth 2 (two) times daily with meals. 360 tablet 1    metoclopramide HCl (REGLAN) 5 MG tablet Take 5 mg by mouth 4 (four) times daily.      metoprolol succinate (TOPROL-XL) 25 MG 24 hr tablet Take 1 tablet (25 mg total) by mouth 2 (two) times daily. 90 tablet 3    nitroGLYCERIN (NITROSTAT) 0.4 MG SL tablet Place 1 tablet (0.4 mg total) under the tongue every 5 (five) minutes as needed for Chest pain. 25 tablet 2    OXcarbazepine (TRILEPTAL) 300 MG Tab Take 300 mg by mouth 2 (two) times daily.      pantoprazole (PROTONIX) 40 MG tablet TAKE 1 TABLET (40 MG TOTAL) BY MOUTH ONCE DAILY FOR ACID REFLUX 90 tablet 1    rosuvastatin (CRESTOR) 40 MG Tab Take 1 tablet (40 mg total) by mouth once daily. 90 tablet 3    tiZANidine (ZANAFLEX) 4 MG tablet Take 1 tablet (4 mg total) by mouth every 8 (eight) hours. 270 tablet 1    ACCU-CHEK GUIDE ME GLUCOSE MTR Misc       blood sugar diagnostic Strp To check BG 2 times daily, to use with insurance preferred meter E11.9 100 each 6    blood-glucose meter kit To check BG 2 times daily, to use with insurance preferred meter E11.9 1 each 0    desvenlafaxine succinate (PRISTIQ) 100 MG Tb24       dulaglutide (TRULICITY) 3 mg/0.5 mL pen injector Inject 3 mg into the skin every 7 days. For Diabetes 4 pen 2    lancets Misc To check BG 2 times daily, to use with insurance preferred meter E11.9 100 each 6    mupirocin (BACTROBAN) 2 % ointment Apply topically 2 (two) times daily.      nicotine (NICODERM CQ) 21 mg/24 hr Place 1 patch onto the skin once daily. (Patient not taking: Reported on 8/26/2024) 90 patch 0     Current  Schedule Inpatient Medications:   diazePAM  5 mg Oral Once     Continuous Infusions:    Significant Labs:   Chemistries:   Recent Labs   Lab 09/14/24  2056 09/15/24  0303     --    K 4.0  --      --    CO2 21*  --    BUN 4.9*  --    CREATININE 0.75  --    CALCIUM 10.3*  --    BILITOT 0.3  --    ALKPHOS 87  --    ALT 34  --    AST 22  --    GLUCOSE 185*  --    MG 1.60  --    TROPONINI <0.010 <0.010        CBC/Anemia Labs: Coags:    Recent Labs   Lab 09/14/24 2056   WBC 17.47*   HGB 14.9   HCT 43.5      MCV 86.1   RDW 13.1    Recent Labs   Lab 09/14/24 2056   INR 1.0   APTT 27.3        Significant Imaging: X-Ray: CXR: X-Ray Chest 1 View (CXR): No results found for this visit on 09/14/24. and X-Ray Chest PA and Lateral (CXR): No results found for this visit on 09/14/24.    I,Markie Franklin MD,performed the substantive portion of this visit. I had a face-to-face time with the patient on 9/15/24. I reviewed and agree with the nurse practitioner's history, physical exam.     Medical decision making:      Assessment and Plan:   Unstable Angina   MVCAD/CABG     - [facility] (9.9.24) - L Main No Stenosis, Mid LAD 70%, LCX Recanalized Stent with 95% ISR, OM2 70% at Ostium, RCA 20% Prox   HTN  HLD  DM II  CVA  MO  Tobacco Use  Anxiety/Depression  No Hx of GIB     PLAN:   Resume Home Medications: Imdur, ARB, BB  Start Atorvastatin 40mg PO Qday  Give Plavix 300mg PO x 1 then 75mg PO Daily   Start ASA 81mg PO Qday   Lovenox 1mg/kg SQ BID x 2 Doses (Tx of [place])  Keep NPO after MN  Schedule/Consent for MV PCI on Monday (9.16.24)   Risk, Benefits and Alternatives Reviewed and Discussed with the PT and their Family and they wish to proceed with above Procedure.   Labs and EKG in AM: CBC, CMP and Mg    Ken Auguste, JYOTI  Cardiology  [facility]  09/15/2024

## 2024-09-15 NOTE — ED PROVIDER NOTES
"Encounter Date: 9/14/2024    SCRIBE #1 NOTE: I, Gustavo Sandhu, am scribing for, and in the presence of,  Dr. Durbin. I have scribed the following portions of the note - the EKG reading. Other sections scribed: HPI, ROS, Physical Exam, MDM, Attending.       History     Chief Complaint   Patient presents with    Chest Pain     Patient has c/o CP and SOB. Angiogram done this past Monday. CP started last night and became increasingly worse throughout the day. Patient has HX of HTN, CABG, diabetes, CAD and hyperlipidemia.  Sees [facility] Cardiologist. Patient took two nitro tabs PTA with some relief.      47 y/o female with history of HTN, HLD, DM, CVA, GERD, and CAD s/p CABG and stents presents to ED for intermittent L-sided chest pressure onset last night.  Pt also complains of SOB and cough.  She states the pressure began to worsen around 1100 today and is similar to previous episodes related to her CAD.  It radiates to her L arm.  Pt had angiogram 5 days ago at [facility], which showed "95% blockage", but she states they did not place any stents and told her to come here if she had any chest pain.  She took 2x NTG, which did provide some relief.  She also notes relief when she rubs on her chest.  Pt is on Plavix.  She does smoke tobacco.      The history is provided by the patient.     Review of patient's allergies indicates:   Allergen Reactions    Aspirin Nausea And Vomiting and Other (See Comments)     Other reaction(s): Stomach upset  Pt. States doesn't take because of stomach ulcers       Past Medical History:   Diagnosis Date    Anxiety     Coronary artery disease     Depression     Diabetes mellitus     Hyperlipidemia     Hypertension     Stroke      Past Surgical History:   Procedure Laterality Date    ANGIOGRAM, CORONARY, WITH LEFT HEART CATHETERIZATION N/A 9/9/2024    Procedure: Angiogram, Coronary, with Left Heart Cath;  Surgeon: Tyler Stevenson MD;  Location: [facility] CATH LAB;  Service: Cardiology;  Laterality: N/A;    " CARDIAC CATHETERIZATION      CARDIAC VALVE REPLACEMENT      CHOLECYSTECTOMY      CORONARY ANGIOPLASTY      CORONARY ARTERY BYPASS GRAFT      HYSTERECTOMY      right wrist surgery      TUBAL LIGATION       Family History   Problem Relation Name Age of Onset    Arrhythmia Mother Marianela     Clotting disorder Mother Marianela     Heart disease Mother Marianela     Heart attack Mother Marianela     Hyperlipidemia Mother Marianela     Heart failure Mother Marianela     Stroke Mother Marianela     Hypertension Mother Marianela     Diabetes Mother Marianela     Depression Mother Marianela     Hyperlipidemia Brother Domingo     Hypertension Brother Domingo      Social History     Tobacco Use    Smoking status: Every Day     Current packs/day: 0.25     Average packs/day: 0.3 packs/day for 29.7 years (7.4 ttl pk-yrs)     Types: Cigarettes     Start date: 1995    Smokeless tobacco: Never   Substance Use Topics    Alcohol use: Not Currently    Drug use: Never     Review of Systems   Constitutional:  Negative for chills and fever.   HENT:  Negative for congestion, rhinorrhea and sore throat.    Eyes:  Negative for visual disturbance.   Respiratory:  Positive for cough and shortness of breath.    Cardiovascular:  Positive for chest pain. Negative for leg swelling.   Gastrointestinal:  Negative for abdominal pain, nausea and vomiting.   Genitourinary:  Negative for dysuria, hematuria, vaginal bleeding and vaginal discharge.   Musculoskeletal:  Negative for joint swelling.   Skin:  Negative for rash.   Neurological:  Negative for weakness.   Psychiatric/Behavioral:  Negative for confusion.        Physical Exam     Initial Vitals [09/14/24 1943]   BP Pulse Resp Temp SpO2   134/80 109 18 97.8 °F (36.6 °C) 98 %      MAP       --         Physical Exam    Nursing note and vitals reviewed.  Constitutional: She is not diaphoretic. No distress.   HENT:   Head: Normocephalic and atraumatic.   Neck: Neck supple.   Normal range of motion.  Cardiovascular:  Normal rate and  regular rhythm.           No murmur heard.  Pulmonary/Chest: Breath sounds normal. No respiratory distress.   Abdominal: Abdomen is soft. She exhibits no distension. There is no abdominal tenderness.   Musculoskeletal:      Cervical back: Normal range of motion and neck supple.     Neurological: She is alert and oriented to person, place, and time. She has normal strength. No cranial nerve deficit or sensory deficit.   Skin: Skin is warm. Capillary refill takes less than 2 seconds.   Psychiatric: She has a normal mood and affect.         ED Course   Procedures  Labs Reviewed   COMPREHENSIVE METABOLIC PANEL - Abnormal       Result Value    Sodium 139      Potassium 4.0      Chloride 103      CO2 21 (*)     Glucose 185 (*)     Blood Urea Nitrogen 4.9 (*)     Creatinine 0.75      Calcium 10.3 (*)     Protein Total 7.3      Albumin 4.1      Globulin 3.2      Albumin/Globulin Ratio 1.3      Bilirubin Total 0.3      ALP 87      ALT 34      AST 22      eGFR >60      Anion Gap 15.0      BUN/Creatinine Ratio 7     CBC WITH DIFFERENTIAL - Abnormal    WBC 17.47 (*)     RBC 5.05      Hgb 14.9      Hct 43.5      MCV 86.1      MCH 29.5      MCHC 34.3      RDW 13.1      Platelet 300      MPV 10.8 (*)     Neut % 65.3      Lymph % 26.4      Mono % 5.6      Eos % 0.9      Basophil % 0.5      Lymph # 4.61 (*)     Neut # 11.43 (*)     Mono # 0.98      Eos # 0.15      Baso # 0.08      IG# 0.22 (*)     IG% 1.3      NRBC% 0.0     TROPONIN I - Normal    Troponin-I <0.010     MAGNESIUM - Normal    Magnesium Level 1.60     PROTIME-INR - Normal    PT 12.6      INR 1.0     APTT - Normal    PTT 27.3     CBC W/ AUTO DIFFERENTIAL    Narrative:     The following orders were created for panel order CBC auto differential.  Procedure                               Abnormality         Status                     ---------                               -----------         ------                     CBC with Differential[[id number]]       Abnormal             Final result                 Please view results for these tests on the individual orders.   TROPONIN I     EKG Readings: (Independently Interpreted)   Initial Reading: No STEMI. Rhythm: Sinus Tachycardia. Heart Rate: 107. Ectopy: No Ectopy. Conduction: Normal. ST Segments: Normal ST Segments. T Waves: Normal. Axis: Normal. [facility] ED EKG2 - Other Findings: no ischemic changes.   Time 1935       Imaging Results              X-Ray Chest AP Portable (Final result)  Result time 09/14/24 20:09:12      Final result by Anthony Hua MD (09/14/24 20:09:12)                   Impression:      No acute cardiopulmonary process identified      Electronically signed by: Anthony Hua  Date:    09/14/2024  Time:    20:09               Narrative:    EXAMINATION:  XR CHEST AP PORTABLE    CLINICAL HISTORY:  Chest pain, unspecified    TECHNIQUE:  One view    COMPARISON:  June 21, 2023.    FINDINGS:  Cardiopericardial silhouette is within normal limits.  No acute dense focal or segmental consolidation, congestive process, pleural effusions or pneumothorax.                                       Medications   sodium chloride 0.9% flush 10 mL (has no administration in time range)   melatonin tablet 6 mg (has no administration in time range)   ondansetron injection 4 mg (has no administration in time range)   acetaminophen tablet 650 mg (has no administration in time range)   nitroGLYCERIN SL tablet 0.4 mg (0.4 mg Sublingual Given 9/14/24 2024)     Medical Decision Making  48-year-old female with a history of CAD status post CABG recent angio at [facility] with significant CAD presenting with left-sided chest pain shortness of breath discussed with cardiology who will admit for ACS rule out initial EKG troponin unremarkable negative for any acute ischemia    Differential diagnoses include, but are not limited to:   Judging by the patient's chief complaint and pertinent history, the patient has the following possible differential diagnoses,  including but not limited to the following.  Some of these are deemed to be lower likelihood and some more likely based on my physical exam and history combined with possible lab work and/or imaging studies.   Please see the pertinent studies, and refer to the HPI.  Some of these diagnoses will take further evaluation to fully rule out, perhaps as an outpatient and the patient was encouraged to follow up when discharged for more comprehensive evaluation.    Chest pain emergent diagnoses: ACS, PE, dissection, cardiac tamponade, tension pneumothorax.    Chest pain non-emergent diagnoses: Musculoskeletal, trauma, pleurisy, pneumonia, pleural effusion, GERD, other GI, neurologic, psychiatric and other non emergent diagnoses considered.        Problems Addressed:  Chest pain: acute illness or injury that poses a threat to life or bodily functions    Amount and/or Complexity of Data Reviewed  Labs: ordered.  Radiology: ordered and independent interpretation performed.     Details: CXR - no obvious infiltrates, consolidations, pleural effusions, or pneumothorax.      ECG/medicine tests: ordered and independent interpretation performed.  Discussion of management or test interpretation with external provider(s): Discussed with Nicole [facility] will admit for chest pain rule out    Risk  OTC drugs.  Prescription drug management.  Decision regarding hospitalization.            Scribe Attestation:   Scribe #1: I performed the above scribed service and the documentation accurately describes the services I performed. I attest to the accuracy of the note.    Attending Attestation:           Physician Attestation for Scribe:  Physician Attestation Statement for Scribe #1: I, reviewed documentation, as scribed by Gustavo Sandhu in my presence, and it is both accurate and complete.             ED Course as of 09/14/24 2350   Sat Sep 14, 2024   [id number] [facility] - will admit   [AC]      ED Course User Index  [AC] Quinn Durbin IV, MD                            Clinical Impression:  Final diagnoses:  [R07.9] Chest pain          ED Disposition Condition    Observation                 Quinn Durbin IV, MD  09/14/24 3396

## 2024-09-15 NOTE — NURSING
Nurses Note -- 4 Eyes      9/15/2024   4:05 PM      Skin assessed during: Admit      [] No Altered Skin Integrity Present    []Prevention Measures Documented      [x] Yes- Altered Skin Integrity Present or Discovered   [] LDA Added if Not in Epic (Describe Wound)   [] New Altered Skin Integrity was Present on Admit and Documented in LDA   [] Wound Image Taken    Wound Care Consulted? No    Attending Nurse:  Tia Guerra RN/Staff Member:  Soumya

## 2024-09-16 PROBLEM — F17.200 TOBACCO DEPENDENCY: Status: ACTIVE | Noted: 2024-09-16

## 2024-09-16 LAB
ALBUMIN SERPL-MCNC: 3.8 G/DL (ref 3.5–5)
ALBUMIN/GLOB SERPL: 1.3 RATIO (ref 1.1–2)
ALP SERPL-CCNC: 82 UNIT/L (ref 40–150)
ALT SERPL-CCNC: 36 UNIT/L (ref 0–55)
ANION GAP SERPL CALC-SCNC: 11 MEQ/L
AST SERPL-CCNC: 30 UNIT/L (ref 5–34)
BASOPHILS # BLD AUTO: 0.1 X10(3)/MCL
BASOPHILS NFR BLD AUTO: 0.7 %
BILIRUB SERPL-MCNC: 0.3 MG/DL
BUN SERPL-MCNC: 10.5 MG/DL (ref 7–18.7)
CALCIUM SERPL-MCNC: 9.5 MG/DL (ref 8.4–10.2)
CHLORIDE SERPL-SCNC: 102 MMOL/L (ref 98–107)
CO2 SERPL-SCNC: 23 MMOL/L (ref 22–29)
CREAT SERPL-MCNC: 0.87 MG/DL (ref 0.55–1.02)
CREAT/UREA NIT SERPL: 12
EOSINOPHIL # BLD AUTO: 0.14 X10(3)/MCL (ref 0–0.9)
EOSINOPHIL NFR BLD AUTO: 1 %
ERYTHROCYTE [DISTWIDTH] IN BLOOD BY AUTOMATED COUNT: 13.2 % (ref 11.5–17)
GFR SERPLBLD CREATININE-BSD FMLA CKD-EPI: >60 ML/MIN/1.73/M2
GLOBULIN SER-MCNC: 3 GM/DL (ref 2.4–3.5)
GLUCOSE SERPL-MCNC: 212 MG/DL (ref 74–100)
HCT VFR BLD AUTO: 44.1 % (ref 37–47)
HGB BLD-MCNC: 14.3 G/DL (ref 12–16)
IMM GRANULOCYTES # BLD AUTO: 0.21 X10(3)/MCL (ref 0–0.04)
IMM GRANULOCYTES NFR BLD AUTO: 1.6 %
LYMPHOCYTES # BLD AUTO: 4.46 X10(3)/MCL (ref 0.6–4.6)
LYMPHOCYTES NFR BLD AUTO: 33.3 %
MAGNESIUM SERPL-MCNC: 1.7 MG/DL (ref 1.6–2.6)
MCH RBC QN AUTO: 28.9 PG (ref 27–31)
MCHC RBC AUTO-ENTMCNC: 32.4 G/DL (ref 33–36)
MCV RBC AUTO: 89.1 FL (ref 80–94)
MONOCYTES # BLD AUTO: 0.82 X10(3)/MCL (ref 0.1–1.3)
MONOCYTES NFR BLD AUTO: 6.1 %
NEUTROPHILS # BLD AUTO: 7.68 X10(3)/MCL (ref 2.1–9.2)
NEUTROPHILS NFR BLD AUTO: 57.3 %
NRBC BLD AUTO-RTO: 0 %
OHS QRS DURATION: 102 MS
OHS QRS DURATION: 94 MS
OHS QTC CALCULATION: 455 MS
OHS QTC CALCULATION: 459 MS
PLATELET # BLD AUTO: 267 X10(3)/MCL (ref 130–400)
PMV BLD AUTO: 11 FL (ref 7.4–10.4)
POCT GLUCOSE: 144 MG/DL (ref 70–110)
POCT GLUCOSE: 173 MG/DL (ref 70–110)
POCT GLUCOSE: 272 MG/DL (ref 70–110)
POTASSIUM SERPL-SCNC: 3.7 MMOL/L (ref 3.5–5.1)
PROT SERPL-MCNC: 6.8 GM/DL (ref 6.4–8.3)
RBC # BLD AUTO: 4.95 X10(6)/MCL (ref 4.2–5.4)
SODIUM SERPL-SCNC: 136 MMOL/L (ref 136–145)
WBC # BLD AUTO: 13.41 X10(3)/MCL (ref 4.5–11.5)

## 2024-09-16 PROCEDURE — C1714 CATH, TRANS ATHERECTOMY, DIR: HCPCS | Performed by: STUDENT IN AN ORGANIZED HEALTH CARE EDUCATION/TRAINING PROGRAM

## 2024-09-16 PROCEDURE — 92978 ENDOLUMINL IVUS OCT C 1ST: CPT | Mod: LC | Performed by: STUDENT IN AN ORGANIZED HEALTH CARE EDUCATION/TRAINING PROGRAM

## 2024-09-16 PROCEDURE — C1894 INTRO/SHEATH, NON-LASER: HCPCS | Performed by: STUDENT IN AN ORGANIZED HEALTH CARE EDUCATION/TRAINING PROGRAM

## 2024-09-16 PROCEDURE — 25000003 PHARM REV CODE 250: Performed by: STUDENT IN AN ORGANIZED HEALTH CARE EDUCATION/TRAINING PROGRAM

## 2024-09-16 PROCEDURE — 4A023N7 MEASUREMENT OF CARDIAC SAMPLING AND PRESSURE, LEFT HEART, PERCUTANEOUS APPROACH: ICD-10-PCS | Performed by: INTERNAL MEDICINE

## 2024-09-16 PROCEDURE — 02703DZ DILATION OF CORONARY ARTERY, ONE ARTERY WITH INTRALUMINAL DEVICE, PERCUTANEOUS APPROACH: ICD-10-PCS | Performed by: INTERNAL MEDICINE

## 2024-09-16 PROCEDURE — 36415 COLL VENOUS BLD VENIPUNCTURE: CPT | Performed by: NURSE PRACTITIONER

## 2024-09-16 PROCEDURE — 25000003 PHARM REV CODE 250: Performed by: NURSE PRACTITIONER

## 2024-09-16 PROCEDURE — 11000001 HC ACUTE MED/SURG PRIVATE ROOM

## 2024-09-16 PROCEDURE — B241ZZ3 ULTRASONOGRAPHY OF MULTIPLE CORONARY ARTERIES, INTRAVASCULAR: ICD-10-PCS | Performed by: INTERNAL MEDICINE

## 2024-09-16 PROCEDURE — 85025 COMPLETE CBC W/AUTO DIFF WBC: CPT | Performed by: NURSE PRACTITIONER

## 2024-09-16 PROCEDURE — 02C03ZZ EXTIRPATION OF MATTER FROM CORONARY ARTERY, ONE ARTERY, PERCUTANEOUS APPROACH: ICD-10-PCS | Performed by: INTERNAL MEDICINE

## 2024-09-16 PROCEDURE — C2623 CATH, TRANSLUMIN, DRUG-COAT: HCPCS | Performed by: STUDENT IN AN ORGANIZED HEALTH CARE EDUCATION/TRAINING PROGRAM

## 2024-09-16 PROCEDURE — 25500020 PHARM REV CODE 255: Performed by: STUDENT IN AN ORGANIZED HEALTH CARE EDUCATION/TRAINING PROGRAM

## 2024-09-16 PROCEDURE — 93010 ELECTROCARDIOGRAM REPORT: CPT | Mod: 76,,, | Performed by: INTERNAL MEDICINE

## 2024-09-16 PROCEDURE — 92924 PRQ TRLUML C ATHRC 1 ART&/BR: CPT | Performed by: STUDENT IN AN ORGANIZED HEALTH CARE EDUCATION/TRAINING PROGRAM

## 2024-09-16 PROCEDURE — 63600175 PHARM REV CODE 636 W HCPCS: Performed by: NURSE PRACTITIONER

## 2024-09-16 PROCEDURE — C1753 CATH, INTRAVAS ULTRASOUND: HCPCS | Performed by: STUDENT IN AN ORGANIZED HEALTH CARE EDUCATION/TRAINING PROGRAM

## 2024-09-16 PROCEDURE — 83735 ASSAY OF MAGNESIUM: CPT | Performed by: NURSE PRACTITIONER

## 2024-09-16 PROCEDURE — 92920 PRQ TRLUML C ANGIOP 1ART&/BR: CPT | Mod: LC | Performed by: STUDENT IN AN ORGANIZED HEALTH CARE EDUCATION/TRAINING PROGRAM

## 2024-09-16 PROCEDURE — C1769 GUIDE WIRE: HCPCS | Performed by: STUDENT IN AN ORGANIZED HEALTH CARE EDUCATION/TRAINING PROGRAM

## 2024-09-16 PROCEDURE — 80053 COMPREHEN METABOLIC PANEL: CPT | Performed by: NURSE PRACTITIONER

## 2024-09-16 PROCEDURE — S4991 NICOTINE PATCH NONLEGEND: HCPCS | Performed by: NURSE PRACTITIONER

## 2024-09-16 PROCEDURE — C1887 CATHETER, GUIDING: HCPCS | Performed by: STUDENT IN AN ORGANIZED HEALTH CARE EDUCATION/TRAINING PROGRAM

## 2024-09-16 PROCEDURE — 93005 ELECTROCARDIOGRAM TRACING: CPT

## 2024-09-16 PROCEDURE — 93010 ELECTROCARDIOGRAM REPORT: CPT | Mod: ,,, | Performed by: INTERNAL MEDICINE

## 2024-09-16 PROCEDURE — 92921 HC PTCA , ADD'L BRANCH: CPT | Mod: LC | Performed by: STUDENT IN AN ORGANIZED HEALTH CARE EDUCATION/TRAINING PROGRAM

## 2024-09-16 PROCEDURE — 99153 MOD SED SAME PHYS/QHP EA: CPT | Performed by: STUDENT IN AN ORGANIZED HEALTH CARE EDUCATION/TRAINING PROGRAM

## 2024-09-16 PROCEDURE — 99152 MOD SED SAME PHYS/QHP 5/>YRS: CPT | Performed by: STUDENT IN AN ORGANIZED HEALTH CARE EDUCATION/TRAINING PROGRAM

## 2024-09-16 PROCEDURE — 63600175 PHARM REV CODE 636 W HCPCS: Mod: JZ,JG | Performed by: STUDENT IN AN ORGANIZED HEALTH CARE EDUCATION/TRAINING PROGRAM

## 2024-09-16 PROCEDURE — 27201423 OPTIME MED/SURG SUP & DEVICES STERILE SUPPLY: Performed by: STUDENT IN AN ORGANIZED HEALTH CARE EDUCATION/TRAINING PROGRAM

## 2024-09-16 PROCEDURE — C1725 CATH, TRANSLUMIN NON-LASER: HCPCS | Performed by: STUDENT IN AN ORGANIZED HEALTH CARE EDUCATION/TRAINING PROGRAM

## 2024-09-16 RX ORDER — DIPHENHYDRAMINE HYDROCHLORIDE 50 MG/ML
INJECTION INTRAMUSCULAR; INTRAVENOUS
Status: DISCONTINUED | OUTPATIENT
Start: 2024-09-16 | End: 2024-09-16

## 2024-09-16 RX ORDER — HYDROCODONE BITARTRATE AND ACETAMINOPHEN 7.5; 325 MG/1; MG/1
1 TABLET ORAL EVERY 4 HOURS PRN
Status: DISCONTINUED | OUTPATIENT
Start: 2024-09-16 | End: 2024-09-17 | Stop reason: HOSPADM

## 2024-09-16 RX ORDER — NAPROXEN SODIUM 220 MG/1
225 TABLET, FILM COATED ORAL ONCE
Status: COMPLETED | OUTPATIENT
Start: 2024-09-16 | End: 2024-09-16

## 2024-09-16 RX ORDER — LIDOCAINE HYDROCHLORIDE 10 MG/ML
INJECTION, SOLUTION INFILTRATION; PERINEURAL
Status: DISCONTINUED | OUTPATIENT
Start: 2024-09-16 | End: 2024-09-16

## 2024-09-16 RX ORDER — TIROFIBAN HYDROCHLORIDE 50 UG/ML
INJECTION INTRAVENOUS
Status: DISCONTINUED | OUTPATIENT
Start: 2024-09-16 | End: 2024-09-17 | Stop reason: HOSPADM

## 2024-09-16 RX ORDER — MIDAZOLAM HYDROCHLORIDE 1 MG/ML
INJECTION INTRAMUSCULAR; INTRAVENOUS
Status: DISCONTINUED | OUTPATIENT
Start: 2024-09-16 | End: 2024-09-16

## 2024-09-16 RX ORDER — IBUPROFEN 200 MG
1 TABLET ORAL DAILY
Status: DISCONTINUED | OUTPATIENT
Start: 2024-09-17 | End: 2024-09-17 | Stop reason: HOSPADM

## 2024-09-16 RX ORDER — HEPARIN SODIUM 1000 [USP'U]/ML
INJECTION, SOLUTION INTRAVENOUS; SUBCUTANEOUS
Status: DISCONTINUED | OUTPATIENT
Start: 2024-09-16 | End: 2024-09-16

## 2024-09-16 RX ORDER — CLOPIDOGREL BISULFATE 75 MG/1
225 TABLET ORAL ONCE
Status: COMPLETED | OUTPATIENT
Start: 2024-09-16 | End: 2024-09-16

## 2024-09-16 RX ORDER — ATROPINE SULFATE 0.1 MG/ML
INJECTION INTRAVENOUS
Status: DISCONTINUED
Start: 2024-09-16 | End: 2024-09-16 | Stop reason: WASHOUT

## 2024-09-16 RX ORDER — IOPAMIDOL 755 MG/ML
INJECTION, SOLUTION INTRAVASCULAR
Status: DISCONTINUED | OUTPATIENT
Start: 2024-09-16 | End: 2024-09-16

## 2024-09-16 RX ORDER — NITROGLYCERIN 20 MG/100ML
INJECTION INTRAVENOUS
Status: DISCONTINUED | OUTPATIENT
Start: 2024-09-16 | End: 2024-09-16

## 2024-09-16 RX ORDER — ACETAMINOPHEN 325 MG/1
650 TABLET ORAL EVERY 4 HOURS PRN
Status: DISCONTINUED | OUTPATIENT
Start: 2024-09-16 | End: 2024-09-17 | Stop reason: HOSPADM

## 2024-09-16 RX ORDER — FENTANYL CITRATE 50 UG/ML
INJECTION, SOLUTION INTRAMUSCULAR; INTRAVENOUS
Status: DISCONTINUED | OUTPATIENT
Start: 2024-09-16 | End: 2024-09-16

## 2024-09-16 RX ADMIN — INSULIN ASPART 3 UNITS: 100 INJECTION, SOLUTION INTRAVENOUS; SUBCUTANEOUS at 10:09

## 2024-09-16 RX ADMIN — ALPRAZOLAM 1 MG: 0.5 TABLET ORAL at 10:09

## 2024-09-16 RX ADMIN — CLOPIDOGREL BISULFATE 225 MG: 75 TABLET ORAL at 10:09

## 2024-09-16 RX ADMIN — ASPIRIN 81 MG CHEWABLE TABLET 243 MG: 81 TABLET CHEWABLE at 10:09

## 2024-09-16 RX ADMIN — METOPROLOL SUCCINATE 25 MG: 25 TABLET, EXTENDED RELEASE ORAL at 09:09

## 2024-09-16 RX ADMIN — GABAPENTIN 600 MG: 300 CAPSULE ORAL at 10:09

## 2024-09-16 RX ADMIN — HYDROCODONE BITARTRATE AND ACETAMINOPHEN 1 TABLET: 7.5; 325 TABLET ORAL at 07:09

## 2024-09-16 RX ADMIN — LUBIPROSTONE 24 MCG: 24 CAPSULE, GELATIN COATED ORAL at 09:09

## 2024-09-16 RX ADMIN — TIZANIDINE 4 MG: 4 TABLET ORAL at 10:09

## 2024-09-16 RX ADMIN — PANTOPRAZOLE SODIUM 40 MG: 40 TABLET, DELAYED RELEASE ORAL at 09:09

## 2024-09-16 RX ADMIN — DOXEPIN HYDROCHLORIDE 25 MG: 25 CAPSULE ORAL at 10:09

## 2024-09-16 RX ADMIN — ASPIRIN 81 MG CHEWABLE TABLET 81 MG: 81 TABLET CHEWABLE at 09:09

## 2024-09-16 RX ADMIN — NICOTINE 1 PATCH: 21 PATCH, EXTENDED RELEASE TRANSDERMAL at 09:09

## 2024-09-16 RX ADMIN — GABAPENTIN 600 MG: 300 CAPSULE ORAL at 09:09

## 2024-09-16 RX ADMIN — ISOSORBIDE MONONITRATE 60 MG: 60 TABLET, EXTENDED RELEASE ORAL at 09:09

## 2024-09-16 RX ADMIN — CLOPIDOGREL BISULFATE 75 MG: 75 TABLET ORAL at 09:09

## 2024-09-16 RX ADMIN — TIZANIDINE 4 MG: 4 TABLET ORAL at 09:09

## 2024-09-16 RX ADMIN — ATORVASTATIN CALCIUM 40 MG: 40 TABLET, FILM COATED ORAL at 10:09

## 2024-09-16 RX ADMIN — ALPRAZOLAM 1 MG: 0.5 TABLET ORAL at 09:09

## 2024-09-16 RX ADMIN — OXCARBAZEPINE 300 MG: 300 TABLET, FILM COATED ORAL at 09:09

## 2024-09-16 RX ADMIN — EZETIMIBE 10 MG: 10 TABLET ORAL at 09:09

## 2024-09-16 RX ADMIN — METOPROLOL SUCCINATE 25 MG: 25 TABLET, EXTENDED RELEASE ORAL at 10:09

## 2024-09-16 RX ADMIN — LOSARTAN POTASSIUM 100 MG: 50 TABLET, FILM COATED ORAL at 09:09

## 2024-09-16 RX ADMIN — ARIPIPRAZOLE 2 MG: 2 TABLET ORAL at 10:09

## 2024-09-16 NOTE — BRIEF OP NOTE
[facility] - Cath Lab Services  Brief Operative Note    SUMMARY     Surgery Date: 9/16/2024     Surgeons and Role:     * Tyler Robles Jr., MD - Primary    Assisting Surgeon: None    Pre-op Diagnosis:  Unstable angina [I20.0]    Post-op Diagnosis:  Post-Op Diagnosis Codes:     * Unstable angina [I20.0]    Procedure(s) (LRB):  ANGIOPLASTY, CORONARY ARTERY, WITH STENT INSERTION (N/A)    Anesthesia: RN IV Sedation    Implants:  * No implants in log *    Operative Findings:   - Laser ablation, NC balloon angioplasty, scoring balloon angioplasty, and DCB treatment of LCX ISR.  - Angioplasty of OM2 side branch.      Estimated Blood Loss: * No values recorded between 9/16/2024  3:34 PM and 9/16/2024  4:50 PM *    Estimated Blood Loss has been documented.         Specimens:   Specimen (24h ago, onward)      None            [id number]

## 2024-09-16 NOTE — PLAN OF CARE
09/16/24 0913   Discharge Assessment   Assessment Type Discharge Planning Assessment   Confirmed/corrected address, phone number and insurance Yes   Confirmed Demographics Correct on Facesheet   Source of Information patient   Communicated ROSIE with patient/caregiver Date not available/Unable to determine   Reason For Admission Chief Complaint  Patient presents with    Chest Pain        Patient has c/o CP and SOB. Angiogram done this past Monday. CP started last night and became increasingly worse throughout the day. Patient has HX of HTN, CABG, diabetes, CAD and hyperlipidemia.  Sees [facility] Cardiologist. Patient took two nitro tabs PTA with some relief.      47 y/o female with history of HTN, HLD, DM, CVA, GERD, and CAD s/p CABG and stents presents to ED for intermittent L-sided chest pressure onset last night.  Pt also complains of SOB and cough.  She states the pressure began to worsen around 1100 today and is similar to previous episodes related to her CAD.  It radiates to her L arm.  Pt had angiogram 5 days ago at [facility]   People in Home significant other;child(ursula), dependent  (Dar Hickman (Significant other)  [phone] ([place]) and her (8 year-old) step-daughter.)   Facility Arrived From: Private residence.   Do you expect to return to your current living situation? Yes   Do you have help at home or someone to help you manage your care at home? Yes   Who are your caregiver(s) and their phone number(s)? Dar Hickman (Significant other) [phone] ([place]) and her (8 year-old) step-daughter.   Prior to hospitilization cognitive status: Alert/Oriented   Current cognitive status: Alert/Oriented   Walking or Climbing Stairs Difficulty yes   Walking or Climbing Stairs ambulation difficulty, requires equipment   Mobility Management The patient has a rolling walker for mobility concerns PRN.   Dressing/Bathing Difficulty no   Home Accessibility   (There are (6) steps to climb prior to entering her private  residence.)   Home Layout Able to live on 1st floor   Equipment Currently Used at Home walker, rolling;raised toilet   Readmission within 30 days? No   Patient currently being followed by outpatient case management? No   Do you currently have service(s) that help you manage your care at home? No   Do you take prescription medications? Yes   Do you have prescription coverage? Yes   Coverage Financial Class: [place] Medicaid  Payor: MEDICAID - [facility])   Do you have any problems affording any of your prescribed medications? No   Is the patient taking medications as prescribed? yes   Who is going to help you get home at discharge? Dar Hickman (Significant other) [phone] ([place]) and her (8 year-old) step-daughter.   How do you get to doctors appointments? family or friend will provide   Are you on dialysis? No   Do you take coumadin? No   Discharge Plan A Home with family   Discharge Plan B Home with family   DME Needed Upon Discharge  none   Discharge Plan discussed with: Patient   Transition of Care Barriers None   Physical Activity   On average, how many days per week do you engage in moderate to strenuous exercise (like a brisk walk)? 5 days   On average, how many minutes do you engage in exercise at this level? 30 min   Financial Resource Strain   How hard is it for you to pay for the very basics like food, housing, medical care, and heating? Not very   Housing Stability   In the last 12 months, was there a time when you were not able to pay the mortgage or rent on time? N   At any time in the past 12 months, were you homeless or living in a shelter (including now)? N   Transportation Needs   Has the lack of transportation kept you from medical appointments, meetings, work or from getting things needed for daily living? No   Food Insecurity   Within the past 12 months, you worried that your food would run out before you got the money to buy more. Never true   Within the past 12  months, the food you bought just didn't last and you didn't have money to get more. Never true   Stress   Do you feel stress - tense, restless, nervous, or anxious, or unable to sleep at night because your mind is troubled all the time - these days? Only a littl   Social Isolation   How often do you feel lonely or isolated from those around you?  Never   Alcohol Use   Q1: How often do you have a drink containing alcohol? Never   Q2: How many drinks containing alcohol do you have on a typical day when you are drinking? None   Q3: How often do you have six or more drinks on one occasion? Never   Utilities   In the past 12 months has the electric, gas, oil, or water company threatened to shut off services in your home? No   Health Literacy   How often do you need to have someone help you when you read instructions, pamphlets, or other written material from your doctor or pharmacy? Never   OTHER   Name(s) of People in Home Dar Hickman (Significant other) [phone] ([place]) and her (8 year-old) step-daughter.

## 2024-09-16 NOTE — NURSING
Nurses Note -- 4 Eyes      9/16/2024   0720 AM      Skin assessed during: Q Shift Change      [x] No Altered Skin Integrity Present    [x]Prevention Measures Documented      [] Yes- Altered Skin Integrity Present or Discovered   [] LDA Added if Not in Epic (Describe Wound)   [] New Altered Skin Integrity was Present on Admit and Documented in LDA   [] Wound Image Taken    Wound Care Consulted? No    Attending Nurse:  PATRICIA Liu    Second RN/Staff Member:  PATRICIA Esparza

## 2024-09-16 NOTE — PROGRESS NOTES
[facility]     Cardiology  Progress Note    Patient Name: Kaye Spring  MRN: [id number]  Admission Date: 9/14/2024  Code Status: Full Code   Attending Provider: Markie Franklin MD   Primary Care Physician: Flory De La Cruz FNP  Principal Problem:<principal problem not specified>    Patient information was obtained from patient, past medical records, and ER records.     Subjective:     Chief Complaint:  CP    HPI:  Ms. Spring is a 48-year-old female who is known to [facility].  The patient recently underwent a diagnostic angiogram on 09/09/2024 with results of multivessel CAD.  The patient's LIMA was known to be a small vessel and it was recommended that the patient continue medical therapy versus being evaluated for high-risk PCI.  The patient was discharged home in stable condition however on the day prior to her admission she developed substernal chest pain that radiated to her left arm and was associated with shortness of breath.  She reported that the pain was 7/10 on a verbal scale and described as a heaviness.  She took 2 sublingual nitros at home which did provide some relief however secondary to her history of multivessel CAD she became concerned and presented to the ER for evaluation.  [facility] will admit for MV-CAD.    9.16.24: The patient is sitting in bed with family at bedside. NAD noted. Denies CP, SOB, and Palps. [facility] consent signed and dated. Denies any questions about [facility] procedure.     PMH: Anxiety, MVCAD, Depression, DM II, HLD, HTN, CVA, MO, Tobacco Use  PSH: Angiogram, Cholecystectomy, JUS, R Wrist Surgery, Tubal Ligation  Family History: Father, D, Unknown; Mother, L, CAD/CABG, DM II  Social History: + 1 PPD for 25 + Years; Denies Illicit Drug and ETOH Use     Previous Cardiac Diagnostics:     [facility] 9.9.24:  The 2nd Mrg lesion was 70% stenosed.  The Mid Cx to Dist Cx lesion was 95% stenosed.  The Mid LAD lesion was 70% stenosed.  The pre-procedure left ventricular  end diastolic pressure was 7.  The estimated blood loss was <50 mL.  There was two vessel coronary artery disease.  FINDINGS  LVEDP:  7 mmHg  Left Main:  No stenosis   LAD:  mid LAD lesion 70%, patent LIMA -LAD  Circumflex:  re-cannulized Lcx stents with 95% ISR, OM2 70% stenosis at ostium   RCA:  20% stenosis proximal   The patient has Significant two vessel disease patent graft  Blood loss:  less than 45 cc.  LIMA:  Small vessel.  No significant stenosis noted.   Right femoral access with closure device  Femoral artery:  small to moderate size   RECOMMENDATIONS  Treat with anti-anginals and consider complex PCI  Risk factor modifications -- smoking cessation  Activity -- avoid straining with affected limb for one week  Exercise on regular basis.  Precautions post D/C -- come to ER for hematoma, unusual pain, erythema, or unusual drainage at access site  Precautions post D/C -- if develop bleeding or hematoma at access site, hold pressure at access site, and come to ER   Antiplatelet:  Continue Plavix indefinitely    ECHO 6.14.19:  The study quality is average.   The left ventricle is normal in size. Global left ventricular systolic function is borderline normal. The left ventricular ejection fraction is 50%. The left ventricle diastolic function is impaired (Grade I) with normal left atrial pressure. Right ventricular systolic function is mildly decreased.  No evidence of significant valvular dysfunction is detected.     [facility] 2.8.19:  L Main Normal  LAD Mid 40-50%  LCX Mod Caliber OM2 of a 99% Stenosis Reduced to 0%  RCA Mid 40-50%    Review of patient's allergies indicates:  No Known Allergies    No current facility-administered medications on file prior to encounter.     Current Outpatient Medications on File Prior to Encounter   Medication Sig    albuterol (PROVENTIL/VENTOLIN HFA) 90 mcg/actuation inhaler Inhale 2 puffs into the lungs 4 (four) times daily as needed for Wheezing or Shortness of Breath.     ALPRAZolam (XANAX) 1 MG tablet Take 1 mg by mouth 3 (three) times daily as needed.    AMITIZA 24 mcg Cap Take 1 capsule (24 mcg total) by mouth 2 (two) times daily.    ARIPiprazole (ABILIFY) 2 MG Tab Take 2 mg by mouth once daily.    clopidogreL (PLAVIX) 75 mg tablet See Instructions, TAKE ONE TABLET BY MOUTH EVERY DAY, # 90 tab(s), 3 Refill(s), Pharmacy: [place] [id number] [id number], 160, cm, Height/Length Dosing, 09/01/21 13:52:00 CDT, 85, kg, Weight Dosing, 09/01/21 13:52:00 CDT    doxepin (SINEQUAN) 25 MG capsule Take 25 mg by mouth nightly.    ezetimibe (ZETIA) 10 mg tablet Take 1 tablet (10 mg total) by mouth once daily.    gabapentin (NEURONTIN) 600 MG tablet Take 1 tablet (600 mg total) by mouth 3 (three) times daily. As needed for back pain    isosorbide mononitrate (IMDUR) 60 MG 24 hr tablet Take 1 tablet (60 mg total) by mouth once daily.    losartan (COZAAR) 100 MG tablet Take 1 tablet (100 mg total) by mouth once daily. For High Blood Pressure    metFORMIN (GLUCOPHAGE-XR) 500 MG ER 24hr tablet Take 2 tablets (1,000 mg total) by mouth 2 (two) times daily with meals.    metoclopramide HCl (REGLAN) 5 MG tablet Take 5 mg by mouth 4 (four) times daily.    metoprolol succinate (TOPROL-XL) 25 MG 24 hr tablet Take 1 tablet (25 mg total) by mouth 2 (two) times daily.    nitroGLYCERIN (NITROSTAT) 0.4 MG SL tablet Place 1 tablet (0.4 mg total) under the tongue every 5 (five) minutes as needed for Chest pain.    OXcarbazepine (TRILEPTAL) 300 MG Tab Take 300 mg by mouth 2 (two) times daily.    pantoprazole (PROTONIX) 40 MG tablet TAKE 1 TABLET (40 MG TOTAL) BY MOUTH ONCE DAILY FOR ACID REFLUX    rosuvastatin (CRESTOR) 40 MG Tab Take 1 tablet (40 mg total) by mouth once daily.    tiZANidine (ZANAFLEX) 4 MG tablet Take 1 tablet (4 mg total) by mouth every 8 (eight) hours.    ACCU-CHEK GUIDE ME GLUCOSE MTR Misc     blood sugar diagnostic Strp To check BG 2 times daily, to use with insurance preferred meter E11.9     blood-glucose meter kit To check BG 2 times daily, to use with insurance preferred meter E11.9    desvenlafaxine succinate (PRISTIQ) 100 MG Tb24     dulaglutide (TRULICITY) 3 mg/0.5 mL pen injector Inject 3 mg into the skin every 7 days. For Diabetes    lancets Misc To check BG 2 times daily, to use with insurance preferred meter E11.9    mupirocin (BACTROBAN) 2 % ointment Apply topically 2 (two) times daily.    nicotine (NICODERM CQ) 21 mg/24 hr Place 1 patch onto the skin once daily. (Patient not taking: Reported on 8/26/2024)     Review of Systems   Constitutional: Negative for malaise/fatigue.   Cardiovascular:  Negative for chest pain, dyspnea on exertion and palpitations.   Respiratory:  Negative for shortness of breath.    All other systems reviewed and are negative.    Objective:     Vital Signs (Most Recent):  Temp: 97.8 °F (36.6 °C) (09/16/24 1123)  Pulse: 89 (09/16/24 1123)  Resp: 17 (09/16/24 1123)  BP: 124/82 (09/16/24 1123)  SpO2: 95 % (09/16/24 1123) Vital Signs (24h Range):  Temp:  [97.6 °F (36.4 °C)-97.9 °F (36.6 °C)] 97.8 °F (36.6 °C)  Pulse:  [] 89  Resp:  [17-18] 17  SpO2:  [95 %-97 %] 95 %  BP: ()/(60-82) 124/82     Weight: 85.3 kg (188 lb)  Body mass index is 32.27 kg/m².    SpO2: 95 %         Intake/Output Summary (Last 24 hours) at 9/16/2024 1454  Last data filed at 9/16/2024 1404  Gross per 24 hour   Intake 0 ml   Output --   Net 0 ml       Lines/Drains/Airways       Peripheral Intravenous Line  Duration                  Peripheral IV - Single Lumen 09/14/24 2059 20 G Posterior;Right Hand 1 day                  Physical Exam  Constitutional:       General: She is not in acute distress.     Appearance: Normal appearance. She is obese.   HENT:      Head: Normocephalic.      Mouth/Throat:      Mouth: Mucous membranes are moist.   Eyes:      Extraocular Movements: Extraocular movements intact.      Conjunctiva/sclera: Conjunctivae normal.   Cardiovascular:      Rate and Rhythm:  Normal rate and regular rhythm.      Heart sounds: Normal heart sounds. No murmur heard.     Comments: Right pedal pulse 1+  Pulmonary:      Effort: Pulmonary effort is normal. No respiratory distress.      Breath sounds: Normal breath sounds.   Abdominal:      Palpations: Abdomen is soft.   Musculoskeletal:         General: Normal range of motion.      Cervical back: Normal range of motion and neck supple.   Skin:     General: Skin is warm and dry.      Capillary Refill: Capillary refill takes less than 2 seconds.   Neurological:      General: No focal deficit present.      Mental Status: She is alert and oriented to person, place, and time. Mental status is at baseline.   Psychiatric:         Mood and Affect: Mood normal.         Behavior: Behavior normal.         Thought Content: Thought content normal.         Judgment: Judgment normal.       EKG:     Telemetry:     Home Medications:   No current facility-administered medications on file prior to encounter.     Current Outpatient Medications on File Prior to Encounter   Medication Sig Dispense Refill    albuterol (PROVENTIL/VENTOLIN HFA) 90 mcg/actuation inhaler Inhale 2 puffs into the lungs 4 (four) times daily as needed for Wheezing or Shortness of Breath. 18 g 0    ALPRAZolam (XANAX) 1 MG tablet Take 1 mg by mouth 3 (three) times daily as needed.      AMITIZA 24 mcg Cap Take 1 capsule (24 mcg total) by mouth 2 (two) times daily. 180 capsule 2    ARIPiprazole (ABILIFY) 2 MG Tab Take 2 mg by mouth once daily.      clopidogreL (PLAVIX) 75 mg tablet See Instructions, TAKE ONE TABLET BY MOUTH EVERY DAY, # 90 tab(s), 3 Refill(s), Pharmacy: [place] [id number] [id number], 160, cm, Height/Length Dosing, 09/01/21 13:52:00 CDT, 85, kg, Weight Dosing, 09/01/21 13:52:00 CDT 90 tablet 3    doxepin (SINEQUAN) 25 MG capsule Take 25 mg by mouth nightly.      ezetimibe (ZETIA) 10 mg tablet Take 1 tablet (10 mg total) by mouth once daily. 90 tablet 3    gabapentin (NEURONTIN) 600 MG  tablet Take 1 tablet (600 mg total) by mouth 3 (three) times daily. As needed for back pain 90 tablet 8    isosorbide mononitrate (IMDUR) 60 MG 24 hr tablet Take 1 tablet (60 mg total) by mouth once daily. 90 tablet 2    losartan (COZAAR) 100 MG tablet Take 1 tablet (100 mg total) by mouth once daily. For High Blood Pressure 90 tablet 3    metFORMIN (GLUCOPHAGE-XR) 500 MG ER 24hr tablet Take 2 tablets (1,000 mg total) by mouth 2 (two) times daily with meals. 360 tablet 1    metoclopramide HCl (REGLAN) 5 MG tablet Take 5 mg by mouth 4 (four) times daily.      metoprolol succinate (TOPROL-XL) 25 MG 24 hr tablet Take 1 tablet (25 mg total) by mouth 2 (two) times daily. 90 tablet 3    nitroGLYCERIN (NITROSTAT) 0.4 MG SL tablet Place 1 tablet (0.4 mg total) under the tongue every 5 (five) minutes as needed for Chest pain. 25 tablet 2    OXcarbazepine (TRILEPTAL) 300 MG Tab Take 300 mg by mouth 2 (two) times daily.      pantoprazole (PROTONIX) 40 MG tablet TAKE 1 TABLET (40 MG TOTAL) BY MOUTH ONCE DAILY FOR ACID REFLUX 90 tablet 1    rosuvastatin (CRESTOR) 40 MG Tab Take 1 tablet (40 mg total) by mouth once daily. 90 tablet 3    tiZANidine (ZANAFLEX) 4 MG tablet Take 1 tablet (4 mg total) by mouth every 8 (eight) hours. 270 tablet 1    ACCU-CHEK GUIDE ME GLUCOSE MTR Misc       blood sugar diagnostic Strp To check BG 2 times daily, to use with insurance preferred meter E11.9 100 each 6    blood-glucose meter kit To check BG 2 times daily, to use with insurance preferred meter E11.9 1 each 0    desvenlafaxine succinate (PRISTIQ) 100 MG Tb24       dulaglutide (TRULICITY) 3 mg/0.5 mL pen injector Inject 3 mg into the skin every 7 days. For Diabetes 4 pen 2    lancets Misc To check BG 2 times daily, to use with insurance preferred meter E11.9 100 each 6    mupirocin (BACTROBAN) 2 % ointment Apply topically 2 (two) times daily.      nicotine (NICODERM CQ) 21 mg/24 hr Place 1 patch onto the skin once daily. (Patient not taking:  Reported on 8/26/2024) 90 patch 0     Current Schedule Inpatient Medications:   ARIPiprazole  2 mg Oral Daily    aspirin  81 mg Oral Daily    atorvastatin  40 mg Oral Daily    clopidogreL  75 mg Oral Daily    doxepin  25 mg Oral Nightly    ezetimibe  10 mg Oral Daily    gabapentin  600 mg Oral TID    isosorbide mononitrate  60 mg Oral Daily    losartan  100 mg Oral Daily    lubiprostone  24 mcg Oral BID    metoprolol succinate  25 mg Oral BID    nicotine  1 patch Transdermal Daily    OXcarbazepine  300 mg Oral BID    pantoprazole  40 mg Oral Daily    tiZANidine  4 mg Oral Q8H     Continuous Infusions:    Significant Labs:   Chemistries:   Recent Labs   Lab 09/14/24  2056 09/15/24  0303 09/15/24  1032 09/15/24  1620 09/16/24  0408     --   --   --  136   K 4.0  --   --   --  3.7     --   --   --  102   CO2 21*  --   --   --  23   BUN 4.9*  --   --   --  10.5   CREATININE 0.75  --   --   --  0.87   CALCIUM 10.3*  --   --   --  9.5   BILITOT 0.3  --   --   --  0.3   ALKPHOS 87  --   --   --  82   ALT 34  --   --   --  36   AST 22  --   --   --  30   GLUCOSE 185*  --   --   --  212*   MG 1.60  --   --   --  1.70   TROPONINI <0.010 <0.010 <0.010 <0.010  --         CBC/Anemia Labs: Coags:    Recent Labs   Lab 09/14/24 2056 09/16/24  0408   WBC 17.47* 13.41*   HGB 14.9 14.3   HCT 43.5 44.1    267   MCV 86.1 89.1   RDW 13.1 13.2    Recent Labs   Lab 09/14/24 2056   INR 1.0   APTT 27.3        Significant Imaging: X-Ray: CXR: X-Ray Chest 1 View (CXR): No results found for this visit on 09/14/24. and X-Ray Chest PA and Lateral (CXR): No results found for this visit on 09/14/24.    Assessment and Plan:   Unstable Angina   MVCAD/CABG     - [facility] (9.9.24) - L Main No Stenosis, Mid LAD 70%, LCX Recanalized Stent with 95% ISR, OM2 70% at Ostium, RCA 20% Prox   Leukocytosis  HTN-Controlled  HLD  DM II  CVA  MO  Tobacco Use  Anxiety/Depression  No Hx of GIB     PLAN:   Continue NPO status  C scheduled for today     -Consent signed, dated, and on chart  Administer Aspirin 244 mg now (per Dr. Robles)    -Patient received Aspirin 81 mg this AM  Administer Plavix 225 mg now (per Travis Salmeron)    -Patient Received  Plavix 75 mg this AM   Continue Home Medications: Imdur, ARB, BB  Continue Atorvastatin 40mg PO Qday  Labs and EKG in AM: CBC, CMP and Mg    PROCEDURE APPROPRIATENESS  Planned Procedure: [facility]  Consent: YES  Able to Lie Flat: YES  Creatinine: 0.87  H&H: 44.1/14.3  Platelets: 267  Anticoagulation: Lovenox 1mg/kg SQ BID x 2 Doses (Tx of [place]) stop after 9.15.24 PM dose   Antiplatelets (LOAD for High Risk PCI): Received Plavix 300 mg 9.15.24 and Aspirin 81 mg 9.15.24; an additional dose of Plavix 300 mg once and Aspirin 325 mg once   Metformin: NO  IV Dye Allergy: NO  Dye Allergy Premeds Given: NO  NPO: YES  Risk, Benefits and Alternatives Reviewed and Discussed with the PT and their Family and they wish to proceed with above Procedure.      I, Noa Arias RN, transcribed this report in the presence of  Gerson Sears MD.     Noa Arias RN  Cardiology  [facility]  09/16/2024     I have reviewed the notes, assessments, and/or procedures performed by Noa Arias, I concur with her/his documentation of Kaye pSring.     Continue current medications  Continue post op care  Cont BP meds

## 2024-09-16 NOTE — NURSING
Nurses Note -- 4 Eyes      9/15/2024   7PM      Skin assessed during: Q Shift Change      [x] No Altered Skin Integrity Present    [x]Prevention Measures Documented      [] Yes- Altered Skin Integrity Present or Discovered   [] LDA Added if Not in Epic (Describe Wound)   [] New Altered Skin Integrity was Present on Admit and Documented in LDA   [] Wound Image Taken    Wound Care Consulted? No    Attending Nurse:  Isaias Lynch RN    Second RN/Staff Member:   Tia Tovar RN

## 2024-09-17 VITALS
DIASTOLIC BLOOD PRESSURE: 75 MMHG | SYSTOLIC BLOOD PRESSURE: 113 MMHG | HEART RATE: 77 BPM | WEIGHT: 188 LBS | OXYGEN SATURATION: 99 % | BODY MASS INDEX: 32.1 KG/M2 | TEMPERATURE: 98 F | HEIGHT: 64 IN | RESPIRATION RATE: 20 BRPM

## 2024-09-17 LAB
ALBUMIN SERPL-MCNC: 3.4 G/DL (ref 3.5–5)
ALBUMIN/GLOB SERPL: 1.1 RATIO (ref 1.1–2)
ALP SERPL-CCNC: 69 UNIT/L (ref 40–150)
ALT SERPL-CCNC: 36 UNIT/L (ref 0–55)
ANION GAP SERPL CALC-SCNC: 9 MEQ/L
AST SERPL-CCNC: 32 UNIT/L (ref 5–34)
BASOPHILS # BLD AUTO: 0.06 X10(3)/MCL
BASOPHILS NFR BLD AUTO: 0.6 %
BILIRUB SERPL-MCNC: 0.3 MG/DL
BUN SERPL-MCNC: 10.9 MG/DL (ref 7–18.7)
CALCIUM SERPL-MCNC: 9.1 MG/DL (ref 8.4–10.2)
CHLORIDE SERPL-SCNC: 105 MMOL/L (ref 98–107)
CO2 SERPL-SCNC: 20 MMOL/L (ref 22–29)
CREAT SERPL-MCNC: 1.11 MG/DL (ref 0.55–1.02)
CREAT/UREA NIT SERPL: 10
EOSINOPHIL # BLD AUTO: 0.08 X10(3)/MCL (ref 0–0.9)
EOSINOPHIL NFR BLD AUTO: 0.8 %
ERYTHROCYTE [DISTWIDTH] IN BLOOD BY AUTOMATED COUNT: 13.3 % (ref 11.5–17)
GFR SERPLBLD CREATININE-BSD FMLA CKD-EPI: >60 ML/MIN/1.73/M2
GLOBULIN SER-MCNC: 3 GM/DL (ref 2.4–3.5)
GLUCOSE SERPL-MCNC: 270 MG/DL (ref 74–100)
HCT VFR BLD AUTO: 37.1 % (ref 37–47)
HGB BLD-MCNC: 12.3 G/DL (ref 12–16)
IMM GRANULOCYTES # BLD AUTO: 0.11 X10(3)/MCL (ref 0–0.04)
IMM GRANULOCYTES NFR BLD AUTO: 1.1 %
LYMPHOCYTES # BLD AUTO: 2.65 X10(3)/MCL (ref 0.6–4.6)
LYMPHOCYTES NFR BLD AUTO: 27.2 %
MAGNESIUM SERPL-MCNC: 1.7 MG/DL (ref 1.6–2.6)
MCH RBC QN AUTO: 29.5 PG (ref 27–31)
MCHC RBC AUTO-ENTMCNC: 33.2 G/DL (ref 33–36)
MCV RBC AUTO: 89 FL (ref 80–94)
MONOCYTES # BLD AUTO: 0.62 X10(3)/MCL (ref 0.1–1.3)
MONOCYTES NFR BLD AUTO: 6.4 %
NEUTROPHILS # BLD AUTO: 6.21 X10(3)/MCL (ref 2.1–9.2)
NEUTROPHILS NFR BLD AUTO: 63.9 %
NRBC BLD AUTO-RTO: 0 %
OHS QRS DURATION: 104 MS
OHS QRS DURATION: 94 MS
OHS QTC CALCULATION: 475 MS
OHS QTC CALCULATION: 476 MS
PLATELET # BLD AUTO: 255 X10(3)/MCL (ref 130–400)
PMV BLD AUTO: 11.6 FL (ref 7.4–10.4)
POTASSIUM SERPL-SCNC: 3.8 MMOL/L (ref 3.5–5.1)
PROT SERPL-MCNC: 6.4 GM/DL (ref 6.4–8.3)
RBC # BLD AUTO: 4.17 X10(6)/MCL (ref 4.2–5.4)
SODIUM SERPL-SCNC: 134 MMOL/L (ref 136–145)
WBC # BLD AUTO: 9.73 X10(3)/MCL (ref 4.5–11.5)

## 2024-09-17 PROCEDURE — 85025 COMPLETE CBC W/AUTO DIFF WBC: CPT

## 2024-09-17 PROCEDURE — 93005 ELECTROCARDIOGRAM TRACING: CPT

## 2024-09-17 PROCEDURE — 80053 COMPREHEN METABOLIC PANEL: CPT

## 2024-09-17 PROCEDURE — 25000003 PHARM REV CODE 250: Performed by: NURSE PRACTITIONER

## 2024-09-17 PROCEDURE — 93010 ELECTROCARDIOGRAM REPORT: CPT | Mod: ,,, | Performed by: INTERNAL MEDICINE

## 2024-09-17 PROCEDURE — 25000003 PHARM REV CODE 250: Performed by: STUDENT IN AN ORGANIZED HEALTH CARE EDUCATION/TRAINING PROGRAM

## 2024-09-17 PROCEDURE — S4991 NICOTINE PATCH NONLEGEND: HCPCS

## 2024-09-17 PROCEDURE — 25000003 PHARM REV CODE 250

## 2024-09-17 PROCEDURE — 83735 ASSAY OF MAGNESIUM: CPT

## 2024-09-17 PROCEDURE — 36415 COLL VENOUS BLD VENIPUNCTURE: CPT

## 2024-09-17 RX ORDER — LANOLIN ALCOHOL/MO/W.PET/CERES
400 CREAM (GRAM) TOPICAL ONCE
Status: DISCONTINUED | OUTPATIENT
Start: 2024-09-17 | End: 2024-09-17 | Stop reason: HOSPADM

## 2024-09-17 RX ORDER — NAPROXEN SODIUM 220 MG/1
81 TABLET, FILM COATED ORAL DAILY
Qty: 30 TABLET | Refills: 11 | Status: SHIPPED | OUTPATIENT
Start: 2024-09-18 | End: 2025-09-18

## 2024-09-17 RX ORDER — LANOLIN ALCOHOL/MO/W.PET/CERES
400 CREAM (GRAM) TOPICAL 2 TIMES DAILY
Qty: 14 TABLET | Refills: 0 | Status: SHIPPED | OUTPATIENT
Start: 2024-09-17

## 2024-09-17 RX ADMIN — ARIPIPRAZOLE 2 MG: 2 TABLET ORAL at 09:09

## 2024-09-17 RX ADMIN — LOSARTAN POTASSIUM 100 MG: 50 TABLET, FILM COATED ORAL at 09:09

## 2024-09-17 RX ADMIN — SODIUM CHLORIDE 500 ML: 9 INJECTION, SOLUTION INTRAVENOUS at 05:09

## 2024-09-17 RX ADMIN — ASPIRIN 81 MG CHEWABLE TABLET 81 MG: 81 TABLET CHEWABLE at 09:09

## 2024-09-17 RX ADMIN — NICOTINE 1 PATCH: 14 PATCH TRANSDERMAL at 09:09

## 2024-09-17 RX ADMIN — ISOSORBIDE MONONITRATE 60 MG: 60 TABLET, EXTENDED RELEASE ORAL at 09:09

## 2024-09-17 RX ADMIN — GABAPENTIN 600 MG: 300 CAPSULE ORAL at 09:09

## 2024-09-17 RX ADMIN — PANTOPRAZOLE SODIUM 40 MG: 40 TABLET, DELAYED RELEASE ORAL at 09:09

## 2024-09-17 RX ADMIN — ATORVASTATIN CALCIUM 40 MG: 40 TABLET, FILM COATED ORAL at 09:09

## 2024-09-17 RX ADMIN — METOPROLOL SUCCINATE 25 MG: 25 TABLET, EXTENDED RELEASE ORAL at 09:09

## 2024-09-17 RX ADMIN — LUBIPROSTONE 24 MCG: 24 CAPSULE, GELATIN COATED ORAL at 09:09

## 2024-09-17 RX ADMIN — OXCARBAZEPINE 300 MG: 300 TABLET, FILM COATED ORAL at 09:09

## 2024-09-17 RX ADMIN — ALPRAZOLAM 1 MG: 0.5 TABLET ORAL at 09:09

## 2024-09-17 RX ADMIN — HYDROCODONE BITARTRATE AND ACETAMINOPHEN 1 TABLET: 7.5; 325 TABLET ORAL at 12:09

## 2024-09-17 RX ADMIN — CLOPIDOGREL BISULFATE 75 MG: 75 TABLET ORAL at 09:09

## 2024-09-17 RX ADMIN — OXCARBAZEPINE 300 MG: 300 TABLET, FILM COATED ORAL at 12:09

## 2024-09-17 RX ADMIN — EZETIMIBE 10 MG: 10 TABLET ORAL at 09:09

## 2024-09-17 NOTE — NURSING
Patient transferred from [place] [id number] to [place] [address], transported by ASHLIE Au RN. R femoral sheath site WDL; dressing clean, dry, intact; no hematoma or drainage. Bedside shift report with CRESCENCIO Pruitt RN. Vital signs stable as follows:      09/16/24 2040   Vital Signs   Temp 97.8 °F (36.6 °C)   Temp Source Oral   Pulse 85   SpO2 96 %   BP 96/66   MAP (mmHg) 76

## 2024-09-17 NOTE — DISCHARGE SUMMARY
"[facility] Telemetry    Cardiology  Discharge Summary      Patient Name: Kaye Spring  MRN: [id number]  Admission Date: 9/14/2024  Hospital Length of Stay: 1 days  Discharge Date and Time:  09/17/2024 10:49 AM  Attending Physician: Markie Franklin MD  Discharging Provider: Noa Arias RN  Primary Care Physician: Flory De La Cruz FNP    HPI/Hospital Course: Ms. Spring is a 48-year-old female who is known to [facility].  The patient recently underwent a diagnostic angiogram on 09/09/2024 with results of multivessel CAD.  The patient's LIMA was known to be a small vessel and it was recommended that the patient continue medical therapy versus being evaluated for high-risk PCI.  The patient was discharged home in stable condition however on the day prior to her admission she developed substernal chest pain that radiated to her left arm and was associated with shortness of breath.  She reported that the pain was 7/10 on a verbal scale and described as a heaviness.  She took 2 sublingual nitros at home which did provide some relief however secondary to her history of multivessel CAD she became concerned and presented to the ER for evaluation.  [facility] will admit for MV-CAD.     9.16.24: The patient is sitting in bed with family at bedside. NAD noted. Denies CP, SOB, and Palps. [facility] consent signed and dated. Denies any questions about [facility] procedure.   9.17.24: Patient lying in bed. NAD noted. States, "I am feeling fine." Denies CP, SOB, Fatigue, or Palpitations.     Procedure(s) (LRB):  ANGIOPLASTY, CORONARY ARTERY, WITH STENT INSERTION (N/A)   Consults:   Final Active Diagnoses:    Diagnosis Date Noted POA    Tobacco dependency [F17.200] 09/16/2024 Yes      Problems Resolved During this Admission:     Discharged Condition: stable  Review of Systems   Cardiovascular:  Negative for chest pain, irregular heartbeat and palpitations.   Respiratory:  Negative for shortness of " breath.    All other systems reviewed and are negative.    Physical Exam  Constitutional:       Appearance: Normal appearance. She is obese.   HENT:      Head: Normocephalic.      Mouth/Throat:      Mouth: Mucous membranes are moist.   Eyes:      Extraocular Movements: Extraocular movements intact.   Cardiovascular:      Rate and Rhythm: Normal rate and regular rhythm.      Pulses: Normal pulses.      Heart sounds: Normal heart sounds.   Pulmonary:      Effort: Pulmonary effort is normal.      Breath sounds: Normal breath sounds.   Abdominal:      Palpations: Abdomen is soft.   Musculoskeletal:         General: Normal range of motion.      Cervical back: Normal range of motion and neck supple.   Skin:     General: Skin is warm and dry.      Capillary Refill: Capillary refill takes 2 to 3 seconds.      Findings: Lesion present.      Comments: R Groin Soft/Flat, Non-Tender, No Sign of Bleed/Infection. +2 BLE Palpable Pedal Pulses   Old Midline incision from Hx of CABG   Neurological:      Mental Status: She is alert and oriented to person, place, and time.   Psychiatric:         Mood and Affect: Mood normal.         Behavior: Behavior normal.         Thought Content: Thought content normal.         Judgment: Judgment normal.     Disposition: Discharge home with self care  Follow Up: Follow up appointment with Cardiologist at [facility] in 1-2 weeks    Patient Instructions:      Nursing communication   Order Comments: Ok to pull sheath when ACT less than 180 seconds     Medications:  Reconciled Home Medications:      Medication List        ASK your doctor about these medications      albuterol 90 mcg/actuation inhaler  Commonly known as: PROVENTIL/VENTOLIN HFA  Inhale 2 puffs into the lungs 4 (four) times daily as needed for Wheezing or Shortness of Breath.     ALPRAZolam 1 MG tablet  Commonly known as: XANAX  Take 1 mg by mouth 3 (three) times daily as needed.     AMITIZA 24 mcg Cap  Generic drug: lubiprostone  Take 1  capsule (24 mcg total) by mouth 2 (two) times daily.     ARIPiprazole 2 MG Tab  Commonly known as: ABILIFY  Take 2 mg by mouth once daily.     blood sugar diagnostic Strp  To check BG 2 times daily, to use with insurance preferred meter E11.9     * blood-glucose meter kit  To check BG 2 times daily, to use with insurance preferred meter E11.9     * ACCU-CHEK GUIDE ME GLUCOSE MTR Misc  Generic drug: blood-glucose meter     clopidogreL 75 mg tablet  Commonly known as: PLAVIX  See Instructions, TAKE ONE TABLET BY MOUTH EVERY DAY, # 90 tab(s), 3 Refill(s), Pharmacy: [facility] [id number] [id number], 160, cm, Height/Length Dosing, 09/01/21 13:52:00 CDT, 85, kg, Weight Dosing, 09/01/21 13:52:00 CDT     desvenlafaxine succinate 100 MG Tb24  Commonly known as: PRISTIQ     doxepin 25 MG capsule  Commonly known as: SINEQUAN  Take 25 mg by mouth nightly.     ezetimibe 10 mg tablet  Commonly known as: ZETIA  Take 1 tablet (10 mg total) by mouth once daily.     gabapentin 600 MG tablet  Commonly known as: NEURONTIN  Take 1 tablet (600 mg total) by mouth 3 (three) times daily. As needed for back pain     isosorbide mononitrate 60 MG 24 hr tablet  Commonly known as: IMDUR  Take 1 tablet (60 mg total) by mouth once daily.     lancets Misc  To check BG 2 times daily, to use with insurance preferred meter E11.9     losartan 100 MG tablet  Commonly known as: COZAAR  Take 1 tablet (100 mg total) by mouth once daily. For High Blood Pressure     metFORMIN 500 MG ER 24hr tablet  Commonly known as: GLUCOPHAGE-XR  Take 2 tablets (1,000 mg total) by mouth 2 (two) times daily with meals.     metoclopramide HCl 5 MG tablet  Commonly known as: REGLAN  Take 5 mg by mouth 4 (four) times daily.     metoprolol succinate 25 MG 24 hr tablet  Commonly known as: TOPROL-XL  Take 1 tablet (25 mg total) by mouth 2 (two) times daily.     mupirocin 2 % ointment  Commonly known as: BACTROBAN  Apply topically 2 (two) times daily.     nicotine 21 mg/24 hr  Commonly known  as: NICODERM CQ  Place 1 patch onto the skin once daily.     nitroGLYCERIN 0.4 MG SL tablet  Commonly known as: NITROSTAT  Place 1 tablet (0.4 mg total) under the tongue every 5 (five) minutes as needed for Chest pain.     OXcarbazepine 300 MG Tab  Commonly known as: TRILEPTAL  Take 300 mg by mouth 2 (two) times daily.     pantoprazole 40 MG tablet  Commonly known as: PROTONIX  TAKE 1 TABLET (40 MG TOTAL) BY MOUTH ONCE DAILY FOR ACID REFLUX     rosuvastatin 40 MG Tab  Commonly known as: CRESTOR  Take 1 tablet (40 mg total) by mouth once daily.     tiZANidine 4 MG tablet  Commonly known as: ZANAFLEX  Take 1 tablet (4 mg total) by mouth every 8 (eight) hours.     TRULICITY 3 mg/0.5 mL pen injector  Generic drug: dulaglutide  Inject 3 mg into the skin every 7 days. For Diabetes           * This list has 2 medication(s) that are the same as other medications prescribed for you. Read the directions carefully, and ask your doctor or other care provider to review them with you.                Impression:  Unstable Angina  MVCAD/CABG    -[facility] (9.9.24) - L Main No Stenosis, Mid LAD 70%, LCX Recanalized Stent with 95% ISR, OM2 70% at Ostium, RCA 20% Prox     -[facility] 9.16.24 (Dr. Robles) -Laser ablation, NC balloon angioplasty, scoring balloon angioplasty, and DCB treatment of LCX ISR. Angioplasty of OM2 side branch.   Leukocytosis-Resolved  HTN-Controlled  HLD  DM II  CVA  MO  Tobacco Use  Anxiety/Depression  No Hx of GIB     Plan:   Discharge home with activities as tolerated  Follow up with Cardiologist in 1-2 weeks  Follow up with PCP in 1-2 weeks  Continue Home medications (Aspirin 81 mg, Rosuvastatin 40 mg, Plavix 75 mg, Metoprolol 25 mg, Pantoprazole 40 mg as directed  New prescription for Eliquis 5 mg will be sent to pharmacy.     DISCHARGE PLAN:  Discharge Activity:  As Tolerated, No Heavy Lifting > 5 lbs., Notify MD with Symptoms of Bleed/Infection     IMPORTANT Antiplatelet Medication Instructions:  The patient,  Kaye Spring, was instructed by Noa Arias on the importance of Antiplatelet Medication(s) and she verbalizes understanding.   She will continue taking her present antiplatelet Aspirin 81mg daily and Plavix 75 mg Daily as prescribed.   **WARNING:  Never stop **PLAVIX, EFFIENT, or BRILINTA** without first speaking to a [facility] provider** (even if another physician or surgeon insists it must be stopped for a procedure)  Time spent on the discharge of patient: 39 minutes    I, Noa Arias RN, transcribed this report in the presence of  Gerson Sears MD.     Noa Arias RN  Cardiology  [facility] Tele[facility]

## 2024-09-17 NOTE — NURSING
Nurses Note -- 4 Eyes      9/17/2024   3:23 AM      Skin assessed during: Q Shift Change      [] No Altered Skin Integrity Present    []Prevention Measures Documented      [x] Yes- Altered Skin Integrity Present or Discovered   [] LDA Added if Not in Epic (Describe Wound)   [] New Altered Skin Integrity was Present on Admit and Documented in LDA   [] Wound Image Taken    Wound Care Consulted? No    Attending Nurse:  Sol Guerra RN/Staff Member: Jose

## 2024-09-18 ENCOUNTER — TELEPHONE (OUTPATIENT)
Dept: INTERNAL MEDICINE | Facility: CLINIC | Age: 49
End: 2024-09-18
Payer: MEDICAID

## 2024-09-18 NOTE — TELEPHONE ENCOUNTER
Hospital F/U Appointment Date: 09/26/2024    Discharge Date:09/17/2024    Discharge Facility: [facility]    If External Facility, is Discharge paperwork available: N/A    Discharge Diagnosis: CAD involving native coronary artery of native heart w/o angina pectoris     Specialty Referrals / Appointments?cardiology 10/9/2024    Home Health Services or DME?     Discharged Medication Reviewed? Yes      Questions regarding medications? no     Advised to bring ALL medications for visit: yes     Is the patient experiencing any symptoms today? none     Clinic Phone number given to patient? yes       I attempted to reach . I left V/M   2nd attempt   I contacted patient and informed postward follow up .   Concerned with high glucose readings. Patient voiced understanding.

## 2024-09-20 ENCOUNTER — TELEPHONE (OUTPATIENT)
Dept: CARDIOLOGY | Facility: CLINIC | Age: 49
End: 2024-09-20
Payer: MEDICAID

## 2024-09-20 NOTE — TELEPHONE ENCOUNTER
Instructions given for patient to contact [facility] or the charge nurse at [facility] for clarification of her discharge instructions and when to resume medications. I gave her the contact numbers.

## 2024-09-20 NOTE — TELEPHONE ENCOUNTER
Patient called stating she recently had an angiogram done and she wasn't told when can she restart her Metformin. Would like to know when can she restart?

## 2024-09-26 ENCOUNTER — OFFICE VISIT (OUTPATIENT)
Dept: INTERNAL MEDICINE | Facility: CLINIC | Age: 49
End: 2024-09-26
Payer: MEDICAID

## 2024-09-26 ENCOUNTER — LAB VISIT (OUTPATIENT)
Dept: LAB | Facility: HOSPITAL | Age: 49
End: 2024-09-26
Attending: NURSE PRACTITIONER
Payer: MEDICAID

## 2024-09-26 VITALS
HEART RATE: 88 BPM | HEIGHT: 64 IN | RESPIRATION RATE: 16 BRPM | WEIGHT: 164 LBS | DIASTOLIC BLOOD PRESSURE: 82 MMHG | TEMPERATURE: 98 F | BODY MASS INDEX: 28 KG/M2 | SYSTOLIC BLOOD PRESSURE: 138 MMHG

## 2024-09-26 DIAGNOSIS — I10 PRIMARY HYPERTENSION: ICD-10-CM

## 2024-09-26 DIAGNOSIS — Z23 IMMUNIZATION DUE: ICD-10-CM

## 2024-09-26 DIAGNOSIS — K59.09 CHRONIC CONSTIPATION: ICD-10-CM

## 2024-09-26 DIAGNOSIS — K21.00 GASTROESOPHAGEAL REFLUX DISEASE WITH ESOPHAGITIS WITHOUT HEMORRHAGE: ICD-10-CM

## 2024-09-26 DIAGNOSIS — E11.9 TYPE 2 DIABETES MELLITUS WITHOUT COMPLICATION, WITHOUT LONG-TERM CURRENT USE OF INSULIN: ICD-10-CM

## 2024-09-26 DIAGNOSIS — I25.10 CORONARY ARTERY DISEASE INVOLVING NATIVE CORONARY ARTERY OF NATIVE HEART WITHOUT ANGINA PECTORIS: ICD-10-CM

## 2024-09-26 DIAGNOSIS — I20.0 UNSTABLE ANGINA: Primary | ICD-10-CM

## 2024-09-26 PROBLEM — F17.200 TOBACCO DEPENDENCY: Status: RESOLVED | Noted: 2024-09-16 | Resolved: 2024-09-26

## 2024-09-26 LAB
ALBUMIN SERPL-MCNC: 4.2 G/DL (ref 3.5–5)
ALBUMIN/GLOB SERPL: 1.1 RATIO (ref 1.1–2)
ALP SERPL-CCNC: 104 UNIT/L (ref 40–150)
ALT SERPL-CCNC: 47 UNIT/L (ref 0–55)
ANION GAP SERPL CALC-SCNC: 8 MEQ/L
AST SERPL-CCNC: 24 UNIT/L (ref 5–34)
BACTERIA #/AREA URNS AUTO: ABNORMAL /HPF
BILIRUB SERPL-MCNC: 0.3 MG/DL
BILIRUB UR QL STRIP.AUTO: NEGATIVE
BUN SERPL-MCNC: 4.6 MG/DL (ref 7–18.7)
CALCIUM SERPL-MCNC: 10.4 MG/DL (ref 8.4–10.2)
CHLORIDE SERPL-SCNC: 104 MMOL/L (ref 98–107)
CLARITY UR: CLEAR
CO2 SERPL-SCNC: 24 MMOL/L (ref 22–29)
COLOR UR AUTO: COLORLESS
CREAT SERPL-MCNC: 0.86 MG/DL (ref 0.55–1.02)
CREAT UR-MCNC: 27.8 MG/DL (ref 45–106)
CREAT/UREA NIT SERPL: 5
EST. AVERAGE GLUCOSE BLD GHB EST-MCNC: 226 MG/DL
GFR SERPLBLD CREATININE-BSD FMLA CKD-EPI: >60 ML/MIN/1.73/M2
GLOBULIN SER-MCNC: 3.7 GM/DL (ref 2.4–3.5)
GLUCOSE SERPL-MCNC: 220 MG/DL (ref 74–100)
GLUCOSE UR QL STRIP: ABNORMAL
HBA1C MFR BLD: 9.5 %
HGB UR QL STRIP: NEGATIVE
HYALINE CASTS #/AREA URNS LPF: ABNORMAL /LPF
KETONES UR QL STRIP: NEGATIVE
LEUKOCYTE ESTERASE UR QL STRIP: NEGATIVE
MICROALBUMIN UR-MCNC: <5 UG/ML
MICROALBUMIN/CREAT RATIO PNL UR: ABNORMAL
NITRITE UR QL STRIP: NEGATIVE
PH UR STRIP: 5 [PH]
POTASSIUM SERPL-SCNC: 5.1 MMOL/L (ref 3.5–5.1)
PROT SERPL-MCNC: 7.9 GM/DL (ref 6.4–8.3)
PROT UR QL STRIP: NEGATIVE
RBC #/AREA URNS AUTO: ABNORMAL /HPF
SODIUM SERPL-SCNC: 136 MMOL/L (ref 136–145)
SP GR UR STRIP.AUTO: 1 (ref 1–1.03)
SQUAMOUS #/AREA URNS LPF: ABNORMAL /HPF
UROBILINOGEN UR STRIP-ACNC: NORMAL
WBC #/AREA URNS AUTO: ABNORMAL /HPF

## 2024-09-26 PROCEDURE — 80053 COMPREHEN METABOLIC PANEL: CPT

## 2024-09-26 PROCEDURE — 90656 IIV3 VACC NO PRSV 0.5 ML IM: CPT | Mod: PBBFAC

## 2024-09-26 PROCEDURE — 36415 COLL VENOUS BLD VENIPUNCTURE: CPT

## 2024-09-26 PROCEDURE — 81001 URINALYSIS AUTO W/SCOPE: CPT

## 2024-09-26 PROCEDURE — 81003 URINALYSIS AUTO W/O SCOPE: CPT

## 2024-09-26 PROCEDURE — 82043 UR ALBUMIN QUANTITATIVE: CPT

## 2024-09-26 PROCEDURE — 99213 OFFICE O/P EST LOW 20 MIN: CPT | Mod: PBBFAC | Performed by: NURSE PRACTITIONER

## 2024-09-26 PROCEDURE — 90471 IMMUNIZATION ADMIN: CPT | Mod: PBBFAC

## 2024-09-26 PROCEDURE — 83036 HEMOGLOBIN GLYCOSYLATED A1C: CPT

## 2024-09-26 PROCEDURE — 82570 ASSAY OF URINE CREATININE: CPT

## 2024-09-26 RX ORDER — PANTOPRAZOLE SODIUM 40 MG/1
40 TABLET, DELAYED RELEASE ORAL DAILY
Qty: 90 TABLET | Refills: 4 | Status: SHIPPED | OUTPATIENT
Start: 2024-09-26 | End: 2025-12-20

## 2024-09-26 RX ORDER — LUBIPROSTONE 24 UG/1
24 CAPSULE, GELATIN COATED ORAL 2 TIMES DAILY
Qty: 180 CAPSULE | Refills: 4 | Status: SHIPPED | OUTPATIENT
Start: 2024-09-26 | End: 2025-12-20

## 2024-09-26 RX ADMIN — INFLUENZA VIRUS VACCINE 0.5 ML: 15; 15; 15 SUSPENSION INTRAMUSCULAR at 10:09

## 2024-09-26 NOTE — PROGRESS NOTES
Flory De La Cruz, JOSE RAFAEL   [facility]  [address] [facility] [address]      PATIENT NAME: Kaye Spring  : 1975  DATE: 24  MRN: [id number]      Reason for Visit / Chief Complaint: Follow-up (Hospital postward /)       History of Present Illness / Problem Focused Workflow     Kaye Spring presents to the clinic with Follow-up (Hospital postward /)     47 yo WF here for f/u. Medical problems includes GERD, BPV (self-treats with Epley), chronic rhinitis, T2DM, HLD, CAD with MI in 2019, with stenting, HTN, chronic lower back pain, ventral hernia, AUB, endometriosis, depression with anxiety, and tobacco use, 1-2 ciggs / day.  Followed by Nikki Ackerman, mental health NP for psychiatric care and med management every 3 months. Disabled. Followed by [facility] for Cardiology Services.      Cervical Cancer Screening - F/U [facility] GYN Annually  Breast Cancer Screening - 23 MMG  Osteoporosis Screening -24 Vitamin D Level 39.3  Diabetic Screening -    Eye Screening-   Dental Screening -      2024  Pt here today for yearly wellness OV with labs completed prior to OV; reviewed and discussed with no questions or concerns at this time. A1C at goal. Meds reviewed and refilled appropriately. Pt reports with continued left shoulder pain, more in the muscle area, elevated her arm up causes it to pull and radiates down into her elbow. Reports the pulling feeling when reaching around her back as well. Pt was seen at [facility] in December after an injury where she fell on her shoulder. The pt is agreeable to referral to PT for eval and tx. Pt reports she is using ROM exercise to help with some of the pain. Pt BP elevated today, reports compliance with meds, unsure why it is high as she is compliant with meds. Will increase losartan today to 100 mg daily. Pt with Cards visit in 2 weeks, will set reminder to eval BP. Will f/u in 6 months for routine f/u  with labs.      08/01/2024  Patient here today for routine follow up appointment with labs completed.  A1c tremendously increased to 11.0 since last evaluation.  Patient reports that she is eating an excessive amount of watermelon over the last couple of weeks.  Reports that she will stop eating watermelon at this time.  Also was only doing the Trulicity 1.5 mg due to the pharmacy being out of the 3 mg.  We will recent prescription for 3 mg dosage today.  If not available we will send prescription for 4.5 mg instead.  We will follow up with patient in about 6 weeks with labs for evaluation again.  Patient agreeable to foot and fundal exam today as well.  Medications reviewed and refilled appropriately.    Transitional Care Note    Patient was discharged on 09/17/2024 from [facility] . Patient was contacted within 2 business days post discharge on 09/18/2024 and is documented in patient medical record. A Face to Face office visit is being completed on 09/26/2024 with appropriate medical complexity decision making.     Family and/or Caretaker present at visit?  Yes.  Diagnostic tests reviewed/disposition: No diagnosic tests pending after this hospitalization.  Disease/illness education: Completed  Home health/community services discussion/referrals: Patient does not have home health established from hospital visit.  They do not need home health.  If needed, we will set up home health for the patient.   Establishment or re-establishment of referral orders for community resources: No other necessary community resources.   Discussion with other health care providers: No discussion with other health care providers necessary.     Patient here today for hospital follow up visit s/p inpatient admission at [facility] for CAD.   [facility] (9.9.24) - L Main No Stenosis, Mid LAD 70%, LCX Recanalized Stent with 95% ISR, OM2 70% at Ostium, RCA 20% Prox    [facility] 9.16.24 (Dr. Robles) -Laser ablation, NC balloon angioplasty,  scoring balloon angioplasty, and DCB treatment of LCX ISR. Angioplasty of OM2 side branch.   Discharge Plan included home with activities as tolerated, Follow up with Cardiologist in 1-2 weeks, Follow up with PCP in 1-2 weeks, Continue Home medications (Aspirin 81 mg, Rosuvastatin 40 mg, Plavix 75 mg, Metoprolol 25 mg, Pantoprazole 40 mg as directed. & a new prescription for Eliquis 5 mg will be sent to pharmacy.     Pt reports compliance with all medications including new meds; is aware of routine f/u with Cardiology Clinic.  Agreeable to Flu shot today. Will f/u with pt in a few weeks for T2DM f/u with labs.                   Review of Systems     Review of Systems   Constitutional: Negative.    HENT: Negative.     Eyes: Negative.    Respiratory: Negative.     Cardiovascular: Negative.    Gastrointestinal: Negative.    Endocrine: Negative.    Genitourinary: Negative.    Neurological: Negative.    Psychiatric/Behavioral: Negative.           Medications and Allergies     Medications  Medication List with Changes/Refills   Current Medications    ACCU-CHEK GUIDE ME GLUCOSE MTR MISC        ALBUTEROL (PROVENTIL/VENTOLIN HFA) 90 MCG/ACTUATION INHALER    Inhale 2 puffs into the lungs 4 (four) times daily as needed for Wheezing or Shortness of Breath.    ALPRAZOLAM (XANAX) 1 MG TABLET    Take 1 mg by mouth 3 (three) times daily as needed.    ARIPIPRAZOLE (ABILIFY) 2 MG TAB    Take 2 mg by mouth once daily.    ASPIRIN 81 MG CHEW    Take 1 tablet (81 mg total) by mouth once daily.    BLOOD SUGAR DIAGNOSTIC STRP    To check BG 2 times daily, to use with insurance preferred meter E11.9    BLOOD-GLUCOSE METER KIT    To check BG 2 times daily, to use with insurance preferred meter E11.9    CLOPIDOGREL (PLAVIX) 75 MG TABLET    See Instructions, TAKE ONE TABLET BY MOUTH EVERY DAY, # 90 tab(s), 3 Refill(s), Pharmacy: [place] [facility] [id number] [id number], 160, cm, Height/Length Dosing, 09/01/21 13:52:00 CDT, 85, kg, Weight Dosing, 09/01/21  13:52:00 CDT    DESVENLAFAXINE SUCCINATE (PRISTIQ) 100 MG TB24        DOXEPIN (SINEQUAN) 25 MG CAPSULE    Take 25 mg by mouth nightly.    DULAGLUTIDE (TRULICITY) 3 MG/0.5 ML PEN INJECTOR    Inject 3 mg into the skin every 7 days. For Diabetes    EZETIMIBE (ZETIA) 10 MG TABLET    Take 1 tablet (10 mg total) by mouth once daily.    GABAPENTIN (NEURONTIN) 600 MG TABLET    Take 1 tablet (600 mg total) by mouth 3 (three) times daily. As needed for back pain    ISOSORBIDE MONONITRATE (IMDUR) 60 MG 24 HR TABLET    Take 1 tablet (60 mg total) by mouth once daily.    LANCETS MISC    To check BG 2 times daily, to use with insurance preferred meter E11.9    LOSARTAN (COZAAR) 100 MG TABLET    Take 1 tablet (100 mg total) by mouth once daily. For High Blood Pressure    MAGNESIUM OXIDE (MAG-OX) 400 MG (241.3 MG MAGNESIUM) TABLET    Take 1 tablet (400 mg total) by mouth 2 (two) times daily.    METFORMIN (GLUCOPHAGE-XR) 500 MG ER 24HR TABLET    Take 2 tablets (1,000 mg total) by mouth 2 (two) times daily with meals.    METOCLOPRAMIDE HCL (REGLAN) 5 MG TABLET    Take 5 mg by mouth 4 (four) times daily.    METOPROLOL SUCCINATE (TOPROL-XL) 25 MG 24 HR TABLET    Take 1 tablet (25 mg total) by mouth 2 (two) times daily.    NITROGLYCERIN (NITROSTAT) 0.4 MG SL TABLET    Place 1 tablet (0.4 mg total) under the tongue every 5 (five) minutes as needed for Chest pain.    OXCARBAZEPINE (TRILEPTAL) 300 MG TAB    Take 300 mg by mouth 2 (two) times daily.    ROSUVASTATIN (CRESTOR) 40 MG TAB    Take 1 tablet (40 mg total) by mouth once daily.    TIZANIDINE (ZANAFLEX) 4 MG TABLET    Take 1 tablet (4 mg total) by mouth every 8 (eight) hours.   Changed and/or Refilled Medications    Modified Medication Previous Medication    AMITIZA 24 MCG CAP AMITIZA 24 mcg Cap       Take 1 capsule (24 mcg total) by mouth 2 (two) times daily.    Take 1 capsule (24 mcg total) by mouth 2 (two) times daily.    PANTOPRAZOLE (PROTONIX) 40 MG TABLET pantoprazole  (PROTONIX) 40 MG tablet       Take 1 tablet (40 mg total) by mouth once daily. For Acid Reflux    TAKE 1 TABLET (40 MG TOTAL) BY MOUTH ONCE DAILY FOR ACID REFLUX   Discontinued Medications    MUPIROCIN (BACTROBAN) 2 % OINTMENT    Apply topically 2 (two) times daily.         Allergies  Review of patient's allergies indicates:  No Known Allergies    Physical Examination     Vitals:    09/26/24 1051   BP: 138/82   Pulse: 88   Resp: 16   Temp: 98.4 °F (36.9 °C)     Physical Exam  Vitals reviewed.   Constitutional:       Appearance: Normal appearance. She is obese.   HENT:      Head: Normocephalic.   Cardiovascular:      Rate and Rhythm: Normal rate and regular rhythm.      Pulses: Normal pulses.      Heart sounds: Normal heart sounds.   Pulmonary:      Effort: Pulmonary effort is normal.      Breath sounds: Normal breath sounds.   Abdominal:      General: Abdomen is flat.      Palpations: Abdomen is soft.   Musculoskeletal:         General: Normal range of motion.      Cervical back: Normal range of motion.   Skin:     General: Skin is warm and dry.   Neurological:      Mental Status: She is alert.   Psychiatric:         Mood and Affect: Mood normal.         Results     Lab Results   Component Value Date    WBC 9.73 09/17/2024    RBC 4.17 (L) 09/17/2024    HGB 12.3 09/17/2024    HCT 37.1 09/17/2024    MCV 89.0 09/17/2024    MCH 29.5 09/17/2024    MCHC 33.2 09/17/2024    RDW 13.3 09/17/2024     09/17/2024    MPV 11.6 (H) 09/17/2024     Sodium   Date Value Ref Range Status   09/26/2024 136 136 - 145 mmol/L Final   09/30/2023 134 (L) 136 - 145 mmol/L Final     Potassium   Date Value Ref Range Status   09/26/2024 5.1 3.5 - 5.1 mmol/L Final   09/30/2023 4.2 3.5 - 5.1 mmol/L Final     Comment:     Slight hemolysis present, interpret results with caution     Chloride   Date Value Ref Range Status   09/26/2024 104 98 - 107 mmol/L Final   09/30/2023 102 100 - 109 mmol/L Final     CO2   Date Value Ref Range Status    09/26/2024 24 22 - 29 mmol/L Final     Carbon Dioxide   Date Value Ref Range Status   09/30/2023 20 (L) 22 - 33 mmol/L Final     Blood Urea Nitrogen   Date Value Ref Range Status   09/26/2024 4.6 (L) 7.0 - 18.7 mg/dL Final   09/30/2023 8 5 - 25 mg/dL Final     Creatinine   Date Value Ref Range Status   09/26/2024 0.86 0.55 - 1.02 mg/dL Final   09/30/2023 0.80 0.57 - 1.25 mg/dL Final     Calcium   Date Value Ref Range Status   09/26/2024 10.4 (H) 8.4 - 10.2 mg/dL Final   09/30/2023 8.4 (L) 8.8 - 10.6 mg/dL Final     Albumin   Date Value Ref Range Status   09/26/2024 4.2 3.5 - 5.0 g/dL Final     Bilirubin Total   Date Value Ref Range Status   09/26/2024 0.3 <=1.5 mg/dL Final     ALP   Date Value Ref Range Status   09/26/2024 104 40 - 150 unit/L Final     AST   Date Value Ref Range Status   09/26/2024 24 5 - 34 unit/L Final     ALT   Date Value Ref Range Status   09/26/2024 47 0 - 55 unit/L Final     Anion Gap   Date Value Ref Range Status   09/30/2023 12 8 - 16 mmol/L Final     eGFR    Date Value Ref Range Status   09/30/2023 91 mL/min/1.73mSq Final     Comment:     In accordance with NKF-ASN Task Force recommendation, calculation based on the Chronic Kidney Disease Epidemiology Collaboration (CKD-EPI) equation without adjustment for race. eGFR adjusted for gender and age and calculated in ml/min/1.73mSquared. eGFR cannot be calculated if patient is under 18 years of age.     Reference Range:   >= 60 ml/min/1.73mSquared.     Estimated GFR-Non    Date Value Ref Range Status   06/28/2022 >60 mls/min/1.73/m2 Final     Lab Results   Component Value Date    CHOL 118 07/30/2024     Lab Results   Component Value Date    HDL 47 07/30/2024     Lab Results   Component Value Date    TRIG 241 (H) 07/30/2024     Lab Results   Component Value Date    VLDL 30 01/31/2024     Lab Results   Component Value Date    LDL 43.00 (L) 01/31/2024     Lab Results   Component Value Date    TSH 2.601 01/31/2024      Lab Results   Component Value Date    PHUR 5.0 09/26/2024    PROTEINUA Negative 09/26/2024    GLUCUA 1+ (A) 09/26/2024    KETONESU Negative 09/13/2021    OCCULTUA Negative 09/26/2024    NITRITE Negative 09/26/2024    LEUKOCYTESUR Negative 09/26/2024     Lab Results   Component Value Date    HGBA1C 9.5 (H) 09/26/2024    HGBA1C 11.0 (H) 07/30/2024    HGBA1C 7.0 01/31/2024           Assessment         ICD-10-CM ICD-9-CM   1. Unstable angina  I20.0 411.1   2. Primary hypertension  I10 401.9   3. Chronic constipation  K59.09 564.00   4. Gastroesophageal reflux disease with esophagitis without hemorrhage  K21.00 530.81     530.10   5. Immunization due  Z23 V05.9   6. Coronary artery disease involving native coronary artery of native heart without angina pectoris  I25.10 414.01       Plan      Problem List Items Addressed This Visit          Cardiac/Vascular    Coronary artery disease involving native coronary artery of native heart without angina pectoris    Current Assessment & Plan     Continued F/U with [facility]   Continue meds as directed  Aspirin 81 mg, Rosuvastatin 40 mg qhs, plavix 75 mg daily, metoprolol  .         Primary hypertension    Current Assessment & Plan     BP at goal  Continue RX Imdur 60 mg daily, losartan 100 mg daily, metoprolol 25 mg BID   BP: 138/82  Low Sodium Diet (Dash Diet - less than 2 grams of sodium per day).  Maintain healthy weight with goal BMI <30. Exercise 30 minutes per day 5 days per week.           Relevant Orders    Comprehensive Metabolic Panel (Completed)       GI    Gastroesophageal reflux disease with esophagitis    Current Assessment & Plan     Stable  Continue RX Protonix 40 mg daily  Avoid spicy, acidic, fried foods and alcohol.  Eat 2-3 hours before going to bed.  Avoid tight clothing, chew food thoroughly.  Reduce caffeine intake, avoid soda.  Stressed importance of Smoking/Tobacco Cessation.           Relevant Medications    pantoprazole (PROTONIX) 40 MG  tablet    Chronic constipation    Current Assessment & Plan     Stable  Continue RX amitiza 24 BID  Educated on high fiber diet and exercise.  Drink at least 64 ozs of water daily.  Eat hot breakfast q am.           Relevant Medications    AMITIZA 24 mcg Cap     Other Visit Diagnoses       Unstable angina    -  Primary    Immunization due        Relevant Medications    influenza (Flulaval, Fluzone, Fluarix) 45 mcg/0.5 mL IM vaccine (> or = 6 mo) 0.5 mL (Completed)            Future Appointments   Date Time Provider Department Center   9/30/2024 12:45 PM [facility]   10/1/2024  3:00 PM [facility] [place]   10/2/2024  2:20 PM [facility] La [facility]             Signature:     [facility]  [address] [facility] [address]    Date of encounter: 9/26/24

## 2024-09-26 NOTE — ASSESSMENT & PLAN NOTE
Stable  Continue RX amitiza 24 BID  Educated on high fiber diet and exercise.  Drink at least 64 ozs of water daily.  Eat hot breakfast q am.

## 2024-09-26 NOTE — ASSESSMENT & PLAN NOTE
Stable  Continue RX Protonix 40 mg daily  Avoid spicy, acidic, fried foods and alcohol.  Eat 2-3 hours before going to bed.  Avoid tight clothing, chew food thoroughly.  Reduce caffeine intake, avoid soda.  Stressed importance of Smoking/Tobacco Cessation.

## 2024-09-26 NOTE — ASSESSMENT & PLAN NOTE
Continued F/U with [facility]   Continue meds as directed  Aspirin 81 mg, Rosuvastatin 40 mg qhs, plavix 75 mg daily, metoprolol  .

## 2024-09-26 NOTE — ASSESSMENT & PLAN NOTE
BP at goal  Continue RX Imdur 60 mg daily, losartan 100 mg daily, metoprolol 25 mg BID   BP: 138/82  Low Sodium Diet (Dash Diet - less than 2 grams of sodium per day).  Maintain healthy weight with goal BMI <30. Exercise 30 minutes per day 5 days per week.

## 2024-09-27 NOTE — PROGRESS NOTES
CHIEF COMPLAINT:   Chief Complaint   Patient presents with    Follow-up     Sp lhc denies cardiac targets                                                  HPI:  Kaye Spring 48 y.o. female  PMHx includes  CAD with MI in February 2019, with stenting now s/p CABG LIMA->LAD in 6/2021 complicated by wound dehiscence and infection treated, HTN, T2DM, HLD, chronic lower back pain, ventral hernia, AUB, endometriosis, depression with anxiety, and tobacco use.  Patient presents for follow up and ongoing care.  At prior office visit patient endorsed worsening chest pain/acid reflux/GERD/heartburn pain.  Given her worsening symptoms and status change from prior nuclear stress test was ordered.  Nuclear stress test revealed an abnormal myocardial perfusion scan.  There was a moderate intensity, medium size, reversible perfusion abnormality in the mid to apical lateral apical walls in the typical distribution of the left circumflex territory.  See full report below.    Since last office visit, patient completed initial LHC on 9.9.24 at [facility] which revealed significant two-vessel coronary artery disease and complex PCI was recommended.  Subsequently, on 9.16.24 patient underwent emergent LHC with successful intervention of the LCx ISR.  She was discharged from the hospital with the appropriate CAD medications and will remain on DAPT for at least 1 year.  See full operative report below.  There was some miscommunication/concern regarding if patient needs to be on Eliquis.  There was a mention of a new Eliquis prescription on discharge summary.  However, case discussed with staff cardiologist, Dr. Mcgrath, and determined that there is no indication for patient to be on Eliquis at this time (patient was also never given Eliquis rx on discharge from [facility],  likely typo as operative note/instructions only mention ASA and Plavix).    Today the patient states that she feels much improved from a cardiac standpoint since the procedures.  She  states that her shortness of breath has significantly improved.  Otherwise she denies any further complaints such as chest pain, palpitations, PND, orthopnea, lightheadedness, dizziness, syncope, lower extremity edema, or claudication symptoms.  She was still having some residual soreness in her left femoral area at the insertion site of the catheter.  She denies any other complaints.  She has not been extremely physically active since her procedure, however this is stable overall.  This is her baseline.  Encouraged her to to physical activity if she was able to once her soreness has subsided.  She follows a mostly heart healthy diet.  She reports compliance with all her current medications and is tolerating them well.  She states that she has decreased her smoking to approximately 1/2 pack of cigarettes per day and is hoping to quit entirely.  She was starting smoking cessation class tomorrow.                                                                                                                                                                                                                                                                                                                                                                                                                                                            CARDIAC TESTING:  [facility] 9.16.24    There is severe coronary artery disease. Successful intervention of LCx ISR as outlined below.    Mid Left Circumflex has diffuse 95% in-stent restenosis. This was the culprit lesion. The lesion was successfully treated with Laser atherectomy using a 1.4 mm device, an NC EMERGE MR 2.5X30MM angioplasty balloon inflated to high pressure, a Scoreflex 2.5X20MM cutting balloon, and finally a 2.01Y13ZA drug-coated balloon for a full 60 second inflation.    Intravascular Ultrasound (IVUS) was performed prior to LCx intervention to further characterize the lesion     Second Obtuse Marginal Branch has a 70% stenosis. This branch vessel was protected with a wire during the LCx intervention, and was treated with a 2.0x15 angioplasty balloon. There remains a 60% ostial stenosis and LUDIN grade 3 flow in the distal vessel.  Findings:  - There is severe coronary artery disease. Successful intervention of LCx ISR as outlined below.  - Mid Left Circumflex has diffuse 95% in-stent restenosis. This was the culprit lesion. The lesion was successfully treated with Laser atherectomy using a 1.4 mm device, an NC EMERGE MR 2.5X30MM angioplasty balloon inflated to high pressure, a Scoreflex 2.5X20MM cutting balloon, and finally a 2.65O56HS drug-coated balloon for a full 60 second inflation.   - Intravascular Ultrasound (IVUS) was performed prior to LCx intervention to further characterize the lesion  - Second Obtuse Marginal Branch has a 70% stenosis. This branch vessel was protected with a wire during the LCx intervention, and was treated with a 2.0x15 angioplasty balloon. There remains a 60% ostial stenosis and LUDIN grade 3 flow in the distal vessel.  Assessment/Plan:  - Patient is a 48 y.o. female with a history of CAD/CABG presents with unstable angina. Staged PCI of LCx ISR was successful as noted above. Outflow vessel is small, but will hopefully improve overtime given increased flow following ISR intervention.  - Continue DAPT for a minimum of 12 months and aspirin indefinitely thereafter  - High intensity statin  - Continue Aggrastat for 12 hours post-procedure  - Arterial sheath remains place; plan is to remove when activated clotting time (ACT) less than 180 seconds. Bedrest for 4 hours after hemostasis is achieved.    [facility] 9.9.24    The 2nd Mrg lesion was 70% stenosed.    The Mid Cx to Dist Cx lesion was 95% stenosed.    The Mid LAD lesion was 70% stenosed.    The pre-procedure left ventricular end diastolic pressure was 7.    The estimated blood loss was <50 mL.    There was two  vessel coronary artery disease.  FINDINGS  LVEDP:  7 mmHg  Left Main:  No stenosis   LAD:  mid LAD lesion 70%, patent LIMA -LAD  Circumflex:  re-cannulized Lcx stents with 95% ISR, OM2 70% stenosis at ostium   RCA:  20% stenosis proximal   The patient has Significant two vessel disease patent graft  Blood loss:  less than 45 cc.  LIMA:  Small vessel.  No significant stenosis noted.   Right femoral access with closure device  Femoral artery:  small to moderate size  RECOMMENDATIONS  Treat with anti-anginals and consider complex PCI  Risk factor modifications -- smoking cessation  Activity -- avoid straining with affected limb for one week  Exercise on regular basis.  Precautions post D/C -- come to ER for hematoma, unusual pain, erythema, or unusual drainage at access site  Precautions post D/C -- if develop bleeding or hematoma at access site, hold pressure at access site, and come to ER  Antiplatelet:  Continue Plavix indefinitely    Nuclear Stress Test 8.21.24    Abnormal myocardial perfusion scan.    moderate intensity, medium sized,  in the  mid to apical lateral apical wall(s) in the typical distribution of the LCX territory.    There are no other significant perfusion abnormalities.    The gated perfusion images showed an ejection fraction of 77% at rest.    The patient reported no chest pain during the stress test.    There were no arrhythmias during stress.  The nuclear stress test is  fair quality.    On the nuclear stress test the EF is normal.  If the patient has an echo, the EF on the echo is considered more accurate.   The EF drops from rest to stress.     The patient has a moderate risk nuclear stress test due to lateral and apical reversible defect.  If clinically indicated, consider further evaluation with coronary angiogram.          Results for orders placed in visit on 06/21/21    CATH LAB PROCEDURE       Patient Active Problem List   Diagnosis    Coronary artery disease involving native coronary  artery of native heart without angina pectoris    Other synovitis and tenosynovitis, unspecified hand    Anxiety    Benign paroxysmal positional vertigo    Chronic back pain    Chronic rhinitis    Gastroesophageal reflux disease with esophagitis    Diastasis of rectus abdominis    Primary hypertension    Leukocytosis    Microcytic hypochromic anemia    Mixed anxiety depressive disorder    Mixed hyperlipidemia    Obesity    Spondylosis of lumbar spine    Thrombocythemia    Type 2 diabetes mellitus without complication, without long-term current use of insulin    Cigarette nicotine dependence without complication    Positive colorectal cancer screening using Cologuard test    Chest pain    Shortness of breath    Abnormal stress test    Chronic constipation     Past Surgical History:   Procedure Laterality Date    ANGIOGRAM, CORONARY, WITH LEFT HEART CATHETERIZATION N/A 9/9/2024    Procedure: Angiogram, Coronary, with Left Heart Cath;  Surgeon: Tyler Stevenson MD;  Location: [facility] CATH LAB;  Service: Cardiology;  Laterality: N/A;    CARDIAC CATHETERIZATION      CARDIAC VALVE REPLACEMENT      CHOLECYSTECTOMY      CORONARY ANGIOPLASTY      CORONARY ANGIOPLASTY WITH STENT PLACEMENT N/A 9/16/2024    Procedure: ANGIOPLASTY, CORONARY ARTERY, WITH STENT INSERTION;  Surgeon: Tyler Robles Jr., MD;  Location: [facility] CATH LAB;  Service: Cardiology;  Laterality: N/A;    CORONARY ARTERY BYPASS GRAFT      HYSTERECTOMY      right wrist surgery      TUBAL LIGATION       Social History     Socioeconomic History    Marital status: Single   Tobacco Use    Smoking status: Every Day     Current packs/day: 0.25     Average packs/day: 0.3 packs/day for 29.7 years (7.4 ttl pk-yrs)     Types: Cigarettes     Start date: 1995    Smokeless tobacco: Never   Substance and Sexual Activity    Alcohol use: Not Currently    Drug use: Never    Sexual activity: Yes     Social Drivers of Health     Financial Resource Strain: Low Risk  (9/16/2024)     Overall Financial Resource Strain (CARDIA)     Difficulty of Paying Living Expenses: Not very hard   Food Insecurity: No Food Insecurity (9/16/2024)    Hunger Vital Sign     Worried About Running Out of Food in the Last Year: Never true     Ran Out of Food in the Last Year: Never true   Transportation Needs: No Transportation Needs (9/16/2024)    TRANSPORTATION NEEDS     Transportation : No   Physical Activity: Sufficiently Active (9/16/2024)    Exercise Vital Sign     Days of Exercise per Week: 5 days     Minutes of Exercise per Session: 30 min   Stress: No Stress Concern Present (9/16/2024)    [employer] - Occupational Stress Questionnaire     Feeling of Stress : Only a little   Housing Stability: Low Risk  (9/16/2024)    Housing Stability Vital Sign     Unable to Pay for Housing in the Last Year: No     Homeless in the Last Year: No        Family History   Problem Relation Name Age of Onset    Arrhythmia Mother Marianela     Clotting disorder Mother Marianela     Heart disease Mother Marianela     Heart attack Mother Marianela     Hyperlipidemia Mother Marianela     Heart failure Mother Marianela     Stroke Mother Marianela     Hypertension Mother Marianela     Diabetes Mother Marianela     Depression Mother Marianela     Hyperlipidemia Brother Domingo     Hypertension Brother Domingo      Review of patient's allergies indicates:  No Known Allergies        ROS:  Review of Systems   Constitutional: Negative.  Negative for malaise/fatigue.   HENT: Negative.     Eyes: Negative.    Respiratory:  Negative for shortness of breath.    Cardiovascular:  Negative for chest pain, palpitations, orthopnea, claudication, leg swelling and PND.   Gastrointestinal:  Negative for heartburn, nausea and vomiting.   Genitourinary: Negative.    Musculoskeletal:  Positive for back pain, joint pain and myalgias.   Skin: Negative.    Neurological: Negative.    Endo/Heme/Allergies: Negative.    Psychiatric/Behavioral: Negative.               "                                                                                                                                                                    Negative except as stated in the history of present illness. See HPI for details.    PHYSICAL EXAM:  Visit Vitals  /72   Pulse 86   Temp 98.5 °F (36.9 °C) (Oral)   Resp 18   Ht 5' 4" (1.626 m)   Wt 85.8 kg (189 lb 2.5 oz)   SpO2 97%   BMI 32.47 kg/m²       Physical Exam  Constitutional:       Appearance: Normal appearance. She is obese. She is not ill-appearing.   HENT:      Head: Normocephalic.      Nose: Nose normal.      Mouth/Throat:      Mouth: Mucous membranes are moist.   Eyes:      Extraocular Movements: Extraocular movements intact.   Neck:      Vascular: No carotid bruit.   Cardiovascular:      Rate and Rhythm: Normal rate and regular rhythm.      Pulses: Normal pulses.      Heart sounds: No murmur heard.  Pulmonary:      Effort: Pulmonary effort is normal.   Abdominal:      General: There is no distension.   Musculoskeletal:         General: Normal range of motion.      Cervical back: Normal range of motion.      Right lower leg: No edema.      Left lower leg: No edema.   Skin:     General: Skin is warm.   Neurological:      General: No focal deficit present.      Mental Status: She is alert and oriented to person, place, and time.   Psychiatric:         Mood and Affect: Mood normal.         Current Outpatient Medications   Medication Instructions    ACCU-CHEK GUIDE ME GLUCOSE MTR Misc No dose, route, or frequency recorded.    albuterol (PROVENTIL/VENTOLIN HFA) 90 mcg/actuation inhaler 2 puffs, Inhalation, 4 times daily PRN    ALPRAZolam (XANAX) 1 mg, 3 times daily PRN    AMITIZA 24 mcg, Oral, 2 times daily    ARIPiprazole (ABILIFY) 2 mg, Daily    aspirin 81 mg, Oral, Daily    blood sugar diagnostic Strp To check BG 2 times daily, to use with insurance preferred meter E11.9    blood-glucose meter kit To check BG 2 times daily, to use with " insurance preferred meter E11.9    clopidogreL (PLAVIX) 75 mg tablet <BR>See Instructions, TAKE ONE TABLET BY MOUTH EVERY DAY, # 90 tab(s), 3 Refill(s), Pharmacy: [place] [facility] [id number] [id number], 160, cm, Height/Length Dosing, 09/01/21 13:52:00 CDT, 85, kg, Weight Dosing, 09/01/21 13:52:00 CDT    desvenlafaxine succinate (PRISTIQ) 100 MG Tb24 No dose, route, or frequency recorded.    doxepin (SINEQUAN) 25 mg, Nightly    ezetimibe (ZETIA) 10 mg, Oral, Daily    gabapentin (NEURONTIN) 600 mg, Oral, 3 times daily, As needed for back pain    isosorbide mononitrate (IMDUR) 60 mg, Oral, Daily    lancets Misc To check BG 2 times daily, to use with insurance preferred meter E11.9    losartan (COZAAR) 100 mg, Oral, Daily, For High Blood Pressure    magnesium oxide (MAG-OX) 400 mg, Oral, 2 times daily    metFORMIN (GLUCOPHAGE-XR) 1,000 mg, Oral, 2 times daily with meals    metoclopramide HCl (REGLAN) 5 mg, 4 times daily    metoprolol succinate (TOPROL-XL) 25 mg, Oral, 2 times daily    nitroGLYCERIN (NITROSTAT) 0.4 mg, Sublingual, Every 5 min PRN    OXcarbazepine (TRILEPTAL) 300 mg, 2 times daily    pantoprazole (PROTONIX) 40 mg, Oral, Daily, For Acid Reflux    rosuvastatin (CRESTOR) 40 mg, Oral, Daily    tiZANidine (ZANAFLEX) 4 mg, Oral, Every 8 hours    TRULICITY 3 mg, Subcutaneous, Every 7 days, For Diabetes        All medications, laboratory studies, cardiac diagnostic imaging reviewed.     Lab Results   Component Value Date    LDL 43.00 (L) 01/31/2024    LDL 58.00 01/03/2023    TRIG 241 (H) 07/30/2024    TRIG 152 (H) 01/31/2024    CREATININE 0.86 09/26/2024    MG 1.70 09/17/2024    K 5.1 09/26/2024        ASSESSMENT/PLAN:    CAD s/p CABG x2 in 2021  CABG complicated by wound dehiscence and infection with sternal flap placed  Now s/p Successful intervention of LCx ISR (September 2024)  - See operative reports from recent LHCs (September 2024)  - Reports significant improvement in symptoms following recent LHC   - Occasional SOB/ESPARZA,  much improvement. No recent chest pain or other anginal or anginal equivalent symptoms   - Nuclear stress test as above- abnormal myocardial perfusion scan there was a moderate intensity, medium size, reversible perfusion abnormality in the mid to apical lateral apical walls in the typical distribution of the left circumflex territory.  See full report above  - Continue DAPT for at least 12 months, likely indefinitely   - Continue current medications as listed above  - Antianginals: Metoprolol, Imdur   - Continue SL nitro for as needed chest pain  - Echo revealed EF of 50 55%, grade 1 diastolic dysfunction, no significant valvular structural disease otherwise.  See full report above (September 2024)  - Strict ED precautions given  - No need for for further cardiac testing    Palpitations   -Resolved  -No indication for further testing today      Hypertension   - /72  - Continue current medication as above   -Counseled on the importance of following a low-sodium, heart healthy diet and exercise as tolerated     Hyperlipidemia   -LDL 52.9 per labs January 2024  -Continue rosuvastatin 40 mg daily, Zetia 10 mg daily   -Counseled on the importance of following a low-cholesterol, low-fat diet and exercise as tolerated     Nicotine dependence   -Encouraged smoking cessation  -Currently smoking 1/2 ppd and is interested in quitting   -Starting smoking cessation classes this week           Follow up in cardiology clinic in 4 months or sooner if needed   Follow up with PCP/other specialties as directed   Please notify clinic if any new concerns or any change in symptoms   ED precautions given

## 2024-09-30 ENCOUNTER — OFFICE VISIT (OUTPATIENT)
Dept: CARDIOLOGY | Facility: CLINIC | Age: 49
End: 2024-09-30
Payer: MEDICAID

## 2024-09-30 VITALS
SYSTOLIC BLOOD PRESSURE: 117 MMHG | HEART RATE: 86 BPM | HEIGHT: 64 IN | RESPIRATION RATE: 18 BRPM | WEIGHT: 189.13 LBS | TEMPERATURE: 99 F | DIASTOLIC BLOOD PRESSURE: 72 MMHG | OXYGEN SATURATION: 97 % | BODY MASS INDEX: 32.29 KG/M2

## 2024-09-30 DIAGNOSIS — I10 PRIMARY HYPERTENSION: ICD-10-CM

## 2024-09-30 DIAGNOSIS — R07.9 CHEST PAIN, UNSPECIFIED TYPE: ICD-10-CM

## 2024-09-30 DIAGNOSIS — E11.9 TYPE 2 DIABETES MELLITUS WITHOUT COMPLICATION, WITHOUT LONG-TERM CURRENT USE OF INSULIN: Primary | ICD-10-CM

## 2024-09-30 DIAGNOSIS — F17.210 CIGARETTE NICOTINE DEPENDENCE WITHOUT COMPLICATION: ICD-10-CM

## 2024-09-30 DIAGNOSIS — E78.2 MIXED HYPERLIPIDEMIA: ICD-10-CM

## 2024-09-30 DIAGNOSIS — R94.39 ABNORMAL STRESS TEST: ICD-10-CM

## 2024-09-30 DIAGNOSIS — I25.10 CORONARY ARTERY DISEASE INVOLVING NATIVE CORONARY ARTERY OF NATIVE HEART WITHOUT ANGINA PECTORIS: ICD-10-CM

## 2024-09-30 PROCEDURE — 1111F DSCHRG MED/CURRENT MED MERGE: CPT | Mod: CPTII,,,

## 2024-09-30 PROCEDURE — 3008F BODY MASS INDEX DOCD: CPT | Mod: CPTII,,,

## 2024-09-30 PROCEDURE — 99215 OFFICE O/P EST HI 40 MIN: CPT | Mod: PBBFAC

## 2024-09-30 PROCEDURE — 3074F SYST BP LT 130 MM HG: CPT | Mod: CPTII,,,

## 2024-09-30 PROCEDURE — 3061F NEG MICROALBUMINURIA REV: CPT | Mod: CPTII,,,

## 2024-09-30 PROCEDURE — 3066F NEPHROPATHY DOC TX: CPT | Mod: CPTII,,,

## 2024-09-30 PROCEDURE — 4010F ACE/ARB THERAPY RXD/TAKEN: CPT | Mod: CPTII,,,

## 2024-09-30 PROCEDURE — 3046F HEMOGLOBIN A1C LEVEL >9.0%: CPT | Mod: CPTII,,,

## 2024-09-30 PROCEDURE — 1159F MED LIST DOCD IN RCRD: CPT | Mod: CPTII,,,

## 2024-09-30 PROCEDURE — 3078F DIAST BP <80 MM HG: CPT | Mod: CPTII,,,

## 2024-09-30 PROCEDURE — 99214 OFFICE O/P EST MOD 30 MIN: CPT | Mod: S$PBB,,,

## 2024-09-30 PROCEDURE — 1160F RVW MEDS BY RX/DR IN RCRD: CPT | Mod: CPTII,,,

## 2024-09-30 NOTE — PATIENT INSTRUCTIONS
Follow up in cardiology clinic in 4 months or sooner if needed   Follow up with PCP/other specialties as directed   Please notify clinic if any new concerns or any change in symptoms   ED precautions given

## 2024-10-02 ENCOUNTER — TELEPHONE (OUTPATIENT)
Dept: SMOKING CESSATION | Facility: CLINIC | Age: 49
End: 2024-10-02
Payer: MEDICAID

## 2024-10-02 ENCOUNTER — OFFICE VISIT (OUTPATIENT)
Dept: INTERNAL MEDICINE | Facility: CLINIC | Age: 49
End: 2024-10-02
Payer: MEDICAID

## 2024-10-02 DIAGNOSIS — E11.9 TYPE 2 DIABETES MELLITUS WITHOUT COMPLICATION, WITHOUT LONG-TERM CURRENT USE OF INSULIN: Primary | ICD-10-CM

## 2024-10-02 PROCEDURE — 1159F MED LIST DOCD IN RCRD: CPT | Mod: CPTII,95,, | Performed by: NURSE PRACTITIONER

## 2024-10-02 PROCEDURE — 99214 OFFICE O/P EST MOD 30 MIN: CPT | Mod: 95,,, | Performed by: NURSE PRACTITIONER

## 2024-10-02 PROCEDURE — 1160F RVW MEDS BY RX/DR IN RCRD: CPT | Mod: CPTII,95,, | Performed by: NURSE PRACTITIONER

## 2024-10-02 PROCEDURE — 3061F NEG MICROALBUMINURIA REV: CPT | Mod: CPTII,95,, | Performed by: NURSE PRACTITIONER

## 2024-10-02 PROCEDURE — 3046F HEMOGLOBIN A1C LEVEL >9.0%: CPT | Mod: CPTII,95,, | Performed by: NURSE PRACTITIONER

## 2024-10-02 PROCEDURE — 3066F NEPHROPATHY DOC TX: CPT | Mod: CPTII,95,, | Performed by: NURSE PRACTITIONER

## 2024-10-02 PROCEDURE — 4010F ACE/ARB THERAPY RXD/TAKEN: CPT | Mod: CPTII,95,, | Performed by: NURSE PRACTITIONER

## 2024-10-02 PROCEDURE — 1111F DSCHRG MED/CURRENT MED MERGE: CPT | Mod: CPTII,95,, | Performed by: NURSE PRACTITIONER

## 2024-10-02 NOTE — TELEPHONE ENCOUNTER
Pt called stating that she needed to reschedule her appointment.  Pt stated that she canceled her appointment yesterday due to transportation issues.  Pt rescheduled to 10/15/24 at 3 pm.

## 2024-10-02 NOTE — PROGRESS NOTES
Flory De La Cruz, JOSE RAFAEL   [facility]  [address] [place] [address]      PATIENT NAME: Kaye Spring  : 1975  DATE: 10/2/24  MRN: [id number]      Reason for Visit / Chief Complaint: Diabetes       History of Present Illness / Problem Focused Workflow     Kaye Spring presents to the clinic with Diabetes     47 yo WF here for f/u. Medical problems includes GERD, BPV (self-treats with Epley), chronic rhinitis, T2DM, HLD, CAD with MI in 2019, with stenting, HTN, chronic lower back pain, ventral hernia, AUB, endometriosis, depression with anxiety, and tobacco use, 1-2 ciggs / day.  Followed by Nikki Ackerman, mental health NP for psychiatric care and med management every 3 months. Disabled. Followed by [facility].      Cervical Cancer Screening - F/U [facility] GYN Annually  Breast Cancer Screening - 23 MMG  Osteoporosis Screening -24 Vitamin D Level 39.3  Diabetic Screening -    Eye Screening-   Dental Screening -     10/02/2024  Patient here today via virtual for type 2 diabetes follow up.  Labs completed and reviewed, A1c improved from last visit.  Patient reports compliance with all medications.  Patient is agreeable to increase in dosage of Trulicity to 4.5 mg weekly at this time.  We will follow up again in about 6 weeks via virtual with labs for evaluation and for a med review.  Patient denies any acute issues or concerns at this time.                 Review of Systems     Review of Systems   Constitutional:  Negative for activity change and unexpected weight change.   HENT:  Negative for hearing loss, rhinorrhea and trouble swallowing.    Eyes:  Negative for discharge and visual disturbance.   Respiratory:  Negative for chest tightness and wheezing.    Cardiovascular:  Negative for chest pain and palpitations.   Gastrointestinal:  Negative for blood in stool, constipation, diarrhea and vomiting.   Endocrine:  Negative for polydipsia and polyuria.   Genitourinary:  Negative for difficulty urinating, dysuria, hematuria and menstrual problem.   Musculoskeletal:  Negative for arthralgias, joint swelling and neck pain.   Neurological:  Negative for weakness and headaches.   Psychiatric/Behavioral:  Negative for confusion and dysphoric mood.          Medications and Allergies     Medications  Medication List with Changes/Refills   New Medications    DULAGLUTIDE (TRULICITY) 4.5 MG/0.5 ML PEN INJECTOR    Inject 4.5 mg into the skin every 7 days. For Diabetes   Current Medications    ACCU-CHEK GUIDE ME GLUCOSE MTR MISC        ALBUTEROL (PROVENTIL/VENTOLIN HFA) 90 MCG/ACTUATION INHALER    Inhale 2 puffs into the lungs 4 (four) times daily as needed for Wheezing or Shortness of Breath.    ALPRAZOLAM (XANAX) 1 MG TABLET    Take 1 mg by mouth 3 (three) times daily as needed.    AMITIZA 24 MCG CAP    Take 1 capsule (24 mcg total) by mouth 2 (two) times daily.    ARIPIPRAZOLE (ABILIFY) 2 MG TAB    Take 2 mg by mouth once daily.    ASPIRIN 81 MG CHEW    Take 1 tablet (81 mg total) by mouth once daily.    BLOOD SUGAR DIAGNOSTIC STRP    To check BG 2 times daily, to use with insurance preferred meter E11.9    BLOOD-GLUCOSE METER KIT    To check BG 2 times daily, to use with insurance preferred meter E11.9    CLOPIDOGREL (PLAVIX) 75 MG TABLET    See Instructions, TAKE ONE TABLET BY MOUTH EVERY DAY, # 90 tab(s), 3 Refill(s), Pharmacy: [facility] [id number] [id number], 160, cm, Height/Length Dosing, 09/01/21 13:52:00 CDT, 85, kg, Weight Dosing, 09/01/21 13:52:00 CDT    DESVENLAFAXINE SUCCINATE (PRISTIQ) 100 MG TB24        DOXEPIN (SINEQUAN) 25 MG CAPSULE    Take 25 mg by mouth nightly.    EZETIMIBE (ZETIA) 10 MG TABLET    Take 1 tablet (10 mg total) by mouth once daily.    GABAPENTIN (NEURONTIN) 600 MG TABLET    Take 1 tablet (600 mg total) by mouth 3 (three) times daily. As needed for back pain    ISOSORBIDE MONONITRATE (IMDUR) 60 MG 24 HR TABLET     Take 1 tablet (60 mg total) by mouth once daily.    LANCETS MISC    To check BG 2 times daily, to use with insurance preferred meter E11.9    LOSARTAN (COZAAR) 100 MG TABLET    Take 1 tablet (100 mg total) by mouth once daily. For High Blood Pressure    METFORMIN (GLUCOPHAGE-XR) 500 MG ER 24HR TABLET    Take 2 tablets (1,000 mg total) by mouth 2 (two) times daily with meals.    METOCLOPRAMIDE HCL (REGLAN) 5 MG TABLET    Take 5 mg by mouth 4 (four) times daily.    METOPROLOL SUCCINATE (TOPROL-XL) 25 MG 24 HR TABLET    Take 1 tablet (25 mg total) by mouth 2 (two) times daily.    NITROGLYCERIN (NITROSTAT) 0.4 MG SL TABLET    Place 1 tablet (0.4 mg total) under the tongue every 5 (five) minutes as needed for Chest pain.    OXCARBAZEPINE (TRILEPTAL) 300 MG TAB    Take 300 mg by mouth 2 (two) times daily.    PANTOPRAZOLE (PROTONIX) 40 MG TABLET    Take 1 tablet (40 mg total) by mouth once daily. For Acid Reflux    ROSUVASTATIN (CRESTOR) 40 MG TAB    Take 1 tablet (40 mg total) by mouth once daily.    TIZANIDINE (ZANAFLEX) 4 MG TABLET    Take 1 tablet (4 mg total) by mouth every 8 (eight) hours.   Discontinued Medications    DULAGLUTIDE (TRULICITY) 3 MG/0.5 ML PEN INJECTOR    Inject 3 mg into the skin every 7 days. For Diabetes    MAGNESIUM OXIDE (MAG-OX) 400 MG (241.3 MG MAGNESIUM) TABLET    Take 1 tablet (400 mg total) by mouth 2 (two) times daily.         Allergies  Review of patient's allergies indicates:  No Known Allergies    Physical Examination   There were no vitals filed for this visit.  Physical Exam  HENT:      Right Ear: Hearing normal.      Left Ear: Hearing normal.   Neurological:      Mental Status: She is alert and oriented to person, place, and time.   Psychiatric:         Mood and Affect: Mood normal.           Results     Lab Results   Component Value Date    WBC 9.73 09/17/2024    RBC 4.17 (L) 09/17/2024    HGB 12.3 09/17/2024    HCT 37.1 09/17/2024    MCV 89.0 09/17/2024    MCH 29.5 09/17/2024    [facility]  33.2 09/17/2024    RDW 13.3 09/17/2024     09/17/2024    MPV 11.6 (H) 09/17/2024     Sodium   Date Value Ref Range Status   09/26/2024 136 136 - 145 mmol/L Final   09/30/2023 134 (L) 136 - 145 mmol/L Final     Potassium   Date Value Ref Range Status   09/26/2024 5.1 3.5 - 5.1 mmol/L Final   09/30/2023 4.2 3.5 - 5.1 mmol/L Final     Comment:     Slight hemolysis present, interpret results with caution     Chloride   Date Value Ref Range Status   09/26/2024 104 98 - 107 mmol/L Final   09/30/2023 102 100 - 109 mmol/L Final     CO2   Date Value Ref Range Status   09/26/2024 24 22 - 29 mmol/L Final     Carbon Dioxide   Date Value Ref Range Status   09/30/2023 20 (L) 22 - 33 mmol/L Final     Blood Urea Nitrogen   Date Value Ref Range Status   09/26/2024 4.6 (L) 7.0 - 18.7 mg/dL Final   09/30/2023 8 5 - 25 mg/dL Final     Creatinine   Date Value Ref Range Status   09/26/2024 0.86 0.55 - 1.02 mg/dL Final   09/30/2023 0.80 0.57 - 1.25 mg/dL Final     Calcium   Date Value Ref Range Status   09/26/2024 10.4 (H) 8.4 - 10.2 mg/dL Final   09/30/2023 8.4 (L) 8.8 - 10.6 mg/dL Final     Albumin   Date Value Ref Range Status   09/26/2024 4.2 3.5 - 5.0 g/dL Final     Bilirubin Total   Date Value Ref Range Status   09/26/2024 0.3 <=1.5 mg/dL Final     ALP   Date Value Ref Range Status   09/26/2024 104 40 - 150 unit/L Final     AST   Date Value Ref Range Status   09/26/2024 24 5 - 34 unit/L Final     ALT   Date Value Ref Range Status   09/26/2024 47 0 - 55 unit/L Final     Anion Gap   Date Value Ref Range Status   09/30/2023 12 8 - 16 mmol/L Final     eGFR    Date Value Ref Range Status   09/30/2023 91 mL/min/1.73mSq Final     Comment:     In accordance with NKF-ASN Task Force recommendation, calculation based on the Chronic Kidney Disease Epidemiology Collaboration (CKD-EPI) equation without adjustment for race. eGFR adjusted for gender and age and calculated in ml/min/1.73mSquared. eGFR cannot be calculated if  patient is under 18 years of age.     Reference Range:   >= 60 ml/min/1.73mSquared.     Estimated GFR-Non    Date Value Ref Range Status   06/28/2022 >60 mls/min/1.73/m2 Final     Lab Results   Component Value Date    CHOL 118 07/30/2024     Lab Results   Component Value Date    HDL 47 07/30/2024     Lab Results   Component Value Date    TRIG 241 (H) 07/30/2024     Lab Results   Component Value Date    VLDL 30 01/31/2024     Lab Results   Component Value Date    LDL 43.00 (L) 01/31/2024     Lab Results   Component Value Date    TSH 2.601 01/31/2024     Lab Results   Component Value Date    PHUR 5.0 09/26/2024    PROTEINUA Negative 09/26/2024    GLUCUA 1+ (A) 09/26/2024    KETONESU Negative 09/13/2021    OCCULTUA Negative 09/26/2024    NITRITE Negative 09/26/2024    LEUKOCYTESUR Negative 09/26/2024     Lab Results   Component Value Date    HGBA1C 9.5 (H) 09/26/2024    HGBA1C 11.0 (H) 07/30/2024    HGBA1C 7.0 01/31/2024           Assessment         ICD-10-CM ICD-9-CM   1. Type 2 diabetes mellitus without complication, without long-term current use of insulin  E11.9 250.00       Plan      Problem List Items Addressed This Visit          Endocrine    Type 2 diabetes mellitus without complication, without long-term current use of insulin - Primary    Relevant Medications    dulaglutide (TRULICITY) 4.5 mg/0.5 mL pen injector    Other Relevant Orders    Basic Metabolic Panel    Hemoglobin A1C    Urinalysis, Reflex to Urine Culture       Future Appointments   Date Time Provider Department Center   10/15/2024  3:00 PM Marlyn Valencia CTTS [facility] [place]   11/13/2024  2:00 PM Flory De La Cruz FNP [facility] [place]   2/5/2025 11:15 AM Yael Guerrero, [facility] [place]            Signature:     [facility]  [address] [address]    Date of encounter: 10/2/24    The patient location is: home  The  chief complaint leading to consultation is: lab review    Visit type: audiovisual    Face to Face time with patient: 15  20 minutes of total time spent on the encounter, which includes face to face time and non-face to face time preparing to see the patient (eg, review of tests), Obtaining and/or reviewing separately obtained history, Documenting clinical information in the electronic or other health record, Independently interpreting results (not separately reported) and communicating results to the patient/family/caregiver, or Care coordination (not separately reported).         Each patient to whom he or she provides medical services by telemedicine is:  (1) informed of the relationship between the physician and patient and the respective role of any other health care provider with respect to management of the patient; and (2) notified that he or she may decline to receive medical services by telemedicine and may withdraw from such care at any time.    Notes:

## 2024-10-11 ENCOUNTER — TELEPHONE (OUTPATIENT)
Dept: INTERNAL MEDICINE | Facility: CLINIC | Age: 49
End: 2024-10-11
Payer: MEDICAID

## 2024-10-11 NOTE — TELEPHONE ENCOUNTER
----- Message from Lisa sent at 10/10/2024 12:22 PM CDT -----  Regarding: medication  Who Called: Kaye Spring    Caller is requesting assistance/information from provider's office.    Symptoms (please be specific):     Rx name: dulaglutide (TRULICITY) 4.5 mg/0.5 mL pen injector 4 pen 5 10/2/2024 3/31/2025 No  Sig - Route: Inject 4.5 mg into the skin every 7 days. For Diabetes - Subcutaneous       How long has patient had these symptoms:      List of preferred pharmacies on file (remove unneeded): [unfilled]  If different, enter pharmacy into here including location and phone number:         Preferred Method of Contact: Phone Call  Patient's Preferred Phone Number on File: [phone]   Best Call Back Number, if different:  Additional Information: Pt states that her insurance is refusing to cover the costs of her trulicity since increasing to the 4.5 mg; pt states that a prior authorization is needed; please advise.

## 2024-10-11 NOTE — TELEPHONE ENCOUNTER
PA initiated for dulaglutide (TRULICITY) 4.5 mg/0.5 mL pen injector. Waiting on response from insurance company for approval/denial.

## 2024-10-15 ENCOUNTER — CLINICAL SUPPORT (OUTPATIENT)
Dept: SMOKING CESSATION | Facility: CLINIC | Age: 49
End: 2024-10-15
Payer: COMMERCIAL

## 2024-10-15 DIAGNOSIS — F17.200 NICOTINE DEPENDENCE: Primary | ICD-10-CM

## 2024-10-15 PROCEDURE — 99404 PREV MED CNSL INDIV APPRX 60: CPT | Mod: S$GLB,,,

## 2024-10-15 RX ORDER — IBUPROFEN 200 MG
1 TABLET ORAL DAILY
Qty: 28 PATCH | Refills: 0 | Status: SHIPPED | OUTPATIENT
Start: 2024-10-15

## 2024-10-15 RX ORDER — DM/P-EPHED/ACETAMINOPH/DOXYLAM 30-7.5/3
2 LIQUID (ML) ORAL
Qty: 108 LOZENGE | Refills: 0 | Status: SHIPPED | OUTPATIENT
Start: 2024-10-15

## 2024-10-16 ENCOUNTER — TELEPHONE (OUTPATIENT)
Dept: INTERNAL MEDICINE | Facility: CLINIC | Age: 49
End: 2024-10-16
Payer: MEDICAID

## 2024-10-16 NOTE — TELEPHONE ENCOUNTER
----- Message from Herb Medina sent at 10/11/2024 11:00 AM CDT -----  Regarding: advice  Who Called: Kaye Spring    Caller is requesting assistance/information from provider's office.    Symptoms (please be specific):    How long has patient had these symptoms:    List of preferred pharmacies on file (remove unneeded): [unfilled]  If different, enter pharmacy into here including location and phone number:       Preferred Method of Contact: Phone Call  Patient's Preferred Phone Number on File: [phone]   Best Call Back Number, if different:    Additional Information: Pt requested a call back from nurse in regards to her medication. Please advise

## 2024-10-16 NOTE — TELEPHONE ENCOUNTER
I contacted patient   Because she had trulicity 3 ml filled on 9/21/2024 , she is taking 3 ml as prescribed, insurance will not pay for 4.5 ml until 10/21/2024. They may pay for 1.5 ml to equal 4.5 ml.   NOV is 11/13/2024 ,so  if she starts taking 4.5 ml on 10/21 she will have been taking for 3 1/2 weeks at NOV time.

## 2024-10-16 NOTE — PROGRESS NOTES
Patient will be participating in tobacco cessation meetings and will begin the prescribed tobacco cessation medication regimen of 21 mg nicotine patch QD and 2 mg nicotine lozenge PRN (1-2 per hour in place of cigarettes). Patient currently smokes 10-13 cigarettes per day and vapes with 5% nicotine. Pt stated that she vapes 2-3 times per day and her vape lasts more than one month.  Discussed the role of tobacco cessation program, role of nicotine replacement therapy (NRT) and behavioral changes to assist the patient to reach her goal of being tobacco free. Education and instruction on the role of the NRT, usage and proper placement of the patch and lozenge. Pt started on rate reduction and wait time of 15 min prior to smoking. Pt's exhaled carbon monoxide level was 25 ppm as per Smokerlyzer. (non- smoker = 0-5 ppm.)

## 2024-10-22 ENCOUNTER — TELEPHONE (OUTPATIENT)
Dept: INTERNAL MEDICINE | Facility: CLINIC | Age: 49
End: 2024-10-22

## 2024-11-05 ENCOUNTER — CLINICAL SUPPORT (OUTPATIENT)
Dept: SMOKING CESSATION | Facility: CLINIC | Age: 49
End: 2024-11-05
Payer: COMMERCIAL

## 2024-11-05 DIAGNOSIS — F17.200 NICOTINE DEPENDENCE: Primary | ICD-10-CM

## 2024-11-05 PROCEDURE — 99404 PREV MED CNSL INDIV APPRX 60: CPT | Mod: S$GLB,,,

## 2024-11-05 RX ORDER — BUPROPION HYDROCHLORIDE 150 MG/1
150 TABLET, EXTENDED RELEASE ORAL 2 TIMES DAILY
Qty: 60 TABLET | Refills: 0 | Status: SHIPPED | OUTPATIENT
Start: 2024-11-05 | End: 2025-11-05

## 2024-11-05 RX ORDER — DM/P-EPHED/ACETAMINOPH/DOXYLAM 30-7.5/3
2 LIQUID (ML) ORAL
Qty: 108 LOZENGE | Refills: 0 | Status: SHIPPED | OUTPATIENT
Start: 2024-11-05

## 2024-11-05 RX ORDER — IBUPROFEN 200 MG
1 TABLET ORAL DAILY
Qty: 28 PATCH | Refills: 0 | Status: SHIPPED | OUTPATIENT
Start: 2024-11-05

## 2024-11-05 NOTE — PROGRESS NOTES
Individual Follow-Up Form    11/5/2024    Quit Date:     Clinical Status of Patient: Outpatient    Length of Service: 60 minutes    Continuing Medication: yes  Patches or Nicotine Lozenges    Other Medications: Wellbutrin     Target Symptoms: Withdrawal and medication side effects. The following were  rated moderate (3) to severe (4) on TCRS:  Moderate (3): none  Severe (4): none    Comments: Patient presents for follow up smoking 20 cigarettes per day. Pt stated that she is no longer vaping.  Pt stated that she has not been using the 21 mg nicotine patches or the 2 mg nicotine lozenges.  Pt stated that she read that she could not smoke with a patch on therefore she quit using the patches.  Reviewed usage and placement of nicotine patches.  Pt requested to try Wellbutrin as well.  Discussed Wellbutrin, how to take it and side effects.  Will order 150 mg Wellbutrin SR BID. Discussed smoking triggers.  Pt smokes due to boredom and stress.  Encouraged pt to find a new hobby or find things to do for distraction.  Pt enjoys coloring.  Discussed alternative ways to deal with stress rather than smoking; deep breathing ( discussed 4-7-8 breathing technique), meditation or exercise. Discussed coping skills and gave pt a handout on coping skills to keep next to her pack of cigarettes. Reviewed learned addiction model, personal reasons for quitting, medications, goals, quit date. Encouraged pt to move her cigarettes, smoke outdoors only, make her vehicle tobacco free, use her tobacco cessation medication daily, and wait 15 minutes before smoking each cigarette.  Reviewed rate reduction.  Pt will decrease by 2 cigarettes each week. Will follow up with pt in a few weeks. Pt's exhaled carbon monoxide level was 32 ppm as per Smokerlyzer. (non- smoker = 0-5 ppm.)     Diagnosis: F17.200    Next Visit: 2 weeks

## 2024-11-11 ENCOUNTER — LAB VISIT (OUTPATIENT)
Dept: LAB | Facility: HOSPITAL | Age: 49
End: 2024-11-11
Attending: NURSE PRACTITIONER
Payer: MEDICAID

## 2024-11-11 DIAGNOSIS — E11.9 TYPE 2 DIABETES MELLITUS WITHOUT COMPLICATION, WITHOUT LONG-TERM CURRENT USE OF INSULIN: ICD-10-CM

## 2024-11-11 DIAGNOSIS — F17.200 NICOTINE DEPENDENCE: Primary | ICD-10-CM

## 2024-11-11 LAB
ANION GAP SERPL CALC-SCNC: 8 MEQ/L (ref 2–13)
BACTERIA #/AREA URNS AUTO: ABNORMAL /HPF
BILIRUB UR QL STRIP.AUTO: NEGATIVE
BUN SERPL-MCNC: 6 MG/DL (ref 7–20)
CALCIUM SERPL-MCNC: 10.2 MG/DL (ref 8.4–10.2)
CHLORIDE SERPL-SCNC: 103 MMOL/L (ref 98–110)
CLARITY UR: ABNORMAL
CO2 SERPL-SCNC: 24 MMOL/L (ref 21–32)
COLOR UR AUTO: YELLOW
CREAT SERPL-MCNC: 0.71 MG/DL (ref 0.66–1.25)
CREAT/UREA NIT SERPL: 8 (ref 12–20)
EST. AVERAGE GLUCOSE BLD GHB EST-MCNC: 223.1 MG/DL (ref 70–115)
GFR SERPLBLD CREATININE-BSD FMLA CKD-EPI: >90 ML/MIN/1.73/M2
GLUCOSE SERPL-MCNC: 272 MG/DL (ref 70–115)
GLUCOSE UR QL STRIP: >=1000
HBA1C MFR BLD: 9.4 % (ref 4–6)
HGB UR QL STRIP: NEGATIVE
KETONES UR QL STRIP: NEGATIVE
LEUKOCYTE ESTERASE UR QL STRIP: NEGATIVE
NITRITE UR QL STRIP: POSITIVE
PH UR STRIP: 6 [PH]
POTASSIUM SERPL-SCNC: 4.5 MMOL/L (ref 3.5–5.1)
PROT UR QL STRIP: NEGATIVE
RBC #/AREA URNS AUTO: ABNORMAL /HPF
SODIUM SERPL-SCNC: 135 MMOL/L (ref 136–145)
SP GR UR STRIP.AUTO: 1.02 (ref 1–1.03)
SQUAMOUS #/AREA URNS AUTO: ABNORMAL /HPF
UROBILINOGEN UR STRIP-ACNC: 0.2
WBC #/AREA URNS AUTO: ABNORMAL /HPF

## 2024-11-11 PROCEDURE — 36415 COLL VENOUS BLD VENIPUNCTURE: CPT

## 2024-11-11 PROCEDURE — 87077 CULTURE AEROBIC IDENTIFY: CPT

## 2024-11-11 PROCEDURE — 81015 MICROSCOPIC EXAM OF URINE: CPT

## 2024-11-11 PROCEDURE — 80048 BASIC METABOLIC PNL TOTAL CA: CPT

## 2024-11-11 PROCEDURE — 81003 URINALYSIS AUTO W/O SCOPE: CPT

## 2024-11-11 PROCEDURE — 83036 HEMOGLOBIN GLYCOSYLATED A1C: CPT

## 2024-11-11 RX ORDER — NICOTINE POLACRILEX 2 MG/1
2 LOZENGE ORAL
Qty: 108 EACH | Refills: 0 | Status: SHIPPED | OUTPATIENT
Start: 2024-11-11

## 2024-11-11 NOTE — PROGRESS NOTES
Labs reviewed. Upcoming appointment noted, will review with patient at that time.   JOSE RAFAEL Calvin

## 2024-11-13 ENCOUNTER — OFFICE VISIT (OUTPATIENT)
Dept: INTERNAL MEDICINE | Facility: CLINIC | Age: 49
End: 2024-11-13
Payer: MEDICAID

## 2024-11-13 DIAGNOSIS — N39.0 URINARY TRACT INFECTION WITHOUT HEMATURIA, SITE UNSPECIFIED: ICD-10-CM

## 2024-11-13 DIAGNOSIS — E11.9 TYPE 2 DIABETES MELLITUS WITHOUT COMPLICATION, WITHOUT LONG-TERM CURRENT USE OF INSULIN: Primary | ICD-10-CM

## 2024-11-13 DIAGNOSIS — Z00.00 WELLNESS EXAMINATION: ICD-10-CM

## 2024-11-13 LAB — BACTERIA UR CULT: ABNORMAL

## 2024-11-13 PROCEDURE — 3046F HEMOGLOBIN A1C LEVEL >9.0%: CPT | Mod: CPTII,95,, | Performed by: NURSE PRACTITIONER

## 2024-11-13 PROCEDURE — 1160F RVW MEDS BY RX/DR IN RCRD: CPT | Mod: CPTII,95,, | Performed by: NURSE PRACTITIONER

## 2024-11-13 PROCEDURE — 3066F NEPHROPATHY DOC TX: CPT | Mod: CPTII,95,, | Performed by: NURSE PRACTITIONER

## 2024-11-13 PROCEDURE — 99214 OFFICE O/P EST MOD 30 MIN: CPT | Mod: 95,,, | Performed by: NURSE PRACTITIONER

## 2024-11-13 PROCEDURE — 3061F NEG MICROALBUMINURIA REV: CPT | Mod: CPTII,95,, | Performed by: NURSE PRACTITIONER

## 2024-11-13 PROCEDURE — 1159F MED LIST DOCD IN RCRD: CPT | Mod: CPTII,95,, | Performed by: NURSE PRACTITIONER

## 2024-11-13 PROCEDURE — 4010F ACE/ARB THERAPY RXD/TAKEN: CPT | Mod: CPTII,95,, | Performed by: NURSE PRACTITIONER

## 2024-11-13 RX ORDER — CIPROFLOXACIN 500 MG/1
500 TABLET ORAL EVERY 12 HOURS
Qty: 20 TABLET | Refills: 0 | Status: SHIPPED | OUTPATIENT
Start: 2024-11-13 | End: 2024-11-23

## 2024-11-13 NOTE — PROGRESS NOTES
Flory De La Cruz, JOSE RAFAEL   [facility]  [address] [facility] [address]      PATIENT NAME: Kaye Spring  : 1975  DATE: 24  MRN: [id number]      Reason for Visit / Chief Complaint: Diabetes       History of Present Illness / Problem Focused Workflow     Kaye Spring presents to the clinic with Diabetes     49 yo WF here for f/u. Medical problems includes GERD, BPV (self-treats with Epley), chronic rhinitis, T2DM, HLD, CAD with MI in 2019, with stenting, HTN, chronic lower back pain, ventral hernia, AUB, endometriosis, depression with anxiety, and tobacco use, 1-2 ciggs / day.  Followed by Nikki Ackerman, mental health NP for psychiatric care and med management every 3 months. Disabled. Followed by [facility].      Cervical Cancer Screening - F/U [facility] Annually  Breast Cancer Screening - 23 MMG  Osteoporosis Screening -24 Vitamin D Level 39.3  Diabetic Screening -    Eye Screening-   Dental Screening -     10/02/2024  Patient here today via virtual for type 2 diabetes follow up.  Labs completed and reviewed, A1c improved from last visit.  Patient reports compliance with all medications.  Patient is agreeable to increase in dosage of Trulicity to 4.5 mg weekly at this time.  We will follow up again in about 6 weeks via virtual with labs for evaluation and for a med review.  Patient denies any acute issues or concerns at this time.    2024  Pt here today via virtual for T2DM F./U; at our LOV the pt dosage of Trulicty increased to 4.5 mg weekly however the pt reports that she did not start increased dosage until 10/21 which would only about 3 weeks ago at this time and would not show a great improvement. Will continue pt on current medication regiment at this time. We will f/u again in about 2 months F2F for Wellness with labs. Pt does report as well with some dysuria and burning, discussed positive  Urine culture results, will tx with appropriate AB today. Pt to follow up as directed or prn.             Review of Systems     Review of Systems   Constitutional: Negative.  Negative for activity change and unexpected weight change.   HENT: Negative.  Negative for hearing loss, rhinorrhea and trouble swallowing.    Eyes: Negative.  Negative for discharge and visual disturbance.   Respiratory: Negative.  Negative for chest tightness and wheezing.    Cardiovascular: Negative.  Negative for chest pain and palpitations.   Gastrointestinal: Negative.  Negative for blood in stool, constipation, diarrhea and vomiting.   Endocrine: Negative for polydipsia.   Genitourinary: Negative.  Negative for difficulty urinating, dysuria, hematuria and menstrual problem.   Musculoskeletal:  Negative for joint swelling and neck pain.   Neurological: Negative.  Negative for weakness and headaches.   Psychiatric/Behavioral: Negative.  Negative for confusion and dysphoric mood.          Medications and Allergies     Medications  Medication List with Changes/Refills   New Medications    CIPROFLOXACIN HCL (CIPRO) 500 MG TABLET    Take 1 tablet (500 mg total) by mouth every 12 (twelve) hours. For UTI. Take with food. for 10 days   Current Medications    ACCU-CHEK GUIDE ME GLUCOSE MTR MISC        ALBUTEROL (PROVENTIL/VENTOLIN HFA) 90 MCG/ACTUATION INHALER    Inhale 2 puffs into the lungs 4 (four) times daily as needed for Wheezing or Shortness of Breath.    ALPRAZOLAM (XANAX) 1 MG TABLET    Take 1 mg by mouth 3 (three) times daily as needed.    AMITIZA 24 MCG CAP    Take 1 capsule (24 mcg total) by mouth 2 (two) times daily.    ARIPIPRAZOLE (ABILIFY) 2 MG TAB    Take 2 mg by mouth once daily.    ASPIRIN 81 MG CHEW    Take 1 tablet (81 mg total) by mouth once daily.    BLOOD SUGAR DIAGNOSTIC STRP    To check BG 2 times daily, to use with insurance preferred meter E11.9    BLOOD-GLUCOSE METER KIT    To check BG 2 times daily, to use with  insurance preferred meter E11.9    BUPROPION (WELLBUTRIN SR) 150 MG TBSR 12 HR TABLET    Take 1 tablet (150 mg total) by mouth 2 (two) times daily.    CLOPIDOGREL (PLAVIX) 75 MG TABLET    See Instructions, TAKE ONE TABLET BY MOUTH EVERY DAY, # 90 tab(s), 3 Refill(s), Pharmacy: [facility] [id number] [id number], 160, cm, Height/Length Dosing, 09/01/21 13:52:00 CDT, 85, kg, Weight Dosing, 09/01/21 13:52:00 CDT    DESVENLAFAXINE SUCCINATE (PRISTIQ) 100 MG TB24        DOXEPIN (SINEQUAN) 25 MG CAPSULE    Take 25 mg by mouth nightly.    DULAGLUTIDE (TRULICITY) 4.5 MG/0.5 ML PEN INJECTOR    Inject 4.5 mg into the skin every 7 days. For Diabetes    EZETIMIBE (ZETIA) 10 MG TABLET    Take 1 tablet (10 mg total) by mouth once daily.    GABAPENTIN (NEURONTIN) 600 MG TABLET    Take 1 tablet (600 mg total) by mouth 3 (three) times daily. As needed for back pain    ISOSORBIDE MONONITRATE (IMDUR) 60 MG 24 HR TABLET    Take 1 tablet (60 mg total) by mouth once daily.    LANCETS MISC    To check BG 2 times daily, to use with insurance preferred meter E11.9    LOSARTAN (COZAAR) 100 MG TABLET    Take 1 tablet (100 mg total) by mouth once daily. For High Blood Pressure    METFORMIN (GLUCOPHAGE-XR) 500 MG ER 24HR TABLET    Take 2 tablets (1,000 mg total) by mouth 2 (two) times daily with meals.    METOCLOPRAMIDE HCL (REGLAN) 5 MG TABLET    Take 5 mg by mouth 4 (four) times daily.    METOPROLOL SUCCINATE (TOPROL-XL) 25 MG 24 HR TABLET    Take 1 tablet (25 mg total) by mouth 2 (two) times daily.    NICOTINE (NICODERM CQ) 21 MG/24 HR    Place 1 patch onto the skin once daily.    NICOTINE POLACRILEX 2 MG LOZG    Take 1 lozenge (2 mg total) by mouth as needed (Do not exceed more than 10 pieces in 24 hours).    NICOTINE, POLACRILEX, 2 MG LZMN    Place 1 each (2 mg total) inside cheek as needed (Do not exceed more than 10 pieces in 24 hours).    NITROGLYCERIN (NITROSTAT) 0.4 MG SL TABLET    Place 1 tablet (0.4 mg total) under the tongue every 5 (five)  minutes as needed for Chest pain.    OXCARBAZEPINE (TRILEPTAL) 300 MG TAB    Take 300 mg by mouth 2 (two) times daily.    PANTOPRAZOLE (PROTONIX) 40 MG TABLET    Take 1 tablet (40 mg total) by mouth once daily. For Acid Reflux    ROSUVASTATIN (CRESTOR) 40 MG TAB    Take 1 tablet (40 mg total) by mouth once daily.    TIZANIDINE (ZANAFLEX) 4 MG TABLET    Take 1 tablet (4 mg total) by mouth every 8 (eight) hours.         Allergies  Review of patient's allergies indicates:  No Known Allergies    Physical Examination   There were no vitals filed for this visit.  Physical Exam  HENT:      Right Ear: Hearing normal.      Left Ear: Hearing normal.   Neurological:      Mental Status: She is alert and oriented to person, place, and time.   Psychiatric:         Mood and Affect: Mood normal.           Results     Lab Results   Component Value Date    WBC 9.73 09/17/2024    RBC 4.17 (L) 09/17/2024    HGB 12.3 09/17/2024    HCT 37.1 09/17/2024    MCV 89.0 09/17/2024    MCH 29.5 09/17/2024    MCHC 33.2 09/17/2024    RDW 13.3 09/17/2024     09/17/2024    MPV 11.6 (H) 09/17/2024     Sodium   Date Value Ref Range Status   11/11/2024 135 (L) 136 - 145 mmol/L Final   09/30/2023 134 (L) 136 - 145 mmol/L Final     Potassium   Date Value Ref Range Status   11/11/2024 4.5 3.5 - 5.1 mmol/L Final   09/30/2023 4.2 3.5 - 5.1 mmol/L Final     Comment:     Slight hemolysis present, interpret results with caution     Chloride   Date Value Ref Range Status   11/11/2024 103 98 - 110 mmol/L Final   09/30/2023 102 100 - 109 mmol/L Final     CO2   Date Value Ref Range Status   11/11/2024 24 21 - 32 mmol/L Final     Carbon Dioxide   Date Value Ref Range Status   09/30/2023 20 (L) 22 - 33 mmol/L Final     Blood Urea Nitrogen   Date Value Ref Range Status   11/11/2024 6 (L) 7.0 - 20.0 mg/dL Final   09/30/2023 8 5 - 25 mg/dL Final     Creatinine   Date Value Ref Range Status   11/11/2024 0.71 0.66 - 1.25 mg/dL Final   09/30/2023 0.80 0.57 - 1.25  mg/dL Final     Calcium   Date Value Ref Range Status   11/11/2024 10.2 8.4 - 10.2 mg/dL Final   09/30/2023 8.4 (L) 8.8 - 10.6 mg/dL Final     Albumin   Date Value Ref Range Status   09/26/2024 4.2 3.5 - 5.0 g/dL Final     Bilirubin Total   Date Value Ref Range Status   09/26/2024 0.3 <=1.5 mg/dL Final     ALP   Date Value Ref Range Status   09/26/2024 104 40 - 150 unit/L Final     AST   Date Value Ref Range Status   09/26/2024 24 5 - 34 unit/L Final     ALT   Date Value Ref Range Status   09/26/2024 47 0 - 55 unit/L Final     Anion Gap   Date Value Ref Range Status   09/30/2023 12 8 - 16 mmol/L Final     eGFR    Date Value Ref Range Status   09/30/2023 91 mL/min/1.73mSq Final     Comment:     In accordance with NKF-ASN Task Force recommendation, calculation based on the Chronic Kidney Disease Epidemiology Collaboration (CKD-EPI) equation without adjustment for race. eGFR adjusted for gender and age and calculated in ml/min/1.73mSquared. eGFR cannot be calculated if patient is under 18 years of age.     Reference Range:   >= 60 ml/min/1.73mSquared.     Estimated GFR-Non    Date Value Ref Range Status   06/28/2022 >60 mls/min/1.73/m2 Final     Lab Results   Component Value Date    CHOL 118 07/30/2024     Lab Results   Component Value Date    HDL 47 07/30/2024     Lab Results   Component Value Date    TRIG 241 (H) 07/30/2024     Lab Results   Component Value Date    VLDL 30 01/31/2024     Lab Results   Component Value Date    LDL 43.00 (L) 01/31/2024     Lab Results   Component Value Date    TSH 2.601 01/31/2024     Lab Results   Component Value Date    PHUR 6.0 11/11/2024    PROTEINUA Negative 11/11/2024    GLUCUA >=1000 (A) 11/11/2024    KETONESU Negative 09/13/2021    OCCULTUA Negative 11/11/2024    NITRITE Positive (A) 11/11/2024    LEUKOCYTESUR Negative 11/11/2024     Lab Results   Component Value Date    HGBA1C 9.4 (H) 11/11/2024    HGBA1C 9.5 (H) 09/26/2024    HGBA1C 11.0 (H)  07/30/2024           Assessment         ICD-10-CM ICD-9-CM   1. Type 2 diabetes mellitus without complication, without long-term current use of insulin  E11.9 250.00   2. Urinary tract infection without hematuria, site unspecified  N39.0 599.0   3. Wellness examination  Z00.00 V70.0       Plan      Problem List Items Addressed This Visit          Renal/    Urinary tract infection without hematuria    Current Assessment & Plan     Start RX cipro 500 mg BID x 10 days  Start antibiotics as prescribed.   Complete the full course of the medication.  Report any continuing signs such as nausea/vomiting,visible blood in urine, increased low back or flank pain, worsening burning upon urination after antibiotic completion or fever.  Drink plenty of water.  Avoid soda or carbonated beverages.   Urinate frequently; do not hold urine for extended periods of time.  Wear cotton underwear, avoid tight fitting pants.  Use OTC AZO or pyridium for relief of urinary spasms.  Women: wipe front to back, urinate after sexual intercourse, and avoid scented or irritating feminine products.           Relevant Medications    ciprofloxacin HCl (CIPRO) 500 MG tablet       Endocrine    Type 2 diabetes mellitus without complication, without long-term current use of insulin - Primary    Current Assessment & Plan     A1C Above goal   Continue RX metformin 1000 mg BID, Trulicity 3 mg weekly, and Lantus 15 units q hs  2 month f/u with labs  Follow ADA diet.  Avoid soda, simple sweets, and limit rice/pasta/bread/starches and consume brown options when possible.   Maintain healthy weight with BMI goal <30.   Perform aerobic exercise for 150 minutes per week (or 5 days a week for 30 minutes each day).   Examine feet daily.     Lab Results   Component Value Date    HGBA1C 9.4 (H) 11/11/2024              Relevant Orders    Hemoglobin A1C    Comprehensive Metabolic Panel    Microalbumin/Creatinine Ratio, Urine     Other Visit Diagnoses       Wellness  examination        Relevant Orders    Vitamin D    Urinalysis, Reflex to Urine Culture    T4, Free    TSH    Lipid Panel    Comprehensive Metabolic Panel    CBC Auto Differential            Future Appointments   Date Time Provider Department Center   12/5/2024  1:00 PM Marlyn Valencia, HAYDEE LGOCO [facility] [place]   2/5/2025 11:15 AM Yael Guerrero PA-C [facility] [place]            Signature:     [facility]  [address] [facility][1] [phone]    Date of encounter: 11/13/24      The patient location is: home  The chief complaint leading to consultation is: T2DM Lab Review    Visit type: audiovisual    Face to Face time with patient: 15  20 minutes of total time spent on the encounter, which includes face to face time and non-face to face time preparing to see the patient (eg, review of tests), Obtaining and/or reviewing separately obtained history, Documenting clinical information in the electronic or other health record, Independently interpreting results (not separately reported) and communicating results to the patient/family/caregiver, or Care coordination (not separately reported).         Each patient to whom he or she provides medical services by telemedicine is:  (1) informed of the relationship between the physician and patient and the respective role of any other health care provider with respect to management of the patient; and (2) notified that he or she may decline to receive medical services by telemedicine and may withdraw from such care at any time.    Notes:

## 2024-11-13 NOTE — ASSESSMENT & PLAN NOTE
A1C Above goal   Continue RX metformin 1000 mg BID, Trulicity 3 mg weekly, and Lantus 15 units q hs  2 month f/u with labs  Follow ADA diet.  Avoid soda, simple sweets, and limit rice/pasta/bread/starches and consume brown options when possible.   Maintain healthy weight with BMI goal <30.   Perform aerobic exercise for 150 minutes per week (or 5 days a week for 30 minutes each day).   Examine feet daily.     Lab Results   Component Value Date    HGBA1C 9.4 (H) 11/11/2024

## 2024-11-13 NOTE — ASSESSMENT & PLAN NOTE
Start RX cipro 500 mg BID x 10 days  Start antibiotics as prescribed.   Complete the full course of the medication.  Report any continuing signs such as nausea/vomiting,visible blood in urine, increased low back or flank pain, worsening burning upon urination after antibiotic completion or fever.  Drink plenty of water.  Avoid soda or carbonated beverages.   Urinate frequently; do not hold urine for extended periods of time.  Wear cotton underwear, avoid tight fitting pants.  Use OTC AZO or pyridium for relief of urinary spasms.  Women: wipe front to back, urinate after sexual intercourse, and avoid scented or irritating feminine products.

## 2024-11-27 DIAGNOSIS — I25.10 CORONARY ARTERY DISEASE, UNSPECIFIED VESSEL OR LESION TYPE, UNSPECIFIED WHETHER ANGINA PRESENT, UNSPECIFIED WHETHER NATIVE OR TRANSPLANTED HEART: ICD-10-CM

## 2024-11-27 RX ORDER — ROSUVASTATIN CALCIUM 40 MG/1
40 TABLET, COATED ORAL
Qty: 90 TABLET | Refills: 3 | Status: SHIPPED | OUTPATIENT
Start: 2024-11-27

## 2024-12-04 ENCOUNTER — TELEPHONE (OUTPATIENT)
Dept: SMOKING CESSATION | Facility: CLINIC | Age: 49
End: 2024-12-04
Payer: MEDICAID

## 2024-12-04 DIAGNOSIS — F17.200 NICOTINE DEPENDENCE: ICD-10-CM

## 2024-12-04 RX ORDER — IBUPROFEN 200 MG
1 TABLET ORAL DAILY
Qty: 28 PATCH | Refills: 0 | Status: SHIPPED | OUTPATIENT
Start: 2024-12-04

## 2024-12-04 RX ORDER — BUPROPION HYDROCHLORIDE 150 MG/1
150 TABLET, EXTENDED RELEASE ORAL 2 TIMES DAILY
Qty: 60 TABLET | Refills: 0 | Status: SHIPPED | OUTPATIENT
Start: 2024-12-04 | End: 2025-12-04

## 2024-12-04 NOTE — TELEPHONE ENCOUNTER
Contacted pt to remind her of her smoking cessation appointment on tomorrow.  Pt requested to reschedule.  Pt rescheduled to Jan. 2, 2025 at 6 pm.

## 2024-12-05 DIAGNOSIS — I25.10 CORONARY ARTERY DISEASE, UNSPECIFIED VESSEL OR LESION TYPE, UNSPECIFIED WHETHER ANGINA PRESENT, UNSPECIFIED WHETHER NATIVE OR TRANSPLANTED HEART: ICD-10-CM

## 2024-12-05 RX ORDER — METOPROLOL SUCCINATE 25 MG/1
25 TABLET, EXTENDED RELEASE ORAL 2 TIMES DAILY
Qty: 90 TABLET | Refills: 3 | Status: SHIPPED | OUTPATIENT
Start: 2024-12-05

## 2024-12-05 NOTE — TELEPHONE ENCOUNTER
Called requesting a refill on her METOPROLOL SUCC. To be sent to [facility] in [place].

## 2025-01-02 ENCOUNTER — CLINICAL SUPPORT (OUTPATIENT)
Dept: SMOKING CESSATION | Facility: CLINIC | Age: 50
End: 2025-01-02
Payer: COMMERCIAL

## 2025-01-02 DIAGNOSIS — F17.200 NICOTINE DEPENDENCE: Primary | ICD-10-CM

## 2025-01-02 PROCEDURE — 99402 PREV MED CNSL INDIV APPRX 30: CPT | Mod: S$GLB,,,

## 2025-01-02 RX ORDER — IBUPROFEN 200 MG
1 TABLET ORAL DAILY
Qty: 28 PATCH | Refills: 0 | Status: SHIPPED | OUTPATIENT
Start: 2025-01-02

## 2025-01-02 RX ORDER — BUPROPION HYDROCHLORIDE 150 MG/1
150 TABLET, EXTENDED RELEASE ORAL 2 TIMES DAILY
Qty: 60 TABLET | Refills: 0 | Status: SHIPPED | OUTPATIENT
Start: 2025-01-02 | End: 2026-01-02

## 2025-01-03 DIAGNOSIS — I25.10 CORONARY ARTERY DISEASE, UNSPECIFIED VESSEL OR LESION TYPE, UNSPECIFIED WHETHER ANGINA PRESENT, UNSPECIFIED WHETHER NATIVE OR TRANSPLANTED HEART: ICD-10-CM

## 2025-01-03 DIAGNOSIS — E11.9 TYPE 2 DIABETES MELLITUS WITHOUT COMPLICATION, WITHOUT LONG-TERM CURRENT USE OF INSULIN: ICD-10-CM

## 2025-01-03 RX ORDER — METFORMIN HYDROCHLORIDE 500 MG/1
1000 TABLET, EXTENDED RELEASE ORAL 2 TIMES DAILY WITH MEALS
Qty: 120 TABLET | Refills: 5 | OUTPATIENT
Start: 2025-01-03

## 2025-01-03 RX ORDER — ISOSORBIDE MONONITRATE 60 MG/1
60 TABLET, EXTENDED RELEASE ORAL
Qty: 90 TABLET | Refills: 2 | Status: SHIPPED | OUTPATIENT
Start: 2025-01-03

## 2025-01-03 NOTE — PROGRESS NOTES
Individual Follow-Up Form    1/2/2025    Quit Date:     Clinical Status of Patient: Outpatient    Length of Service: 30 minutes    Continuing Medication: yes  Wellbutrin, Patches, or Nicotine Lozenges    Other Medications: none     Target Symptoms: Withdrawal and medication side effects. The following were  rated moderate (3) to severe (4) on TCRS:  Moderate (3): Wellbutrin causing shakes  Severe (4): none    Comments: Spoke with pt via phone regarding smoking cessation progress. Pt is smoking 16-17 cigarettes per day.  Pt remains on tobacco cessation medication of 150 mg Wellbutrin BID, 21 mg nicotine patch QD and 2 mg nicotine lozenge PRN.  Pt stated that the Wellbutrin is causing her hands to shake a little but it is not bad and she does not feel that she needs to discontinue taking it.  Pt stated that she is still not vaping.  Pt continues to smoke inside of her home.  Encouraged pt to smoke outdoors only.  Pt stated that it is too cold for her to smoke outdoors.  Discussed that you have to make things difficult in order to break the habit of smoking.  Encouraged pt to commit to smoking in only 1 room inside of her home. Pt stated that she has been coloring for distraction. Pt encouraged to pick a quit day. Reviewed coping strategies and habitual behavior with patient. Reviewed rate reduction again.  Encouraged pt to decrease by 2 cigarettes each week.  Commended pt on her progress thus far. Will follow up with pt in the office in a few weeks.    Diagnosis: F17.200    Next Visit: 2 weeks

## 2025-01-14 ENCOUNTER — TELEPHONE (OUTPATIENT)
Dept: INTERNAL MEDICINE | Facility: CLINIC | Age: 50
End: 2025-01-14
Payer: MEDICAID

## 2025-01-14 NOTE — TELEPHONE ENCOUNTER
----- Message from Katy sent at 1/13/2025 12:00 PM CST -----  .Who Called: Kaye Spring        Preferred Method of Contact: Phone Call  Patient's Preferred Phone Number on File: [phone]   Best Call Back Number, if different:  Additional Information: pt asking for virtual visit for her wellness  starting new job

## 2025-01-23 ENCOUNTER — TELEPHONE (OUTPATIENT)
Dept: SMOKING CESSATION | Facility: CLINIC | Age: 50
End: 2025-01-23
Payer: MEDICAID

## 2025-01-23 ENCOUNTER — CLINICAL SUPPORT (OUTPATIENT)
Dept: SMOKING CESSATION | Facility: CLINIC | Age: 50
End: 2025-01-23
Payer: COMMERCIAL

## 2025-01-23 DIAGNOSIS — F17.200 NICOTINE DEPENDENCE: Primary | ICD-10-CM

## 2025-01-23 PROCEDURE — 99407 BEHAV CHNG SMOKING > 10 MIN: CPT | Mod: S$GLB,,,

## 2025-01-23 PROCEDURE — 99999 PR PBB SHADOW E&M-EST. PATIENT-LVL I: CPT | Mod: PBBFAC,,,

## 2025-01-27 NOTE — PROGRESS NOTES
Spoke with patient today in regard to smoking cessation progress for  3 month telephone follow up, she states not tobacco free. Patient is currently enrolled in the program and states the use of Wellbutrin and the nicotine patch to help aid in her quit attempt. Informed patient of benefit period, future follow ups, and contact information if any further help or support is needed. Will complete smart form for 3 month follow up on Quit attempt #1.

## 2025-01-29 ENCOUNTER — CLINICAL SUPPORT (OUTPATIENT)
Dept: SMOKING CESSATION | Facility: CLINIC | Age: 50
End: 2025-01-29
Payer: COMMERCIAL

## 2025-01-29 DIAGNOSIS — F17.200 NICOTINE DEPENDENCE: Primary | ICD-10-CM

## 2025-01-29 PROCEDURE — 99402 PREV MED CNSL INDIV APPRX 30: CPT | Mod: S$GLB,,,

## 2025-01-29 RX ORDER — BUPROPION HYDROCHLORIDE 150 MG/1
150 TABLET, EXTENDED RELEASE ORAL 2 TIMES DAILY
Qty: 60 TABLET | Refills: 0 | Status: SHIPPED | OUTPATIENT
Start: 2025-01-29 | End: 2026-01-29

## 2025-01-29 NOTE — PROGRESS NOTES
Individual Follow-Up Form    1/29/2025    Quit Date:     Clinical Status of Patient: Outpatient    Length of Service: 30 minutes    Continuing Medication: yes  Wellbutrin, Patches, or Nicotine Lozenges    Other Medications: none     Target Symptoms: Withdrawal and medication side effects. The following were  rated moderate (3) to severe (4) on TCRS:  Moderate (3): none  Severe (4): none    Comments: Pt had not shown up for her tobacco cessation follow up appointment.  Called and spoke with pt via phone tobacco cessation progress.  Pt stated that her  just started a new job and they have only 1 vehicle, therefore she did not have transportation to come in today. Pt stated that she has been sick and has not been smoking as much.  Pt is smoking 6-7 cigarettes per day.  Pt remains on tobacco cessation regimen of 150 mg Wellbutrin, 21 mg nicotine patch QD, 2 mg nicotine lozenge PRN.  No adverse effects noted at this time.  Pt stated that she is keeping her cigarettes in a kitchen drawer and is smoking inside of her laundry room.  Reviewed strategies, cues, and triggers. Introduced the negative impact of tobacco on health, the health advantages of discontinuing the use of tobacco, time line improved health changes after a quit, withdrawal issues to expect from nicotine and habit, and ways to achieve the goal of a quit. Commended pt on her progress thus far.  Encouraged pt move her cigarettes again, wait 15 minutes before smoking each cigarette and find things to do for distraction.  Discussed trying Chantix.  Pt stated that she wants to speak with her cardiologist first.  Will follow up with pt in a few weeks.     Diagnosis: F17.200    Next Visit: 2 weeks

## 2025-01-30 ENCOUNTER — LAB VISIT (OUTPATIENT)
Dept: LAB | Facility: HOSPITAL | Age: 50
End: 2025-01-30
Attending: NURSE PRACTITIONER
Payer: MEDICAID

## 2025-01-30 DIAGNOSIS — E11.9 TYPE 2 DIABETES MELLITUS WITHOUT COMPLICATION, WITHOUT LONG-TERM CURRENT USE OF INSULIN: ICD-10-CM

## 2025-01-30 DIAGNOSIS — Z00.00 WELLNESS EXAMINATION: ICD-10-CM

## 2025-01-30 LAB
25(OH)D3+25(OH)D2 SERPL-MCNC: 59 NG/ML (ref 30–80)
ALBUMIN SERPL-MCNC: 4.5 G/DL (ref 3.4–5)
ALBUMIN/GLOB SERPL: 1.7 RATIO
ALP SERPL-CCNC: 94 UNIT/L (ref 50–144)
ALT SERPL-CCNC: 28 UNIT/L (ref 1–45)
ANION GAP SERPL CALC-SCNC: 9 MEQ/L (ref 2–13)
AST SERPL-CCNC: 30 UNIT/L (ref 14–36)
BASOPHILS # BLD AUTO: 0.06 X10(3)/MCL (ref 0.01–0.08)
BASOPHILS NFR BLD AUTO: 0.5 % (ref 0.1–1.2)
BILIRUB SERPL-MCNC: 0.4 MG/DL (ref 0–1)
BILIRUB UR QL STRIP.AUTO: NEGATIVE
BUN SERPL-MCNC: 4 MG/DL (ref 7–20)
CALCIUM SERPL-MCNC: 9.6 MG/DL (ref 8.4–10.2)
CHLORIDE SERPL-SCNC: 104 MMOL/L (ref 98–110)
CHOLEST SERPL-MCNC: 88 MG/DL (ref 0–200)
CLARITY UR: CLEAR
CO2 SERPL-SCNC: 23 MMOL/L (ref 21–32)
COLOR UR AUTO: YELLOW
CREAT SERPL-MCNC: 0.78 MG/DL (ref 0.66–1.25)
CREAT UR-MCNC: 164.6 MG/DL (ref 45–106)
CREAT/UREA NIT SERPL: 5 (ref 12–20)
EOSINOPHIL # BLD AUTO: 0.09 X10(3)/MCL (ref 0.04–0.36)
EOSINOPHIL NFR BLD AUTO: 0.8 % (ref 0.7–7)
ERYTHROCYTE [DISTWIDTH] IN BLOOD BY AUTOMATED COUNT: 12.4 % (ref 11–14.5)
EST. AVERAGE GLUCOSE BLD GHB EST-MCNC: 260.4 MG/DL (ref 70–115)
GFR SERPLBLD CREATININE-BSD FMLA CKD-EPI: >90 ML/MIN/1.73/M2
GLOBULIN SER-MCNC: 2.7 GM/DL (ref 2–3.9)
GLUCOSE SERPL-MCNC: 190 MG/DL (ref 70–115)
GLUCOSE UR QL STRIP: 250
HBA1C MFR BLD: 10.7 % (ref 4–6)
HCT VFR BLD AUTO: 44.1 % (ref 36–48)
HDLC SERPL-MCNC: 39 MG/DL (ref 40–60)
HGB BLD-MCNC: 14.9 G/DL (ref 11.8–16)
HGB UR QL STRIP: NEGATIVE
IMM GRANULOCYTES # BLD AUTO: 0.05 X10(3)/MCL (ref 0–0.03)
IMM GRANULOCYTES NFR BLD AUTO: 0.5 % (ref 0–0.5)
KETONES UR QL STRIP: NEGATIVE
LDLC SERPL DIRECT ASSAY-SCNC: 30.8 MG/DL (ref 30–100)
LEUKOCYTE ESTERASE UR QL STRIP: NEGATIVE
LYMPHOCYTES # BLD AUTO: 3.84 X10(3)/MCL (ref 1.16–3.74)
LYMPHOCYTES NFR BLD AUTO: 34.8 % (ref 20–55)
MCH RBC QN AUTO: 27.7 PG (ref 27–34)
MCHC RBC AUTO-ENTMCNC: 33.8 G/DL (ref 31–37)
MCV RBC AUTO: 82 FL (ref 79–99)
MICROALBUMIN UR-MCNC: 18.1 UG/ML
MICROALBUMIN/CREAT RATIO PNL UR: 11 MG/GM CR (ref 0–30)
MONOCYTES # BLD AUTO: 0.59 X10(3)/MCL (ref 0.24–0.36)
MONOCYTES NFR BLD AUTO: 5.3 % (ref 4.7–12.5)
NEUTROPHILS # BLD AUTO: 6.42 X10(3)/MCL (ref 1.56–6.13)
NEUTROPHILS NFR BLD AUTO: 58.1 % (ref 37–73)
NITRITE UR QL STRIP: NEGATIVE
NRBC BLD AUTO-RTO: 0 %
PH UR STRIP: 6 [PH]
PLATELET # BLD AUTO: 266 X10(3)/MCL (ref 140–371)
PMV BLD AUTO: 10.8 FL (ref 9.4–12.4)
POTASSIUM SERPL-SCNC: 3.5 MMOL/L (ref 3.5–5.1)
PROT SERPL-MCNC: 7.2 GM/DL (ref 6.3–8.2)
PROT UR QL STRIP: NEGATIVE
RBC # BLD AUTO: 5.38 X10(6)/MCL (ref 4–5.1)
SODIUM SERPL-SCNC: 136 MMOL/L (ref 136–145)
SP GR UR STRIP.AUTO: 1.02 (ref 1–1.03)
T4 FREE SERPL-MCNC: 0.89 NG/DL (ref 0.78–2.19)
TRIGL SERPL-MCNC: 174 MG/DL (ref 30–200)
TSH SERPL-ACNC: 2.79 UIU/ML (ref 0.36–3.74)
UROBILINOGEN UR STRIP-ACNC: 0.2
WBC # BLD AUTO: 11.05 X10(3)/MCL (ref 4–11.5)

## 2025-01-30 PROCEDURE — 84443 ASSAY THYROID STIM HORMONE: CPT

## 2025-01-30 PROCEDURE — 83036 HEMOGLOBIN GLYCOSYLATED A1C: CPT

## 2025-01-30 PROCEDURE — 84439 ASSAY OF FREE THYROXINE: CPT

## 2025-01-30 PROCEDURE — 82043 UR ALBUMIN QUANTITATIVE: CPT

## 2025-01-30 PROCEDURE — 80061 LIPID PANEL: CPT

## 2025-01-30 PROCEDURE — 36415 COLL VENOUS BLD VENIPUNCTURE: CPT

## 2025-01-30 PROCEDURE — 82306 VITAMIN D 25 HYDROXY: CPT

## 2025-01-30 PROCEDURE — 80053 COMPREHEN METABOLIC PANEL: CPT

## 2025-01-30 PROCEDURE — 81003 URINALYSIS AUTO W/O SCOPE: CPT

## 2025-01-30 PROCEDURE — 85025 COMPLETE CBC W/AUTO DIFF WBC: CPT

## 2025-02-03 DIAGNOSIS — K59.09 CHRONIC CONSTIPATION: ICD-10-CM

## 2025-02-03 RX ORDER — LUBIPROSTONE 24 UG/1
24 CAPSULE, GELATIN COATED ORAL 2 TIMES DAILY
Qty: 180 CAPSULE | Refills: 4 | OUTPATIENT
Start: 2025-02-03 | End: 2026-04-29

## 2025-02-04 ENCOUNTER — CLINICAL SUPPORT (OUTPATIENT)
Dept: SMOKING CESSATION | Facility: CLINIC | Age: 50
End: 2025-02-04
Payer: COMMERCIAL

## 2025-02-04 ENCOUNTER — OFFICE VISIT (OUTPATIENT)
Dept: INTERNAL MEDICINE | Facility: CLINIC | Age: 50
End: 2025-02-04
Payer: MEDICAID

## 2025-02-04 VITALS
BODY MASS INDEX: 32.83 KG/M2 | RESPIRATION RATE: 18 BRPM | OXYGEN SATURATION: 99 % | HEART RATE: 88 BPM | DIASTOLIC BLOOD PRESSURE: 83 MMHG | WEIGHT: 192.31 LBS | HEIGHT: 64 IN | SYSTOLIC BLOOD PRESSURE: 133 MMHG | TEMPERATURE: 98 F

## 2025-02-04 DIAGNOSIS — M54.50 CHRONIC LOW BACK PAIN, UNSPECIFIED BACK PAIN LATERALITY, UNSPECIFIED WHETHER SCIATICA PRESENT: ICD-10-CM

## 2025-02-04 DIAGNOSIS — G89.29 CHRONIC LOW BACK PAIN, UNSPECIFIED BACK PAIN LATERALITY, UNSPECIFIED WHETHER SCIATICA PRESENT: ICD-10-CM

## 2025-02-04 DIAGNOSIS — M79.2 NERVE PAIN: ICD-10-CM

## 2025-02-04 DIAGNOSIS — Z12.31 BREAST CANCER SCREENING BY MAMMOGRAM: ICD-10-CM

## 2025-02-04 DIAGNOSIS — E11.65 TYPE 2 DIABETES MELLITUS WITH HYPERGLYCEMIA, WITHOUT LONG-TERM CURRENT USE OF INSULIN: Primary | ICD-10-CM

## 2025-02-04 DIAGNOSIS — F17.200 NICOTINE DEPENDENCE: Primary | ICD-10-CM

## 2025-02-04 DIAGNOSIS — J31.0 CHRONIC RHINITIS: ICD-10-CM

## 2025-02-04 PROCEDURE — 3008F BODY MASS INDEX DOCD: CPT | Mod: CPTII,,, | Performed by: NURSE PRACTITIONER

## 2025-02-04 PROCEDURE — 99215 OFFICE O/P EST HI 40 MIN: CPT | Mod: PBBFAC | Performed by: NURSE PRACTITIONER

## 2025-02-04 PROCEDURE — 3046F HEMOGLOBIN A1C LEVEL >9.0%: CPT | Mod: CPTII,,, | Performed by: NURSE PRACTITIONER

## 2025-02-04 PROCEDURE — 99214 OFFICE O/P EST MOD 30 MIN: CPT | Mod: S$PBB,,, | Performed by: NURSE PRACTITIONER

## 2025-02-04 PROCEDURE — G2211 COMPLEX E/M VISIT ADD ON: HCPCS | Mod: S$PBB,,, | Performed by: NURSE PRACTITIONER

## 2025-02-04 PROCEDURE — 3075F SYST BP GE 130 - 139MM HG: CPT | Mod: CPTII,,, | Performed by: NURSE PRACTITIONER

## 2025-02-04 PROCEDURE — 3061F NEG MICROALBUMINURIA REV: CPT | Mod: CPTII,,, | Performed by: NURSE PRACTITIONER

## 2025-02-04 PROCEDURE — 1159F MED LIST DOCD IN RCRD: CPT | Mod: CPTII,,, | Performed by: NURSE PRACTITIONER

## 2025-02-04 PROCEDURE — 4010F ACE/ARB THERAPY RXD/TAKEN: CPT | Mod: CPTII,,, | Performed by: NURSE PRACTITIONER

## 2025-02-04 PROCEDURE — 1160F RVW MEDS BY RX/DR IN RCRD: CPT | Mod: CPTII,,, | Performed by: NURSE PRACTITIONER

## 2025-02-04 PROCEDURE — 3079F DIAST BP 80-89 MM HG: CPT | Mod: CPTII,,, | Performed by: NURSE PRACTITIONER

## 2025-02-04 PROCEDURE — 3066F NEPHROPATHY DOC TX: CPT | Mod: CPTII,,, | Performed by: NURSE PRACTITIONER

## 2025-02-04 PROCEDURE — 99407 BEHAV CHNG SMOKING > 10 MIN: CPT | Mod: S$GLB,,,

## 2025-02-04 RX ORDER — METFORMIN HYDROCHLORIDE 500 MG/1
1000 TABLET, EXTENDED RELEASE ORAL 2 TIMES DAILY WITH MEALS
Qty: 360 TABLET | Refills: 1 | Status: SHIPPED | OUTPATIENT
Start: 2025-02-04 | End: 2025-08-03

## 2025-02-04 RX ORDER — INSULIN PUMP SYRINGE, 3 ML
EACH MISCELLANEOUS
Qty: 1 EACH | Refills: 0 | Status: SHIPPED | OUTPATIENT
Start: 2025-02-04

## 2025-02-04 RX ORDER — GLIPIZIDE 10 MG/1
10 TABLET ORAL
Qty: 180 TABLET | Refills: 0 | Status: SHIPPED | OUTPATIENT
Start: 2025-02-04 | End: 2025-05-05

## 2025-02-04 RX ORDER — ALBUTEROL SULFATE 90 UG/1
2 INHALANT RESPIRATORY (INHALATION) 4 TIMES DAILY PRN
Qty: 18 G | Refills: 12 | Status: SHIPPED | OUTPATIENT
Start: 2025-02-04

## 2025-02-04 RX ORDER — LANCETS
EACH MISCELLANEOUS
Qty: 100 EACH | Refills: 11 | Status: SHIPPED | OUTPATIENT
Start: 2025-02-04

## 2025-02-04 RX ORDER — GABAPENTIN 600 MG/1
600 TABLET ORAL 3 TIMES DAILY
Qty: 90 TABLET | Refills: 8 | Status: SHIPPED | OUTPATIENT
Start: 2025-02-04 | End: 2025-11-01

## 2025-02-04 RX ORDER — VARENICLINE TARTRATE 1 MG/1
TABLET, FILM COATED ORAL
Qty: 56 TABLET | Refills: 0 | Status: SHIPPED | OUTPATIENT
Start: 2025-02-04

## 2025-02-04 NOTE — ASSESSMENT & PLAN NOTE
A1C Above goal   Continue RX metformin 1000 mg BID, Trulicity 3 mg weekly, and Lantus 15 units q hs.  Start RX glipizide 10 mg BID  2 month f/u with labs  Follow ADA diet.  Avoid soda, simple sweets, and limit rice/pasta/bread/starches and consume brown options when possible.   Maintain healthy weight with BMI goal <30.   Perform aerobic exercise for 150 minutes per week (or 5 days a week for 30 minutes each day).   Examine feet daily.     Lab Results   Component Value Date    HGBA1C 10.7 (H) 01/30/2025

## 2025-02-04 NOTE — PROGRESS NOTES
Flory De La Cruz, JOSE RAFAEL   [facility]  [address] [address]      PATIENT NAME: Kaye Spring  : 1975  DATE: 25  MRN: [id number]      Reason for Visit / Chief Complaint: wellness (Lab results)       History of Present Illness / Problem Focused Workflow     Kaye Spring presents to the clinic with wellness (Lab results)     50 yo WF here for f/u. Medical problems includes GERD, BPV (self-treats with Epley), chronic rhinitis, T2DM, HLD, CAD with MI in 2019, with stenting, HTN, chronic lower back pain, ventral hernia, AUB, endometriosis, depression with anxiety, and tobacco use, 1-2 ciggs / day.  Followed by Nikki Ackerman, mental health NP for psychiatric care and med management every 3 months. Disabled. Followed by [facility]iology Services.      Cervical Cancer Screening - F/U [facility] GYN Annually  Breast Cancer Screening - 23 MMG  Osteoporosis Screening -25 Vitamin D Level 50  Diabetic Screening -    Eye Screening-   Dental Screening -   Wellness- 2025  Pt here today via virtual for T2DM F./U; at our LOV the pt dosage of Trulicty increased to 4.5 mg weekly however the pt reports that she did not start increased dosage until 10/21 which would only about 3 weeks ago at this time and would not show a great improvement. Will continue pt on current medication regiment at this time. We will f/u again in about 2 months F2F for Wellness with labs. Pt does report as well with some dysuria and burning, discussed positive Urine culture results, will tx with appropriate AB today. Pt to follow up as directed or prn.     2025  Pt here today for Wellness with labs; Pt A1C increased since last evaluation from 9.4 to 10.7. Pt reports compliance with meds however reports that he diet has not been the best especially over the week of the snow storm. Discussed with pt average of the A1C over 3 months. Pt is  agreeable to adding glipizide 10 mg BID to regimen to assist with elevated glucose levels. Pt requesting new testing supplies today, will keep log for AM fasting glucose levels. Will f/u in about 2 months with labs via [facility].   Visit today included increased complexity associated with the care of the episodic problem T2DM addressed and managing the longitudinal care of the patient due to the serious and/or complex managed problem(s) T2DM.                  Review of Systems     Review of Systems   Constitutional: Negative.  Negative for activity change and unexpected weight change.   HENT: Negative.  Negative for hearing loss, rhinorrhea and trouble swallowing.    Eyes: Negative.  Negative for discharge and visual disturbance.   Respiratory: Negative.  Negative for chest tightness and wheezing.    Cardiovascular: Negative.  Negative for chest pain and palpitations.   Gastrointestinal: Negative.  Negative for blood in stool, constipation, diarrhea and vomiting.   Endocrine: Negative.  Negative for polydipsia.   Genitourinary: Negative.  Negative for difficulty urinating, dysuria, hematuria and menstrual problem.   Musculoskeletal:  Negative for joint swelling and neck pain.   Neurological: Negative.  Negative for weakness and headaches.   Psychiatric/Behavioral: Negative.  Negative for confusion and dysphoric mood.          Medications and Allergies     Medications  Medication List with Changes/Refills   New Medications    GLIPIZIDE (GLUCOTROL) 10 MG TABLET    Take 1 tablet (10 mg total) by mouth 2 (two) times daily before meals. For Diabetes   Current Medications    ACCU-CHEK GUIDE ME GLUCOSE MTR MISC        ALPRAZOLAM (XANAX) 1 MG TABLET    Take 1 mg by mouth 3 (three) times daily as needed.    AMITIZA 24 MCG CAP    Take 1 capsule (24 mcg total) by mouth 2 (two) times daily.    ARIPIPRAZOLE (ABILIFY) 2 MG TAB    Take 2 mg by mouth once daily.    ASPIRIN 81 MG CHEW    Take 1 tablet (81 mg total) by  mouth once daily.    BUPROPION (WELLBUTRIN SR) 150 MG TBSR 12 HR TABLET    Take 1 tablet (150 mg total) by mouth 2 (two) times daily.    CLOPIDOGREL (PLAVIX) 75 MG TABLET    See Instructions, TAKE ONE TABLET BY MOUTH EVERY DAY, # 90 tab(s), 3 Refill(s), Pharmacy: [place] [address] [id number], 160, cm, Height/Length Dosing, 09/01/21 13:52:00 CDT, 85, kg, Weight Dosing, 09/01/21 13:52:00 CDT    DESVENLAFAXINE SUCCINATE (PRISTIQ) 100 MG TB24        DOXEPIN (SINEQUAN) 25 MG CAPSULE    Take 25 mg by mouth nightly.    EZETIMIBE (ZETIA) 10 MG TABLET    Take 1 tablet (10 mg total) by mouth once daily.    ISOSORBIDE MONONITRATE (IMDUR) 60 MG 24 HR TABLET    TAKE 1 TABLET BY MOUTH ONCE DAILY    LOSARTAN (COZAAR) 100 MG TABLET    Take 1 tablet (100 mg total) by mouth once daily. For High Blood Pressure    METOCLOPRAMIDE HCL (REGLAN) 5 MG TABLET    Take 5 mg by mouth 4 (four) times daily.    METOPROLOL SUCCINATE (TOPROL-XL) 25 MG 24 HR TABLET    Take 1 tablet (25 mg total) by mouth 2 (two) times daily.    NICOTINE (NICODERM CQ) 21 MG/24 HR    Place 1 patch onto the skin once daily.    NICOTINE POLACRILEX 2 MG LOZG    Take 1 lozenge (2 mg total) by mouth as needed (Do not exceed more than 10 pieces in 24 hours).    NICOTINE, POLACRILEX, 2 MG LZMN    Place 1 each (2 mg total) inside cheek as needed (Do not exceed more than 10 pieces in 24 hours).    NITROGLYCERIN (NITROSTAT) 0.4 MG SL TABLET    Place 1 tablet (0.4 mg total) under the tongue every 5 (five) minutes as needed for Chest pain.    OXCARBAZEPINE (TRILEPTAL) 300 MG TAB    Take 300 mg by mouth 2 (two) times daily.    PANTOPRAZOLE (PROTONIX) 40 MG TABLET    Take 1 tablet (40 mg total) by mouth once daily. For Acid Reflux    ROSUVASTATIN (CRESTOR) 40 MG TAB    TAKE 1 TABLET BY MOUTH EVERY DAY    TIZANIDINE (ZANAFLEX) 4 MG TABLET    Take 1 tablet (4 mg total) by mouth every 8 (eight) hours.   Changed and/or Refilled Medications    Modified Medication Previous Medication     ALBUTEROL (PROVENTIL/VENTOLIN HFA) 90 MCG/ACTUATION INHALER albuterol (PROVENTIL/VENTOLIN HFA) 90 mcg/actuation inhaler       Inhale 2 puffs into the lungs 4 (four) times daily as needed for Wheezing or Shortness of Breath.    Inhale 2 puffs into the lungs 4 (four) times daily as needed for Wheezing or Shortness of Breath.    BLOOD SUGAR DIAGNOSTIC STRP blood sugar diagnostic Strp       To check BG 2 times daily, to use with insurance preferred meter E11.9    To check BG 2 times daily, to use with insurance preferred meter E11.9    BLOOD-GLUCOSE METER KIT blood-glucose meter kit       To check BG 2 times daily, to use with insurance preferred meter E11.9    To check BG 2 times daily, to use with insurance preferred meter E11.9    DULAGLUTIDE (TRULICITY) 4.5 MG/0.5 ML PEN INJECTOR dulaglutide (TRULICITY) 4.5 mg/0.5 mL pen injector       Inject 4.5 mg into the skin every 7 days. For Diabetes    Inject 4.5 mg into the skin every 7 days. For Diabetes    GABAPENTIN (NEURONTIN) 600 MG TABLET gabapentin (NEURONTIN) 600 MG tablet       Take 1 tablet (600 mg total) by mouth 3 (three) times daily. As needed for back pain    Take 1 tablet (600 mg total) by mouth 3 (three) times daily. As needed for back pain    LANCETS MISC lancets Misc       To check BG 2 times daily, to use with insurance preferred meter E11.9    To check BG 2 times daily, to use with insurance preferred meter E11.9    METFORMIN (GLUCOPHAGE-XR) 500 MG ER 24HR TABLET metFORMIN (GLUCOPHAGE-XR) 500 MG ER 24hr tablet       Take 2 tablets (1,000 mg total) by mouth 2 (two) times daily with meals.    Take 2 tablets (1,000 mg total) by mouth 2 (two) times daily with meals.         Allergies  Review of patient's allergies indicates:  No Known Allergies    Physical Examination     Vitals:    02/04/25 0944   BP: 133/83   Pulse: 88   Resp: 18   Temp: 98.1 °F (36.7 °C)     Physical Exam  Vitals reviewed.   Constitutional:       Appearance: Normal appearance. She is  normal weight.   HENT:      Head: Normocephalic.      Right Ear: Hearing normal.      Left Ear: Hearing normal.   Cardiovascular:      Rate and Rhythm: Normal rate and regular rhythm.      Pulses: Normal pulses.      Heart sounds: Normal heart sounds.   Pulmonary:      Effort: Pulmonary effort is normal.      Breath sounds: Normal breath sounds.   Abdominal:      General: Abdomen is flat.      Palpations: Abdomen is soft.   Musculoskeletal:         General: Normal range of motion.      Cervical back: Normal range of motion.   Skin:     General: Skin is warm and dry.   Neurological:      Mental Status: She is alert and oriented to person, place, and time.   Psychiatric:         Mood and Affect: Mood normal.           Results     Lab Results   Component Value Date    WBC 11.05 01/30/2025    RBC 5.38 (H) 01/30/2025    HGB 14.9 01/30/2025    HCT 44.1 01/30/2025    MCV 82.0 01/30/2025    MCH 27.7 01/30/2025    MCHC 33.8 01/30/2025    RDW 12.4 01/30/2025     01/30/2025    MPV 10.8 01/30/2025     Sodium   Date Value Ref Range Status   01/30/2025 136 136 - 145 mmol/L Final   09/30/2023 134 (L) 136 - 145 mmol/L Final     Potassium   Date Value Ref Range Status   01/30/2025 3.5 3.5 - 5.1 mmol/L Final   09/30/2023 4.2 3.5 - 5.1 mmol/L Final     Comment:     Slight hemolysis present, interpret results with caution     Chloride   Date Value Ref Range Status   01/30/2025 104 98 - 110 mmol/L Final   09/30/2023 102 100 - 109 mmol/L Final     CO2   Date Value Ref Range Status   01/30/2025 23 21 - 32 mmol/L Final     Carbon Dioxide   Date Value Ref Range Status   09/30/2023 20 (L) 22 - 33 mmol/L Final     Blood Urea Nitrogen   Date Value Ref Range Status   01/30/2025 4 (L) 7.0 - 20.0 mg/dL Final   09/30/2023 8 5 - 25 mg/dL Final     Creatinine   Date Value Ref Range Status   01/30/2025 0.78 0.66 - 1.25 mg/dL Final   09/30/2023 0.80 0.57 - 1.25 mg/dL Final     Calcium   Date Value Ref Range Status   01/30/2025 9.6 8.4 - 10.2  mg/dL Final   09/30/2023 8.4 (L) 8.8 - 10.6 mg/dL Final     Albumin   Date Value Ref Range Status   01/30/2025 4.5 3.4 - 5.0 g/dL Final     Bilirubin Total   Date Value Ref Range Status   01/30/2025 0.4 0.0 - 1.0 mg/dL Final     ALP   Date Value Ref Range Status   01/30/2025 94 50 - 144 unit/L Final     AST   Date Value Ref Range Status   01/30/2025 30 14 - 36 unit/L Final     ALT   Date Value Ref Range Status   01/30/2025 28 1 - 45 unit/L Final     Anion Gap   Date Value Ref Range Status   09/30/2023 12 8 - 16 mmol/L Final     eGFR    Date Value Ref Range Status   09/30/2023 91 mL/min/1.73mSq Final     Comment:     In accordance with NKF-ASN Task Force recommendation, calculation based on the Chronic Kidney Disease Epidemiology Collaboration (CKD-EPI) equation without adjustment for race. eGFR adjusted for gender and age and calculated in ml/min/1.73mSquared. eGFR cannot be calculated if patient is under 18 years of age.     Reference Range:   >= 60 ml/min/1.73mSquared.     Estimated GFR-Non    Date Value Ref Range Status   06/28/2022 >60 mls/min/1.73/m2 Final     Lab Results   Component Value Date    CHOL 88 01/30/2025     Lab Results   Component Value Date    HDL 39 (L) 01/30/2025     Lab Results   Component Value Date    TRIG 174 01/30/2025     Lab Results   Component Value Date    VLDL 30 01/31/2024     Lab Results   Component Value Date    LDL 43.00 (L) 01/31/2024     Lab Results   Component Value Date    TSH 2.790 01/30/2025     Lab Results   Component Value Date    PHUR 6.0 01/30/2025    PROTEINUA Negative 01/30/2025    GLUCUA 250 (A) 01/30/2025    KETONESU Negative 09/13/2021    OCCULTUA Negative 01/30/2025    NITRITE Negative 01/30/2025    LEUKOCYTESUR Negative 01/30/2025     Lab Results   Component Value Date    HGBA1C 10.7 (H) 01/30/2025    HGBA1C 9.4 (H) 11/11/2024    HGBA1C 9.5 (H) 09/26/2024           Assessment         ICD-10-CM ICD-9-CM   1. Type 2 diabetes mellitus  with hyperglycemia, without long-term current use of insulin  E11.65 250.00     790.29   2. Nerve pain  M79.2 729.2   3. Breast cancer screening by mammogram  Z12.31 V76.12   4. Chronic low back pain, unspecified back pain laterality, unspecified whether sciatica present  M54.50 724.2    G89.29 338.29   5. Chronic rhinitis  J31.0 472.0         Plan      Problem List Items Addressed This Visit          Neuro    Nerve pain    Relevant Medications    gabapentin (NEURONTIN) 600 MG tablet       ENT    Chronic rhinitis    Current Assessment & Plan     Stable  OTC antihistamines as needed (i.e. Zyrtec, Claritin, Allergra, Benadryl)  Increase PO Fluids  Avoid/limit triggers such as pollen, dust, molds, and pet dander.   Avoid smoke, strong chemicals, cleaning products, perfumes/colognes.           Relevant Medications    albuterol (PROVENTIL/VENTOLIN HFA) 90 mcg/actuation inhaler       Endocrine    Type 2 diabetes mellitus with hyperglycemia, without long-term current use of insulin - Primary    Current Assessment & Plan     A1C Above goal   Continue RX metformin 1000 mg BID, Trulicity 3 mg weekly, and Lantus 15 units q hs.  Start RX glipizide 10 mg BID  2 month f/u with labs  Follow ADA diet.  Avoid soda, simple sweets, and limit rice/pasta/bread/starches and consume brown options when possible.   Maintain healthy weight with BMI goal <30.   Perform aerobic exercise for 150 minutes per week (or 5 days a week for 30 minutes each day).   Examine feet daily.     Lab Results   Component Value Date    HGBA1C 10.7 (H) 01/30/2025              Relevant Medications    blood sugar diagnostic Strp    lancets Misc    metFORMIN (GLUCOPHAGE-XR) 500 MG ER 24hr tablet    dulaglutide (TRULICITY) 4.5 mg/0.5 mL pen injector    glipiZIDE (GLUCOTROL) 10 MG tablet    blood-glucose meter kit    Other Relevant Orders    Urinalysis, Reflex to Urine Culture    Basic Metabolic Panel    Hemoglobin A1C       Orthopedic    Chronic back pain    Current  Assessment & Plan     Stable  Continue RX gabapentin 600 mg TID PRN and tizanidine prn  Perform back stretches daily.   Avoid activities than increase back pain or stiffness.   Apply heating pad, ice pack, and Icy Hot / BioFreeze as needed; alternate every 15-20 minutes.   Massage back to loosen muscles.            Relevant Medications    gabapentin (NEURONTIN) 600 MG tablet     Other Visit Diagnoses       Breast cancer screening by mammogram        Relevant Orders    Mammo Digital Screening Bilat              Future Appointments   Date Time Provider Department Center   2/17/2025  3:00 PM Marlyn Valencia, HAYDEE LGOCO [facility] [place]   2/21/2025 11:15 AM Yael Guerrero PA-C [facility] [place]   4/9/2025  2:40 PM Flory De La Cruz FNP [facility] [place]            Signature:     [facility]  [address] [address]    Date of encounter: 2/4/25

## 2025-02-04 NOTE — ASSESSMENT & PLAN NOTE
Stable  OTC antihistamines as needed (i.e. Zyrtec, Claritin, Allergra, Benadryl)  Increase PO Fluids  Avoid/limit triggers such as pollen, dust, molds, and pet dander.   Avoid smoke, strong chemicals, cleaning products, perfumes/colognes.

## 2025-02-05 ENCOUNTER — TELEPHONE (OUTPATIENT)
Dept: INTERNAL MEDICINE | Facility: CLINIC | Age: 50
End: 2025-02-05
Payer: MEDICAID

## 2025-02-05 DIAGNOSIS — K59.09 CHRONIC CONSTIPATION: Primary | ICD-10-CM

## 2025-02-05 NOTE — TELEPHONE ENCOUNTER
Can advise patient I have sent in RX for Linzess to start taking every AM.    Thanks,    ALBINO CalvinP

## 2025-02-05 NOTE — TELEPHONE ENCOUNTER
I contacted patient. Josuea does not really work well anyway . She'd rather try something ,else instead of appeal this medication.

## 2025-02-05 NOTE — TELEPHONE ENCOUNTER
Pt. Contacted about new medication prescribed and called in to her pharmacy. Verbalized understanding.

## 2025-02-07 DIAGNOSIS — M54.50 CHRONIC LOW BACK PAIN, UNSPECIFIED BACK PAIN LATERALITY, UNSPECIFIED WHETHER SCIATICA PRESENT: ICD-10-CM

## 2025-02-07 DIAGNOSIS — G89.29 CHRONIC LOW BACK PAIN, UNSPECIFIED BACK PAIN LATERALITY, UNSPECIFIED WHETHER SCIATICA PRESENT: ICD-10-CM

## 2025-02-07 RX ORDER — TIZANIDINE 4 MG/1
4 TABLET ORAL EVERY 8 HOURS
Qty: 270 TABLET | Refills: 1 | Status: SHIPPED | OUTPATIENT
Start: 2025-02-07

## 2025-02-07 NOTE — TELEPHONE ENCOUNTER
----- Message from Jia sent at 2/6/2025  9:20 AM CST -----  Regarding: med  refill  .Who Called: Kaye Spring    Refill or New Rx:Refill  RX Name and Strength:tiZANidine (ZANAFLEX) 4 MG tablet  How is the patient currently taking it? (ex. 1XDay):  Is this a 30 day or 90 day RX:270  Local or Mail Order:local  List of preferred pharmacies on file (remove unneeded): [facility]/pharmacy #[id number] - [place] - [address]   Phone: [phone]  Fax: [phone]        Ordering Provider:Handy      Preferred Method of Contact: Phone Call  Patient's Preferred Phone Number on File: [phone]   Best Call Back Number, if different:  Additional Information: pt needs authorization from dr to refill prescription   pt also requesting a call back on the status of refill for  blood sugar diagnostic Strp, lancets Misc

## 2025-02-11 ENCOUNTER — TELEPHONE (OUTPATIENT)
Dept: INTERNAL MEDICINE | Facility: CLINIC | Age: 50
End: 2025-02-11
Payer: MEDICAID

## 2025-02-11 NOTE — TELEPHONE ENCOUNTER
----- Message from Matthew sent at 2/10/2025  1:58 PM CST -----  Who Called: Kaye Spring    Caller is requesting assistance/information from provider's office.    Symptoms (please be specific):    How long has patient had these symptoms:    List of preferred pharmacies on file (remove unneeded): [unfilled]  If different, enter pharmacy into here including location and phone number:       Preferred Method of Contact: Phone Call  Patient's Preferred Phone Number on File: [phone]   Best Call Back Number, if different:  Additional Information: Pt states she was advised by pharmacy to have pcp send in a PA for blood sugar diagnostic Strp so insurance can pay for them or a different brand.

## 2025-02-11 NOTE — TELEPHONE ENCOUNTER
I contacted patient  And advised order is in for insurance approved meter and test strips. Patient voiced understanding.

## 2025-02-14 ENCOUNTER — TELEPHONE (OUTPATIENT)
Dept: INTERNAL MEDICINE | Facility: CLINIC | Age: 50
End: 2025-02-14
Payer: MEDICAID

## 2025-02-14 NOTE — TELEPHONE ENCOUNTER
----- Message from Jenn sent at 2/13/2025 10:23 AM CST -----  Regarding: PA on lancets Misc  Who Called: Kaye Spring    Refill or New Rx:Refill  RX Name and Strength:lancets Misc  How is the patient currently taking it? (ex. 1XDay):  Is this a 30 day or 90 day RX:  Local or Mail Order:  List of preferred pharmacies on file (remove unneeded): [unfilled]  If different Pharmacy is requested, enter Pharmacy information here including location and phone number:    Ordering Provider:      Preferred Method of Contact: Phone Call  Patient's Preferred Phone Number on File: [phone]   Best Call Back Number, if different:  Additional Information: Insurance office needs a PA on her lancets Misc

## 2025-02-17 ENCOUNTER — CLINICAL SUPPORT (OUTPATIENT)
Dept: SMOKING CESSATION | Facility: CLINIC | Age: 50
End: 2025-02-17
Payer: COMMERCIAL

## 2025-02-17 DIAGNOSIS — F17.200 NICOTINE DEPENDENCE: Primary | ICD-10-CM

## 2025-02-17 PROCEDURE — 99403 PREV MED CNSL INDIV APPRX 45: CPT | Mod: S$GLB,,,

## 2025-02-17 RX ORDER — IBUPROFEN 200 MG
1 TABLET ORAL DAILY
Qty: 28 PATCH | Refills: 0 | Status: SHIPPED | OUTPATIENT
Start: 2025-02-17

## 2025-02-17 NOTE — PROGRESS NOTES
Individual Follow-Up Form    2/17/2025    Quit Date:     Clinical Status of Patient: Outpatient    Length of Service: 45 minutes    Continuing Medication: yes  Chantix or Patches    Other Medications: none     Target Symptoms: Withdrawal and medication side effects. The following were  rated moderate (3) to severe (4) on TCRS:  Moderate (3): vivid dreams - Chantix  Severe (4): none    Comments: Spoke with pt via phone regarding tobacco cessation progress.  Pt is smoking 7-8 cigarettes per day. Pt remains on tobacco cessation medication of 1 mg Chantix BID and 21 mg nicotine patches QD.  Pt stated that she is having vivid dreams but they are not bad. Pt is no longer taking the 150 mg Wellbutrin.  Pt stated that she is only on the first week of the Chantix. Pt stated that she continues to smoke due to boredom and stress.  Pt stated that since her  is back working, she is home alone and does not have transportation to go anywhere, therefore she smokes. Pt stated that she continues to color to keep herself busy but is finding that she is not coloring as much as she was.  Discussed other things that she can do for distraction. Reviewed strategies, controlling environment, cues, triggers, new goals set. Introduced high risk situations with preparation interventions, caffeine similarities with withdrawal issues of habit and nicotine, Alcohol, Understanding urges, cravings, stress and relaxation. Open discussion with intervention discussion.  Pt drinks 4-5 Dr. Peppers per day.  Encouraged pt to decrease her caffeine intake and drink more water. Encouraged pt to move her cigarettes again, wait 15 minutes before smoking each cigarette and continue to work on rate reduction.  Also encouraged pt to set a quit day.  Pt will have her quit day chosen by her next follow up appointment. Will follow up with pt in a few weeks.    Diagnosis: F17.200    Next Visit: 2 weeks

## 2025-02-19 ENCOUNTER — TELEPHONE (OUTPATIENT)
Dept: INTERNAL MEDICINE | Facility: CLINIC | Age: 50
End: 2025-02-19
Payer: MEDICAID

## 2025-02-19 NOTE — TELEPHONE ENCOUNTER
I contacted pharmacy. I spoke to Hayley , pharmacist and she added needles to the script.    No issues today.  BP well controlled.  Tolerating med(s).  Continuing present management plan.

## 2025-02-19 NOTE — TELEPHONE ENCOUNTER
----- Message from Katy sent at 2/18/2025 11:31 AM CST -----  .Who Called: Kaye Madrigal's Preferred Phone Number on File: [phone] Best Call Back Number, if different:Additional Information: the needles needs PA per pharmacy

## 2025-02-20 NOTE — PROGRESS NOTES
This is a telemedicine note. Patient was treated using telemedicine, real time audio and video, according to [facility] protocols. Yael GRULLON PA-C, conducted the visit from the [facility]. The patient participated in the visit at a non-[facility] location selected by the patient, identified below. I am licensed in the state where the patient stated they are located. The patient stated that they understood and accepted the privacy and security risks to their information at their location. This visit is not recorded.    Patient was located at the patient's home.       CHIEF COMPLAINT:   Chief Complaint   Patient presents with    Follow-up     Follow up denies cardiac targets                                                  HPI:  Kaye Spring 49 y.o. female  PMHx includes  CAD with MI in February 2019, with stenting now s/p CABG LIMA->LAD in 6/2021 complicated by wound dehiscence and infection treated, HTN, T2DM, HLD, chronic lower back pain, ventral hernia, AUB, endometriosis, depression with anxiety, and tobacco use.  Patient presents for follow up and ongoing care. Nuclear stress test revealed an abnormal myocardial perfusion scan.  There was a moderate intensity, medium size, reversible perfusion abnormality in the mid to apical lateral apical walls in the typical distribution of the left circumflex territory.  See full report below. She  LHC on 9.9.24 at [facility] which revealed significant two-vessel coronary artery disease and complex PCI was recommended.  Subsequently, on 9.16.24 patient underwent emergent LHC with successful intervention of the LCx ISR.  She was discharged from the hospital with the appropriate CAD medications and will remain on DAPT for at least 1 year.  See full operative report below.      Today the patient states that she feels stable from a cardiac standpoint.  She denies any recent episodes of chest pain, SOB, or ESPARZA.  She states that she occasionally has heartburn.   "She was previously on GERD medications but states that she feels as though they are not working as effectively as they used to.  She states that she has several GI issues going on.  She admits that she has been suffering with severe constipation, stating that sometimes she does not have a bowel movement for 3-4 weeks at a time.  Strongly encouraged her to discuss this with PCP or even go to the ED for evaluation.  She has gained approximately 30 lb since September 2024.  Otherwise she endorses stable SOB mentally occurs with "over doing it".  She does not do much physical activity at this time.  She denies any palpitations, PND, orthopnea, lightheadedness, dizziness, syncope, lower extremity edema, or claudication symptoms.  She states that previously she had 1 episode of lightheadedness and dizziness in the middle of the night when going to the restroom, but she believes that she just stood up too quickly.  She is able to complete her ADLs without any issues or ischemic symptoms.  Strongly encouraged more physical activity if she is able to.  She reports compliance with all her current medications and is tolerating them well.  She denies any adverse bleeding effects while on DAPT.  She has decreased smoking to approximately 8 cigarettes per day and is working towards complete cessation.                                                                                                                                                                                                                                                                                                                                                                                                                                                                                        CARDIAC TESTING:  Results for orders placed during the hospital encounter of 09/04/24    Echo    Interpretation Summary    Left Ventricle: The left ventricle is normal " in size. There is low normal systolic function with a visually estimated ejection fraction of 50 - 55%. Grade I diastolic dysfunction.    Right Ventricle: Right ventricle was not well visualized due to poor acoustic window. Normal right ventricular cavity size. Systolic function is borderline low.    Less than mild valvular stenosis/regurgitation.    Unable to determine PASP.    Results for orders placed during the hospital encounter of 08/21/24    Nuclear Stress - Cardiology Interpreted    Interpretation Summary    Abnormal myocardial perfusion scan.    moderate intensity, medium sized,  in the  mid to apical lateral apical wall(s) in the typical distribution of the LCX territory.    There are no other significant perfusion abnormalities.    The gated perfusion images showed an ejection fraction of 77% at rest.    The patient reported no chest pain during the stress test.    There were no arrhythmias during stress.    The nuclear stress test is  fair quality.  On the nuclear stress test the EF is normal.  If the patient has an echo, the EF on the echo is considered more accurate.   The EF drops from rest to stress.    The patient has a moderate risk nuclear stress test due to lateral and apical reversible defect.    If clinically indicated, consider further evaluation with coronary angiogram.     Results for orders placed during the hospital encounter of 09/14/24    Cardiac catheterization    Conclusion    There is severe coronary artery disease. Successful intervention of LCx ISR as outlined below.    Mid Left Circumflex has diffuse 95% in-stent restenosis. This was the culprit lesion. The lesion was successfully treated with Laser atherectomy using a 1.4 mm device, an NC EMERGE MR 2.5X30MM angioplasty balloon inflated to high pressure, a Scoreflex 2.5X20MM cutting balloon, and finally a 2.50T94IN drug-coated balloon for a full 60 second inflation.    Intravascular Ultrasound (IVUS) was performed prior to LCx  intervention to further characterize the lesion    Second Obtuse Marginal Branch has a 70% stenosis. This branch vessel was protected with a wire during the LCx intervention, and was treated with a 2.0x15 angioplasty balloon. There remains a 60% ostial stenosis and LUDIN grade 3 flow in the distal vessel.    Procedure:  Left heart catheterization with coronary angiography  Percutaneous coronary intervention    Preoperative diagnosis:  Unstable angina    Postoperative diagnosis:  Successful PCI    Access:  Right common femoral artery    Estimated blood loss: 10 cc  Complications: None    Summary:  Consent obtained. Risks and benefits discussed with the patient. The patient was brought to the cath lab in a fasting state. The patient was prepped and draped in usual sterile fashion. A preprocedure time-out was performed.    Ultrasound-guided right common femoral arterial access via modified Seldinger technique using a micropuncture kit. A 6 [place]bourg[place] sheath was inserted into the right common femoral artery.    A 6 [place] EBU 3.5 catheter was used for the intervention.  The left circumflex lesion was wired with a runthrough wire.  Laser atherectomy was performed on the LCx ISR using 1.4 mm device. The 2nd obtuse marginal branch was wired with a Prowater wire. Balloon angioplasty was performed on the ostial 2nd obtuse marginal branch lesion with a 2.0 x 15 mm angioplasty balloon. Intravascular Ultrasound (IVUS) was performed prior to LCx intervention to further characterize the LCx lesion. The LCx lesion was treated with an NC EMERGE MR 2.5X30MM angioplasty balloon inflated to high pressure. Further LCx angioplasty was performed with a Scoreflex 2.5X20MM cutting balloon. Finally, the LCx lesion was treated with a 2.5X30MM drug coated balloon. Intracoronary nitroglycerin was administered throughout the case to increase outflow in the distal LCx.    At the end the procedure, all wires and catheters were removed.  Hemostasis  achieved with manual compression.    Findings:  - There is severe coronary artery disease. Successful intervention of LCx ISR as outlined below.  - Mid Left Circumflex has diffuse 95% in-stent restenosis. This was the culprit lesion. The lesion was successfully treated with Laser atherectomy using a 1.4 mm device, an NC EMERGE MR 2.5X30MM angioplasty balloon inflated to high pressure, a Scoreflex 2.5X20MM cutting balloon, and finally a 2.04K79TR drug-coated balloon for a full 60 second inflation.  - Intravascular Ultrasound (IVUS) was performed prior to LCx intervention to further characterize the lesion  - Second Obtuse Marginal Branch has a 70% stenosis. This branch vessel was protected with a wire during the LCx intervention, and was treated with a 2.0x15 angioplasty balloon. There remains a 60% ostial stenosis and LUDIN grade 3 flow in the distal vessel.    Assessment/Plan:  - Patient is a 48 y.o. female with a history of CAD/CABG presents with unstable angina. Staged PCI of LCx ISR was successful as noted above. Outflow vessel is small, but will hopefully improve overtime given increased flow following ISR intervention.  - Continue DAPT for a minimum of 12 months and aspirin indefinitely thereafter  - High intensity statin  - Continue Aggrastat for 12 hours post-procedure  - Arterial sheath remains place; plan is to remove when activated clotting time (ACT) less than 180 seconds. Bedrest for 4 hours after hemostasis is achieved.    Tyler Robles MD  Interventional Cardiology/Structural Heart Disease  [facility]       Problem List[1]  Past Surgical History:   Procedure Laterality Date    ANGIOGRAM, CORONARY, WITH LEFT HEART CATHETERIZATION N/A 9/9/2024    Procedure: Angiogram, Coronary, with Left Heart Cath;  Surgeon: Tyler Stevenson MD;  Location: [facility] CATH LAB;  Service: Cardiology;  Laterality: N/A;    CARDIAC CATHETERIZATION      CARDIAC VALVE REPLACEMENT      CHOLECYSTECTOMY  "     CORONARY ANGIOPLASTY      CORONARY ANGIOPLASTY WITH STENT PLACEMENT N/A 9/16/2024    Procedure: ANGIOPLASTY, CORONARY ARTERY, WITH STENT INSERTION;  Surgeon: Tyler Robles Jr., MD;  Location: [facility] CATH LAB;  Service: Cardiology;  Laterality: N/A;    CORONARY ARTERY BYPASS GRAFT      HYSTERECTOMY      right wrist surgery      TUBAL LIGATION       Social History[2]     Family History   Problem Relation Name Age of Onset    Arrhythmia Mother Marianela     Clotting disorder Mother Marianela     Heart disease Mother Marianela     Heart attack Mother Marianela     Hyperlipidemia Mother Marianela     Heart failure Mother Marianela     Stroke Mother Marianela     Hypertension Mother Marianela     Diabetes Mother Marianela     Depression Mother Marianela     Hyperlipidemia Brother Domingo     Hypertension Brother Domingo      Review of patient's allergies indicates:  No Known Allergies      ROS:  Review of Systems   Constitutional:  Positive for malaise/fatigue.   HENT: Negative.     Eyes: Negative.    Respiratory: Negative.  Negative for shortness of breath (Occasional).    Cardiovascular: Negative.  Negative for chest pain, palpitations, orthopnea, claudication, leg swelling and PND.   Gastrointestinal:  Positive for abdominal pain and constipation.   Genitourinary: Negative.    Musculoskeletal: Negative.  Negative for joint pain.   Skin: Negative.    Neurological: Negative.    Endo/Heme/Allergies: Negative.    Psychiatric/Behavioral: Negative.                                                                                                                                                                                  Negative except as stated in the history of present illness. See HPI for details.    PHYSICAL EXAM:  Visit Vitals  /82   Resp 18   Ht 5' 4" (1.626 m)   Wt 87.1 kg (192 lb)   BMI 32.96 kg/m²       Physical Exam  Constitutional:       Appearance: Normal appearance. She is obese. She is not ill-appearing.   HENT:      Head: " Normocephalic.      Nose: Nose normal.   Eyes:      Extraocular Movements: Extraocular movements intact.   Neck:      Vascular: No carotid bruit.   Cardiovascular:      Rate and Rhythm: Normal rate and regular rhythm.      Pulses: Normal pulses.      Heart sounds: Normal heart sounds. No murmur heard.  Pulmonary:      Effort: Pulmonary effort is normal.   Abdominal:      General: There is no distension.   Musculoskeletal:         General: Normal range of motion.      Cervical back: Normal range of motion.      Right lower leg: No edema.      Left lower leg: No edema.   Skin:     General: Skin is warm.   Neurological:      General: No focal deficit present.      Mental Status: She is alert.   Psychiatric:         Mood and Affect: Mood normal.       Physical Exam: LIMITED DUE TO TELEMEDICINE RESTRICTIONS.  General: Alert and oriented, No acute distress.  Head: Normocephalic, Atraumatic.  Eye: Sclera non-icteric.  Neck/Thyroid:  Full range of motion.  Respiratory: Non-labored respirations, Symmetrical chest wall expansion.  Musculoskeletal: Normal range of motion.  Integumentary:  No visible suspicious lesions or rashes. No diaphoresis.   Neurologic: No focal deficits  Psychiatric: Normal interaction, Coherent speech, Euthymic mood, Appropriate affect     Current Outpatient Medications   Medication Instructions    ACCU-CHEK GUIDE ME GLUCOSE MTR Misc No dose, route, or frequency recorded.    albuterol (PROVENTIL/VENTOLIN HFA) 90 mcg/actuation inhaler 2 puffs, Inhalation, 4 times daily PRN    ALPRAZolam (XANAX) 1 mg, 3 times daily PRN    ARIPiprazole (ABILIFY) 2 mg, Daily    aspirin 81 mg, Oral, Daily    blood sugar diagnostic Strp To check BG 2 times daily, to use with insurance preferred meter E11.9    blood-glucose meter kit To check BG 2 times daily, to use with insurance preferred meter E11.9    buPROPion (WELLBUTRIN SR) 150 mg, Oral, 2 times daily    clopidogreL (PLAVIX) 75 mg tablet   See Instructions, TAKE ONE  TABLET BY MOUTH EVERY DAY, # 90 tab(s), 3 Refill(s), Pharmacy: [place] [facility] [id number] [id number], 160, cm, Height/Length Dosing, 09/01/21 13:52:00 CDT, 85, kg, Weight Dosing, 09/01/21 13:52:00 CDT    desvenlafaxine succinate (PRISTIQ) 100 MG Tb24 No dose, route, or frequency recorded.    doxepin (SINEQUAN) 25 mg, Nightly    dulaglutide (TRULICITY) 4.5 mg, Subcutaneous, Every 7 days, For Diabetes    ezetimibe (ZETIA) 10 mg, Oral, Daily    gabapentin (NEURONTIN) 600 mg, Oral, 3 times daily, As needed for back pain    glipiZIDE (GLUCOTROL) 10 mg, Oral, 2 times daily before meals, For Diabetes    isosorbide mononitrate (IMDUR) 60 mg, Oral    lancets Misc To check BG 2 times daily, to use with insurance preferred meter E11.9    linaCLOtide (LINZESS) 72 mcg, Oral, Before breakfast, For Constipation    losartan (COZAAR) 100 mg, Oral, Daily, For High Blood Pressure    metFORMIN (GLUCOPHAGE-XR) 1,000 mg, Oral, 2 times daily with meals    metoclopramide HCl (REGLAN) 5 mg, 4 times daily    metoprolol succinate (TOPROL-XL) 25 mg, Oral, 2 times daily    nicotine (NICODERM CQ) 21 mg/24 hr 1 patch, Transdermal, Daily    nicotine (polacrilex) 2 mg, Buccal, As needed (PRN)    nicotine polacrilex 2 mg, Oral, As needed (PRN)    nitroGLYCERIN (NITROSTAT) 0.4 mg, Sublingual, Every 5 min PRN    OXcarbazepine (TRILEPTAL) 300 mg, 2 times daily    pantoprazole (PROTONIX) 40 mg, Oral, Daily, For Acid Reflux    rosuvastatin (CRESTOR) 40 mg, Oral    tiZANidine (ZANAFLEX) 4 mg, Oral, Every 8 hours    varenicline tartrate (CHANTIX) 1 mg Tab Take 1/2 tablet once a day for 3 days, then increase to 1/2 tablet twice a day for 4 days. Beginning on Day 8, take 1 tablet twice daily until complete        All medications, laboratory studies, cardiac diagnostic imaging reviewed.     Lab Results   Component Value Date    LDL 43.00 (L) 01/31/2024    LDL 58.00 01/03/2023    TRIG 174 01/30/2025    TRIG 241 (H) 07/30/2024    CREATININE 0.78 01/30/2025    MG 1.70  09/17/2024    K 3.5 01/30/2025        ASSESSMENT/PLAN:    CAD s/p CABG x2 in 2021  CABG complicated by wound dehiscence and infection with sternal flap placed  Now s/p Successful intervention of LCx ISR (September 2024)  - States that she is feeling stable from a cardiac standpoint.  She denies any chest pain, SOB, ESPARZA-see HPI  - See operative reports from recent LHCs (September 2024)  - Reports significant improvement in symptoms following recent LHC   - Occasional SOB/ESPARZA, with overexertion. No recent chest pain or other anginal or anginal equivalent symptoms   - Has gained about 30 lbs over last 6 months. Reports having significant GI symptoms- constipation preventing her from bowel movement for 3-4 weeks at a time- strongly encouraged her to reach out to PCP about this   - Nuclear stress test as above- abnormal myocardial perfusion scan there was a moderate intensity, medium size, reversible perfusion abnormality in the mid to apical lateral apical walls in the typical distribution of the left circumflex territory.  See full report above  - Continue DAPT for at least 12 months, likely indefinitely - tolerating very well without any adverse bleeding effects   - Continue current medications as listed above  - Antianginals: Metoprolol, Imdur   - Continue SL nitro for as needed chest pain  - Echo revealed EF of 50 55%, grade 1 diastolic dysfunction, no significant valvular structural disease otherwise.  See full report above (September 2024)  - Strict ED precautions given  - No need for for further cardiac testing     Palpitations   -Resolved  -No indication for further testing today      Hypertension   - /82  - Continue current medication as above   -Counseled on the importance of following a low-sodium, heart healthy diet and exercise as tolerated     Hyperlipidemia   -LDL 30.8 per labs January 2025  -Continue rosuvastatin 40 mg daily, Zetia 10 mg daily   -Counseled on the importance of following a  low-cholesterol, low-fat diet and exercise as tolerated     Nicotine dependence   -Encouraged smoking cessation  -Currently smoking 8 cigarettes per day and is interested in quitting   -Continue with Chantix and smoking cessation classes          Follow Up in Cardiology Clinic around July 2025   Follow up with PCP as directed   Please notify clinic if any new concerns or any change in symptoms   Please        Follow up in about 5 months (around 7/21/2025). In addition to their scheduled follow up, the patient has also been instructed to follow up on as needed basis.     Future Appointments   Date Time Provider Department Center   3/11/2025  4:00 PM Marlyn Valencia CTTS [facility] [place]   4/9/2025  2:40 PM Flory De La Cruz FNP [facility] [place]    7/21/2025  2:00 PM Yael Guerrero PA-C [facility] CARD [place]         Video Time Documentation:  Spent 10 minutes with patient face to face discussed health concerns. More than 50% of this time was spent in counseling and coordination of care.    Yael Guerrero PA-C             [1]   Patient Active Problem List  Diagnosis    Coronary artery disease involving native coronary artery of native heart without angina pectoris    Other synovitis and tenosynovitis, unspecified hand    Anxiety    Benign paroxysmal positional vertigo    Chronic back pain    Chronic rhinitis    Gastroesophageal reflux disease with esophagitis    Diastasis of rectus abdominis    Primary hypertension    Leukocytosis    Microcytic hypochromic anemia    Mixed anxiety depressive disorder    Mixed hyperlipidemia    Obesity    Spondylosis of lumbar spine    Thrombocythemia    Type 2 diabetes mellitus with hyperglycemia, without long-term current use of insulin    Cigarette nicotine dependence without complication    Positive colorectal cancer screening using Cologuard test    Chest pain    Shortness of breath    Abnormal stress test    Chronic constipation    Urinary tract  infection without hematuria    Nerve pain   [2]   Social History  Socioeconomic History    Marital status: Single   Tobacco Use    Smoking status: Every Day     Current packs/day: 1.00     Average packs/day: 0.4 packs/day for 31.1 years (12.4 ttl pk-yrs)     Types: Cigarettes, Vaping with nicotine     Start date: 1994     Last attempt to quit: 11/5/2024     Passive exposure: Past    Smokeless tobacco: Never   Substance and Sexual Activity    Alcohol use: Not Currently    Drug use: Never    Sexual activity: Yes     Social Drivers of Health     Financial Resource Strain: Low Risk  (9/16/2024)    Overall Financial Resource Strain (CARDIA)     Difficulty of Paying Living Expenses: Not very hard   Food Insecurity: No Food Insecurity (9/16/2024)    Hunger Vital Sign     Worried About Running Out of Food in the Last Year: Never true     Ran Out of Food in the Last Year: Never true   Transportation Needs: No Transportation Needs (9/16/2024)    TRANSPORTATION NEEDS     Transportation : No   Physical Activity: Sufficiently Active (9/16/2024)    Exercise Vital Sign     Days of Exercise per Week: 5 days     Minutes of Exercise per Session: 30 min   Stress: No Stress Concern Present (9/16/2024)    [employer] [employer] - Occupational Stress Questionnaire     Feeling of Stress : Only a little   Housing Stability: Unknown (9/16/2024)    Housing Stability Vital Sign     Unable to Pay for Housing in the Last Year: No     Homeless in the Last Year: No

## 2025-02-21 ENCOUNTER — OFFICE VISIT (OUTPATIENT)
Dept: CARDIOLOGY | Facility: CLINIC | Age: 50
End: 2025-02-21
Payer: MEDICAID

## 2025-02-21 VITALS
RESPIRATION RATE: 18 BRPM | WEIGHT: 192 LBS | BODY MASS INDEX: 32.78 KG/M2 | DIASTOLIC BLOOD PRESSURE: 82 MMHG | SYSTOLIC BLOOD PRESSURE: 128 MMHG | HEIGHT: 64 IN

## 2025-02-21 DIAGNOSIS — R06.02 SHORTNESS OF BREATH: Primary | ICD-10-CM

## 2025-02-21 DIAGNOSIS — E11.65 TYPE 2 DIABETES MELLITUS WITH HYPERGLYCEMIA, WITHOUT LONG-TERM CURRENT USE OF INSULIN: ICD-10-CM

## 2025-02-21 DIAGNOSIS — I25.10 CORONARY ARTERY DISEASE, UNSPECIFIED VESSEL OR LESION TYPE, UNSPECIFIED WHETHER ANGINA PRESENT, UNSPECIFIED WHETHER NATIVE OR TRANSPLANTED HEART: ICD-10-CM

## 2025-02-21 DIAGNOSIS — E78.2 MIXED HYPERLIPIDEMIA: ICD-10-CM

## 2025-02-21 DIAGNOSIS — I10 PRIMARY HYPERTENSION: ICD-10-CM

## 2025-02-21 DIAGNOSIS — E66.9 OBESITY, UNSPECIFIED CLASS, UNSPECIFIED OBESITY TYPE, UNSPECIFIED WHETHER SERIOUS COMORBIDITY PRESENT: ICD-10-CM

## 2025-02-21 DIAGNOSIS — I25.10 CORONARY ARTERY DISEASE INVOLVING NATIVE CORONARY ARTERY OF NATIVE HEART WITHOUT ANGINA PECTORIS: ICD-10-CM

## 2025-02-21 DIAGNOSIS — F17.210 CIGARETTE NICOTINE DEPENDENCE WITHOUT COMPLICATION: ICD-10-CM

## 2025-02-21 DIAGNOSIS — R07.9 CHEST PAIN, UNSPECIFIED TYPE: ICD-10-CM

## 2025-02-21 RX ORDER — ROSUVASTATIN CALCIUM 40 MG/1
40 TABLET, COATED ORAL DAILY
Qty: 90 TABLET | Refills: 3 | Status: SHIPPED | OUTPATIENT
Start: 2025-02-21 | End: 2026-02-21

## 2025-02-21 RX ORDER — EZETIMIBE 10 MG/1
10 TABLET ORAL DAILY
Qty: 90 TABLET | Refills: 3 | Status: SHIPPED | OUTPATIENT
Start: 2025-02-21 | End: 2026-02-21

## 2025-02-21 RX ORDER — LOSARTAN POTASSIUM 100 MG/1
100 TABLET ORAL DAILY
Qty: 90 TABLET | Refills: 3 | Status: SHIPPED | OUTPATIENT
Start: 2025-02-21 | End: 2026-02-16

## 2025-02-21 RX ORDER — METOPROLOL SUCCINATE 25 MG/1
25 TABLET, EXTENDED RELEASE ORAL 2 TIMES DAILY
Qty: 90 TABLET | Refills: 3 | Status: SHIPPED | OUTPATIENT
Start: 2025-02-21

## 2025-02-21 RX ORDER — ISOSORBIDE MONONITRATE 60 MG/1
60 TABLET, EXTENDED RELEASE ORAL DAILY
Qty: 90 TABLET | Refills: 2 | Status: SHIPPED | OUTPATIENT
Start: 2025-02-21 | End: 2026-02-12

## 2025-02-21 NOTE — PATIENT INSTRUCTIONS
Follow Up in Cardiology Clinic around July 2025   Follow up with PCP as directed   Please notify clinic if any new concerns or any change in symptoms   Please

## 2025-03-03 DIAGNOSIS — I25.10 CORONARY ARTERY DISEASE, UNSPECIFIED VESSEL OR LESION TYPE, UNSPECIFIED WHETHER ANGINA PRESENT, UNSPECIFIED WHETHER NATIVE OR TRANSPLANTED HEART: ICD-10-CM

## 2025-03-03 RX ORDER — CLOPIDOGREL BISULFATE 75 MG/1
TABLET ORAL
Qty: 90 TABLET | Refills: 3 | Status: SHIPPED | OUTPATIENT
Start: 2025-03-03 | End: 2026-03-02

## 2025-03-11 ENCOUNTER — CLINICAL SUPPORT (OUTPATIENT)
Dept: SMOKING CESSATION | Facility: CLINIC | Age: 50
End: 2025-03-11
Payer: COMMERCIAL

## 2025-03-11 DIAGNOSIS — F17.200 NICOTINE DEPENDENCE: Primary | ICD-10-CM

## 2025-03-11 PROCEDURE — 99403 PREV MED CNSL INDIV APPRX 45: CPT | Mod: S$GLB,,,

## 2025-03-11 RX ORDER — NICOTINE POLACRILEX 2 MG/1
2 LOZENGE ORAL
Qty: 108 EACH | Refills: 0 | Status: SHIPPED | OUTPATIENT
Start: 2025-03-11

## 2025-03-11 RX ORDER — VARENICLINE TARTRATE 1 MG/1
TABLET, FILM COATED ORAL
Qty: 60 TABLET | Refills: 0 | Status: SHIPPED | OUTPATIENT
Start: 2025-03-11

## 2025-03-11 NOTE — PROGRESS NOTES
Individual Follow-Up Form    3/11/2025    Quit Date:     Clinical Status of Patient: Outpatient    Length of Service: 45 minutes    Continuing Medication: yes  Chantix, Patches, or Nicotine Lozenges    Other Medications: none     Target Symptoms: Withdrawal and medication side effects. The following were  rated moderate (3) to severe (4) on TCRS:  Moderate (3): none  Severe (4): none    Comments: Spoke with pt via phone regarding tobacco cessation progress.  Pt is smoking 6 cigarettes per day.  Pt remains on tobacco cessation medication of 1 mg Chantix BID and 21 mg nicotine patch QD. Pt continues to have vivid dreams but stated that they are not bad dreams. No other side effects noted at this time. Pt stated that she has chosen a quit day.  Pt's quit day will be April 27, 2025 which is her wedding anniversary. Discussed a plan for her quit.  Pt stated that her aunt was recently diagnosed with lung cancer therefore this is giving her an extra push to quit smoking. Pt smokes more in the morning due to stress. Reviewed alternative ways to deal with stress rather than smoking.  Discussed relaxing things that she can do in the morning. Reviewed coping strategies and habitual behavior with patient. Commended pt on her progress thus far. Encouraged pt to move her cigarettes again, wait 15 minutes before smoking each cigarette and continue to work on rate reduction.  Will follow up with pt in a few weeks.     Diagnosis: F17.200    Next Visit: 2 weeks

## 2025-03-17 DIAGNOSIS — I25.10 CORONARY ARTERY DISEASE, UNSPECIFIED VESSEL OR LESION TYPE, UNSPECIFIED WHETHER ANGINA PRESENT, UNSPECIFIED WHETHER NATIVE OR TRANSPLANTED HEART: ICD-10-CM

## 2025-03-17 RX ORDER — CLOPIDOGREL BISULFATE 75 MG/1
TABLET ORAL
Qty: 90 TABLET | Refills: 3 | OUTPATIENT
Start: 2025-03-17 | End: 2026-03-16

## 2025-03-17 RX ORDER — NAPROXEN SODIUM 220 MG/1
81 TABLET, FILM COATED ORAL DAILY
Qty: 90 TABLET | Refills: 3 | OUTPATIENT
Start: 2025-03-17 | End: 2026-03-17

## 2025-03-18 RX ORDER — NAPROXEN SODIUM 220 MG/1
81 TABLET, FILM COATED ORAL DAILY
Qty: 90 TABLET | Refills: 0 | Status: SHIPPED | OUTPATIENT
Start: 2025-03-18 | End: 2025-06-16

## 2025-03-18 RX ORDER — NAPROXEN SODIUM 220 MG/1
81 TABLET, FILM COATED ORAL DAILY
Qty: 30 TABLET | Refills: 11 | OUTPATIENT
Start: 2025-03-18 | End: 2026-03-18

## 2025-03-18 RX ORDER — CLOPIDOGREL BISULFATE 75 MG/1
TABLET ORAL
Qty: 90 TABLET | Refills: 3 | OUTPATIENT
Start: 2025-03-18 | End: 2026-03-17

## 2025-03-18 RX ORDER — CLOPIDOGREL BISULFATE 75 MG/1
TABLET ORAL
Qty: 90 TABLET | Refills: 0 | Status: SHIPPED | OUTPATIENT
Start: 2025-03-18 | End: 2026-03-17

## 2025-03-18 NOTE — TELEPHONE ENCOUNTER
----- Message from Cerenity sent at 3/17/2025 10:33 AM CDT -----  Regarding: Rx refills / New pharmacy  Lv: 2/21/25Nv: 7/21/25DOB: 1975Pt requests refills on the following:clopidogreL (PLAVIX) 75 mg tabletaspirin 81 MG ChewPt's pharmacy stated that they did not receive refills for those medications.Pt wants all medications sent to new pharmacy:[facility]

## 2025-03-24 DIAGNOSIS — K59.09 CHRONIC CONSTIPATION: ICD-10-CM

## 2025-03-24 NOTE — TELEPHONE ENCOUNTER
----- Message from Kaity sent at 3/24/2025  9:18 AM CDT -----  .Who Called: Kaye Galo or New Rx:New RxRX Name and Strength: linaCLOtide (LINZESS) 72 mcg Cap capsuleHow is the patient currently taking it? (ex. 1XDay): Sig - Route: Take 1 capsule (72 mcg total) by mouth before breakfast. For Constipation - OralIs this a 30 day or 90 day RX: 90Local or Mail Order: LocalList of preferred pharmacies on file (remove unneeded): [facility] - [place], [place] - [address] Phone: [phone]: [phone] Provider: NIGEL Calvinrefemma Method of Contact: Phone CallPatient's Preferred Phone Number on File: [phone] Best Call Back Number, if different:Additional Information: Pt stated she switched pharmacies and he new pharmacy is above. Pt says when [facility] transferred her medications to new pharmacy,her medications were transferred with no refills. Pharmacy above is requesting new prescription w/ refills. Please advise, thank you.

## 2025-03-31 ENCOUNTER — TELEPHONE (OUTPATIENT)
Dept: SMOKING CESSATION | Facility: CLINIC | Age: 50
End: 2025-03-31
Payer: MEDICAID

## 2025-03-31 NOTE — TELEPHONE ENCOUNTER
Pt had a scheduled phone follow up today at 3 pm. Called pt. No answer. Left voice message with contact information.

## 2025-04-08 ENCOUNTER — TELEPHONE (OUTPATIENT)
Dept: SMOKING CESSATION | Facility: CLINIC | Age: 50
End: 2025-04-08
Payer: MEDICAID

## 2025-04-08 ENCOUNTER — LAB VISIT (OUTPATIENT)
Dept: LAB | Facility: HOSPITAL | Age: 50
End: 2025-04-08
Attending: NURSE PRACTITIONER
Payer: MEDICAID

## 2025-04-08 ENCOUNTER — RESULTS FOLLOW-UP (OUTPATIENT)
Dept: INTERNAL MEDICINE | Facility: CLINIC | Age: 50
End: 2025-04-08

## 2025-04-08 DIAGNOSIS — E11.65 TYPE 2 DIABETES MELLITUS WITH HYPERGLYCEMIA, WITHOUT LONG-TERM CURRENT USE OF INSULIN: ICD-10-CM

## 2025-04-08 LAB
ANION GAP SERPL CALC-SCNC: 12 MEQ/L (ref 2–13)
BILIRUB UR QL STRIP.AUTO: NEGATIVE
BUN SERPL-MCNC: 10 MG/DL (ref 7–20)
CALCIUM SERPL-MCNC: 10.1 MG/DL (ref 8.4–10.2)
CHLORIDE SERPL-SCNC: 102 MMOL/L (ref 98–110)
CLARITY UR: CLEAR
CO2 SERPL-SCNC: 24 MMOL/L (ref 21–32)
COLOR UR AUTO: YELLOW
CREAT SERPL-MCNC: 0.95 MG/DL (ref 0.66–1.25)
CREAT/UREA NIT SERPL: 11 (ref 12–20)
EST. AVERAGE GLUCOSE BLD GHB EST-MCNC: 217.3 MG/DL (ref 70–115)
GFR SERPLBLD CREATININE-BSD FMLA CKD-EPI: 73 ML/MIN/1.73/M2
GLUCOSE SERPL-MCNC: 178 MG/DL (ref 70–115)
GLUCOSE UR QL STRIP: 250
HBA1C MFR BLD: 9.2 % (ref 4–6)
HGB UR QL STRIP: NEGATIVE
KETONES UR QL STRIP: NEGATIVE
LEUKOCYTE ESTERASE UR QL STRIP: NEGATIVE
NITRITE UR QL STRIP: NEGATIVE
PH UR STRIP: 6 [PH]
POTASSIUM SERPL-SCNC: 4.1 MMOL/L (ref 3.5–5.1)
PROT UR QL STRIP: NEGATIVE
SODIUM SERPL-SCNC: 138 MMOL/L (ref 136–145)
SP GR UR STRIP.AUTO: <=1.005 (ref 1–1.03)
UROBILINOGEN UR STRIP-ACNC: 0.2

## 2025-04-08 PROCEDURE — 80048 BASIC METABOLIC PNL TOTAL CA: CPT

## 2025-04-08 PROCEDURE — 83036 HEMOGLOBIN GLYCOSYLATED A1C: CPT

## 2025-04-08 PROCEDURE — 36415 COLL VENOUS BLD VENIPUNCTURE: CPT

## 2025-04-08 PROCEDURE — 81003 URINALYSIS AUTO W/O SCOPE: CPT

## 2025-04-08 NOTE — TELEPHONE ENCOUNTER
Contacted pt regarding rescheduling her tobacco cessation phone follow up appointment. Pt rescheduled to 4/10/25 at 2 pm.

## 2025-04-09 ENCOUNTER — OFFICE VISIT (OUTPATIENT)
Dept: INTERNAL MEDICINE | Facility: CLINIC | Age: 50
End: 2025-04-09
Payer: MEDICAID

## 2025-04-09 DIAGNOSIS — E11.65 TYPE 2 DIABETES MELLITUS WITH HYPERGLYCEMIA, WITHOUT LONG-TERM CURRENT USE OF INSULIN: ICD-10-CM

## 2025-04-09 RX ORDER — GLIPIZIDE 10 MG/1
20 TABLET ORAL
Qty: 360 TABLET | Refills: 0 | Status: SHIPPED | OUTPATIENT
Start: 2025-04-09 | End: 2025-07-08

## 2025-04-09 NOTE — ASSESSMENT & PLAN NOTE
A1C Above goal   Continue RX metformin 1000 mg BID, Trulicity 3 mg weekly, and Lantus 15 units q hs.  Increase RX glipizide 20 mg BID  2 month f/u with labs  Follow ADA diet.  Avoid soda, simple sweets, and limit rice/pasta/bread/starches and consume brown options when possible.   Maintain healthy weight with BMI goal <30.   Perform aerobic exercise for 150 minutes per week (or 5 days a week for 30 minutes each day).   Examine feet daily.     Lab Results   Component Value Date    HGBA1C 9.2 (H) 04/08/2025

## 2025-04-09 NOTE — PROGRESS NOTES
Flory De La Cruz, JOSE RAFAEL   [facility]  [address] [address]      PATIENT NAME: Kaye Spring  : 1975  DATE: 25  MRN: [id number]      Reason for Visit / Chief Complaint: Diabetes       History of Present Illness / Problem Focused Workflow     Kaye Spring presents to the clinic with Diabetes     48 yo WF here for f/u. Medical problems includes GERD, BPV (self-treats with Epley), chronic rhinitis, T2DM, HLD, CAD with MI in 2019, with stenting, HTN, chronic lower back pain, ventral hernia, AUB, endometriosis, depression with anxiety, and tobacco use, 1-2 ciggs / day.  Followed by Nikki Ackerman, mental health NP for psychiatric care and med management every 3 months. Disabled. Followed by [facility] for Cardiology Services.      Cervical Cancer Screening - F/U [facility] Annually  Breast Cancer Screening - 23 MMG  Osteoporosis Screening -25 Vitamin D Level 50  Diabetic Screening -    Eye Screening-   Dental Screening -   Wellness- 2025  Pt here today via virtual for T2DM F./U; at our LOV the pt dosage of Trulicty increased to 4.5 mg weekly however the pt reports that she did not start increased dosage until 10/21 which would only about 3 weeks ago at this time and would not show a great improvement. Will continue pt on current medication regiment at this time. We will f/u again in about 2 months F2F for Wellness with labs. Pt does report as well with some dysuria and burning, discussed positive Urine culture results, will tx with appropriate AB today. Pt to follow up as directed or prn.     2025  Pt here today for Wellness with labs; Pt A1C increased since last evaluation from 9.4 to 10.7. Pt reports compliance with meds however reports that he diet has not been the best especially over the week of the snow storm. Discussed with pt average of the A1C over 3 months. Pt is agreeable to adding  glipizide 10 mg BID to regimen to assist with elevated glucose levels. Pt requesting new testing supplies today, will keep log for AM fasting glucose levels. Will f/u in about 2 months with labs via virtual for med eval.   Visit today included increased complexity associated with the care of the episodic problem T2DM addressed and managing the longitudinal care of the patient due to the serious and/or complex managed problem(s) T2DM.    04/09/2025  Pt reports via virtual for lab review; A1C improved but still above goal.  150 - 210 AM fasting glucose which is improved since previous. Agreeable to med dose increase of glipizide to 20 BID. Denies any acute issues today. Overall feeling well, will f/u in about 2 months via virtual with labs and prn.             Review of Systems     Review of Systems   Constitutional: Negative.    HENT: Negative.     Eyes: Negative.    Respiratory: Negative.     Cardiovascular: Negative.    Gastrointestinal: Negative.    Endocrine: Negative.    Genitourinary: Negative.    Neurological: Negative.    Psychiatric/Behavioral: Negative.           Medications and Allergies     Medications  Medication List with Changes/Refills   Current Medications    ACCU-CHEK GUIDE ME GLUCOSE MTR MISC        ALBUTEROL (PROVENTIL/VENTOLIN HFA) 90 MCG/ACTUATION INHALER    Inhale 2 puffs into the lungs 4 (four) times daily as needed for Wheezing or Shortness of Breath.    ALPRAZOLAM (XANAX) 1 MG TABLET    Take 1 mg by mouth 3 (three) times daily as needed.    ARIPIPRAZOLE (ABILIFY) 2 MG TAB    Take 2 mg by mouth once daily.    ASPIRIN 81 MG CHEW    Take 1 tablet (81 mg total) by mouth once daily.    BLOOD SUGAR DIAGNOSTIC STRP    To check BG 2 times daily, to use with insurance preferred meter E11.9    BLOOD-GLUCOSE METER KIT    To check BG 2 times daily, to use with insurance preferred meter E11.9    BUPROPION (WELLBUTRIN SR) 150 MG TBSR 12 HR TABLET    Take 1 tablet (150 mg total) by mouth 2 (two) times  daily.    CLOPIDOGREL (PLAVIX) 75 MG TABLET    See Instructions, TAKE ONE TABLET BY MOUTH EVERY DAY, # 90 tab(s), 3 Refill(s), Pharmacy: [facility] [id number] [id number], 160, cm, Height/Length Dosing, 09/01/21 13:52:00 CDT, 85, kg, Weight Dosing, 09/01/21 13:52:00 CDT    DESVENLAFAXINE SUCCINATE (PRISTIQ) 100 MG TB24        DOXEPIN (SINEQUAN) 25 MG CAPSULE    Take 25 mg by mouth nightly.    DULAGLUTIDE (TRULICITY) 4.5 MG/0.5 ML PEN INJECTOR    Inject 4.5 mg into the skin every 7 days. For Diabetes    EZETIMIBE (ZETIA) 10 MG TABLET    Take 1 tablet (10 mg total) by mouth once daily.    GABAPENTIN (NEURONTIN) 600 MG TABLET    Take 1 tablet (600 mg total) by mouth 3 (three) times daily. As needed for back pain    ISOSORBIDE MONONITRATE (IMDUR) 60 MG 24 HR TABLET    Take 1 tablet (60 mg total) by mouth once daily.    LANCETS MISC    To check BG 2 times daily, to use with insurance preferred meter E11.9    LINACLOTIDE (LINZESS) 72 MCG CAP CAPSULE    Take 1 capsule (72 mcg total) by mouth before breakfast. For Constipation    LOSARTAN (COZAAR) 100 MG TABLET    Take 1 tablet (100 mg total) by mouth once daily. For High Blood Pressure    METFORMIN (GLUCOPHAGE-XR) 500 MG ER 24HR TABLET    Take 2 tablets (1,000 mg total) by mouth 2 (two) times daily with meals.    METOCLOPRAMIDE HCL (REGLAN) 5 MG TABLET    Take 5 mg by mouth 4 (four) times daily.    METOPROLOL SUCCINATE (TOPROL-XL) 25 MG 24 HR TABLET    Take 1 tablet (25 mg total) by mouth 2 (two) times daily.    NICOTINE (NICODERM CQ) 21 MG/24 HR    Place 1 patch onto the skin once daily.    NICOTINE, POLACRILEX, 2 MG LZMN    Place 1 each (2 mg total) inside cheek as needed (Do not exceed more than 10 pieces in 24 hours).    NITROGLYCERIN (NITROSTAT) 0.4 MG SL TABLET    Place 1 tablet (0.4 mg total) under the tongue every 5 (five) minutes as needed for Chest pain.    OXCARBAZEPINE (TRILEPTAL) 300 MG TAB    Take 300 mg by mouth 2 (two) times daily.    PANTOPRAZOLE (PROTONIX) 40 MG  TABLET    Take 1 tablet (40 mg total) by mouth once daily. For Acid Reflux    ROSUVASTATIN (CRESTOR) 40 MG TAB    Take 1 tablet (40 mg total) by mouth once daily.    TIZANIDINE (ZANAFLEX) 4 MG TABLET    Take 1 tablet (4 mg total) by mouth every 8 (eight) hours.    VARENICLINE TARTRATE (CHANTIX) 1 MG TAB    Take 1 tablet twice daily   Changed and/or Refilled Medications    Modified Medication Previous Medication    GLIPIZIDE (GLUCOTROL) 10 MG TABLET glipiZIDE (GLUCOTROL) 10 MG tablet       Take 2 tablets (20 mg total) by mouth 2 (two) times daily before meals. For Diabetes    Take 1 tablet (10 mg total) by mouth 2 (two) times daily before meals. For Diabetes         Allergies  Review of patient's allergies indicates:  No Known Allergies    Physical Examination   There were no vitals filed for this visit.  Physical Exam  HENT:      Right Ear: Hearing normal.      Left Ear: Hearing normal.   Neurological:      Mental Status: She is alert and oriented to person, place, and time.   Psychiatric:         Mood and Affect: Mood normal.           Results     Lab Results   Component Value Date    WBC 11.05 01/30/2025    RBC 5.38 (H) 01/30/2025    HGB 14.9 01/30/2025    HCT 44.1 01/30/2025    MCV 82.0 01/30/2025    MCH 27.7 01/30/2025    MCHC 33.8 01/30/2025    RDW 12.4 01/30/2025     01/30/2025    MPV 10.8 01/30/2025     Sodium   Date Value Ref Range Status   04/08/2025 138 136 - 145 mmol/L Final   09/30/2023 134 (L) 136 - 145 mmol/L Final     Potassium   Date Value Ref Range Status   04/08/2025 4.1 3.5 - 5.1 mmol/L Final   09/30/2023 4.2 3.5 - 5.1 mmol/L Final     Comment:     Slight hemolysis present, interpret results with caution     Chloride   Date Value Ref Range Status   04/08/2025 102 98 - 110 mmol/L Final   09/30/2023 102 100 - 109 mmol/L Final     CO2   Date Value Ref Range Status   04/08/2025 24 21 - 32 mmol/L Final     Carbon Dioxide   Date Value Ref Range Status   09/30/2023 20 (L) 22 - 33 mmol/L Final      Blood Urea Nitrogen   Date Value Ref Range Status   04/08/2025 10 7.0 - 20.0 mg/dL Final   09/30/2023 8 5 - 25 mg/dL Final     Creatinine   Date Value Ref Range Status   04/08/2025 0.95 0.66 - 1.25 mg/dL Final   09/30/2023 0.80 0.57 - 1.25 mg/dL Final     Calcium   Date Value Ref Range Status   04/08/2025 10.1 8.4 - 10.2 mg/dL Final   09/30/2023 8.4 (L) 8.8 - 10.6 mg/dL Final     Albumin   Date Value Ref Range Status   01/30/2025 4.5 3.4 - 5.0 g/dL Final     Bilirubin Total   Date Value Ref Range Status   01/30/2025 0.4 0.0 - 1.0 mg/dL Final     ALP   Date Value Ref Range Status   01/30/2025 94 50 - 144 unit/L Final     AST   Date Value Ref Range Status   01/30/2025 30 14 - 36 unit/L Final     ALT   Date Value Ref Range Status   01/30/2025 28 1 - 45 unit/L Final     Anion Gap   Date Value Ref Range Status   09/30/2023 12 8 - 16 mmol/L Final     eGFR    Date Value Ref Range Status   09/30/2023 91 mL/min/1.73mSq Final     Comment:     In accordance with NKF-ASN Task Force recommendation, calculation based on the Chronic Kidney Disease Epidemiology Collaboration (CKD-EPI) equation without adjustment for race. eGFR adjusted for gender and age and calculated in ml/min/1.73mSquared. eGFR cannot be calculated if patient is under 18 years of age.     Reference Range:   >= 60 ml/min/1.73mSquared.     Estimated GFR-Non    Date Value Ref Range Status   06/28/2022 >60 mls/min/1.73/m2 Final     Lab Results   Component Value Date    CHOL 88 01/30/2025     Lab Results   Component Value Date    HDL 39 (L) 01/30/2025     Lab Results   Component Value Date    TRIG 174 01/30/2025     Lab Results   Component Value Date    VLDL 30 01/31/2024     Lab Results   Component Value Date    LDL 43.00 (L) 01/31/2024     Lab Results   Component Value Date    TSH 2.790 01/30/2025     Lab Results   Component Value Date    PHUR 6.0 04/08/2025    PROTEINUA Negative 04/08/2025    GLUCUA 250 (A) 04/08/2025     KETONESU Negative 09/13/2021    OCCULTUA Negative 04/08/2025    NITRITE Negative 04/08/2025    LEUKOCYTESUR Negative 04/08/2025     Lab Results   Component Value Date    HGBA1C 9.2 (H) 04/08/2025    HGBA1C 10.7 (H) 01/30/2025    HGBA1C 9.4 (H) 11/11/2024           Assessment         ICD-10-CM ICD-9-CM   1. Type 2 diabetes mellitus with hyperglycemia, without long-term current use of insulin  E11.65 250.00     790.29       Plan      1. Type 2 diabetes mellitus with hyperglycemia, without long-term current use of insulin  Assessment & Plan:  A1C Above goal   Continue RX metformin 1000 mg BID, Trulicity 3 mg weekly, and Lantus 15 units q hs.  Increase RX glipizide 20 mg BID  2 month f/u with labs  Follow ADA diet.  Avoid soda, simple sweets, and limit rice/pasta/bread/starches and consume brown options when possible.   Maintain healthy weight with BMI goal <30.   Perform aerobic exercise for 150 minutes per week (or 5 days a week for 30 minutes each day).   Examine feet daily.     Lab Results   Component Value Date    HGBA1C 9.2 (H) 04/08/2025         Orders:  -     glipiZIDE (GLUCOTROL) 10 MG tablet; Take 2 tablets (20 mg total) by mouth 2 (two) times daily before meals. For Diabetes  Dispense: 360 tablet; Refill: 0  -     Urinalysis, Reflex to Urine Culture; Future; Expected date: 06/09/2025  -     Basic Metabolic Panel; Future; Expected date: 06/09/2025  -     Hemoglobin A1C; Future; Expected date: 06/09/2025         Future Appointments   Date Time Provider Department Center   4/9/2025  2:40 PM Handy, Flory N, FNP [facility] [place]   4/10/2025  2:00 PM Marlyn Valencia CTTS [facility] [place]   6/26/2025  1:00 PM [facility]   6/27/2025  8:20 AM Flory De La Cruz FNP [facility] [place]   7/21/2025  2:00 PM Yael GuerreroPARADISE [facility] [place]            Signature:     [facility]  [address]  [facility] [address]    Date of encounter: 4/9/25      The patient location is: home  The chief complaint leading to consultation is: T2DM     Visit type: audiovisual    Face to Face time with patient: 20  25 minutes of total time spent on the encounter, which includes face to face time and non-face to face time preparing to see the patient (eg, review of tests), Obtaining and/or reviewing separately obtained history, Documenting clinical information in the electronic or other health record, Independently interpreting results (not separately reported) and communicating results to the patient/family/caregiver, or Care coordination (not separately reported).         Each patient to whom he or she provides medical services by telemedicine is:  (1) informed of the relationship between the physician and patient and the respective role of any other health care provider with respect to management of the patient; and (2) notified that he or she may decline to receive medical services by telemedicine and may withdraw from such care at any time.    Notes:

## 2025-04-10 ENCOUNTER — TELEPHONE (OUTPATIENT)
Dept: SMOKING CESSATION | Facility: CLINIC | Age: 50
End: 2025-04-10
Payer: MEDICAID

## 2025-04-10 NOTE — TELEPHONE ENCOUNTER
Attempted to contact pt regarding phone follow up. Called pt. No answer. Left voice message with contact information.

## 2025-04-17 DIAGNOSIS — I25.10 CORONARY ARTERY DISEASE, UNSPECIFIED VESSEL OR LESION TYPE, UNSPECIFIED WHETHER ANGINA PRESENT, UNSPECIFIED WHETHER NATIVE OR TRANSPLANTED HEART: ICD-10-CM

## 2025-04-17 RX ORDER — NITROGLYCERIN 0.4 MG/1
0.4 TABLET SUBLINGUAL EVERY 5 MIN PRN
Qty: 25 TABLET | Refills: 2 | Status: SHIPPED | OUTPATIENT
Start: 2025-04-17 | End: 2026-04-17

## 2025-04-28 ENCOUNTER — CLINICAL SUPPORT (OUTPATIENT)
Dept: SMOKING CESSATION | Facility: CLINIC | Age: 50
End: 2025-04-28
Payer: COMMERCIAL

## 2025-04-28 DIAGNOSIS — F17.200 NICOTINE DEPENDENCE: Primary | ICD-10-CM

## 2025-04-28 PROCEDURE — 99999 PR PBB SHADOW E&M-EST. PATIENT-LVL I: CPT | Mod: PBBFAC,,,

## 2025-04-28 PROCEDURE — 99407 BEHAV CHNG SMOKING > 10 MIN: CPT | Mod: S$GLB,,,

## 2025-04-28 NOTE — PROGRESS NOTES
Spoke with patient today in regard to smoking cessation progress for 6 month phone follow up on Quit 1.  Patient not tobacco free at this time but stated that she is close to Quitting and is smoking 3 cpd.  Patient states she has quit vaping.  Commended patient on their progress thus far.  Patient currently enrolled in program for Quit 2 and has scheduled an appointment to f/u with her CTTS.  Informed patient of benefit period, future follow ups, and contact information if any further help or support is needed.  Will complete smart form for 6 month follow up on Quit attempt # 1.

## 2025-05-05 ENCOUNTER — CLINICAL SUPPORT (OUTPATIENT)
Dept: SMOKING CESSATION | Facility: CLINIC | Age: 50
End: 2025-05-05
Payer: COMMERCIAL

## 2025-05-05 DIAGNOSIS — F17.200 NICOTINE DEPENDENCE: Primary | ICD-10-CM

## 2025-05-05 PROCEDURE — 99403 PREV MED CNSL INDIV APPRX 45: CPT | Mod: S$GLB,,,

## 2025-05-05 RX ORDER — VARENICLINE TARTRATE 1 MG/1
TABLET, FILM COATED ORAL
Qty: 60 TABLET | Refills: 0 | Status: SHIPPED | OUTPATIENT
Start: 2025-05-05

## 2025-05-05 RX ORDER — NICOTINE POLACRILEX 2 MG/1
2 LOZENGE ORAL
Qty: 108 EACH | Refills: 0 | Status: SHIPPED | OUTPATIENT
Start: 2025-05-05

## 2025-05-05 RX ORDER — IBUPROFEN 200 MG
1 TABLET ORAL DAILY
Qty: 28 PATCH | Refills: 0 | Status: SHIPPED | OUTPATIENT
Start: 2025-05-05

## 2025-05-05 NOTE — PROGRESS NOTES
Individual Follow-Up Form    5/5/2025    Quit Date:     Clinical Status of Patient: Outpatient    Length of Service: 45 minutes    Continuing Medication: yes  Chantix, Patches, or Nicotine Lozenges    Other Medications: none     Target Symptoms: Withdrawal and medication side effects. The following were  rated moderate (3) to severe (4) on TCRS:  Moderate (3): none  Severe (4): none    Comments: Spoke with pt via phone regarding tobacco cessation progress.  Pt is smoking 10 cigarettes per day.  Pt remains on tobacco cessation regimen of 1 mg Chantix QD, 21 mg nicotine patch QD and 2 mg nicotine lozenge PRN.  No adverse effects noted at this time.  Pt stated that she has only been taking 1 Chantix pill per day because she was running low on them. Pt stated that she was unable to quit on her quit day which was April 27th.  Pt stated that she has been dealing with back pain, depression and boredom.  Pt stated she is not getting out much since her  is working and he is using the only vehicle they have.  Pt has noticed that she is smoking more when she talks to her brother on the phone every morning.  Pt stated that she can talk to him 2 to 3 hours in the morning and smoke while she is talking to him on the phone.  Discussed not bringing the phone outdoors; smoke when she is finished talking on the phone.  Encouraged pt to find things to do for distraction.  Pt stated that she is still coloring and cleaning.  Encouraged pt to try journaling or making to-do lists for distraction. Reviewed coping skills. Discussed writing down the reasons that she wants to quit smoking and keeping the list of reasons near her pack of cigarettes to remind her of those reasons. Encouraged pt to set a new quit day.  Pt would like to try to cut back more before setting another quit day.  Discussed the 4 D's; delay, distract, drink water and deep breathing. Encouraged and attempted to motivate to work on her quit.  Also encouraged pt to  move her cigarettes again, stand up while smoking outdoors, wait 15 minutes before smoking each cigarette and continue to work on rate reduction.  Pt will decrease by 2 cigarettes each week.  Will follow up with pt in a few weeks.     Diagnosis: F17.200    Next Visit: 2 weeks

## 2025-05-27 ENCOUNTER — TELEPHONE (OUTPATIENT)
Dept: SMOKING CESSATION | Facility: CLINIC | Age: 50
End: 2025-05-27
Payer: MEDICAID

## 2025-05-27 NOTE — TELEPHONE ENCOUNTER
Attempted to contact pt regarding phone follow up appointment. Called pt. No answer. Left voice message with contact information.

## 2025-06-03 DIAGNOSIS — I25.10 CORONARY ARTERY DISEASE, UNSPECIFIED VESSEL OR LESION TYPE, UNSPECIFIED WHETHER ANGINA PRESENT, UNSPECIFIED WHETHER NATIVE OR TRANSPLANTED HEART: ICD-10-CM

## 2025-06-04 RX ORDER — CLOPIDOGREL BISULFATE 75 MG/1
TABLET ORAL
Qty: 90 TABLET | Refills: 1 | Status: SHIPPED | OUTPATIENT
Start: 2025-06-04 | End: 2026-06-02

## 2025-06-04 RX ORDER — NAPROXEN SODIUM 220 MG/1
81 TABLET, FILM COATED ORAL DAILY
Qty: 90 TABLET | Refills: 1 | Status: SHIPPED | OUTPATIENT
Start: 2025-06-04 | End: 2025-09-02

## 2025-06-05 ENCOUNTER — TELEPHONE (OUTPATIENT)
Dept: SMOKING CESSATION | Facility: CLINIC | Age: 50
End: 2025-06-05
Payer: MEDICAID

## 2025-06-09 ENCOUNTER — TELEPHONE (OUTPATIENT)
Dept: SMOKING CESSATION | Facility: CLINIC | Age: 50
End: 2025-06-09
Payer: MEDICAID

## 2025-06-23 ENCOUNTER — RESULTS FOLLOW-UP (OUTPATIENT)
Dept: INTERNAL MEDICINE | Facility: CLINIC | Age: 50
End: 2025-06-23

## 2025-06-23 ENCOUNTER — LAB VISIT (OUTPATIENT)
Dept: LAB | Facility: HOSPITAL | Age: 50
End: 2025-06-23
Attending: NURSE PRACTITIONER
Payer: MEDICAID

## 2025-06-23 DIAGNOSIS — E11.65 TYPE 2 DIABETES MELLITUS WITH HYPERGLYCEMIA, WITHOUT LONG-TERM CURRENT USE OF INSULIN: ICD-10-CM

## 2025-06-23 LAB
ANION GAP SERPL CALC-SCNC: 7 MEQ/L (ref 2–13)
BILIRUB UR QL STRIP.AUTO: NEGATIVE
BUN SERPL-MCNC: 4 MG/DL (ref 7–20)
CALCIUM SERPL-MCNC: 9.6 MG/DL (ref 8.4–10.2)
CHLORIDE SERPL-SCNC: 109 MMOL/L (ref 98–110)
CLARITY UR: CLEAR
CO2 SERPL-SCNC: 22 MMOL/L (ref 21–32)
COLOR UR AUTO: YELLOW
CREAT SERPL-MCNC: 0.63 MG/DL (ref 0.66–1.25)
CREAT/UREA NIT SERPL: 6 (ref 12–20)
EST. AVERAGE GLUCOSE BLD GHB EST-MCNC: 251.8 MG/DL (ref 70–115)
GFR SERPLBLD CREATININE-BSD FMLA CKD-EPI: >90 ML/MIN/1.73/M2
GLUCOSE SERPL-MCNC: 195 MG/DL (ref 70–115)
GLUCOSE UR QL STRIP: NEGATIVE
HBA1C MFR BLD: 10.4 % (ref 4–6)
HGB UR QL STRIP: NEGATIVE
KETONES UR QL STRIP: NEGATIVE
LEUKOCYTE ESTERASE UR QL STRIP: NEGATIVE
NITRITE UR QL STRIP: NEGATIVE
PH UR STRIP: 6 [PH]
POTASSIUM SERPL-SCNC: 4.4 MMOL/L (ref 3.5–5.1)
PROT UR QL STRIP: NEGATIVE
SODIUM SERPL-SCNC: 138 MMOL/L (ref 136–145)
SP GR UR STRIP.AUTO: <=1.005 (ref 1–1.03)
UROBILINOGEN UR STRIP-ACNC: 0.2

## 2025-06-23 PROCEDURE — 36415 COLL VENOUS BLD VENIPUNCTURE: CPT

## 2025-06-23 PROCEDURE — 83036 HEMOGLOBIN GLYCOSYLATED A1C: CPT

## 2025-06-23 PROCEDURE — 80048 BASIC METABOLIC PNL TOTAL CA: CPT

## 2025-06-23 PROCEDURE — 81003 URINALYSIS AUTO W/O SCOPE: CPT

## 2025-06-26 ENCOUNTER — HOSPITAL ENCOUNTER (OUTPATIENT)
Dept: RADIOLOGY | Facility: HOSPITAL | Age: 50
Discharge: HOME | End: 2025-06-26
Attending: NURSE PRACTITIONER
Payer: MEDICAID

## 2025-06-26 DIAGNOSIS — Z12.31 BREAST CANCER SCREENING BY MAMMOGRAM: ICD-10-CM

## 2025-06-26 PROCEDURE — 77067 SCR MAMMO BI INCL CAD: CPT | Mod: TC

## 2025-06-26 PROCEDURE — 77063 BREAST TOMOSYNTHESIS BI: CPT | Mod: 26,,, | Performed by: STUDENT IN AN ORGANIZED HEALTH CARE EDUCATION/TRAINING PROGRAM

## 2025-06-26 PROCEDURE — 77067 SCR MAMMO BI INCL CAD: CPT | Mod: 26,,, | Performed by: STUDENT IN AN ORGANIZED HEALTH CARE EDUCATION/TRAINING PROGRAM

## 2025-06-27 ENCOUNTER — OFFICE VISIT (OUTPATIENT)
Dept: INTERNAL MEDICINE | Facility: CLINIC | Age: 50
End: 2025-06-27
Payer: MEDICAID

## 2025-06-27 DIAGNOSIS — E11.65 TYPE 2 DIABETES MELLITUS WITH HYPERGLYCEMIA, WITHOUT LONG-TERM CURRENT USE OF INSULIN: Primary | ICD-10-CM

## 2025-06-27 RX ORDER — ARIPIPRAZOLE 5 MG/1
5 TABLET ORAL DAILY
COMMUNITY
Start: 2025-06-05

## 2025-06-27 RX ORDER — GLIPIZIDE 10 MG/1
20 TABLET ORAL
Qty: 360 TABLET | Refills: 2 | Status: SHIPPED | OUTPATIENT
Start: 2025-06-27 | End: 2026-03-24

## 2025-06-27 RX ORDER — METFORMIN HYDROCHLORIDE 500 MG/1
1000 TABLET, EXTENDED RELEASE ORAL 2 TIMES DAILY WITH MEALS
Qty: 360 TABLET | Refills: 1 | Status: SHIPPED | OUTPATIENT
Start: 2025-06-27 | End: 2025-12-24

## 2025-06-27 RX ORDER — TIRZEPATIDE 2.5 MG/.5ML
2.5 INJECTION, SOLUTION SUBCUTANEOUS
Qty: 2 ML | Refills: 0 | Status: SHIPPED | OUTPATIENT
Start: 2025-06-27 | End: 2025-07-27

## 2025-06-27 RX ORDER — DOXEPIN HYDROCHLORIDE 50 MG/1
50 CAPSULE ORAL NIGHTLY
COMMUNITY
Start: 2025-06-24

## 2025-06-27 NOTE — ASSESSMENT & PLAN NOTE
A1C Above goal   Continue RX metformin 1000 mg BID, and   Increase RX glipizide 20 mg BID  DC Trulicity 4.5 mg weekly,   Start Mounjaro 2.5 mg weekly   1 month f/u for med eval via virtual   Follow ADA diet.  Avoid soda, simple sweets, and limit rice/pasta/bread/starches and consume brown options when possible.   Maintain healthy weight with BMI goal <30.   Perform aerobic exercise for 150 minutes per week (or 5 days a week for 30 minutes each day).   Examine feet daily.     Lab Results   Component Value Date    HGBA1C 10.4 (H) 06/23/2025

## 2025-06-27 NOTE — PROGRESS NOTES
Flory De La Cruz, JOSE RAFAEL   [facility]  [address] [facility] [address]      PATIENT NAME: Kaye Spring  : 1975  DATE: 25  MRN: [id number]      Reason for Visit / Chief Complaint: Diabetes       History of Present Illness / Problem Focused Workflow     Kaye Spring presents to the clinic with Diabetes     48 yo WF here for f/u. Medical problems includes GERD, BPV (self-treats with Epley), chronic rhinitis, T2DM, HLD, CAD with MI in 2019, with stenting, HTN, chronic lower back pain, ventral hernia, AUB, endometriosis, depression with anxiety, and tobacco use, 1-2 ciggs / day.  Followed by Nikki Ackerman, mental health NP for psychiatric care and med management every 3 months. Disabled. Followed by [facility].      Cervical Cancer Screening - F/U [facility] GYN Annually  Breast Cancer Screening - 23 MMG  Osteoporosis Screening -25 Vitamin D Level 50  Diabetic Screening -    Eye Screening-   Dental Screening -   Wellness- 2025  Pt here today via virtual for T2DM F./U; at our LOV the pt dosage of Trulicty increased to 4.5 mg weekly however the pt reports that she did not start increased dosage until 10/21 which would only about 3 weeks ago at this time and would not show a great improvement. Will continue pt on current medication regiment at this time. We will f/u again in about 2 months F2F for Wellness with labs. Pt does report as well with some dysuria and burning, discussed positive Urine culture results, will tx with appropriate AB today. Pt to follow up as directed or prn.     2025  Pt here today for Wellness with labs; Pt A1C increased since last evaluation from 9.4 to 10.7. Pt reports compliance with meds however reports that he diet has not been the best especially over the week of the snow storm. Discussed with pt average of the A1C over 3 months. Pt is agreeable to adding  glipizide 10 mg BID to regimen to assist with elevated glucose levels. Pt requesting new testing supplies today, will keep log for AM fasting glucose levels. Will f/u in about 2 months with labs via virtual for med eval.   Visit today included increased complexity associated with the care of the episodic problem T2DM addressed and managing the longitudinal care of the patient due to the serious and/or complex managed problem(s) T2DM.    04/09/2025  Pt reports via virtual for lab review; A1C improved but still above goal.  150 - 210 AM fasting glucose which is improved since previous. Agreeable to med dose increase of glipizide to 20 BID. Denies any acute issues today. Overall feeling well, will f/u in about 2 months via virtual with labs and prn.    06/27/2025  History of Present Illness  Patient presents today for diabetes follow up. A1C has increased from 9.2 in April to 10.4 this week, which she attributes to increased sugar intake, particularly Arabic chocolate cake during her 's birthday celebration. She acknowledges dietary indiscretion affecting glycemic control. She continues Trulicity weekly, Glipizide 2 tablets twice daily, and Metformin. Insulin was previously discontinued due to high dosage requirements. Agreeable to changes today to DC Trulicity and start RX Mounjaro. Will f/u in about 4 weeks via virtual to titrate up. Denies any other issues today.                      Review of Systems     Review of Systems   Constitutional:  Negative for activity change and unexpected weight change.   HENT:  Negative for hearing loss, rhinorrhea and trouble swallowing.    Eyes:  Negative for discharge and visual disturbance.   Respiratory:  Negative for chest tightness and wheezing.    Cardiovascular:  Negative for chest pain and palpitations.   Gastrointestinal:  Negative for blood in stool, constipation, diarrhea and vomiting.   Endocrine: Negative for polydipsia and polyuria.   Genitourinary:  Negative for  difficulty urinating, dysuria, hematuria and menstrual problem.   Musculoskeletal:  Negative for arthralgias, joint swelling and neck pain.   Neurological:  Negative for weakness and headaches.   Psychiatric/Behavioral:  Negative for confusion and dysphoric mood.          Medications and Allergies     Medications  Medication List with Changes/Refills   New Medications    TIRZEPATIDE (MOUNJARO) 2.5 MG/0.5 ML PNIJ    Inject 2.5 mg into the skin every 7 days. For Diabetes   Current Medications    ACCU-CHEK GUIDE ME GLUCOSE MTR MISC        ALBUTEROL (PROVENTIL/VENTOLIN HFA) 90 MCG/ACTUATION INHALER    Inhale 2 puffs into the lungs 4 (four) times daily as needed for Wheezing or Shortness of Breath.    ALPRAZOLAM (XANAX) 1 MG TABLET    Take 1 mg by mouth 3 (three) times daily as needed.    ARIPIPRAZOLE (ABILIFY) 5 MG TAB    Take 5 mg by mouth once daily.    ASPIRIN 81 MG CHEW    Take 1 tablet (81 mg total) by mouth once daily.    BLOOD SUGAR DIAGNOSTIC STRP    To check BG 2 times daily, to use with insurance preferred meter E11.9    BLOOD-GLUCOSE METER KIT    To check BG 2 times daily, to use with insurance preferred meter E11.9    CLOPIDOGREL (PLAVIX) 75 MG TABLET    See Instructions, TAKE ONE TABLET BY MOUTH EVERY DAY, # 90 tab(s), 3 Refill(s), Pharmacy: [facility] [id number] [id number], 160, cm, Height/Length Dosing, 09/01/21 13:52:00 CDT, 85, kg, Weight Dosing, 09/01/21 13:52:00 CDT    DESVENLAFAXINE SUCCINATE (PRISTIQ) 100 MG TB24        DOXEPIN (SINEQUAN) 50 MG CAPSULE    Take 50 mg by mouth every evening.    EZETIMIBE (ZETIA) 10 MG TABLET    Take 1 tablet (10 mg total) by mouth once daily.    GABAPENTIN (NEURONTIN) 600 MG TABLET    Take 1 tablet (600 mg total) by mouth 3 (three) times daily. As needed for back pain    ISOSORBIDE MONONITRATE (IMDUR) 60 MG 24 HR TABLET    Take 1 tablet (60 mg total) by mouth once daily.    LANCETS MISC    To check BG 2 times daily, to use with insurance preferred meter E11.9    LINACLOTIDE  (LINZESS) 72 MCG CAP CAPSULE    Take 1 capsule (72 mcg total) by mouth before breakfast. For Constipation    LOSARTAN (COZAAR) 100 MG TABLET    Take 1 tablet (100 mg total) by mouth once daily. For High Blood Pressure    METOCLOPRAMIDE HCL (REGLAN) 5 MG TABLET    Take 5 mg by mouth 4 (four) times daily.    METOPROLOL SUCCINATE (TOPROL-XL) 25 MG 24 HR TABLET    Take 1 tablet (25 mg total) by mouth 2 (two) times daily.    NICOTINE (NICODERM CQ) 21 MG/24 HR    Place 1 patch onto the skin once daily.    NICOTINE, POLACRILEX, 2 MG LZMN    Place 1 each (2 mg total) inside cheek as needed (Do not exceed more than 10 pieces in 24 hours).    NITROGLYCERIN (NITROSTAT) 0.4 MG SL TABLET    Place 1 tablet (0.4 mg total) under the tongue every 5 (five) minutes as needed for Chest pain.    OXCARBAZEPINE (TRILEPTAL) 300 MG TAB    Take 300 mg by mouth 2 (two) times daily.    PANTOPRAZOLE (PROTONIX) 40 MG TABLET    Take 1 tablet (40 mg total) by mouth once daily. For Acid Reflux    ROSUVASTATIN (CRESTOR) 40 MG TAB    Take 1 tablet (40 mg total) by mouth once daily.    TIZANIDINE (ZANAFLEX) 4 MG TABLET    Take 1 tablet (4 mg total) by mouth every 8 (eight) hours.   Changed and/or Refilled Medications    Modified Medication Previous Medication    GLIPIZIDE (GLUCOTROL) 10 MG TABLET glipiZIDE (GLUCOTROL) 10 MG tablet       Take 2 tablets (20 mg total) by mouth 2 (two) times daily before meals. For Diabetes    Take 2 tablets (20 mg total) by mouth 2 (two) times daily before meals. For Diabetes    METFORMIN (GLUCOPHAGE-XR) 500 MG ER 24HR TABLET metFORMIN (GLUCOPHAGE-XR) 500 MG ER 24hr tablet       Take 2 tablets (1,000 mg total) by mouth 2 (two) times daily with meals.    Take 2 tablets (1,000 mg total) by mouth 2 (two) times daily with meals.   Discontinued Medications    ARIPIPRAZOLE (ABILIFY) 2 MG TAB    Take 2 mg by mouth once daily.    DOXEPIN (SINEQUAN) 25 MG CAPSULE    Take 25 mg by mouth nightly.    DULAGLUTIDE (TRULICITY) 4.5  MG/0.5 ML PEN INJECTOR    Inject 4.5 mg into the skin every 7 days. For Diabetes    VARENICLINE TARTRATE (CHANTIX) 1 MG TAB    Take 1 tablet twice daily         Allergies  Review of patient's allergies indicates:  No Known Allergies    Physical Examination   There were no vitals filed for this visit.  Physical Exam  HENT:      Right Ear: Hearing normal.      Left Ear: Hearing normal.   Neurological:      Mental Status: She is alert and oriented to person, place, and time.   Psychiatric:         Mood and Affect: Mood normal.           Results     Lab Results   Component Value Date    WBC 11.05 01/30/2025    RBC 5.38 (H) 01/30/2025    HGB 14.9 01/30/2025    HCT 44.1 01/30/2025    MCV 82.0 01/30/2025    MCH 27.7 01/30/2025    MCHC 33.8 01/30/2025    RDW 12.4 01/30/2025     01/30/2025    MPV 10.8 01/30/2025     Sodium   Date Value Ref Range Status   06/23/2025 138 136 - 145 mmol/L Final   09/30/2023 134 (L) 136 - 145 mmol/L Final     Potassium   Date Value Ref Range Status   06/23/2025 4.4 3.5 - 5.1 mmol/L Final   09/30/2023 4.2 3.5 - 5.1 mmol/L Final     Comment:     Slight hemolysis present, interpret results with caution     Chloride   Date Value Ref Range Status   06/23/2025 109 98 - 110 mmol/L Final   09/30/2023 102 100 - 109 mmol/L Final     CO2   Date Value Ref Range Status   06/23/2025 22 21 - 32 mmol/L Final     Carbon Dioxide   Date Value Ref Range Status   09/30/2023 20 (L) 22 - 33 mmol/L Final     Glucose   Date Value Ref Range Status   06/23/2025 195 (H) 70 - 115 mg/dL Final     Blood Urea Nitrogen   Date Value Ref Range Status   06/23/2025 4 (L) 7.0 - 20.0 mg/dL Final   09/30/2023 8 5 - 25 mg/dL Final     Creatinine   Date Value Ref Range Status   06/23/2025 0.63 (L) 0.66 - 1.25 mg/dL Final   09/30/2023 0.80 0.57 - 1.25 mg/dL Final     Calcium   Date Value Ref Range Status   06/23/2025 9.6 8.4 - 10.2 mg/dL Final   09/30/2023 8.4 (L) 8.8 - 10.6 mg/dL Final     Protein Total   Date Value Ref Range  Status   01/30/2025 7.2 6.3 - 8.2 gm/dL Final     Albumin   Date Value Ref Range Status   01/30/2025 4.5 3.4 - 5.0 g/dL Final     Bilirubin Total   Date Value Ref Range Status   01/30/2025 0.4 0.0 - 1.0 mg/dL Final     ALP   Date Value Ref Range Status   01/30/2025 94 50 - 144 unit/L Final     AST   Date Value Ref Range Status   01/30/2025 30 14 - 36 unit/L Final     ALT   Date Value Ref Range Status   01/30/2025 28 1 - 45 unit/L Final     Anion Gap   Date Value Ref Range Status   09/30/2023 12 8 - 16 mmol/L Final     eGFR    Date Value Ref Range Status   09/30/2023 91 mL/min/1.73mSq Final     Comment:     In accordance with NKF-ASN Task Force recommendation, calculation based on the Chronic Kidney Disease Epidemiology Collaboration (CKD-EPI) equation without adjustment for race. eGFR adjusted for gender and age and calculated in ml/min/1.73mSquared. eGFR cannot be calculated if patient is under 18 years of age.     Reference Range:   >= 60 ml/min/1.73mSquared.     Estimated GFR-Non    Date Value Ref Range Status   06/28/2022 >60 mls/min/1.73/m2 Final     Lab Results   Component Value Date    CHOL 88 01/30/2025     Lab Results   Component Value Date    HDL 39 (L) 01/30/2025     Lab Results   Component Value Date    TRIG 174 01/30/2025     Lab Results   Component Value Date    VLDL 30 01/31/2024     Lab Results   Component Value Date    LDL 43.00 (L) 01/31/2024     Lab Results   Component Value Date    TSH 2.790 01/30/2025     Lab Results   Component Value Date    PHUR 6.0 06/23/2025    PROTEINUA Negative 06/23/2025    GLUCUA Negative 06/23/2025    KETONESU Negative 09/13/2021    OCCULTUA Negative 06/23/2025    NITRITE Negative 06/23/2025    LEUKOCYTESUR Negative 06/23/2025     Lab Results   Component Value Date    HGBA1C 10.4 (H) 06/23/2025    HGBA1C 9.2 (H) 04/08/2025    HGBA1C 10.7 (H) 01/30/2025           Assessment         ICD-10-CM ICD-9-CM   1. Type 2 diabetes mellitus with  hyperglycemia, without long-term current use of insulin  E11.65 250.00     790.29       Plan      1. Type 2 diabetes mellitus with hyperglycemia, without long-term current use of insulin  Assessment & Plan:  A1C Above goal   Continue RX metformin 1000 mg BID, and   Increase RX glipizide 20 mg BID  DC Trulicity 4.5 mg weekly,   Start Mounjaro 2.5 mg weekly   1 month f/u for med eval via virtual   Follow ADA diet.  Avoid soda, simple sweets, and limit rice/pasta/bread/starches and consume brown options when possible.   Maintain healthy weight with BMI goal <30.   Perform aerobic exercise for 150 minutes per week (or 5 days a week for 30 minutes each day).   Examine feet daily.     Lab Results   Component Value Date    HGBA1C 10.4 (H) 06/23/2025         Orders:  -     tirzepatide (MOUNJARO) 2.5 mg/0.5 mL PnIj; Inject 2.5 mg into the skin every 7 days. For Diabetes  Dispense: 2 mL; Refill: 0  -     glipiZIDE (GLUCOTROL) 10 MG tablet; Take 2 tablets (20 mg total) by mouth 2 (two) times daily before meals. For Diabetes  Dispense: 360 tablet; Refill: 2  -     metFORMIN (GLUCOPHAGE-XR) 500 MG ER 24hr tablet; Take 2 tablets (1,000 mg total) by mouth 2 (two) times daily with meals.  Dispense: 360 tablet; Refill: 1         Future Appointments   Date Time Provider Department Center   7/18/2025  8:40 AM Flory De La Cruz F[facility]    7/21/2025  2:00 PM Yael Guerrero PA-C [facility] CARD [facility]            Signature:     [facility]  [address] [facility] [address]    Date of encounter: 6/27/25  This note was generated with the assistance of ambient listening technology. Verbal consent was obtained by the patient and accompanying visitor(s) for the recording of patient appointment to facilitate this note. I attest to having reviewed and edited the generated note for accuracy, though some syntax or spelling errors may persist. Please contact  the author of this note for any clarification.    The patient location is: home  The chief complaint leading to consultation is: T2DM Lab Review    Visit type: audiovisual    Face to Face time with patient: 15  20 minutes of total time spent on the encounter, which includes face to face time and non-face to face time preparing to see the patient (eg, review of tests), Obtaining and/or reviewing separately obtained history, Documenting clinical information in the electronic or other health record, Independently interpreting results (not separately reported) and communicating results to the patient/family/caregiver, or Care coordination (not separately reported).         Each patient to whom he or she provides medical services by telemedicine is:  (1) informed of the relationship between the physician and patient and the respective role of any other health care provider with respect to management of the patient; and (2) notified that he or she may decline to receive medical services by telemedicine and may withdraw from such care at any time.    Notes:

## 2025-07-01 ENCOUNTER — TELEPHONE (OUTPATIENT)
Dept: INTERNAL MEDICINE | Facility: CLINIC | Age: 50
End: 2025-07-01
Payer: MEDICAID

## 2025-07-01 NOTE — TELEPHONE ENCOUNTER
Copied from CRM #[id number]. Topic: General Inquiry - Patient Advice  >> Jun 30, 2025 12:55 PM Allen Veloz wrote:  Who Called: Kaye Spring    Caller is requesting assistance/information from provider's office.    Symptoms (please be specific): N/A   How long has patient had these symptoms:  N/A  List of preferred pharmacies on file (remove unneeded): [unfilled]  If different, enter pharmacy into here including location and phone number: N/A      Preferred Method of Contact: Phone Call  Patient's Preferred Phone Number on File: [phone]   Best Call Back Number, if different:  Additional Information: pt stated she'd like a call back from nurse. Pt stated she's suppose to take her insulin today, but a prior uth needs to be done on mounjaro. Pt would like to know if she should use previous insulin .

## 2025-07-01 NOTE — TELEPHONE ENCOUNTER
Copied from CRM #[id number]. Topic: Medications - Medication Authorization  >> Jun 27, 2025  8:50 AM Kaity wrote:  .Who Called: Kaye Spring    Caller is requesting assistance/information from provider's office.    Symptoms (please be specific): N/A   How long has patient had these symptoms:  N/A    List of preferred pharmacies on file (remove unneeded): [facility] - [place], [place] - [address]  [address] [place] [address]  Phone: [phone] Fax: [phone]    Preferred Method of Contact: Phone Call    Patient's Preferred Phone Number on File: [phone]     Best Call Back Number, if different:    Additional Information: Pt called stating she contacted her pharmacy and was informed her tirzepatide (MOUNJARO) 2.5 mg/0.5 mL PnIj needs a prior authorization. Please advise, thank you.

## 2025-07-10 ENCOUNTER — HOSPITAL ENCOUNTER (OUTPATIENT)
Dept: RADIOLOGY | Facility: HOSPITAL | Age: 50
Discharge: HOME | End: 2025-07-10
Attending: NURSE PRACTITIONER
Payer: MEDICAID

## 2025-07-10 VITALS — BODY MASS INDEX: 33.8 KG/M2 | HEIGHT: 64 IN | WEIGHT: 198 LBS

## 2025-07-10 DIAGNOSIS — R92.8 ABNORMAL MAMMOGRAM: ICD-10-CM

## 2025-07-10 PROCEDURE — 76642 ULTRASOUND BREAST LIMITED: CPT | Mod: TC,RT

## 2025-07-10 PROCEDURE — 77065 DX MAMMO INCL CAD UNI: CPT | Mod: TC,RT

## 2025-07-10 PROCEDURE — 77061 BREAST TOMOSYNTHESIS UNI: CPT | Mod: 26,RT,, | Performed by: RADIOLOGY

## 2025-07-10 PROCEDURE — 77065 DX MAMMO INCL CAD UNI: CPT | Mod: 26,RT,, | Performed by: RADIOLOGY

## 2025-07-15 ENCOUNTER — TELEPHONE (OUTPATIENT)
Dept: INTERNAL MEDICINE | Facility: CLINIC | Age: 50
End: 2025-07-15
Payer: MEDICAID

## 2025-07-15 NOTE — TELEPHONE ENCOUNTER
I spoke to Pt. And informed her that PA for Medication (Mounjaro) is in progress.Pt. verbalized understanding.

## 2025-07-15 NOTE — TELEPHONE ENCOUNTER
Copied from CRM #[id number]. Topic: General Inquiry - Patient Advice  >> Jul 15, 2025  2:09 PM Allen Veloz wrote:  Who Called: Kaye Spring    Caller is requesting assistance/information from provider's office.    Symptoms (please be specific): N/A   How long has patient had these symptoms:  N/A  List of preferred pharmacies on file (remove unneeded): [unfilled]  If different, enter pharmacy into here including location and phone number: N/A      Preferred Method of Contact: Phone Call  Patient's Preferred Phone Number on File: [phone]   Best Call Back Number, if different:  Additional Information: pt stated she was told mounjaro was approved. Pt stated she is still being told by her pharmacy that med is needing a prior authorization

## 2025-07-18 ENCOUNTER — OFFICE VISIT (OUTPATIENT)
Dept: INTERNAL MEDICINE | Facility: CLINIC | Age: 50
End: 2025-07-18
Payer: MEDICAID

## 2025-07-18 DIAGNOSIS — E11.65 TYPE 2 DIABETES MELLITUS WITH HYPERGLYCEMIA, WITHOUT LONG-TERM CURRENT USE OF INSULIN: Primary | ICD-10-CM

## 2025-07-18 NOTE — PROGRESS NOTES
Flory De La Cruz, JOSE RAFAEL   [facility]  [address] [facility] [address]      PATIENT NAME: aKye Spring  : 1975  DATE: 25  MRN: [id number]      Reason for Visit / Chief Complaint: Diabetes       History of Present Illness / Problem Focused Workflow     Kaye Spring presents to the clinic with Diabetes     50 yo WF here for f/u. Medical problems includes GERD, BPV (self-treats with Epley), chronic rhinitis, T2DM, HLD, CAD with MI in 2019, with stenting, HTN, chronic lower back pain, ventral hernia, AUB, endometriosis, depression with anxiety, and tobacco use, 1-2 ciggs / day.  Followed by Nikki Ackerman, mental health NP for psychiatric care and med management every 3 months. Disabled. Followed by [facility].      Cervical Cancer Screening - F/U [facility] GYN Annually  Breast Cancer Screening - 23 MMG  Osteoporosis Screening -25 Vitamin D Level 50  Diabetic Screening -    Eye Screening-   Dental Screening -   Wellness- 2025  History of Present Illness  Patient presents today for diabetes follow up. A1C has increased from 9.2 in April to 10.4 this week, which she attributes to increased sugar intake, particularly Sinhala chocolate cake during her 's birthday celebration. She acknowledges dietary indiscretion affecting glycemic control. She continues Trulicity weekly, Glipizide 2 tablets twice daily, and Metformin. Insulin was previously discontinued due to high dosage requirements. Agreeable to changes today to DC Trulicity and start RX Mounjaro. Will f/u in about 4 weeks via virtual to titrate up. Denies any other issues today.     2025  History of Present Illness  Patient presents today for follow up of diabetes medication management. She continues Trulicity 4.5 mg weekly with two doses remaining, metformin twice daily, and glipizide two tablets twice daily. She has eliminated  sweets from her diet. Her glucose readings have improved with highest reading of 200 and morning fasting blood sugar of 155, compared to previous readings near 300. She performs regular glucose monitoring. The pt reports she has not gotten the new dosage of the Mounjaro due to PA still pending. Will f/u on Monday for eval. May need to push back appt pending when she gets her meds. Denies any acute issues today.                       Review of Systems     Review of Systems   Constitutional:  Negative for activity change and unexpected weight change.   HENT:  Negative for hearing loss, rhinorrhea and trouble swallowing.    Eyes:  Negative for discharge and visual disturbance.   Respiratory:  Negative for chest tightness and wheezing.    Cardiovascular:  Negative for chest pain and palpitations.   Gastrointestinal:  Negative for blood in stool, constipation, diarrhea and vomiting.   Endocrine: Negative for polydipsia and polyuria.   Genitourinary:  Negative for difficulty urinating, dysuria, hematuria and menstrual problem.   Musculoskeletal:  Negative for arthralgias, joint swelling and neck pain.   Neurological:  Negative for weakness and headaches.   Psychiatric/Behavioral:  Negative for confusion and dysphoric mood.          Medications and Allergies     Medications  Medication List with Changes/Refills   Current Medications    ACCU-CHEK GUIDE ME GLUCOSE MTR MISC        ALBUTEROL (PROVENTIL/VENTOLIN HFA) 90 MCG/ACTUATION INHALER    Inhale 2 puffs into the lungs 4 (four) times daily as needed for Wheezing or Shortness of Breath.    ALPRAZOLAM (XANAX) 1 MG TABLET    Take 1 mg by mouth 3 (three) times daily as needed.    ARIPIPRAZOLE (ABILIFY) 5 MG TAB    Take 5 mg by mouth once daily.    ASPIRIN 81 MG CHEW    Take 1 tablet (81 mg total) by mouth once daily.    BLOOD SUGAR DIAGNOSTIC STRP    To check BG 2 times daily, to use with insurance preferred meter E11.9    BLOOD-GLUCOSE METER KIT    To check BG 2 times daily,  to use with insurance preferred meter E11.9    CLOPIDOGREL (PLAVIX) 75 MG TABLET    See Instructions, TAKE ONE TABLET BY MOUTH EVERY DAY, # 90 tab(s), 3 Refill(s), Pharmacy: [place] [id number] [id number], 160, cm, Height/Length Dosing, 09/01/21 13:52:00 CDT, 85, kg, Weight Dosing, 09/01/21 13:52:00 CDT    DESVENLAFAXINE SUCCINATE (PRISTIQ) 100 MG TB24        DOXEPIN (SINEQUAN) 50 MG CAPSULE    Take 50 mg by mouth every evening.    EZETIMIBE (ZETIA) 10 MG TABLET    Take 1 tablet (10 mg total) by mouth once daily.    GABAPENTIN (NEURONTIN) 600 MG TABLET    Take 1 tablet (600 mg total) by mouth 3 (three) times daily. As needed for back pain    GLIPIZIDE (GLUCOTROL) 10 MG TABLET    Take 2 tablets (20 mg total) by mouth 2 (two) times daily before meals. For Diabetes    ISOSORBIDE MONONITRATE (IMDUR) 60 MG 24 HR TABLET    Take 1 tablet (60 mg total) by mouth once daily.    LANCETS MISC    To check BG 2 times daily, to use with insurance preferred meter E11.9    LINACLOTIDE (LINZESS) 72 MCG CAP CAPSULE    Take 1 capsule (72 mcg total) by mouth before breakfast. For Constipation    LOSARTAN (COZAAR) 100 MG TABLET    Take 1 tablet (100 mg total) by mouth once daily. For High Blood Pressure    METFORMIN (GLUCOPHAGE-XR) 500 MG ER 24HR TABLET    Take 2 tablets (1,000 mg total) by mouth 2 (two) times daily with meals.    METOCLOPRAMIDE HCL (REGLAN) 5 MG TABLET    Take 5 mg by mouth 4 (four) times daily.    METOPROLOL SUCCINATE (TOPROL-XL) 25 MG 24 HR TABLET    Take 1 tablet (25 mg total) by mouth 2 (two) times daily.    NICOTINE (NICODERM CQ) 21 MG/24 HR    Place 1 patch onto the skin once daily.    NICOTINE, POLACRILEX, 2 MG LZMN    Place 1 each (2 mg total) inside cheek as needed (Do not exceed more than 10 pieces in 24 hours).    NITROGLYCERIN (NITROSTAT) 0.4 MG SL TABLET    Place 1 tablet (0.4 mg total) under the tongue every 5 (five) minutes as needed for Chest pain.    OXCARBAZEPINE (TRILEPTAL) 300 MG TAB    Take 300 mg by mouth 2  (two) times daily.    PANTOPRAZOLE (PROTONIX) 40 MG TABLET    Take 1 tablet (40 mg total) by mouth once daily. For Acid Reflux    ROSUVASTATIN (CRESTOR) 40 MG TAB    Take 1 tablet (40 mg total) by mouth once daily.    TIRZEPATIDE (MOUNJARO) 2.5 MG/0.5 ML PNIJ    Inject 2.5 mg into the skin every 7 days. For Diabetes    TIZANIDINE (ZANAFLEX) 4 MG TABLET    Take 1 tablet (4 mg total) by mouth every 8 (eight) hours.         Allergies  Review of patient's allergies indicates:  No Known Allergies    Physical Examination   There were no vitals filed for this visit.  Physical Exam  Constitutional:       Appearance: Normal appearance.   Cardiovascular:      Rate and Rhythm: Normal rate and regular rhythm.      Pulses:           Dorsalis pedis pulses are 2+ on the right side and 2+ on the left side.        Posterior tibial pulses are 2+ on the right side and 2+ on the left side.   Pulmonary:      Effort: Pulmonary effort is normal.      Breath sounds: Normal breath sounds.   Musculoskeletal:         General: Normal range of motion.      Cervical back: Normal range of motion.   Feet:      Right foot:      Protective Sensation: 9 sites tested.  9 sites sensed.      Skin integrity: Skin integrity normal.      Toenail Condition: Right toenails are normal.      Left foot:      Protective Sensation: 9 sites tested.  9 sites sensed.      Skin integrity: Skin integrity normal.      Toenail Condition: Left toenails are normal.   Skin:     General: Skin is warm and dry.   Neurological:      General: No focal deficit present.      Mental Status: She is alert and oriented to person, place, and time.   Psychiatric:         Mood and Affect: Mood normal.           Results     Lab Results   Component Value Date    WBC 11.05 01/30/2025    RBC 5.38 (H) 01/30/2025    HGB 14.9 01/30/2025    HCT 44.1 01/30/2025    MCV 82.0 01/30/2025    MCH 27.7 01/30/2025    MCHC 33.8 01/30/2025    RDW 12.4 01/30/2025     01/30/2025    MPV 10.8  01/30/2025     Sodium   Date Value Ref Range Status   06/23/2025 138 136 - 145 mmol/L Final   09/30/2023 134 (L) 136 - 145 mmol/L Final     Potassium   Date Value Ref Range Status   06/23/2025 4.4 3.5 - 5.1 mmol/L Final   09/30/2023 4.2 3.5 - 5.1 mmol/L Final     Comment:     Slight hemolysis present, interpret results with caution     Chloride   Date Value Ref Range Status   06/23/2025 109 98 - 110 mmol/L Final   09/30/2023 102 100 - 109 mmol/L Final     CO2   Date Value Ref Range Status   06/23/2025 22 21 - 32 mmol/L Final     Carbon Dioxide   Date Value Ref Range Status   09/30/2023 20 (L) 22 - 33 mmol/L Final     Glucose   Date Value Ref Range Status   06/23/2025 195 (H) 70 - 115 mg/dL Final     Blood Urea Nitrogen   Date Value Ref Range Status   06/23/2025 4 (L) 7.0 - 20.0 mg/dL Final   09/30/2023 8 5 - 25 mg/dL Final     Creatinine   Date Value Ref Range Status   06/23/2025 0.63 (L) 0.66 - 1.25 mg/dL Final   09/30/2023 0.80 0.57 - 1.25 mg/dL Final     Calcium   Date Value Ref Range Status   06/23/2025 9.6 8.4 - 10.2 mg/dL Final   09/30/2023 8.4 (L) 8.8 - 10.6 mg/dL Final     Protein Total   Date Value Ref Range Status   01/30/2025 7.2 6.3 - 8.2 gm/dL Final     Albumin   Date Value Ref Range Status   01/30/2025 4.5 3.4 - 5.0 g/dL Final     Bilirubin Total   Date Value Ref Range Status   01/30/2025 0.4 0.0 - 1.0 mg/dL Final     ALP   Date Value Ref Range Status   01/30/2025 94 50 - 144 unit/L Final     AST   Date Value Ref Range Status   01/30/2025 30 14 - 36 unit/L Final     ALT   Date Value Ref Range Status   01/30/2025 28 1 - 45 unit/L Final     Anion Gap   Date Value Ref Range Status   09/30/2023 12 8 - 16 mmol/L Final     eGFR    Date Value Ref Range Status   09/30/2023 91 mL/min/1.73mSq Final     Comment:     In accordance with NKF-ASN Task Force recommendation, calculation based on the Chronic Kidney Disease Epidemiology Collaboration (CKD-EPI) equation without adjustment for race. eGFR  adjusted for gender and age and calculated in ml/min/1.73mSquared. eGFR cannot be calculated if patient is under 18 years of age.     Reference Range:   >= 60 ml/min/1.73mSquared.     Estimated GFR-Non    Date Value Ref Range Status   06/28/2022 >60 mls/min/1.73/m2 Final     Lab Results   Component Value Date    CHOL 88 01/30/2025     Lab Results   Component Value Date    HDL 39 (L) 01/30/2025     Lab Results   Component Value Date    TRIG 174 01/30/2025     Lab Results   Component Value Date    VLDL 30 01/31/2024     Lab Results   Component Value Date    LDL 43.00 (L) 01/31/2024     Lab Results   Component Value Date    TSH 2.790 01/30/2025     Lab Results   Component Value Date    PHUR 6.0 06/23/2025    PROTEINUA Negative 06/23/2025    GLUCUA Negative 06/23/2025    KETONESU Negative 09/13/2021    OCCULTUA Negative 06/23/2025    NITRITE Negative 06/23/2025    LEUKOCYTESUR Negative 06/23/2025     Lab Results   Component Value Date    HGBA1C 10.4 (H) 06/23/2025    HGBA1C 9.2 (H) 04/08/2025    HGBA1C 10.7 (H) 01/30/2025           Assessment         ICD-10-CM ICD-9-CM   1. Type 2 diabetes mellitus with hyperglycemia, without long-term current use of insulin  E11.65 250.00     790.29       Plan      1. Type 2 diabetes mellitus with hyperglycemia, without long-term current use of insulin  Assessment & Plan:  A1C Above goal   Continue RX metformin 1000 mg BID, and   Continue RX glipizide 20 mg BID  Continue RX Trulicity 4.5 mg weekly,   Will start Mounjaro 2.5 mg weekly once PA completed   1 month f/u for med eval via virtual or pushed back if needed   Follow ADA diet.  Avoid soda, simple sweets, and limit rice/pasta/bread/starches and consume brown options when possible.   Maintain healthy weight with BMI goal <30.   Perform aerobic exercise for 150 minutes per week (or 5 days a week for 30 minutes each day).   Examine feet daily.     Lab Results   Component Value Date    HGBA1C 10.4 (H) 06/23/2025                 Future Appointments   Date Time Provider Department Center   7/21/2025  2:00 PM Yael Guerrero PA-C [facility] CARD Anatoliy Orozco            Signature:     [facility]  [address] [place] [address]    Date of encounter: 7/18/25  This note was generated with the assistance of ambient listening technology. Verbal consent was obtained by the patient and accompanying visitor(s) for the recording of patient appointment to facilitate this note. I attest to having reviewed and edited the generated note for accuracy, though some syntax or spelling errors may persist. Please contact the author of this note for any clarification.    The patient location is: home  The chief complaint leading to consultation is: Diabetes    Visit type: audiovisual    Face to Face time with patient: 15  20 minutes of total time spent on the encounter, which includes face to face time and non-face to face time preparing to see the patient (eg, review of tests), Obtaining and/or reviewing separately obtained history, Documenting clinical information in the electronic or other health record, Independently interpreting results (not separately reported) and communicating results to the patient/family/caregiver, or Care coordination (not separately reported).         Each patient to whom he or she provides medical services by telemedicine is:  (1) informed of the relationship between the physician and patient and the respective role of any other health care provider with respect to management of the patient; and (2) notified that he or she may decline to receive medical services by telemedicine and may withdraw from such care at any time.    Notes:

## 2025-07-18 NOTE — ASSESSMENT & PLAN NOTE
A1C Above goal   Continue RX metformin 1000 mg BID, and   Continue RX glipizide 20 mg BID  Continue RX Trulicity 4.5 mg weekly,   Will start Mounjaro 2.5 mg weekly once PA completed   1 month f/u for med eval via virtual or pushed back if needed   Follow ADA diet.  Avoid soda, simple sweets, and limit rice/pasta/bread/starches and consume brown options when possible.   Maintain healthy weight with BMI goal <30.   Perform aerobic exercise for 150 minutes per week (or 5 days a week for 30 minutes each day).   Examine feet daily.     Lab Results   Component Value Date    HGBA1C 10.4 (H) 06/23/2025        Dr. Kathleen TREADWELL

## 2025-07-18 NOTE — PROGRESS NOTES
This is a telemedicine note. Patient was treated using telemedicine, real time audio and video, according to [facility] protocols. Yael GRULLON PA-C, conducted the visit from the [facility]. The patient participated in the visit at a non-[facility] location selected by the patient, identified below. I am licensed in the state where the patient stated they are located. The patient stated that they understood and accepted the privacy and security risks to their information at their location. This visit is not recorded.    Patient was located at the patient's home.       CHIEF COMPLAINT:   No chief complaint on file.                                                 HPI:  Kaye Spring 49 y.o. female  PMHx includes  CAD with MI in February 2019, with stenting now s/p CABG LIMA->LAD in 6/2021 complicated by wound dehiscence and infection treated, HTN, T2DM, HLD, chronic lower back pain, ventral hernia, AUB, endometriosis, depression with anxiety, and tobacco use.  Patient presents for follow up and ongoing care. Nuclear stress test revealed an abnormal myocardial perfusion scan.  There was a moderate intensity, medium size, reversible perfusion abnormality in the mid to apical lateral apical walls in the typical distribution of the left circumflex territory.  See full report below. She  LHC on 9.9.24 at [facility] which revealed significant two-vessel coronary artery disease and complex PCI was recommended.  Subsequently, on 9.16.24 patient underwent emergent LHC with successful intervention of the LCx ISR.  She was discharged from the hospital with the appropriate CAD medications and will remain on DAPT for at least 1 year.  See full operative report below.      Today the patient states that she feels stable from a cardiac standpoint.  She denies any recent episodes of chest pain, SOB, or ESPARZA.  She states that she occasionally has heartburn.  She was previously on GERD medications but states that she feels  "as though they are not working as effectively as they used to.  She states that she has several GI issues going on.  She admits that she has been suffering with severe constipation, stating that sometimes she does not have a bowel movement for 3-4 weeks at a time.  Strongly encouraged her to discuss this with PCP or even go to the ED for evaluation.  She has gained approximately 30 lb since September 2024.  Otherwise she endorses stable SOB mentally occurs with "over doing it".  She does not do much physical activity at this time.  She denies any palpitations, PND, orthopnea, lightheadedness, dizziness, syncope, lower extremity edema, or claudication symptoms.  She states that previously she had 1 episode of lightheadedness and dizziness in the middle of the night when going to the restroom, but she believes that she just stood up too quickly.  She is able to complete her ADLs without any issues or ischemic symptoms.  Strongly encouraged more physical activity if she is able to.  She reports compliance with all her current medications and is tolerating them well.  She denies any adverse bleeding effects while on DAPT.  She has decreased smoking to approximately 8 cigarettes per day and is working towards complete cessation.                                                                                                                                                                                                                                                                                                                                                                                                                                                                                        CARDIAC TESTING:  Results for orders placed during the hospital encounter of 09/04/24    Echo    Interpretation Summary    Left Ventricle: The left ventricle is normal in size. There is low normal systolic function with a visually " estimated ejection fraction of 50 - 55%. Grade I diastolic dysfunction.    Right Ventricle: Right ventricle was not well visualized due to poor acoustic window. Normal right ventricular cavity size. Systolic function is borderline low.    Less than mild valvular stenosis/regurgitation.    Unable to determine PASP.    Results for orders placed during the hospital encounter of 08/21/24    Nuclear Stress - Cardiology Interpreted    Interpretation Summary    Abnormal myocardial perfusion scan.    moderate intensity, medium sized,  in the  mid to apical lateral apical wall(s) in the typical distribution of the LCX territory.    There are no other significant perfusion abnormalities.    The gated perfusion images showed an ejection fraction of 77% at rest.    The patient reported no chest pain during the stress test.    There were no arrhythmias during stress.    The nuclear stress test is  fair quality.  On the nuclear stress test the EF is normal.  If the patient has an echo, the EF on the echo is considered more accurate.   The EF drops from rest to stress.    The patient has a moderate risk nuclear stress test due to lateral and apical reversible defect.    If clinically indicated, consider further evaluation with coronary angiogram.     Results for orders placed during the hospital encounter of 09/14/24    Cardiac catheterization    Conclusion    There is severe coronary artery disease. Successful intervention of LCx ISR as outlined below.    Mid Left Circumflex has diffuse 95% in-stent restenosis. This was the culprit lesion. The lesion was successfully treated with Laser atherectomy using a 1.4 mm device, an NC EMERGE MR 2.5X30MM angioplasty balloon inflated to high pressure, a Scoreflex 2.5X20MM cutting balloon, and finally a 2.61I73UF drug-coated balloon for a full 60 second inflation.    Intravascular Ultrasound (IVUS) was performed prior to LCx intervention to further characterize the lesion     Second Obtuse Marginal Branch has a 70% stenosis. This branch vessel was protected with a wire during the LCx intervention, and was treated with a 2.0x15 angioplasty balloon. There remains a 60% ostial stenosis and LUDIN grade 3 flow in the distal vessel.    Procedure:  Left heart catheterization with coronary angiography  Percutaneous coronary intervention    Preoperative diagnosis:  Unstable angina    Postoperative diagnosis:  Successful PCI    Access:  Right common femoral artery    Estimated blood loss: 10 cc  Complications: None    Summary:  Consent obtained. Risks and benefits discussed with the patient. The patient was brought to the cath lab in a fasting state. The patient was prepped and draped in usual sterile fashion. A preprocedure time-out was performed.    Ultrasound-guided right common femoral arterial access via modified Seldinger technique using a micropuncture kit. A 6 Chadian sheath was inserted into the right common femoral artery.    A 6 Chadian EBU 3.5 catheter was used for the intervention.  The left circumflex lesion was wired with a runthrough wire.  Laser atherectomy was performed on the LCx ISR using 1.4 mm device. The 2nd obtuse marginal branch was wired with a Prowater wire. Balloon angioplasty was performed on the ostial 2nd obtuse marginal branch lesion with a 2.0 x 15 mm angioplasty balloon. Intravascular Ultrasound (IVUS) was performed prior to LCx intervention to further characterize the LCx lesion. The LCx lesion was treated with an NC EMERGE MR 2.5X30MM angioplasty balloon inflated to high pressure. Further LCx angioplasty was performed with a Scoreflex 2.5X20MM cutting balloon. Finally, the LCx lesion was treated with a 2.5X30MM drug coated balloon. Intracoronary nitroglycerin was administered throughout the case to increase outflow in the distal LCx.    At the end the procedure, all wires and catheters were removed.  Hemostasis achieved with manual compression.    Findings:  -  There is severe coronary artery disease. Successful intervention of LCx ISR as outlined below.  - Mid Left Circumflex has diffuse 95% in-stent restenosis. This was the culprit lesion. The lesion was successfully treated with Laser atherectomy using a 1.4 mm device, an NC EMERGE MR 2.5X30MM angioplasty balloon inflated to high pressure, a Scoreflex 2.5X20MM cutting balloon, and finally a 2.54C10NI drug-coated balloon for a full 60 second inflation.  - Intravascular Ultrasound (IVUS) was performed prior to LCx intervention to further characterize the lesion  - Second Obtuse Marginal Branch has a 70% stenosis. This branch vessel was protected with a wire during the LCx intervention, and was treated with a 2.0x15 angioplasty balloon. There remains a 60% ostial stenosis and LUDIN grade 3 flow in the distal vessel.    Assessment/Plan:  - Patient is a 48 y.o. female with a history of CAD/CABG presents with unstable angina. Staged PCI of LCx ISR was successful as noted above. Outflow vessel is small, but will hopefully improve overtime given increased flow following ISR intervention.  - Continue DAPT for a minimum of 12 months and aspirin indefinitely thereafter  - High intensity statin  - Continue Aggrastat for 12 hours post-procedure  - Arterial sheath remains place; plan is to remove when activated clotting time (ACT) less than 180 seconds. Bedrest for 4 hours after hemostasis is achieved.    Tyler Robles MD  Interventional Cardiology/Structural Heart Disease  [facility]       Problem List[1]  Past Surgical History:   Procedure Laterality Date    ANGIOGRAM, CORONARY, WITH LEFT HEART CATHETERIZATION N/A 09/09/2024    Procedure: Angiogram, Coronary, with Left Heart Cath;  Surgeon: Tyler Stevenson MD;  Location: [facility] CATH LAB;  Service: Cardiology;  Laterality: N/A;    CARDIAC CATHETERIZATION      CARDIAC VALVE REPLACEMENT      CHOLECYSTECTOMY      CORONARY ANGIOPLASTY      CORONARY  ANGIOPLASTY WITH STENT PLACEMENT N/A 09/16/2024    Procedure: ANGIOPLASTY, CORONARY ARTERY, WITH STENT INSERTION;  Surgeon: Tyler Robles Jr., MD;  Location: [facility] CATH LAB;  Service: Cardiology;  Laterality: N/A;    CORONARY ARTERY BYPASS GRAFT      HYSTERECTOMY      partial hyst at age 44    right wrist surgery      TUBAL LIGATION       Social History[2]     Family History   Problem Relation Name Age of Onset    Arrhythmia Mother Marianela     Clotting disorder Mother Marianela     Heart disease Mother Marianela     Heart attack Mother Marianela     Hyperlipidemia Mother Marianela     Heart failure Mother Marianela     Stroke Mother Marianela     Hypertension Mother Marianela     Diabetes Mother Marianela     Depression Mother Marianela     Lymphoma Mother Marianela     Breast cancer Maternal Aunt  40    Lung cancer Maternal Aunt      Hyperlipidemia Brother Domingo     Hypertension Brother Domingo      Review of patient's allergies indicates:  No Known Allergies      ROS:  Review of Systems   Constitutional:  Positive for malaise/fatigue.   HENT: Negative.     Eyes: Negative.    Respiratory: Negative.  Negative for shortness of breath (Occasional).    Cardiovascular: Negative.  Negative for chest pain, palpitations, orthopnea, claudication, leg swelling and PND.   Gastrointestinal:  Positive for abdominal pain and constipation.   Genitourinary: Negative.    Musculoskeletal: Negative.  Negative for joint pain.   Skin: Negative.    Neurological: Negative.    Endo/Heme/Allergies: Negative.    Psychiatric/Behavioral: Negative.                                                                                                                                                                                  Negative except as stated in the history of present illness. See HPI for details.    PHYSICAL EXAM:  There were no vitals taken for this visit.      Physical Exam  Constitutional:       Appearance: Normal appearance. She is obese. She  is not ill-appearing.   HENT:      Head: Normocephalic.      Nose: Nose normal.   Eyes:      Extraocular Movements: Extraocular movements intact.   Neck:      Vascular: No carotid bruit.   Cardiovascular:      Rate and Rhythm: Normal rate and regular rhythm.      Pulses: Normal pulses.      Heart sounds: Normal heart sounds. No murmur heard.  Pulmonary:      Effort: Pulmonary effort is normal.   Abdominal:      General: There is no distension.   Musculoskeletal:         General: Normal range of motion.      Cervical back: Normal range of motion.      Right lower leg: No edema.      Left lower leg: No edema.   Skin:     General: Skin is warm.   Neurological:      General: No focal deficit present.      Mental Status: She is alert.   Psychiatric:         Mood and Affect: Mood normal.     Physical Exam: LIMITED DUE TO TELEMEDICINE RESTRICTIONS.  General: Alert and oriented, No acute distress.  Head: Normocephalic, Atraumatic.  Eye: Sclera non-icteric.  Neck/Thyroid:  Full range of motion.  Respiratory: Non-labored respirations, Symmetrical chest wall expansion.  Musculoskeletal: Normal range of motion.  Integumentary:  No visible suspicious lesions or rashes. No diaphoresis.   Neurologic: No focal deficits  Psychiatric: Normal interaction, Coherent speech, Euthymic mood, Appropriate affect     Current Outpatient Medications   Medication Instructions    ACCU-CHEK GUIDE ME GLUCOSE MTR Misc No dose, route, or frequency recorded.    albuterol (PROVENTIL/VENTOLIN HFA) 90 mcg/actuation inhaler 2 puffs, Inhalation, 4 times daily PRN    ALPRAZolam (XANAX) 1 mg, 3 times daily PRN    ARIPiprazole (ABILIFY) 5 mg, Daily    aspirin 81 mg, Oral, Daily    blood sugar diagnostic Strp To check BG 2 times daily, to use with insurance preferred meter E11.9    blood-glucose meter kit To check BG 2 times daily, to use with insurance preferred meter E11.9    clopidogreL (PLAVIX) 75 mg tablet   See Instructions, TAKE ONE TABLET BY  MOUTH EVERY DAY, # 90 tab(s), 3 Refill(s), Pharmacy: [facility] [id number] [id number], 160, cm, Height/Length Dosing, 09/01/21 13:52:00 CDT, 85, kg, Weight Dosing, 09/01/21 13:52:00 CDT    desvenlafaxine succinate (PRISTIQ) 100 MG Tb24 No dose, route, or frequency recorded.    doxepin (SINEQUAN) 50 mg, Nightly    ezetimibe (ZETIA) 10 mg, Oral, Daily    gabapentin (NEURONTIN) 600 mg, Oral, 3 times daily, As needed for back pain    glipiZIDE (GLUCOTROL) 20 mg, Oral, 2 times daily before meals, For Diabetes    isosorbide mononitrate (IMDUR) 60 mg, Oral, Daily    lancets Misc To check BG 2 times daily, to use with insurance preferred meter E11.9    linaCLOtide (LINZESS) 72 mcg, Oral, Before breakfast, For Constipation    losartan (COZAAR) 100 mg, Oral, Daily, For High Blood Pressure    metFORMIN (GLUCOPHAGE-XR) 1,000 mg, Oral, 2 times daily with meals    metoclopramide HCl (REGLAN) 5 mg, 4 times daily    metoprolol succinate (TOPROL-XL) 25 mg, Oral, 2 times daily    MOUNJARO 2.5 mg, Subcutaneous, Every 7 days, For Diabetes    nicotine (NICODERM CQ) 21 mg/24 hr 1 patch, Transdermal, Daily    nicotine (polacrilex) 2 mg, Buccal, As needed (PRN)    nitroGLYCERIN (NITROSTAT) 0.4 mg, Sublingual, Every 5 min PRN    OXcarbazepine (TRILEPTAL) 300 mg, 2 times daily    pantoprazole (PROTONIX) 40 mg, Oral, Daily, For Acid Reflux    rosuvastatin (CRESTOR) 40 mg, Oral, Daily    tiZANidine (ZANAFLEX) 4 mg, Oral, Every 8 hours        All medications, laboratory studies, cardiac diagnostic imaging reviewed.     Lab Results   Component Value Date    LDL 43.00 (L) 01/31/2024    LDL 58.00 01/03/2023    TRIG 174 01/30/2025    TRIG 241 (H) 07/30/2024    CREATININE 0.63 (L) 06/23/2025    MG 1.70 09/17/2024    K 4.4 06/23/2025        ASSESSMENT/PLAN:    CAD s/p CABG x2 in 2021  CABG complicated by wound dehiscence and infection with sternal flap placed  Now s/p Successful intervention of LCx ISR (September 2024)  - States that she is  feeling stable from a cardiac standpoint.  She denies any chest pain, SOB, ESPARZA-see HPI  - See operative reports from recent LHCs (September 2024)  - Reports significant improvement in symptoms following recent LHC   - Occasional SOB/ESPARZA, with overexertion. No recent chest pain or other anginal or anginal equivalent symptoms   - Has gained about 30 lbs over last 6 months. Reports having significant GI symptoms- constipation preventing her from bowel movement for 3-4 weeks at a time- strongly encouraged her to reach out to PCP about this   - Nuclear stress test as above- abnormal myocardial perfusion scan there was a moderate intensity, medium size, reversible perfusion abnormality in the mid to apical lateral apical walls in the typical distribution of the left circumflex territory.  See full report above  - Continue DAPT for at least 12 months, likely indefinitely - tolerating very well without any adverse bleeding effects   - Continue current medications as listed above  - Antianginals: Metoprolol, Imdur   - Continue SL nitro for as needed chest pain  - Echo revealed EF of 50 55%, grade 1 diastolic dysfunction, no significant valvular structural disease otherwise.  See full report above (September 2024)  - Strict ED precautions given  - No need for for further cardiac testing     Palpitations   -Resolved  -No indication for further testing today      Hypertension   - /82  - Continue current medication as above   -Counseled on the importance of following a low-sodium, heart healthy diet and exercise as tolerated     Hyperlipidemia   -LDL 30.8 per labs January 2025  -Continue rosuvastatin 40 mg daily, Zetia 10 mg daily   -Counseled on the importance of following a low-cholesterol, low-fat diet and exercise as tolerated     Nicotine dependence   -Encouraged smoking cessation  -Currently smoking 8 cigarettes per day and is interested in quitting   -Continue with Chantix and smoking cessation classes           Follow Up in Cardiology Clinic around July 2025   Follow up with PCP as directed   Please notify clinic if any new concerns or any change in symptoms   Please        No follow-ups on file. In addition to their scheduled follow up, the patient has also been instructed to follow up on as needed basis.     Future Appointments   Date Time Provider Department Center   7/21/2025  2:00 PM Yael Guerrero PA-C [facility] CARD [place]        Video Time Documentation:  Spent 10 minutes with patient face to face discussed health concerns. More than 50% of this time was spent in counseling and coordination of care.    Yael Guerrero PA-C                 [1]  Patient Active Problem List  Diagnosis    Coronary artery disease involving native coronary artery of native heart without angina pectoris    Other synovitis and tenosynovitis, unspecified hand    Anxiety    Benign paroxysmal positional vertigo    Chronic back pain    Chronic rhinitis    Gastroesophageal reflux disease with esophagitis    Diastasis of rectus abdominis    Primary hypertension    Leukocytosis    Microcytic hypochromic anemia    Mixed anxiety depressive disorder    Mixed hyperlipidemia    Obesity    Spondylosis of lumbar spine    Thrombocythemia    Type 2 diabetes mellitus with hyperglycemia, without long-term current use of insulin    Cigarette nicotine dependence without complication    Positive colorectal cancer screening using Cologuard test    Chest pain    Shortness of breath    Abnormal stress test    Chronic constipation    Urinary tract infection without hematuria    Nerve pain   [2]  Social History  Socioeconomic History    Marital status: Single   Tobacco Use    Smoking status: Every Day     Current packs/day: 1.00     Average packs/day: 0.4 packs/day for 31.5 years (12.8 ttl pk-yrs)     Types: Cigarettes, Vaping with nicotine     Start date: 1994     Last attempt to quit: 11/5/2024     Passive exposure:  Past    Smokeless tobacco: Never   Substance and Sexual Activity    Alcohol use: Not Currently    Drug use: Never    Sexual activity: Yes     Social Drivers of Health     Financial Resource Strain: Low Risk  (4/8/2025)    Overall Financial Resource Strain (CARDIA)     Difficulty of Paying Living Expenses: Not hard at all   Food Insecurity: No Food Insecurity (4/8/2025)    Hunger Vital Sign     Worried About Running Out of Food in the Last Year: Never true     Ran Out of Food in the Last Year: Never true   Transportation Needs: No Transportation Needs (4/8/2025)    PRAPARE - Transportation     Lack of Transportation (Medical): No     Lack of Transportation (Non-Medical): No   Physical Activity: Inactive (4/8/2025)    Exercise Vital Sign     Days of Exercise per Week: 0 days     Minutes of Exercise per Session: 10 min   Stress: No Stress Concern Present (4/8/2025)    [employer] [place] [employer] - Occupational Stress Questionnaire     Feeling of Stress : Not at all   Housing Stability: Low Risk  (4/8/2025)    Housing Stability Vital Sign     Unable to Pay for Housing in the Last Year: No     Number of Times Moved in the Last Year: 0     Homeless in the Last Year: No

## 2025-07-21 ENCOUNTER — OFFICE VISIT (OUTPATIENT)
Dept: CARDIOLOGY | Facility: CLINIC | Age: 50
End: 2025-07-21
Payer: MEDICAID

## 2025-07-21 VITALS
BODY MASS INDEX: 33.15 KG/M2 | TEMPERATURE: 99 F | RESPIRATION RATE: 18 BRPM | OXYGEN SATURATION: 96 % | HEART RATE: 95 BPM | HEIGHT: 64 IN | SYSTOLIC BLOOD PRESSURE: 120 MMHG | WEIGHT: 194.19 LBS | DIASTOLIC BLOOD PRESSURE: 71 MMHG

## 2025-07-21 DIAGNOSIS — I25.10 CORONARY ARTERY DISEASE INVOLVING NATIVE CORONARY ARTERY OF NATIVE HEART WITHOUT ANGINA PECTORIS: ICD-10-CM

## 2025-07-21 DIAGNOSIS — I10 PRIMARY HYPERTENSION: Primary | ICD-10-CM

## 2025-07-21 DIAGNOSIS — E66.9 OBESITY, UNSPECIFIED CLASS, UNSPECIFIED OBESITY TYPE, UNSPECIFIED WHETHER SERIOUS COMORBIDITY PRESENT: ICD-10-CM

## 2025-07-21 DIAGNOSIS — E11.65 TYPE 2 DIABETES MELLITUS WITH HYPERGLYCEMIA, WITHOUT LONG-TERM CURRENT USE OF INSULIN: ICD-10-CM

## 2025-07-21 DIAGNOSIS — E78.2 MIXED HYPERLIPIDEMIA: ICD-10-CM

## 2025-07-21 DIAGNOSIS — I25.10 CORONARY ARTERY DISEASE, UNSPECIFIED VESSEL OR LESION TYPE, UNSPECIFIED WHETHER ANGINA PRESENT, UNSPECIFIED WHETHER NATIVE OR TRANSPLANTED HEART: ICD-10-CM

## 2025-07-21 DIAGNOSIS — F17.210 CIGARETTE NICOTINE DEPENDENCE WITHOUT COMPLICATION: ICD-10-CM

## 2025-07-21 PROCEDURE — 3074F SYST BP LT 130 MM HG: CPT | Mod: CPTII,,,

## 2025-07-21 PROCEDURE — 1160F RVW MEDS BY RX/DR IN RCRD: CPT | Mod: CPTII,,,

## 2025-07-21 PROCEDURE — 4010F ACE/ARB THERAPY RXD/TAKEN: CPT | Mod: CPTII,,,

## 2025-07-21 PROCEDURE — 3061F NEG MICROALBUMINURIA REV: CPT | Mod: CPTII,,,

## 2025-07-21 PROCEDURE — 99214 OFFICE O/P EST MOD 30 MIN: CPT | Mod: S$PBB,,,

## 2025-07-21 PROCEDURE — 93005 ELECTROCARDIOGRAM TRACING: CPT

## 2025-07-21 PROCEDURE — 1159F MED LIST DOCD IN RCRD: CPT | Mod: CPTII,,,

## 2025-07-21 PROCEDURE — 3066F NEPHROPATHY DOC TX: CPT | Mod: CPTII,,,

## 2025-07-21 PROCEDURE — 3008F BODY MASS INDEX DOCD: CPT | Mod: CPTII,,,

## 2025-07-21 PROCEDURE — 99214 OFFICE O/P EST MOD 30 MIN: CPT | Mod: PBBFAC

## 2025-07-21 PROCEDURE — 3046F HEMOGLOBIN A1C LEVEL >9.0%: CPT | Mod: CPTII,,,

## 2025-07-21 PROCEDURE — 3078F DIAST BP <80 MM HG: CPT | Mod: CPTII,,,

## 2025-07-21 RX ORDER — ISOSORBIDE MONONITRATE 60 MG/1
60 TABLET, EXTENDED RELEASE ORAL DAILY
Qty: 90 TABLET | Refills: 2 | Status: SHIPPED | OUTPATIENT
Start: 2025-07-21 | End: 2026-07-12

## 2025-07-21 NOTE — PATIENT INSTRUCTIONS
EKG Today   Follow up in cardiology clinic in approximately 6 months or sooner if needed   Follow up with PCP as directed   Please notify clinic if any new concerns or any change in symptoms

## 2025-07-21 NOTE — PROGRESS NOTES
CHIEF COMPLAINT:   Chief Complaint   Patient presents with    Follow-up     5 mos f/u                                                  HPI:  Kaye Spring 49 y.o. female  PMHx includes  CAD with MI in February 2019, with stenting now s/p CABG LIMA->LAD in 6/2021 complicated by wound dehiscence and infection treated, HTN, T2DM, HLD, chronic lower back pain, ventral hernia, AUB, endometriosis, depression with anxiety, and tobacco use.  Patient presents for follow up and ongoing care. Nuclear stress test revealed an abnormal myocardial perfusion scan.  There was a moderate intensity, medium size, reversible perfusion abnormality in the mid to apical lateral apical walls in the typical distribution of the left circumflex territory.  See full report below. She  LHC on 9.9.24 at [facility] which revealed significant two-vessel coronary artery disease and complex PCI was recommended.  Subsequently, on 9.16.24 patient underwent emergent LHC with successful intervention of the LCx ISR.  She was discharged from the hospital with the appropriate CAD medications and will remain on DAPT for at least 1 year.  See full operative report below.      Today the patient states that she feels stable from a cardiac standpoint.  She states that her symptoms have not progressed whatsoever.  She continues to have occasional SOB/ESPARZA, but typically with over exertion or over doing it.  She has no issues with her regular activity or ADLs.  She reports very rare chest discomfort.  Otherwise she denied any further cardiac complaints today such as palpitations, orthopnea, PND, lower extremity edema, or claudication symptoms.  She states that she has occasional lightheadedness and dizziness if she takes Xanax with her other medications in the evening.  Discussed holding Xanax at night or adjusting how she takes, so that she does not become too lightheaded or dizzy to the point of syncope.  She states that she will try adjusting the medications.  Also  encouraged her to discuss this with PCP, as Xanax may not be the right medication for her anymore.  She is able to complete her ADLs without any issues or ischemic symptoms.  Strongly encouraged more physical activity and exercise if she is able to.  She reports compliance with all her current medications and she states that she is tolerating them well.  She continues to take both aspirin and Plavix and denies any adverse bleeding effects while on DAPT.  She has decreased smoking to approximately 8 cigarettes per day and is working towards complete cessation.                                                                                                                                                                                                                                                                                                                                                                                                                                                                                       CARDIAC TESTING:  Results for orders placed during the hospital encounter of 09/04/24    Echo    Interpretation Summary    Left Ventricle: The left ventricle is normal in size. There is low normal systolic function with a visually estimated ejection fraction of 50 - 55%. Grade I diastolic dysfunction.    Right Ventricle: Right ventricle was not well visualized due to poor acoustic window. Normal right ventricular cavity size. Systolic function is borderline low.    Less than mild valvular stenosis/regurgitation.    Unable to determine PASP.    Results for orders placed during the hospital encounter of 08/21/24    Nuclear Stress - Cardiology Interpreted    Interpretation Summary    Abnormal myocardial perfusion scan.    moderate intensity, medium sized,  in the  mid to apical lateral apical wall(s) in the typical distribution of the LCX territory.    There are no other significant perfusion  abnormalities.    The gated perfusion images showed an ejection fraction of 77% at rest.    The patient reported no chest pain during the stress test.    There were no arrhythmias during stress.    The nuclear stress test is  fair quality.  On the nuclear stress test the EF is normal.  If the patient has an echo, the EF on the echo is considered more accurate.   The EF drops from rest to stress.    The patient has a moderate risk nuclear stress test due to lateral and apical reversible defect.    If clinically indicated, consider further evaluation with coronary angiogram.     Results for orders placed during the hospital encounter of 09/14/24    Cardiac catheterization    Conclusion    There is severe coronary artery disease. Successful intervention of LCx ISR as outlined below.    Mid Left Circumflex has diffuse 95% in-stent restenosis. This was the culprit lesion. The lesion was successfully treated with Laser atherectomy using a 1.4 mm device, an NC EMERGE MR 2.5X30MM angioplasty balloon inflated to high pressure, a Scoreflex 2.5X20MM cutting balloon, and finally a 2.45F52SM drug-coated balloon for a full 60 second inflation.    Intravascular Ultrasound (IVUS) was performed prior to LCx intervention to further characterize the lesion    Second Obtuse Marginal Branch has a 70% stenosis. This branch vessel was protected with a wire during the LCx intervention, and was treated with a 2.0x15 angioplasty balloon. There remains a 60% ostial stenosis and LUDIN grade 3 flow in the distal vessel.    Procedure:  Left heart catheterization with coronary angiography  Percutaneous coronary intervention    Preoperative diagnosis:  Unstable angina    Postoperative diagnosis:  Successful PCI    Access:  Right common femoral artery    Estimated blood loss: 10 cc  Complications: None    Summary:  Consent obtained. Risks and benefits discussed with the patient. The patient was brought to the cath lab in a fasting state. The patient  was prepped and draped in usual sterile fashion. A preprocedure time-out was performed.    Ultrasound-guided right common femoral arterial access via modified Seldinger technique using a micropuncture kit. A 6 [place] sheath was inserted into the right common femoral artery.    A 6 [place] EBU 3.5 catheter was used for the intervention.  The left circumflex lesion was wired with a runthrough wire.  Laser atherectomy was performed on the LCx ISR using 1.4 mm device. The 2nd obtuse marginal branch was wired with a Prowater wire. Balloon angioplasty was performed on the ostial 2nd obtuse marginal branch lesion with a 2.0 x 15 mm angioplasty balloon. Intravascular Ultrasound (IVUS) was performed prior to LCx intervention to further characterize the LCx lesion. The LCx lesion was treated with an NC CellSpin MR 2.5X30MM angioplasty balloon inflated to high pressure. Further LCx angioplasty was performed with a Scoreflex 2.5X20MM cutting balloon. Finally, the LCx lesion was treated with a 2.5X30MM drug coated balloon. Intracoronary nitroglycerin was administered throughout the case to increase outflow in the distal LCx.    At the end the procedure, all wires and catheters were removed.  Hemostasis achieved with manual compression.    Findings:  - There is severe coronary artery disease. Successful intervention of LCx ISR as outlined below.  - Mid Left Circumflex has diffuse 95% in-stent restenosis. This was the culprit lesion. The lesion was successfully treated with Laser atherectomy using a 1.4 mm device, an NC EMERGE MR 2.5X30MM angioplasty balloon inflated to high pressure, a Scoreflex 2.5X20MM cutting balloon, and finally a 2.33N51QE drug-coated balloon for a full 60 second inflation.  - Intravascular Ultrasound (IVUS) was performed prior to LCx intervention to further characterize the lesion  - Second Obtuse Marginal Branch has a 70% stenosis. This branch vessel was protected with a wire during the LCx intervention, and  was treated with a 2.0x15 angioplasty balloon. There remains a 60% ostial stenosis and LUDIN grade 3 flow in the distal vessel.    Assessment/Plan:  - Patient is a 48 y.o. female with a history of CAD/CABG presents with unstable angina. Staged PCI of LCx ISR was successful as noted above. Outflow vessel is small, but will hopefully improve overtime given increased flow following ISR intervention.  - Continue DAPT for a minimum of 12 months and aspirin indefinitely thereafter  - High intensity statin  - Continue Aggrastat for 12 hours post-procedure  - Arterial sheath remains place; plan is to remove when activated clotting time (ACT) less than 180 seconds. Bedrest for 4 hours after hemostasis is achieved.    Tyler Robles MD  Interventional Cardiology/Structural Heart Disease  Cardiovascular [facility]       Problem List[1]  Past Surgical History:   Procedure Laterality Date    ANGIOGRAM, CORONARY, WITH LEFT HEART CATHETERIZATION N/A 09/09/2024    Procedure: Angiogram, Coronary, with Left Heart Cath;  Surgeon: Tyler Stevenson MD;  Location: [facility] CATH LAB;  Service: Cardiology;  Laterality: N/A;    CARDIAC CATHETERIZATION      CARDIAC VALVE REPLACEMENT      CHOLECYSTECTOMY      CORONARY ANGIOPLASTY      CORONARY ANGIOPLASTY WITH STENT PLACEMENT N/A 09/16/2024    Procedure: ANGIOPLASTY, CORONARY ARTERY, WITH STENT INSERTION;  Surgeon: Tyler Robles Jr., MD;  Location: [facility] CATH LAB;  Service: Cardiology;  Laterality: N/A;    CORONARY ARTERY BYPASS GRAFT      HYSTERECTOMY      partial hyst at age 44    right wrist surgery      TUBAL LIGATION       Social History[2]     Family History   Problem Relation Name Age of Onset    Arrhythmia Mother Marianela     Clotting disorder Mother Marianela     Heart disease Mother Marianela     Heart attack Mother Marianela     Hyperlipidemia Mother Marianela     Heart failure Mother Marianela     Stroke Mother Marianela     Hypertension Mother Marianela     Diabetes Mother Marianela      "Depression Mother Marianela     Lymphoma Mother Marianela     Breast cancer Maternal Aunt  40    Lung cancer Maternal Aunt      Hyperlipidemia Brother Domingo     Hypertension Brother Domingo      Review of patient's allergies indicates:  No Known Allergies      ROS:  Review of Systems   Constitutional:  Negative for malaise/fatigue.   HENT: Negative.     Eyes: Negative.    Respiratory: Negative.  Negative for shortness of breath (Occasional).    Cardiovascular: Negative.  Negative for chest pain, palpitations, orthopnea, claudication, leg swelling and PND.   Gastrointestinal:  Negative for abdominal pain and constipation.   Genitourinary: Negative.    Musculoskeletal: Negative.  Negative for joint pain.   Skin: Negative.    Neurological: Negative.    Endo/Heme/Allergies: Negative.    Psychiatric/Behavioral: Negative.     All other systems reviewed and are negative.                                                                                                                                                                               Negative except as stated in the history of present illness. See HPI for details.    PHYSICAL EXAM:  Visit Vitals  /71 (BP Location: Right arm, Patient Position: Sitting)   Pulse 95   Temp 98.5 °F (36.9 °C) (Oral)   Resp 18   Ht 5' 4" (1.626 m)   Wt 88.1 kg (194 lb 3.2 oz)   SpO2 96%   BMI 33.33 kg/m²       Physical Exam  Vitals reviewed.   Constitutional:       Appearance: Normal appearance. She is obese. She is not ill-appearing.   HENT:      Head: Normocephalic.      Nose: Nose normal.   Eyes:      Extraocular Movements: Extraocular movements intact.   Neck:      Vascular: No carotid bruit.   Cardiovascular:      Rate and Rhythm: Normal rate and regular rhythm.      Pulses: Normal pulses.      Heart sounds: Normal heart sounds. No murmur heard.  Pulmonary:      Effort: Pulmonary effort is normal.   Abdominal:      General: There is no distension.   Musculoskeletal:         General: " Normal range of motion.      Cervical back: Normal range of motion.      Right lower leg: No edema.      Left lower leg: No edema.   Skin:     General: Skin is warm.   Neurological:      General: No focal deficit present.      Mental Status: She is alert.   Psychiatric:         Mood and Affect: Mood normal.       Physical Exam:   General: Alert and oriented, No acute distress.  Head: Normocephalic, Atraumatic.  Eye: Sclera non-icteric.  Neck/Thyroid:  Full range of motion.  Respiratory: Non-labored respirations, Symmetrical chest wall expansion.  Musculoskeletal: Normal range of motion.  Integumentary:  No visible suspicious lesions or rashes. No diaphoresis.   Neurologic: No focal deficits  Psychiatric: Normal interaction, Coherent speech, Euthymic mood, Appropriate affect     Current Outpatient Medications   Medication Instructions    ACCU-CHEK GUIDE ME GLUCOSE MTR Misc No dose, route, or frequency recorded.    albuterol (PROVENTIL/VENTOLIN HFA) 90 mcg/actuation inhaler 2 puffs, Inhalation, 4 times daily PRN    ALPRAZolam (XANAX) 1 mg, 3 times daily PRN    ARIPiprazole (ABILIFY) 5 mg, Daily    aspirin 81 mg, Oral, Daily    blood sugar diagnostic Strp To check BG 2 times daily, to use with insurance preferred meter E11.9    blood-glucose meter kit To check BG 2 times daily, to use with insurance preferred meter E11.9    clopidogreL (PLAVIX) 75 mg tablet   See Instructions, TAKE ONE TABLET BY MOUTH EVERY DAY, # 90 tab(s), 3 Refill(s), Pharmacy: [facility] [id number] [id number], 160, cm, Height/Length Dosing, 09/01/21 13:52:00 CDT, 85, kg, Weight Dosing, 09/01/21 13:52:00 CDT    desvenlafaxine succinate (PRISTIQ) 100 MG Tb24 No dose, route, or frequency recorded.    doxepin (SINEQUAN) 50 mg, Nightly    ezetimibe (ZETIA) 10 mg, Oral, Daily    gabapentin (NEURONTIN) 600 mg, Oral, 3 times daily, As needed for back pain    glipiZIDE (GLUCOTROL) 20 mg, Oral, 2 times daily before meals, For Diabetes    isosorbide mononitrate (IMDUR)  60 mg, Oral, Daily    lancets Misc To check BG 2 times daily, to use with insurance preferred meter E11.9    linaCLOtide (LINZESS) 72 mcg, Oral, Before breakfast, For Constipation    losartan (COZAAR) 100 mg, Oral, Daily, For High Blood Pressure    metFORMIN (GLUCOPHAGE-XR) 1,000 mg, Oral, 2 times daily with meals    metoclopramide HCl (REGLAN) 5 mg, 4 times daily    metoprolol succinate (TOPROL-XL) 25 mg, Oral, 2 times daily    MOUNJARO 2.5 mg, Subcutaneous, Every 7 days, For Diabetes    nicotine (NICODERM CQ) 21 mg/24 hr 1 patch, Transdermal, Daily    nicotine (polacrilex) 2 mg, Buccal, As needed (PRN)    nitroGLYCERIN (NITROSTAT) 0.4 mg, Sublingual, Every 5 min PRN    OXcarbazepine (TRILEPTAL) 300 mg, 2 times daily    pantoprazole (PROTONIX) 40 mg, Oral, Daily, For Acid Reflux    rosuvastatin (CRESTOR) 40 mg, Oral, Daily    tiZANidine (ZANAFLEX) 4 mg, Oral, Every 8 hours        All medications, laboratory studies, cardiac diagnostic imaging reviewed.     Lab Results   Component Value Date    LDL 43.00 (L) 01/31/2024    LDL 58.00 01/03/2023    TRIG 174 01/30/2025    TRIG 241 (H) 07/30/2024    CREATININE 0.63 (L) 06/23/2025    MG 1.70 09/17/2024    K 4.4 06/23/2025        ASSESSMENT/PLAN:    CAD s/p CABG x2 in 2021  CABG complicated by wound dehiscence and infection with sternal flap placed  Now s/p Successful intervention of LCx ISR (September 2024)  - States that she is feeling stable since her last office visit  - Reports stable SOB, ESPARZA that usually only occur with overexertion -see HPI; rare CP   - See operative reports from recent [facility] (September 2024)  - Reports significant improvement in symptoms following recent [facility]   - Nuclear stress test as above- abnormal myocardial perfusion scan there was a moderate intensity, medium size, reversible perfusion abnormality in the mid to apical lateral apical walls in the typical distribution of the left circumflex territory.  See full report above  - Continue DAPT for  at least 12 months, likely indefinitely - tolerating very well without any adverse bleeding effects   - Continue current medications as listed above  - Antianginals: Metoprolol, Imdur   - Continue SL nitro for as needed chest pain  - Echo revealed EF of 50 55%, grade 1 diastolic dysfunction, no significant valvular structural disease otherwise.  See full report above (September 2024)  - Strict ED precautions given  - No need for for further cardiac testing  - EKG today revealed NSR, T-wave abnormality, vent rate 91 beats per minute     Palpitations   -Resolved- no symptoms from last visit   -No indication for further testing today      Hypertension   - /71  - Continue current medication as above   -Counseled on the importance of following a low-sodium, heart healthy diet and exercise as tolerated     Hyperlipidemia   -LDL 30.8 per labs January 2025  -Continue rosuvastatin 40 mg daily, Zetia 10 mg daily   -Counseled on the importance of following a low-cholesterol, low-fat diet and exercise as tolerated     Nicotine dependence   -Encouraged smoking cessation  -Currently smoking 8 cigarettes per day and is interested in quitting   -Continue with Chantix and smoking cessation classes        EKG Today   Follow up in cardiology clinic in approximately 6 months or sooner if needed   Follow up with PCP as directed   Please notify clinic if any new concerns or any change in symptoms           [1]   Patient Active Problem List  Diagnosis    Coronary artery disease involving native coronary artery of native heart without angina pectoris    Other synovitis and tenosynovitis, unspecified hand    Anxiety    Benign paroxysmal positional vertigo    Chronic back pain    Chronic rhinitis    Gastroesophageal reflux disease with esophagitis    Diastasis of rectus abdominis    Primary hypertension    Leukocytosis    Microcytic hypochromic anemia    Mixed anxiety depressive disorder    Mixed hyperlipidemia    Obesity     Spondylosis of lumbar spine    Thrombocythemia    Type 2 diabetes mellitus with hyperglycemia, without long-term current use of insulin    Cigarette nicotine dependence without complication    Positive colorectal cancer screening using Cologuard test    Chest pain    Shortness of breath    Abnormal stress test    Chronic constipation    Urinary tract infection without hematuria    Nerve pain   [2]   Social History  Socioeconomic History    Marital status: Single   Tobacco Use    Smoking status: Every Day     Current packs/day: 1.00     Average packs/day: 0.4 packs/day for 31.6 years (12.8 ttl pk-yrs)     Types: Cigarettes     Start date: 1994     Passive exposure: Past    Smokeless tobacco: Never   Substance and Sexual Activity    Alcohol use: Not Currently    Drug use: Never    Sexual activity: Yes     Social Drivers of Health     Financial Resource Strain: Low Risk  (4/8/2025)    Overall Financial Resource Strain (CARDIA)     Difficulty of Paying Living Expenses: Not hard at all   Food Insecurity: No Food Insecurity (4/8/2025)    Hunger Vital Sign     Worried About Running Out of Food in the Last Year: Never true     Ran Out of Food in the Last Year: Never true   Transportation Needs: No Transportation Needs (4/8/2025)    PRAPARE - Transportation     Lack of Transportation (Medical): No     Lack of Transportation (Non-Medical): No   Physical Activity: Inactive (4/8/2025)    Exercise Vital Sign     Days of Exercise per Week: 0 days     Minutes of Exercise per Session: 10 min   Stress: No Stress Concern Present (4/8/2025)    [place] [employer] [place] [employer] - Occupational Stress Questionnaire     Feeling of Stress : Not at all   Housing Stability: Low Risk  (4/8/2025)    Housing Stability Vital Sign     Unable to Pay for Housing in the Last Year: No     Number of Times Moved in the Last Year: 0     Homeless in the Last Year: No

## 2025-07-22 LAB
OHS QRS DURATION: 96 MS
OHS QTC CALCULATION: 455 MS

## 2025-07-25 ENCOUNTER — TELEPHONE (OUTPATIENT)
Dept: INTERNAL MEDICINE | Facility: CLINIC | Age: 50
End: 2025-07-25
Payer: MEDICAID

## 2025-07-25 NOTE — TELEPHONE ENCOUNTER
----- Message from JOSE RAFAEL Calvin sent at 7/22/2025 11:18 AM CDT -----  Please check on the PA for this patient's Mounjaro.   Thanks,    JOSE RAFAEL Calvin  ----- Message -----  From: Flory De La Cruz FNP  Sent: 7/21/2025  12:00 AM CDT  To: JOSE RAFAEL Vu    Recheck on PA for Mounjaro

## 2025-08-08 DIAGNOSIS — E11.65 TYPE 2 DIABETES MELLITUS WITH HYPERGLYCEMIA, WITHOUT LONG-TERM CURRENT USE OF INSULIN: ICD-10-CM

## 2025-08-08 RX ORDER — TIRZEPATIDE 2.5 MG/.5ML
INJECTION, SOLUTION SUBCUTANEOUS
COMMUNITY
Start: 2025-07-18 | End: 2025-08-11

## 2025-08-11 RX ORDER — TIRZEPATIDE 5 MG/.5ML
5 INJECTION, SOLUTION SUBCUTANEOUS
Qty: 2 ML | Refills: 0 | Status: SHIPPED | OUTPATIENT
Start: 2025-08-11 | End: 2025-09-10

## 2025-08-19 ENCOUNTER — LAB VISIT (OUTPATIENT)
Dept: LAB | Facility: HOSPITAL | Age: 50
End: 2025-08-19
Attending: NURSE PRACTITIONER
Payer: MEDICAID

## 2025-08-19 DIAGNOSIS — E11.65 TYPE 2 DIABETES MELLITUS WITH HYPERGLYCEMIA, WITHOUT LONG-TERM CURRENT USE OF INSULIN: ICD-10-CM

## 2025-08-19 DIAGNOSIS — E11.65 TYPE 2 DIABETES MELLITUS WITH HYPERGLYCEMIA, WITHOUT LONG-TERM CURRENT USE OF INSULIN: Primary | ICD-10-CM

## 2025-08-19 LAB
ANION GAP SERPL CALC-SCNC: 10 MEQ/L
BILIRUB UR QL STRIP.AUTO: NEGATIVE
BUN SERPL-MCNC: 4 MG/DL (ref 9.8–20.1)
CALCIUM SERPL-MCNC: 9.1 MG/DL (ref 8.4–10.2)
CHLORIDE SERPL-SCNC: 106 MMOL/L (ref 98–107)
CLARITY UR: CLEAR
CO2 SERPL-SCNC: 23 MMOL/L (ref 22–29)
COLOR UR AUTO: YELLOW
CREAT SERPL-MCNC: 0.7 MG/DL (ref 0.55–1.02)
CREAT/UREA NIT SERPL: 6
EST. AVERAGE GLUCOSE BLD GHB EST-MCNC: 194.4 MG/DL
GFR SERPLBLD CREATININE-BSD FMLA CKD-EPI: >90 ML/MIN/1.73/M2
GLUCOSE SERPL-MCNC: 218 MG/DL (ref 74–100)
GLUCOSE UR QL STRIP: 100
HBA1C MFR BLD: 8.4 %
HGB UR QL STRIP: NEGATIVE
KETONES UR QL STRIP: NEGATIVE
LEUKOCYTE ESTERASE UR QL STRIP: NEGATIVE
NITRITE UR QL STRIP: NEGATIVE
PH UR STRIP: 6 [PH]
POTASSIUM SERPL-SCNC: 4 MMOL/L (ref 3.5–5.1)
PROT UR QL STRIP: NEGATIVE
SODIUM SERPL-SCNC: 139 MMOL/L (ref 136–145)
SP GR UR STRIP.AUTO: 1.01 (ref 1–1.03)
UROBILINOGEN UR STRIP-ACNC: 0.2

## 2025-08-19 PROCEDURE — 36415 COLL VENOUS BLD VENIPUNCTURE: CPT

## 2025-08-19 PROCEDURE — 81003 URINALYSIS AUTO W/O SCOPE: CPT

## 2025-08-19 PROCEDURE — 83036 HEMOGLOBIN GLYCOSYLATED A1C: CPT

## 2025-08-19 PROCEDURE — 80048 BASIC METABOLIC PNL TOTAL CA: CPT

## 2025-08-22 ENCOUNTER — OFFICE VISIT (OUTPATIENT)
Dept: INTERNAL MEDICINE | Facility: CLINIC | Age: 50
End: 2025-08-22
Payer: MEDICAID

## 2025-08-22 DIAGNOSIS — M54.50 CHRONIC LOW BACK PAIN, UNSPECIFIED BACK PAIN LATERALITY, UNSPECIFIED WHETHER SCIATICA PRESENT: ICD-10-CM

## 2025-08-22 DIAGNOSIS — E11.65 TYPE 2 DIABETES MELLITUS WITH HYPERGLYCEMIA, WITHOUT LONG-TERM CURRENT USE OF INSULIN: Primary | ICD-10-CM

## 2025-08-22 DIAGNOSIS — G89.29 CHRONIC LOW BACK PAIN, UNSPECIFIED BACK PAIN LATERALITY, UNSPECIFIED WHETHER SCIATICA PRESENT: ICD-10-CM

## 2025-08-22 DIAGNOSIS — I25.10 CORONARY ARTERY DISEASE, UNSPECIFIED VESSEL OR LESION TYPE, UNSPECIFIED WHETHER ANGINA PRESENT, UNSPECIFIED WHETHER NATIVE OR TRANSPLANTED HEART: ICD-10-CM

## 2025-08-22 DIAGNOSIS — K59.09 CHRONIC CONSTIPATION: ICD-10-CM

## 2025-08-22 RX ORDER — TIZANIDINE 4 MG/1
4 TABLET ORAL EVERY 8 HOURS
Qty: 270 TABLET | Refills: 1 | Status: SHIPPED | OUTPATIENT
Start: 2025-08-22

## 2025-08-22 RX ORDER — METOPROLOL SUCCINATE 25 MG/1
25 TABLET, EXTENDED RELEASE ORAL 2 TIMES DAILY
Qty: 90 TABLET | Refills: 3 | Status: SHIPPED | OUTPATIENT
Start: 2025-08-22

## 2025-08-27 ENCOUNTER — TELEPHONE (OUTPATIENT)
Dept: INTERNAL MEDICINE | Facility: CLINIC | Age: 50
End: 2025-08-27
Payer: MEDICAID

## 2025-08-27 DIAGNOSIS — K59.09 CHRONIC CONSTIPATION: ICD-10-CM

## 2025-08-27 DIAGNOSIS — K58.1 IRRITABLE BOWEL SYNDROME WITH CONSTIPATION: Primary | ICD-10-CM

## 2025-09-03 ENCOUNTER — TELEPHONE (OUTPATIENT)
Facility: CLINIC | Age: 50
End: 2025-09-03
Payer: MEDICAID

## (undated) DEVICE — VALVE CONTROL COPILOT

## (undated) DEVICE — SHEATH INTRODUCER 5FR 10CM

## (undated) DEVICE — GUIDE LAUNCHER 6FR EBU 3.75

## (undated) DEVICE — CATH IMPULSE MP 5FR 145CM

## (undated) DEVICE — NDL BLUNT FILL 18G 1IN

## (undated) DEVICE — GUIDEWIRE RUNTHROUGH EF 180CM

## (undated) DEVICE — INTRO KIT MICPUNC STIFF CANN

## (undated) DEVICE — Device

## (undated) DEVICE — COVER CIV-FLEX PROBE US 58IN

## (undated) DEVICE — GUIDEWIRE INQWIRE SE 3MM JTIP

## (undated) DEVICE — PAD HEARTSTART DEFIB ADULT

## (undated) DEVICE — PAD DEFIB CADENCE ADULT R2

## (undated) DEVICE — CATH NC EMERGE MR 2.5X30MM

## (undated) DEVICE — GUIDE LAUNCHER 7FR EBU 3.5

## (undated) DEVICE — GUIDEWIRE PROWATER .014X180CM

## (undated) DEVICE — SHEATH INTRODUCER 6FR 11CM

## (undated) DEVICE — CATH EAGLE EYE PLATINUM

## (undated) DEVICE — SHEATH BRITE TIP 7FR 23CM

## (undated) DEVICE — CATH JR4 5FR

## (undated) DEVICE — SET ANGIO ACIST CVI ANGIOTOUCH

## (undated) DEVICE — SHEATH INTRODUCER 7FR 11CM

## (undated) DEVICE — OMNIPAQUE 350MG 150ML VIAL

## (undated) DEVICE — CATH EMERGE MR 15 X 2.00

## (undated) DEVICE — GUIDE LAUNCHER 6FR EBU 3.5

## (undated) DEVICE — GUIDEWIRE EMERALD .035IN 260CM

## (undated) DEVICE — DRESSING TEGADERM 4.4X5IN

## (undated) DEVICE — GUIDEWIRE EMERALD 3MM 175X5CM

## (undated) DEVICE — INTRODUCER VASC RADPQ 5FRX10CM

## (undated) DEVICE — CATH LASER 1.4 ELCA

## (undated) DEVICE — KIT MINI STK MAX COAX 5FR 10CM

## (undated) DEVICE — CATH JL4 5FR

## (undated) DEVICE — DRSNG POLYSKIN TRNSPAR 4X4.75

## (undated) DEVICE — DRAPE RADIAL BRACHIAL 29X42IN

## (undated) DEVICE — CANNULA NASAL ADULT

## (undated) DEVICE — CATH IMA INFINITI 5X100CM

## (undated) DEVICE — INTRODUCER TRNSRADIAL 6F 10CM

## (undated) DEVICE — DEVICE MYNX CONTROL 5FR 10ML